# Patient Record
Sex: MALE | Race: WHITE | NOT HISPANIC OR LATINO | Employment: OTHER | URBAN - METROPOLITAN AREA
[De-identification: names, ages, dates, MRNs, and addresses within clinical notes are randomized per-mention and may not be internally consistent; named-entity substitution may affect disease eponyms.]

---

## 2017-02-08 ENCOUNTER — ANTICOAGULATION THERAPY VISIT (OUTPATIENT)
Dept: FAMILY MEDICINE | Facility: CLINIC | Age: 72
End: 2017-02-08

## 2017-02-08 DIAGNOSIS — B19.20 HEPATITIS C: ICD-10-CM

## 2017-02-08 DIAGNOSIS — E11.9 TYPE 2 DIABETES MELLITUS WITHOUT COMPLICATION (H): ICD-10-CM

## 2017-02-08 DIAGNOSIS — I50.9 CHF (NYHA CLASS II, ACC/AHA STAGE C) (H): ICD-10-CM

## 2017-02-08 DIAGNOSIS — I48.20 CHRONIC ATRIAL FIBRILLATION (H): ICD-10-CM

## 2017-02-08 DIAGNOSIS — I48.20 CHRONIC ATRIAL FIBRILLATION (H): Primary | ICD-10-CM

## 2017-02-08 LAB
ALBUMIN SERPL-MCNC: 3.8 G/DL (ref 3.4–5)
ALP SERPL-CCNC: 156 U/L (ref 40–150)
ALT SERPL W P-5'-P-CCNC: 36 U/L (ref 0–70)
ANION GAP SERPL CALCULATED.3IONS-SCNC: 5 MMOL/L (ref 3–14)
AST SERPL W P-5'-P-CCNC: 30 U/L (ref 0–45)
BILIRUB SERPL-MCNC: 1.1 MG/DL (ref 0.2–1.3)
BUN SERPL-MCNC: 12 MG/DL (ref 7–30)
CALCIUM SERPL-MCNC: 9.4 MG/DL (ref 8.5–10.1)
CHLORIDE SERPL-SCNC: 99 MMOL/L (ref 94–109)
CO2 SERPL-SCNC: 33 MMOL/L (ref 20–32)
CREAT SERPL-MCNC: 0.96 MG/DL (ref 0.66–1.25)
DIGOXIN SERPL-MCNC: 0.5 UG/L (ref 0.5–2)
GFR SERPL CREATININE-BSD FRML MDRD: 77 ML/MIN/1.7M2
GLUCOSE SERPL-MCNC: 289 MG/DL (ref 70–99)
HBA1C MFR BLD: 10.2 % (ref 4.3–6)
INR PPP: 3.6 (ref 0.86–1.14)
POTASSIUM SERPL-SCNC: 5.1 MMOL/L (ref 3.4–5.3)
PROT SERPL-MCNC: 7.5 G/DL (ref 6.8–8.8)
SODIUM SERPL-SCNC: 137 MMOL/L (ref 133–144)

## 2017-02-08 PROCEDURE — 87522 HEPATITIS C REVRS TRNSCRPJ: CPT | Performed by: INTERNAL MEDICINE

## 2017-02-08 PROCEDURE — 85610 PROTHROMBIN TIME: CPT | Performed by: INTERNAL MEDICINE

## 2017-02-08 PROCEDURE — 80053 COMPREHEN METABOLIC PANEL: CPT | Performed by: INTERNAL MEDICINE

## 2017-02-08 PROCEDURE — 80162 ASSAY OF DIGOXIN TOTAL: CPT | Performed by: INTERNAL MEDICINE

## 2017-02-08 PROCEDURE — 36415 COLL VENOUS BLD VENIPUNCTURE: CPT | Performed by: INTERNAL MEDICINE

## 2017-02-08 PROCEDURE — 83036 HEMOGLOBIN GLYCOSYLATED A1C: CPT | Performed by: INTERNAL MEDICINE

## 2017-02-08 NOTE — PROGRESS NOTES
Quick Note:    Dear Keith,    Your recent test results are attached.     Digoxin level is stable.    If you have any questions please feel free to contact [phone] or myself via [employer].    Sincerely,  Anh Irvin CNP    ______

## 2017-02-08 NOTE — PROGRESS NOTES
Quick Note:    Dear Keith,    Your recent test results are attached.     Pre-visit lab to be discussed in clinic with Dr. Gregorio.    If you have any questions please feel free to contact [phone] or myself via [employer].    Sincerely,  Anh Irvin, CELIA    ______

## 2017-02-09 LAB
HCV RNA SERPL NAA+PROBE-ACNC: NORMAL [IU]/ML
HCV RNA SERPL NAA+PROBE-LOG IU: NORMAL LOG IU/ML

## 2017-02-13 NOTE — PROGRESS NOTES
"  SUBJECTIVE:                                                    Keith Desir is a 71 year old male who presents to clinic today for the following health issues:    Diabetes Follow-up      Patient is checking blood sugars: not at all    Diabetic concerns: None     Symptoms of hypoglycemia (low blood sugar): none     Paresthesias (numbness or burning in feet) or sores: No     Date of last diabetic eye exam: 2016     Hyperlipidemia Follow-Up      Rate your low fat/cholesterol diet?: good    Taking statin?  Yes, no muscle aches from statin    Other lipid medications/supplements?:  none     Hypertension Follow-up      Outpatient blood pressures are not being checked.    Low Salt Diet: no added salt       Amount of exercise or physical activity: None    Problems taking medications regularly: No    Medication side effects: none    Diet: diabetic    He admits to regularly eating candy and drinking pop despite seeing the diabetes educator. He denies PND, orthopnea, or worsening lower extremity edema. He will see his cardiologist tomorrow, and would like to switch to a newer anticoagulant for atrial fibrillation, if affordable. He denies worsening shortness of breath and continues to smoke.     Hypercholesterolemia well controlled with current treatment plan without side effects.     I have reviewed the patient's medical history in detail and updated the computerized patient record.     ROS:  C: NEGATIVE for fever, chills, change in weight  INTEGUMENTARY/SKIN: skin lesion on right shoulder   RESP: COPD   CV: see above   M: NEGATIVE for significant arthralgias or myalgia  ENDOCRINE: see HPI   H: NEGATIVE for bleeding problems  P: NEGATIVE for changes in mood or affect    OBJECTIVE:                                                    /88 (BP Location: Left arm, Patient Position: Chair, Cuff Size: Adult Large)  Pulse 115  Temp 97.5  F (36.4  C) (Oral)  Resp 13  Ht 5' 9\" (1.753 m)  Wt 226 lb (102.5 kg)  SpO2 94%  BMI " 33.37 kg/m2  Body mass index is 33.37 kg/(m^2).  GENERAL: alert, no distress and obese  NECK: no adenopathy, no asymmetry, masses, or scars and thyroid normal to palpation  RESP: decreased breath sounds throughout  CV: irregularly irregular rhythm, normal S1 S2, no S3 or S4, no murmur, click or rub and 1+ bilateral lower extremity pitting edema to calves     MS: extremities normal- no gross deformities noted  SKIN: no suspicious lesions or rashes and keratoses - seborrheic  NEURO: mentation intact  Diabetic foot exam: no trophic changes or ulcerative lesions and abnormal bilateral monofilament examination     Diagnostic Test Results:  Results for orders placed or performed in visit on 02/15/17   Hemoglobin A1c   Result Value Ref Range    Hemoglobin A1C 10.3 (H) 4.3 - 6.0 %        ASSESSMENT/PLAN:                                                    (E11.9) Type 2 diabetes mellitus without complication, without long-term current use of insulin (H)  (primary encounter diagnosis)  Comment: uncontrolled with lifestyle strategies   Plan: FOOT EXAM  NO CHARGE [30915.114], metFORMIN         (GLUCOPHAGE) 500 MG tablet        Discussed risks and benefits of this medication. Counseled to make better food choices, exercise as tolerated, and lose weight. Avoid candy and pop. Goal blood glucose <130 AM and <180 PM. May need to increase dose and add glipizide. Follow up in one month or sooner for side effects.     (I50.9) CHF (NYHA class II, ACC/AHA stage C) (H)  Comment: euvolemic, on beta blocker and ACE/ARB   Plan: digoxin (LANOXIN) 125 MCG tablet, lisinopril         (PRINIVIL/ZESTRIL) 10 MG tablet, furosemide         (LASIX) 20 MG tablet        Follow-up with cardiology as planned     (I48.2) Chronic atrial fibrillation (HCC)  Comment: rate controlled and anticoagulated   Plan: digoxin (LANOXIN) 125 MCG tablet, warfarin         (COUMADIN) 2.5 MG tablet        Increase dose of metoprolol to improve blood pressure control.  Offered change of coumadin to Pradaxa or Xarelto; patient prefers to discuss with cardiology given cost of medication     (J43.9) Pulmonary emphysema, unspecified emphysema type (H)  Comment: Well controlled with medications without side effects.   Plan: tiotropium (SPIRIVA HANDIHALER) 18 MCG capsule,        albuterol (ALBUTEROL) 108 (90 BASE) MCG/ACT         Inhaler        Urged tobacco cessation and offered my support.     (E78.5) Hyperlipidemia with target LDL less than 100  Comment: Well controlled with medications without side effects.   Lab Results   Component Value Date    LDL 53 07/20/2016     Plan: atorvastatin (LIPITOR) 20 MG tablet           (K82.4) Gallbladder polyp  Plan: US Abdomen Limited          (Z72.0) Tobacco abuse  Plan: Urged cessation and offered my support.     (L82.1) Seborrheic keratosis  Plan: The patient is reassured that these symptoms do not appear to represent a serious or threatening condition.     (Z12.11) Screen for colon cancer  Plan: Fecal colorectal cancer screen (FIT)             See Patient Instructions - lung cancer screening refused by patient     Cayla Gregorio MD  [facility]

## 2017-02-15 ENCOUNTER — OFFICE VISIT (OUTPATIENT)
Dept: FAMILY MEDICINE | Facility: CLINIC | Age: 72
End: 2017-02-15
Payer: COMMERCIAL

## 2017-02-15 ENCOUNTER — TELEPHONE (OUTPATIENT)
Dept: FAMILY MEDICINE | Facility: CLINIC | Age: 72
End: 2017-02-15

## 2017-02-15 ENCOUNTER — TELEPHONE (OUTPATIENT)
Dept: NURSING | Facility: CLINIC | Age: 72
End: 2017-02-15

## 2017-02-15 VITALS
DIASTOLIC BLOOD PRESSURE: 88 MMHG | BODY MASS INDEX: 33.47 KG/M2 | RESPIRATION RATE: 13 BRPM | HEIGHT: 69 IN | TEMPERATURE: 97.5 F | SYSTOLIC BLOOD PRESSURE: 130 MMHG | HEART RATE: 115 BPM | WEIGHT: 226 LBS | OXYGEN SATURATION: 94 %

## 2017-02-15 DIAGNOSIS — Z72.0 TOBACCO ABUSE: ICD-10-CM

## 2017-02-15 DIAGNOSIS — I50.9 CHF (NYHA CLASS II, ACC/AHA STAGE C) (H): ICD-10-CM

## 2017-02-15 DIAGNOSIS — L82.1 SEBORRHEIC KERATOSIS: ICD-10-CM

## 2017-02-15 DIAGNOSIS — Z23 NEED FOR VACCINATION: ICD-10-CM

## 2017-02-15 DIAGNOSIS — E11.9 TYPE 2 DIABETES MELLITUS WITHOUT COMPLICATION, WITHOUT LONG-TERM CURRENT USE OF INSULIN (H): Primary | ICD-10-CM

## 2017-02-15 DIAGNOSIS — I48.20 CHRONIC ATRIAL FIBRILLATION (H): ICD-10-CM

## 2017-02-15 DIAGNOSIS — Z12.11 SCREEN FOR COLON CANCER: ICD-10-CM

## 2017-02-15 DIAGNOSIS — E78.5 HYPERLIPIDEMIA WITH TARGET LDL LESS THAN 100: ICD-10-CM

## 2017-02-15 DIAGNOSIS — K82.4 GALLBLADDER POLYP: ICD-10-CM

## 2017-02-15 DIAGNOSIS — J43.9 PULMONARY EMPHYSEMA, UNSPECIFIED EMPHYSEMA TYPE (H): Chronic | ICD-10-CM

## 2017-02-15 DIAGNOSIS — I48.20 CHRONIC ATRIAL FIBRILLATION (H): Primary | ICD-10-CM

## 2017-02-15 LAB — HBA1C MFR BLD: 10.3 % (ref 4.3–6)

## 2017-02-15 PROCEDURE — 36415 COLL VENOUS BLD VENIPUNCTURE: CPT | Performed by: FAMILY MEDICINE

## 2017-02-15 PROCEDURE — 85610 PROTHROMBIN TIME: CPT | Performed by: FAMILY MEDICINE

## 2017-02-15 PROCEDURE — 99214 OFFICE O/P EST MOD 30 MIN: CPT | Mod: 25 | Performed by: FAMILY MEDICINE

## 2017-02-15 PROCEDURE — 83036 HEMOGLOBIN GLYCOSYLATED A1C: CPT | Performed by: FAMILY MEDICINE

## 2017-02-15 PROCEDURE — 90732 PPSV23 VACC 2 YRS+ SUBQ/IM: CPT | Performed by: FAMILY MEDICINE

## 2017-02-15 PROCEDURE — 99207 C FOOT EXAM  NO CHARGE: CPT | Performed by: FAMILY MEDICINE

## 2017-02-15 PROCEDURE — G0009 ADMIN PNEUMOCOCCAL VACCINE: HCPCS | Performed by: FAMILY MEDICINE

## 2017-02-15 RX ORDER — METOPROLOL TARTRATE 100 MG
300 TABLET ORAL 2 TIMES DAILY
Qty: 540 TABLET | Refills: 0 | Status: SHIPPED | OUTPATIENT
Start: 2017-02-15 | End: 2017-04-26

## 2017-02-15 RX ORDER — GLIPIZIDE 10 MG/1
TABLET, FILM COATED, EXTENDED RELEASE ORAL
Qty: 180 TABLET | Refills: 0 | Status: CANCELLED | OUTPATIENT
Start: 2017-02-15

## 2017-02-15 RX ORDER — ALBUTEROL SULFATE 90 UG/1
1-2 AEROSOL, METERED RESPIRATORY (INHALATION) EVERY 4 HOURS PRN
Qty: 1 INHALER | Refills: 11 | Status: SHIPPED | OUTPATIENT
Start: 2017-02-15 | End: 2018-05-23 | Stop reason: ALTCHOICE

## 2017-02-15 RX ORDER — LISINOPRIL 10 MG/1
TABLET ORAL
Qty: 90 TABLET | Refills: 3 | Status: SHIPPED | OUTPATIENT
Start: 2017-02-15 | End: 2017-03-22 | Stop reason: DRUGHIGH

## 2017-02-15 RX ORDER — ATORVASTATIN CALCIUM 20 MG/1
TABLET, FILM COATED ORAL
Qty: 90 TABLET | Refills: 3 | Status: SHIPPED | OUTPATIENT
Start: 2017-02-15 | End: 2018-02-14

## 2017-02-15 RX ORDER — WARFARIN SODIUM 2.5 MG/1
TABLET ORAL
Qty: 220 TABLET | Refills: 1 | Status: SHIPPED | OUTPATIENT
Start: 2017-02-15 | End: 2017-04-26

## 2017-02-15 RX ORDER — METOPROLOL TARTRATE 100 MG
300 TABLET ORAL 2 TIMES DAILY
Qty: 540 TABLET | Refills: 0 | Status: SHIPPED | OUTPATIENT
Start: 2017-02-15 | End: 2017-02-15

## 2017-02-15 RX ORDER — DIGOXIN 125 MCG
TABLET ORAL
Qty: 90 TABLET | Refills: 3 | Status: SHIPPED | OUTPATIENT
Start: 2017-02-15 | End: 2018-02-27

## 2017-02-15 RX ORDER — FUROSEMIDE 20 MG
20 TABLET ORAL 2 TIMES DAILY
Qty: 180 TABLET | Refills: 1 | Status: SHIPPED | OUTPATIENT
Start: 2017-02-15 | End: 2017-04-26

## 2017-02-15 RX ORDER — TIOTROPIUM BROMIDE 18 UG/1
CAPSULE ORAL; RESPIRATORY (INHALATION)
Qty: 90 CAPSULE | Refills: 3 | Status: SHIPPED | OUTPATIENT
Start: 2017-02-15 | End: 2018-05-23 | Stop reason: ALTCHOICE

## 2017-02-15 NOTE — PATIENT INSTRUCTIONS
Eat healthy foods like fruits, vegetables, chicken, fish, nuts, and low fat yogurt. Drink water and skim milk instead of pop and juice. Avoid sweets. Exercise more. I'd like you to see our diabetes educator for help with this.    Call the imaging center at [phone] or  [phone] to schedule your gallbladder ultrasound.        [facility]    If you have any questions regarding to your visit please contact your care team:       Team Red:   Clinic Hours Telephone Number   Dr. Cayla Marie, NP   7am-7pm  Monday - Thursday   7am-5pm  Fridays  [phone]  (Appointment scheduling available 24/7)    Questions about your visit?   Team Line  [phone]   Urgent Care - [place] and [facility] - 11am-9pm Monday-Friday Saturday-Sunday- 9am-5pm   [place] - 5pm-9pm Monday-Friday Saturday-Sunday- 9am-5pm  [phone] - [facility]  [phone] - [place]       What options do I have for visits at the clinic other than the traditional office visit?  To expand how we care for you, many of our providers are utilizing electronic visits (e-visits) and telephone visits, when medically appropriate, for interactions with their patients rather than a visit in the clinic.   We also offer nurse visits for many medical concerns. Just like any other service, we will bill your insurance company for this type of visit based on time spent on the phone with your provider. Not all insurance companies cover these visits. Please check with your medical insurance if this type of visit is covered. You will be responsible for any charges that are not paid by your insurance.      E-visits via [employer]:  generally incur a $35.00 fee.  Telephone visits:  Time spent on the phone: *charged based on time that is spent on the phone in increments of 10 minutes. Estimated cost:   5-10 mins $30.00   11-20 mins. $59.00   21-30 mins. $85.00     Use [employer] (secure email  communication and access to your chart) to send your primary care provider a message or make an appointment. Ask someone on your Team how to sign up for [employer].  For a Price Quote for your services, please call our [facility] Price Line at [phone].      As always, Thank you for trusting us with your health care needs!    Discharged by Gricelda Cuenca MA.

## 2017-02-15 NOTE — TELEPHONE ENCOUNTER
Reason for call: Other   Patient called regarding (reason for call): call back  Additional comments: metoprolol (LOPRESSOR) 2 sets of directions - please call back to clarify.    Phone Number Pt can be reached at: Other phone number:  [phone]  Best Time: anytime  Can we leave a detailed message on this number? YES

## 2017-02-15 NOTE — TELEPHONE ENCOUNTER
Erin contacted  INR nurse to inform that she thought patient had INR drawn today at appointment today with primary provider in [place].  Per review of chart, no INR appears to have been drawn while patient had lab work done at [place].  Erin stated that she would contact the [place] lab to determine if the INR was drawn and what the value was.  Erin stated she would contact the  INR Clinic with an update.  Advised Erin that if no INR was drawn at today's office visit, patient should schedule a point of care INR appointment as soon as possible due to recently supratherapeutic INR results.  Erin verbalized good understanding, stated she would update  INR Clinic as soon as possible.  Joan Monte RN

## 2017-02-15 NOTE — TELEPHONE ENCOUNTER
Signed Prescriptions:                        Disp   Refills    metoprolol (LOPRESSOR) 100 MG tablet       540 ta*0        Sig: Take 3 tablets (300 mg) by mouth 2 times daily  Authorizing Provider: DEVON PAREDES

## 2017-02-15 NOTE — TELEPHONE ENCOUNTER
Will route to the provider to clarify on the Metoprolol direction as there are two different directions.  Please review the sig below and advise.       Medication Detail         Disp Refills Start End WILMAN     metoprolol (LOPRESSOR) 100 MG tablet 540 tablet 0 2/15/2017  No     Sig: Take 3 tablets (300 mg) by mouth 2 times daily TAKE 3 TABLETS EVERY MORNING AND 2 TABLETS EVERY EVENING     Class: E-Prescribe     Route: Oral     Order: [id number]     E-Prescribing Status: Receipt confirmed by pharmacy (2/15/2017  7:42 AM CST)             Jaydon GARCIA, RN, BSN

## 2017-02-15 NOTE — MR AVS SNAPSHOT
After Visit Summary   2/15/2017    Keith Desir    MRN: [id number]           Patient Information     Date Of Birth          1945        Visit Information        Provider Department      2/15/2017 7:00 AM Cayla Gregorio MD [facility] [place]        Today's Diagnoses     Type 2 diabetes mellitus without complication, without long-term current use of insulin (H)    -  1    CHF (NYHA class II, ACC/AHA stage C) (H)        Chronic atrial fibrillation (HCC)        Pulmonary emphysema, unspecified emphysema type (H)        Hyperlipidemia with target LDL less than 100        Gallbladder polyp        Tobacco abuse        Seborrheic keratosis        Screen for colon cancer        Need for vaccination          Care Instructions    Eat healthy foods like fruits, vegetables, chicken, fish, nuts, and low fat yogurt. Drink water and skim milk instead of pop and juice. Avoid sweets. Exercise more. I'd like you to see our diabetes educator for help with this.    Call the imaging center at [phone] or  [phone] to schedule your gallbladder ultrasound.        [facility]    If you have any questions regarding to your visit please contact your care team:       Team Red:   Clinic Hours Telephone Number   Dr. Cayla Marie, NP   7am-7pm  Monday - Thursday   7am-5pm  Fridays  [phone]  (Appointment scheduling available 24/7)    Questions about your visit?   Team Line  [phone]   Urgent Care - [place] and [place] - 11am-9pm Monday-Friday Saturday-Sunday- 9am-5pm   [place] - 5pm-9pm Monday-Friday Saturday-Sunday- 9am-5pm  [phone] - [place]   [phone] - [place]       What options do I have for visits at the clinic other than the traditional office visit?  To expand how we care for you, many of our providers are utilizing electronic visits (e-visits) and telephone visits, when medically appropriate,  for interactions with their patients rather than a visit in the clinic.   We also offer nurse visits for many medical concerns. Just like any other service, we will bill your insurance company for this type of visit based on time spent on the phone with your provider. Not all insurance companies cover these visits. Please check with your medical insurance if this type of visit is covered. You will be responsible for any charges that are not paid by your insurance.      E-visits via [employer]:  generally incur a $35.00 fee.  Telephone visits:  Time spent on the phone: *charged based on time that is spent on the phone in increments of 10 minutes. Estimated cost:   5-10 mins $30.00   11-20 mins. $59.00   21-30 mins. $85.00     Use [employer] (secure email communication and access to your chart) to send your primary care provider a message or make an appointment. Ask someone on your Team how to sign up for [employer].  For a Price Quote for your services, please call our [employer] Line at [phone].      As always, Thank you for trusting us with your health care needs!    Discharged by Gricelda Cuenca MA.          Follow-ups after your visit        Follow-up notes from your care team     Return in about 1 month (around 3/15/2017), or blood sugar , for BP Recheck.      Your next 10 appointments already scheduled     Feb 16, 2017 11:40 AM CST   Return Visit with Nayeli Hurt MD   [facility] ([facility]    [address] [place] [id number] [phone]              Future tests that were ordered for you today     Open Future Orders        Priority Expected Expires Ordered    Fecal colorectal cancer screen (FIT) Routine 2/15/2017 2/15/2018 2/15/2017    US Abdomen Limited Routine  4/1/2017 2/15/2017            Who to contact     If you have questions or need follow up information about today's clinic visit or your schedule please contact [facility] directly at  "[phone].  Normal or non-critical lab and imaging results will be communicated to you by MyChart, letter or phone within 4 business days after the clinic has received the results. If you do not hear from us within 7 days, please contact the clinic through [employer] or phone. If you have a critical or abnormal lab result, we will notify you by phone as soon as possible.  Submit refill requests through "[employer] or call your pharmacy and they will forward the refill request to us. Please allow 3 business days for your refill to be completed.          Additional Information About Your Visit        [employer] Information     "[employer] gives you secure access to your electronic health record. If you see a primary care provider, you can also send messages to your care team and make appointments. If you have questions, please call your primary care clinic.  If you do not have a primary care provider, please call [phone] and they will assist you.        Care EveryWhere ID     This is your Care EveryWhere ID. This could be used by other organizations to access your [facility] medical records  [id number]        Your Vitals Were     Pulse Temperature Respirations Height Pulse Oximetry BMI (Body Mass Index)    115 97.5  F (36.4  C) (Oral) 13 5' 9\" (1.753 m) 94% 33.37 kg/m2       Blood Pressure from Last 3 Encounters:   02/15/17 152/90   07/28/16 146/89   07/20/16 120/70    Weight from Last 3 Encounters:   02/15/17 226 lb (102.5 kg)   07/28/16 232 lb 9.6 oz (105.5 kg)   07/20/16 234 lb (106.1 kg)              We Performed the Following     FOOT EXAM  NO CHARGE [19828.114]     Hemoglobin A1c     PNEUMOVOCCAL VACCINE 23 VALENT (PNEUMOVAX 23)          Today's Medication Changes          These changes are accurate as of: 2/15/17  7:44 AM.  If you have any questions, ask your nurse or doctor.               Start taking these medicines.        Dose/Directions    metFORMIN 500 MG tablet   Commonly known as:  GLUCOPHAGE   Used for:  " Type 2 diabetes mellitus without complication, without long-term current use of insulin (H)   Started by:  Cayla Gregorio MD        Take 1 tablet by mouth every evening for 1 week then increase to 1 tablet twice daily   Quantity:  180 tablet   Refills:  0         These medicines have changed or have updated prescriptions.        Dose/Directions    metoprolol 100 MG tablet   Commonly known as:  LOPRESSOR   This may have changed:    - how much to take  - how to take this  - when to take this   Used for:  CHF (NYHA class II, ACC/AHA stage C) (H), Chronic atrial fibrillation (H)   Changed by:  Cayla Gregorio MD        Dose:  300 mg   Take 3 tablets (300 mg) by mouth 2 times daily TAKE 3 TABLETS EVERY MORNING AND 2 TABLETS EVERY EVENING   Quantity:  540 tablet   Refills:  0            Where to get your medicines      These medications were sent to [id number] - [place], [place] - [address]  [address] [place] [address]    Hours:  test fax sent successfully 7/31/03 kr Phone:  [phone]     albuterol 108 (90 BASE) MCG/ACT Inhaler    atorvastatin 20 MG tablet    digoxin 125 MCG tablet    furosemide 20 MG tablet    lisinopril 10 MG tablet    metFORMIN 500 MG tablet    metoprolol 100 MG tablet    tiotropium 18 MCG capsule    warfarin 2.5 MG tablet                Primary Care Provider Office Phone # Fax #    Cayla Gregorio [phone]       [address] [address]        Thank you!     Thank you for choosing [facility]  for your care. Our goal is always to provide you with excellent care. Hearing back from our patients is one way we can continue to improve our services. Please take a few minutes to complete the written survey that you may receive in the mail after your visit with us. Thank you!             Your Updated Medication List - Protect others around you: Learn how to safely use, store and throw away  your medicines at www.disposemymeds.org.          This list is accurate as of: 2/15/17  7:44 AM.  Always use your most recent med list.                   Brand Name Dispense Instructions for use    albuterol 108 (90 BASE) MCG/ACT Inhaler    albuterol    1 Inhaler    Inhale 1-2 puffs into the lungs every 4 hours as needed       ASPIRIN NOT PRESCRIBED    INTENTIONAL     Antiplatelet medication not prescribed intentionally due to Current anticoagulant therapy (warfarin/enoxaparin)       atorvastatin 20 MG tablet    LIPITOR    90 tablet    TAKE 1 TABLET (20 MG) BY MOUTH DAILY       digoxin 125 MCG tablet    LANOXIN    90 tablet    TAKE 1 TABLET (125 MCG) BY MOUTH DAILY       furosemide 20 MG tablet    LASIX    180 tablet    Take 1 tablet (20 mg) by mouth 2 times daily       lisinopril 10 MG tablet    PRINIVIL/ZESTRIL    90 tablet    TAKE 1 TABLET (10 MG) BY MOUTH DAILY       metFORMIN 500 MG tablet    GLUCOPHAGE    180 tablet    Take 1 tablet by mouth every evening for 1 week then increase to 1 tablet twice daily       metoprolol 100 MG tablet    LOPRESSOR    540 tablet    Take 3 tablets (300 mg) by mouth 2 times daily TAKE 3 TABLETS EVERY MORNING AND 2 TABLETS EVERY EVENING       tiotropium 18 MCG capsule    SPIRIVA HANDIHALER    90 capsule    INHALE 1 CAPSULE (18 MCG) INTO THE LUNGS DAILY       warfarin 2.5 MG tablet    COUMADIN    220 tablet    Take 2 tablets (5mg) Tuesday and 3 tablets (7.5 mg) other 6 days of the week or as directed by [facility] nurse

## 2017-02-16 ENCOUNTER — TRANSFERRED RECORDS (OUTPATIENT)
Dept: HEALTH INFORMATION MANAGEMENT | Facility: CLINIC | Age: 72
End: 2017-02-16

## 2017-02-16 ENCOUNTER — OFFICE VISIT (OUTPATIENT)
Dept: GASTROENTEROLOGY | Facility: CLINIC | Age: 72
End: 2017-02-16
Payer: COMMERCIAL

## 2017-02-16 ENCOUNTER — ANTICOAGULATION THERAPY VISIT (OUTPATIENT)
Dept: NURSING | Facility: CLINIC | Age: 72
End: 2017-02-16
Payer: COMMERCIAL

## 2017-02-16 VITALS
DIASTOLIC BLOOD PRESSURE: 106 MMHG | HEIGHT: 69 IN | SYSTOLIC BLOOD PRESSURE: 172 MMHG | HEART RATE: 90 BPM | RESPIRATION RATE: 20 BRPM | WEIGHT: 228.6 LBS | BODY MASS INDEX: 33.86 KG/M2

## 2017-02-16 DIAGNOSIS — B19.20: Primary | ICD-10-CM

## 2017-02-16 DIAGNOSIS — K76.0 NAFLD (NONALCOHOLIC FATTY LIVER DISEASE): ICD-10-CM

## 2017-02-16 DIAGNOSIS — Z79.01 LONG-TERM (CURRENT) USE OF ANTICOAGULANTS: ICD-10-CM

## 2017-02-16 DIAGNOSIS — I48.20 CHRONIC ATRIAL FIBRILLATION (H): ICD-10-CM

## 2017-02-16 LAB — INR PPP: 2.6 (ref 0.86–1.14)

## 2017-02-16 PROCEDURE — 99214 OFFICE O/P EST MOD 30 MIN: CPT | Performed by: INTERNAL MEDICINE

## 2017-02-16 PROCEDURE — 99207 ZZC NO CHARGE NURSE ONLY: CPT | Performed by: FAMILY MEDICINE

## 2017-02-16 ASSESSMENT — PAIN SCALES - GENERAL: PAINLEVEL: NO PAIN (0)

## 2017-02-16 NOTE — NURSING NOTE
"Keith Desir's goals for this visit include:   Chief Complaint   Patient presents with     RECHECK     Hep C     He requests these members of his care team be copied on today's visit information: YES - PCP     Initial BP (!) 172/106  Pulse 90  Resp 20  Ht 1.753 m (5' 9\")  Wt 103.7 kg (228 lb 9.6 oz)  BMI 33.76 kg/m2 Estimated body mass index is 33.76 kg/(m^2) as calculated from the following:    Height as of this encounter: 1.753 m (5' 9\").    Weight as of this encounter: 103.7 kg (228 lb 9.6 oz).  BP completed using cuff size: large      "

## 2017-02-16 NOTE — TELEPHONE ENCOUNTER
This writer attempted to contact Keith's daughter Erin on 02/16/17.    Was call answered?  No.  Left message on voicemail with information to call me back.    If patient calls back, please contact [place] [facility] nurse.    Joan Monte

## 2017-02-16 NOTE — MR AVS SNAPSHOT
After Visit Summary   2/16/2017    Keith Desir    MRN: [id number]           Patient Information     Date Of Birth          1945        Visit Information        Provider Department      2/16/2017 11:40 AM Nayeli Hurt MD [facility]        Today's Diagnoses     Viral hepatitis C without coma    -  1    NAFLD (nonalcoholic fatty liver disease)           Follow-ups after your visit        Future tests that were ordered for you today     Open Future Orders        Priority Expected Expires Ordered    Fecal colorectal cancer screen (FIT) Routine 2/15/2017 2/15/2018 2/15/2017    US Abdomen Limited Routine  4/1/2017 2/15/2017            Who to contact     If you have questions or need follow up information about today's clinic visit or your schedule please contact [facility] directly at [phone].  Normal or non-critical lab and imaging results will be communicated to you by [employer], letter or phone within 4 business days after the clinic has received the results. If you do not hear from us within 7 days, please contact the clinic through [employer] or phone. If you have a critical or abnormal lab result, we will notify you by phone as soon as possible.  Submit refill requests through [employer] or call your pharmacy and they will forward the refill request to us. Please allow 3 business days for your refill to be completed.          Additional Information About Your Visit        [employer] Information     [employer] gives you secure access to your electronic health record. If you see a primary care provider, you can also send messages to your care team and make appointments. If you have questions, please call your primary care clinic.  If you do not have a primary care provider, please call [phone] and they will assist you.      [employer] is an electronic gateway that provides easy, online access to your medical records. With [employer], you can request a clinic  "appointment, read your test results, renew a prescription or communicate with your care team.     To access your existing account, please contact your [facility] or call [phone] for assistance.        Care EveryWhere ID     This is your Care EveryWhere ID. This could be used by other organizations to access your [place] medical records  [id number]        Your Vitals Were     Pulse Respirations Height BMI (Body Mass Index)          90 20 1.753 m (5' 9\") 33.76 kg/m2         Blood Pressure from Last 3 Encounters:   02/16/17 (!) 172/106   02/15/17 130/88   07/28/16 146/89    Weight from Last 3 Encounters:   02/16/17 103.7 kg (228 lb 9.6 oz)   02/15/17 102.5 kg (226 lb)   07/28/16 105.5 kg (232 lb 9.6 oz)              Today, you had the following     No orders found for display       Primary Care Provider Office Phone # Fax #    Cayla Gregorio [phone]       [facility] [address] [address]        Thank you!     Thank you for choosing [facility]  for your care. Our goal is always to provide you with excellent care. Hearing back from our patients is one way we can continue to improve our services. Please take a few minutes to complete the written survey that you may receive in the mail after your visit with us. Thank you!             Your Updated Medication List - Protect others around you: Learn how to safely use, store and throw away your medicines at www.disposemymeds.org.          This list is accurate as of: 2/16/17 12:52 PM.  Always use your most recent med list.                   Brand Name Dispense Instructions for use    albuterol 108 (90 BASE) MCG/ACT Inhaler    albuterol    1 Inhaler    Inhale 1-2 puffs into the lungs every 4 hours as needed       ASPIRIN NOT PRESCRIBED    INTENTIONAL     Antiplatelet medication not prescribed intentionally due to Current anticoagulant therapy (warfarin/enoxaparin)    "    atorvastatin 20 MG tablet    LIPITOR    90 tablet    TAKE 1 TABLET (20 MG) BY MOUTH DAILY       digoxin 125 MCG tablet    LANOXIN    90 tablet    TAKE 1 TABLET (125 MCG) BY MOUTH DAILY       furosemide 20 MG tablet    LASIX    180 tablet    Take 1 tablet (20 mg) by mouth 2 times daily       lisinopril 10 MG tablet    PRINIVIL/ZESTRIL    90 tablet    TAKE 1 TABLET (10 MG) BY MOUTH DAILY       metFORMIN 500 MG tablet    GLUCOPHAGE    180 tablet    Take 1 tablet by mouth every evening for 1 week then increase to 1 tablet twice daily       metoprolol 100 MG tablet    LOPRESSOR    540 tablet    Take 3 tablets (300 mg) by mouth 2 times daily       tiotropium 18 MCG capsule    SPIRIVA HANDIHALER    90 capsule    INHALE 1 CAPSULE (18 MCG) INTO THE LUNGS DAILY       warfarin 2.5 MG tablet    COUMADIN    220 tablet    Take 2 tablets (5mg) Tuesday and 3 tablets (7.5 mg) other 6 days of the week or as directed by [facility] nurse

## 2017-02-16 NOTE — PROGRESS NOTES
Hepatology Clinic note  Keith Desir   Date of Birth 1945  Date of Service 2/16/2017         Impression/plan:   Keith Desir is a 69 yo man with history of HTN, HLD, obesity, DM, COPD, A.fib on anticoagulation, HCV infection s/p treatment and achieved SVR, and likely NAFLD/BELL  He presents for follow up.     He's doing well and achieved SVR of HCV infection. He did not have signs of advanced liver disease. However, he continues to have elevated Alk Phos, and this is likely related to  NAFLD/BELL and the previously noted imaging along with his metabolic risk factors.   Again, encouraged him to exercise and improved dietary habits to results in weight loss.   It would be unfortunate to cure HCV but develop progressive liver disease related to BELL.   I suggest we visit again in 1 year, if still with abnormal liver enzymes despite improvement in his metabolic parameters, then may consider further workup such as MR elastography and/or liver biopsy.     RTC in 1 year or sooner as needed    Nayeli Hurt MD  [facility]    -----------------------------------------------------       HPI:   Keith Desir is a 69 yo man with history of HTN, HLD, obesity, DM, COPD, A.fib on anticoagulation, HCV infection s/p treatment and achieved SVR, and likely NAFLD/BELL  He presents for follow up.     Please refer to prior clinic visit for details of initial presentation.   Since last visit, he completed treatment with Sof/Dac x 12 weeks and achieved SVR. He tolerated the treatment well. However, he admits to poor control of his DM, and reduced exercise, as evidence of the increase HgA1C.  Otherwise, notes to be stable and without acute problems such as chest pain or shortness of breath, no abdominal pain, nausea, vomiting fevers, chills, bleeding, confusion, falls, rash, pruritis, leg swelling or abdominal distention. Has stable weight, appetite and bowel habits.         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     Hepatitis C      HTN (hypertension)      Hyperlipidemia      Morbid obesity (H)      Polycythemia secondary to smoking             Past Surgical History:     Past Surgical History   Procedure Laterality Date     Tonsillectomy & adenoidectomy       Angiogram  8/2006     normal            Medications:     Current Outpatient Prescriptions   Medication     atorvastatin (LIPITOR) 20 MG tablet     tiotropium (SPIRIVA HANDIHALER) 18 MCG capsule     digoxin (LANOXIN) 125 MCG tablet     lisinopril (PRINIVIL/ZESTRIL) 10 MG tablet     furosemide (LASIX) 20 MG tablet     albuterol (ALBUTEROL) 108 (90 BASE) MCG/ACT Inhaler     warfarin (COUMADIN) 2.5 MG tablet     metFORMIN (GLUCOPHAGE) 500 MG tablet     metoprolol (LOPRESSOR) 100 MG tablet     ASPIRIN NOT PRESCRIBED, INTENTIONAL,     No current facility-administered medications for this visit.             Allergies:     Allergies   Allergen Reactions     Nkda [No Known Drug Allergies]             Social History:     Social History     Social History     Marital status: Remarried     Spouse name: Melissa     Number of children: 4     Years of education: 7     Occupational History     foundry work Disabled      Retired     Social History Main Topics     Smoking status: Current Every Day Smoker     Packs/day: 1.00     Years: 45.00     Types: Cigarettes     Smokeless tobacco: Never Used     Alcohol use 0.0 - 2.5 oz/week     0 - 5 Standard drinks or equivalent per week     Drug use: No     Sexual activity: Not Currently     Partners: Female     Other Topics Concern     Not on file     Social History Narrative            Family History:     Family History  "  Problem Relation Age of Onset     DIABETES Mother      CEREBROVASCULAR DISEASE Father      HEART DISEASE Brother      pacer      CANCER No family hx of               Review of Systems:   10 points ROS was obtained and highlighted in the HPI, otherwise negative.          Physical Exam:   VS:  BP (!) 172/106  Pulse 90  Resp 20  Ht 1.753 m (5' 9\")  Wt 103.7 kg (228 lb 9.6 oz)  BMI 33.76 kg/m2    Gen: A&Ox3, NAD, chronically ill appearing   HEENT: non-icteric, oropharynx clear  CV: RRR  Lung: CTAB  Abd: soft, NT, ND  Ext: trace edema, intact pulses.   Skin: no stigmata of chronic liver disease.   Neuro: no focal deficits, grossly intact, no asterixis   Psych: appropriate mood and affect         Data:   Reviewed in person and significant for:  Lab Results   Component Value Date    WBC 8.1 07/20/2016     Lab Results   Component Value Date    RBC 4.90 07/20/2016     Lab Results   Component Value Date    HGB 16.3 07/20/2016     Lab Results   Component Value Date    HCT 49.1 07/20/2016     No components found for: MCT  Lab Results   Component Value Date     07/20/2016     Lab Results   Component Value Date    MCH 33.3 07/20/2016     Lab Results   Component Value Date    MCHC 33.2 07/20/2016     Lab Results   Component Value Date    RDW 14.4 07/20/2016     Lab Results   Component Value Date     07/20/2016     Last Basic Metabolic Panel:  Lab Results   Component Value Date     02/08/2017      Lab Results   Component Value Date    POTASSIUM 5.1 02/08/2017     Lab Results   Component Value Date    CHLORIDE 99 02/08/2017     Lab Results   Component Value Date    SHAHRIAR 9.4 02/08/2017     Lab Results   Component Value Date    CO2 33 02/08/2017     Lab Results   Component Value Date    BUN 12 02/08/2017     Lab Results   Component Value Date    CR 0.96 02/08/2017     Lab Results   Component Value Date     02/08/2017     Liver Function Studies -   Recent Labs   Lab Test  02/08/17   0936   PROTTOTAL  7.5 "   ALBUMIN  3.8   BILITOTAL  1.1   ALKPHOS  156*   AST  30   ALT  36

## 2017-02-16 NOTE — TELEPHONE ENCOUNTER
Erin returned call and stated that patient had INR drawn at [place] [facility] on 02/15/2017.  Per review of chart, no INR value is shown in office visit documentation or in labs.  Routing to [facility] to please advise if patient had INR drawn while at appointment on 02/15/2017.  Joan Monte RN

## 2017-02-16 NOTE — MR AVS SNAPSHOT
Keith Reeden   2/16/2017   Anticoagulation Therapy Visit    Description:  71 year old male   Provider:  Cayla Gregorio MD   Department:  [facility]           INR as of 2/16/2017     Today's INR 2.60 (2/15/2017)      Anticoagulation Summary as of 2/16/2017     INR goal 2.0-3.0   Today's INR 2.60 (2/15/2017)   Full instructions 5 mg on Sun, Tue, Thu; 7.5 mg all other days   Next INR check 3/29/2017    Indications   Long-term (current) use of anticoagulants [Z79.01] [Z79.01]  Chronic atrial fibrillation (HCC) [I48.2]         Your next Anticoagulation Clinic appointment(s)     Mar 29, 2017 10:00 AM CDT   [facility]    [address] [place] [id number] [phone]              Contact Numbers     [facility]  Please call  [phone] to cancel and/or reschedule your appointment, or with any problems or questions regarding your therapy.        February 2017 Details    Sun Mon Tue Wed Thu Fri Sat        1               2               3               4                 5               6               7               8               9               10               11                 12               13               14               15               16      5 mg   See details      17      7.5 mg         18      7.5 mg           19      5 mg         20      7.5 mg         21      5 mg         22      7.5 mg         23      5 mg         24      7.5 mg         25      7.5 mg           26      5 mg         27      7.5 mg         28      5 mg              Date Details   02/16 This INR check               How to take your warfarin dose     To take:  5 mg Take 2 of the 2.5 mg tablets.    To take:  7.5 mg Take 3 of the 2.5 mg tablets.           March 2017 Details    Sun Mon Tue Wed Thu Fri Sat        1      7.5 mg         2      5 mg         3      7.5 mg         4      7.5 mg           5      5 mg          6      7.5 mg         7      5 mg         8      7.5 mg         9      5 mg         10      7.5 mg         11      7.5 mg           12      5 mg         13      7.5 mg         14      5 mg         15      7.5 mg         16      5 mg         17      7.5 mg         18      7.5 mg           19      5 mg         20      7.5 mg         21      5 mg         22      7.5 mg         23      5 mg         24      7.5 mg         25      7.5 mg           26      5 mg         27      7.5 mg         28      5 mg         29            30               31                 Date Details   No additional details    Date of next INR:  3/29/2017         How to take your warfarin dose     To take:  5 mg Take 2 of the 2.5 mg tablets.    To take:  7.5 mg Take 3 of the 2.5 mg tablets.

## 2017-02-16 NOTE — TELEPHONE ENCOUNTER
Spoke with Preethi at [facility] - INR order was placed and result from 02/15/2017 was entered into EPIC.    This writer attempted to contact Keith's daughter Preethi on 02/16/17.    Was call answered?  No.  Left message on voicemail with information to call me back.    If patient calls back, please contact [facility] INR nurse.    Joan Monte

## 2017-02-17 NOTE — PROGRESS NOTES
ANTICOAGULATION FOLLOW-UP CLINIC VISIT    Patient Name:  Keith Desir  Date:  2/17/2017  Contact Type:  Telephone    SUBJECTIVE:        OBJECTIVE    INR   Date Value Ref Range Status   02/15/2017 2.60 (H) 0.86 - 1.14 Final     Comment:     This test is intended for monitoring Coumadin therapy.  Results are not   accurate   in patients with prolonged INR due to factor deficiency.         ASSESSMENT / PLAN  INR assessment THER    Recheck INR In: 2 WEEKS    INR Location Clinic      Anticoagulation Summary as of 2/16/2017     INR goal 2.0-3.0   Today's INR 2.60 (2/15/2017)   Maintenance plan 5 mg (2.5 mg x 2) on Sun, Tue, Thu; 7.5 mg (2.5 mg x 3) all other days   Full instructions 5 mg on Sun, Tue, Thu; 7.5 mg all other days   Weekly total 45 mg   Plan last modified Joan Monte RN (2/16/2017)   Next INR check 3/29/2017   Target end date     Indications   Long-term (current) use of anticoagulants [Z79.01] [Z79.01]  Chronic atrial fibrillation (HCC) [I48.2]         Anticoagulation Episode Summary     INR check location     Preferred lab     Send INR reminders to  [facility]    Comments       Anticoagulation Care Providers     Provider Role Specialty Phone number    Cayla Gregorio MD [facility] [phone]            See the Encounter Report to view Anticoagulation Flowsheet and Dosing Calendar (Go to Encounters tab in chart review, and find the Anticoagulation Therapy Visit)    Joan Monte RN

## 2017-02-17 NOTE — TELEPHONE ENCOUNTER
Erin returned call to clinic.  Follow-up point of care appointment was scheduled.  Please see anticoagulation encounter from 02/16/2017 for additional information.  Joan Monte RN

## 2017-02-23 ENCOUNTER — MYC MEDICAL ADVICE (OUTPATIENT)
Dept: FAMILY MEDICINE | Facility: CLINIC | Age: 72
End: 2017-02-23

## 2017-02-23 DIAGNOSIS — E11.9 TYPE 2 DIABETES MELLITUS WITHOUT COMPLICATION, WITHOUT LONG-TERM CURRENT USE OF INSULIN (H): Primary | ICD-10-CM

## 2017-02-23 NOTE — TELEPHONE ENCOUNTER
Testing Strips  Last Written Prescription Date:  n/a  Last Fill Quantity: n/a,   # refills: n/a  Last Office Visit with G, [facility] or [facility] prescribing provider: 2/15/2016  Future Office visit:       Routing refill request to provider for review/approval because:  Drug not active on patient's medication list

## 2017-03-22 ENCOUNTER — OFFICE VISIT (OUTPATIENT)
Dept: FAMILY MEDICINE | Facility: CLINIC | Age: 72
End: 2017-03-22
Payer: COMMERCIAL

## 2017-03-22 VITALS
SYSTOLIC BLOOD PRESSURE: 128 MMHG | HEIGHT: 69 IN | DIASTOLIC BLOOD PRESSURE: 88 MMHG | BODY MASS INDEX: 33.47 KG/M2 | TEMPERATURE: 97.2 F | OXYGEN SATURATION: 98 % | HEART RATE: 98 BPM | WEIGHT: 226 LBS

## 2017-03-22 DIAGNOSIS — I10 HYPERTENSION GOAL BP (BLOOD PRESSURE) < 140/90: ICD-10-CM

## 2017-03-22 DIAGNOSIS — E11.9 TYPE 2 DIABETES MELLITUS WITHOUT COMPLICATION, WITHOUT LONG-TERM CURRENT USE OF INSULIN (H): Primary | ICD-10-CM

## 2017-03-22 DIAGNOSIS — Z23 NEED FOR VACCINATION: ICD-10-CM

## 2017-03-22 LAB — HBA1C MFR BLD: 8.9 % (ref 4.3–6)

## 2017-03-22 PROCEDURE — G0010 ADMIN HEPATITIS B VACCINE: HCPCS | Performed by: FAMILY MEDICINE

## 2017-03-22 PROCEDURE — 99214 OFFICE O/P EST MOD 30 MIN: CPT | Mod: 25 | Performed by: FAMILY MEDICINE

## 2017-03-22 PROCEDURE — 83036 HEMOGLOBIN GLYCOSYLATED A1C: CPT | Performed by: FAMILY MEDICINE

## 2017-03-22 PROCEDURE — 36415 COLL VENOUS BLD VENIPUNCTURE: CPT | Performed by: FAMILY MEDICINE

## 2017-03-22 PROCEDURE — 90746 HEPB VACCINE 3 DOSE ADULT IM: CPT | Performed by: FAMILY MEDICINE

## 2017-03-22 RX ORDER — LISINOPRIL 20 MG/1
20 TABLET ORAL DAILY
Qty: 90 TABLET | Refills: 0 | Status: SHIPPED | OUTPATIENT
Start: 2017-03-22 | End: 2017-04-26 | Stop reason: DRUGHIGH

## 2017-03-22 RX ORDER — METFORMIN HCL 500 MG
500 TABLET, EXTENDED RELEASE 24 HR ORAL 2 TIMES DAILY WITH MEALS
Qty: 180 TABLET | Refills: 0 | Status: CANCELLED | OUTPATIENT
Start: 2017-03-22

## 2017-03-22 NOTE — NURSING NOTE
"Chief Complaint   Patient presents with     Diabetes       Initial BP (!) 148/98 (BP Location: Left arm, Patient Position: Chair, Cuff Size: Adult Regular)  Pulse 98  Temp 97.2  F (36.2  C) (Oral)  Ht 5' 9\" (1.753 m)  Wt 226 lb (102.5 kg)  SpO2 98%  BMI 33.37 kg/m2 Estimated body mass index is 33.37 kg/(m^2) as calculated from the following:    Height as of this encounter: 5' 9\" (1.753 m).    Weight as of this encounter: 226 lb (102.5 kg).  Medication Reconciliation: complete    "

## 2017-03-22 NOTE — PROGRESS NOTES
"  SUBJECTIVE:                                                    Keith Desir is a 71 year old male who presents to clinic today for the following health issues:    Diabetes Follow-up      Patient is checking blood sugars: 2 times per day: 125-305    Diabetic concerns: other - soreness in fingers     Symptoms of hypoglycemia (low blood sugar): none     Paresthesias (numbness or burning in feet) or sores: No     Date of last diabetic eye exam: 1 year ago       Amount of exercise or physical activity: gets some    Problems taking medications regularly: No    Medication side effects: none    Diet: regular (no restrictions)    I have reviewed the patient's medical history in detail and updated the computerized patient record.     ROS:  C: NEGATIVE for fever, chills, change in weight  I: NEGATIVE for worrisome rashes, moles or lesions  E: NEGATIVE for vision changes or irritation  CV: NEGATIVE for chest pain, palpitations or peripheral edema  ENDOCRINE: NEGATIVE for hypoglycemia     OBJECTIVE:                                                    BP (!) 148/98 (BP Location: Left arm, Patient Position: Chair, Cuff Size: Adult Regular)  Pulse 98  Temp 97.2  F (36.2  C) (Oral)  Ht 5' 9\" (1.753 m)  Wt 226 lb (102.5 kg)  SpO2 98%  BMI 33.37 kg/m2  Body mass index is 33.37 kg/(m^2).  GENERAL: alert, no distress and obese  MS: extremities normal- no gross deformities noted  NEURO: mentation intact  PSYCH: affect normal/bright    Diagnostic Test Results:  Results for orders placed or performed in visit on 03/22/17   Hemoglobin A1c   Result Value Ref Range    Hemoglobin A1C 8.9 (H) 4.3 - 6.0 %        ASSESSMENT/PLAN:                                                    (E11.9) Type 2 diabetes mellitus without complication, without long-term current use of insulin (H)  (primary encounter diagnosis)  Comment: uncontrolled   Plan: Hemoglobin A1c, metFORMIN (GLUCOPHAGE) 1000 MG         tablet, HEPATITIS B VACCINE,ADULT,IM        " Increase metformin to max dose.     (I10) Hypertension goal BP (blood pressure) < 140/90  Plan: lisinopril (PRINIVIL/ZESTRIL) 20 MG tablet        Increase dose and follow-up in 1 month with labs      See Patient Instructions    Cayla Gregorio MD  [facility]

## 2017-03-22 NOTE — MR AVS SNAPSHOT
After Visit Summary   3/22/2017    Keith Desir    MRN: [id number]           Patient Information     Date Of Birth          1945        Visit Information        Provider Department      3/22/2017 8:40 AM Cayla Gregorio MD [facility]        Today's Diagnoses     Type 2 diabetes mellitus without complication, without long-term current use of insulin (H)    -  1    Hypertension goal BP (blood pressure) < 140/90        Need for vaccination          Care Instructions    Increase the lisinopril dose to 20 mg daily.    Increase the metformin dose to 1000 mg twice daily.     Did you know you can lower your blood pressure with your daily habits?    *Losing 20 pounds of weight lowers blood pressure 5 to 20 points.  *Eating a low-fat diet rich in fruits, vegetables and low-fat dairy lowers blood pressure 8 to 14 points.  *Eating a low-salt diet lowers blood pressure 2 to 8 points.  *Exercising regularly lowers blood pressure 4 to 9 points.  *Reducing alcohol use lowers blood pressure 2 to 4 points.    [facility]    If you have any questions regarding to your visit please contact your care team:       Team Red:   Clinic Hours Telephone Number   Dr. Cayla Marie, NP   7am-7pm  Monday - Thursday   7am-5pm  Fridays  [phone]  (Appointment scheduling available 24/7)    Questions about your visit?   Team Line  [phone]   Urgent Care - [place] and [place] - 11am-9pm Monday-Friday Saturday-Sunday- 9am-5pm   [place] - 5pm-9pm Monday-Friday Saturday-Sunday- 9am-5pm  [phone] - [place]   [phone] - [place]       What options do I have for visits at the clinic other than the traditional office visit?  To expand how we care for you, many of our providers are utilizing electronic visits (e-visits) and telephone visits, when medically appropriate, for interactions with their patients rather than a  visit in the clinic.   We also offer nurse visits for many medical concerns. Just like any other service, we will bill your insurance company for this type of visit based on time spent on the phone with your provider. Not all insurance companies cover these visits. Please check with your medical insurance if this type of visit is covered. You will be responsible for any charges that are not paid by your insurance.      E-visits via [employer]:  generally incur a $35.00 fee.  Telephone visits:  Time spent on the phone: *charged based on time that is spent on the phone in increments of 10 minutes. Estimated cost:   5-10 mins $30.00   11-20 mins. $59.00   21-30 mins. $85.00     Use [employer] (secure email communication and access to your chart) to send your primary care provider a message or make an appointment. Ask someone on your Team how to sign up for i.Sec.  For a Price Quote for your services, please call our [place]elle Line at [phone].      As always, Thank you for trusting us with your health care needs!    Discharged by Gricelda Cuenca MA.          Follow-ups after your visit        Follow-up notes from your care team     Return in about 1 month (around 4/22/2017) for BP Recheck, Lab Work.      Your next 10 appointments already scheduled     Mar 29, 2017 10:00 AM CDT   [facility]    [address] [place] [id number] [phone]              Who to contact     If you have questions or need follow up information about today's clinic visit or your schedule please contact [facility] directly at [phone].  Normal or non-critical lab and imaging results will be communicated to you by MyChart, letter or phone within 4 business days after the clinic has received the results. If you do not hear from us within 7 days, please contact the clinic through MyChart or phone. If you have a  "critical or abnormal lab result, we will notify you by phone as soon as possible.  Submit refill requests through [employer] or call your pharmacy and they will forward the refill request to us. Please allow 3 business days for your refill to be completed.          Additional Information About Your Visit        [employer] Information     [employer] gives you secure access to your electronic health record. If you see a primary care provider, you can also send messages to your care team and make appointments. If you have questions, please call your primary care clinic.  If you do not have a primary care provider, please call [phone] and they will assist you.        Care EveryWhere ID     This is your Care EveryWhere ID. This could be used by other organizations to access your [place] medical records  [id number]        Your Vitals Were     Pulse Temperature Height Pulse Oximetry BMI (Body Mass Index)       98 97.2  F (36.2  C) (Oral) 5' 9\" (1.753 m) 98% 33.37 kg/m2        Blood Pressure from Last 3 Encounters:   03/22/17 (!) 148/98   02/16/17 (!) 172/106   02/15/17 130/88    Weight from Last 3 Encounters:   03/22/17 226 lb (102.5 kg)   02/16/17 228 lb 9.6 oz (103.7 kg)   02/15/17 226 lb (102.5 kg)              We Performed the Following     Hemoglobin A1c     HEPATITIS B VACCINE,ADULT,IM          Today's Medication Changes          These changes are accurate as of: 3/22/17  9:15 AM.  If you have any questions, ask your nurse or doctor.               These medicines have changed or have updated prescriptions.        Dose/Directions    lisinopril 20 MG tablet   Commonly known as:  PRINIVIL/ZESTRIL   This may have changed:    - medication strength  - how much to take  - how to take this  - when to take this  - additional instructions   Used for:  Hypertension goal BP (blood pressure) < 140/90   Changed by:  Cayla Gregorio MD        Dose:  20 mg   Take 1 tablet (20 mg) by mouth daily   Quantity:  90 tablet   Refills:  0 "       metFORMIN 1000 MG tablet   Commonly known as:  GLUCOPHAGE   This may have changed:    - medication strength  - how much to take  - how to take this  - when to take this  - additional instructions   Used for:  Type 2 diabetes mellitus without complication, without long-term current use of insulin (H)   Changed by:  Cayla Gregorio MD        Dose:  1000 mg   Take 1 tablet (1,000 mg) by mouth 2 times daily (with meals)   Quantity:  180 tablet   Refills:  0            Where to get your medicines      These medications were sent to [id number] - [place] - [address]  [address] [place] [address]    Hours:  test fax sent successfully 7/31/03  Phone:  [phone]     lisinopril 20 MG tablet    metFORMIN 1000 MG tablet                Primary Care Provider Office Phone # Fax #    Cayla Gregorio [phone]       [address] [address]        Thank you!     Thank you for choosing [facility]  for your care. Our goal is always to provide you with excellent care. Hearing back from our patients is one way we can continue to improve our services. Please take a few minutes to complete the written survey that you may receive in the mail after your visit with us. Thank you!             Your Updated Medication List - Protect others around you: Learn how to safely use, store and throw away your medicines at www.disposemymeds.org.          This list is accurate as of: 3/22/17  9:15 AM.  Always use your most recent med list.                   Brand Name Dispense Instructions for use    albuterol 108 (90 BASE) MCG/ACT Inhaler    albuterol    1 Inhaler    Inhale 1-2 puffs into the lungs every 4 hours as needed       ASPIRIN NOT PRESCRIBED    INTENTIONAL     Antiplatelet medication not prescribed intentionally due to Current anticoagulant therapy (warfarin/enoxaparin)       atorvastatin 20 MG tablet    LIPITOR     90 tablet    TAKE 1 TABLET (20 MG) BY MOUTH DAILY       digoxin 125 MCG tablet    LANOXIN    90 tablet    TAKE 1 TABLET (125 MCG) BY MOUTH DAILY       furosemide 20 MG tablet    LASIX    180 tablet    Take 1 tablet (20 mg) by mouth 2 times daily       lisinopril 20 MG tablet    PRINIVIL/ZESTRIL    90 tablet    Take 1 tablet (20 mg) by mouth daily       metFORMIN 1000 MG tablet    GLUCOPHAGE    180 tablet    Take 1 tablet (1,000 mg) by mouth 2 times daily (with meals)       metoprolol 100 MG tablet    LOPRESSOR    540 tablet    Take 3 tablets (300 mg) by mouth 2 times daily       order for DME     300 each    Equipment being ordered: Blood glucose monitor per insurance formulary; blood glucose test strips per formulary for use 3 time daily; lancets per formulary for use 3 time daily       tiotropium 18 MCG capsule    SPIRIVA HANDIHALER    90 capsule    INHALE 1 CAPSULE (18 MCG) INTO THE LUNGS DAILY       warfarin 2.5 MG tablet    COUMADIN    220 tablet    Take 2 tablets (5mg) Tuesday and 3 tablets (7.5 mg) other 6 days of the week or as directed by [facility] nurse

## 2017-03-29 ENCOUNTER — TELEPHONE (OUTPATIENT)
Dept: NURSING | Facility: CLINIC | Age: 72
End: 2017-03-29

## 2017-03-29 ENCOUNTER — ANTICOAGULATION THERAPY VISIT (OUTPATIENT)
Dept: NURSING | Facility: CLINIC | Age: 72
End: 2017-03-29
Payer: COMMERCIAL

## 2017-03-29 DIAGNOSIS — I48.20 CHRONIC ATRIAL FIBRILLATION (H): ICD-10-CM

## 2017-03-29 DIAGNOSIS — Z79.01 LONG-TERM (CURRENT) USE OF ANTICOAGULANTS: ICD-10-CM

## 2017-03-29 LAB — INR POINT OF CARE: 1.5 (ref 0.86–1.14)

## 2017-03-29 PROCEDURE — 36416 COLLJ CAPILLARY BLOOD SPEC: CPT

## 2017-03-29 PROCEDURE — 85610 PROTHROMBIN TIME: CPT | Mod: QW

## 2017-03-29 PROCEDURE — 99207 ZZC NO CHARGE NURSE ONLY: CPT

## 2017-03-29 NOTE — MR AVS SNAPSHOT
Juannoemi FABRICE Desir   3/29/2017 10:00 AM   Anticoagulation Therapy Visit    Description:  71 year old male   Provider:  MAJOR HERNANDEZ   Department:  Major Nurse           INR as of 3/29/2017     Today's INR 1.5!      Anticoagulation Summary as of 3/29/2017     INR goal 2.0-3.0   Today's INR 1.5!   Full instructions 3/29: 10 mg; Otherwise 5 mg on Sun, Tue, Thu; 7.5 mg all other days   Next INR check 4/5/2017    Indications   Long-term (current) use of anticoagulants [Z79.01] [Z79.01]  Chronic atrial fibrillation (HCC) [I48.2]         Contact Numbers     [facility]  Please call  [phone] to cancel and/or reschedule your appointment, or with any problems or questions regarding your therapy.        March 2017 Details    Sun Mon Tue Wed Thu Fri Sat        1               2               3               4                 5               6               7               8               9               10               11                 12               13               14               15               16               17               18                 19               20               21               22               23               24               25                 26               27               28               29      10 mg   See details      30      5 mg         31      7.5 mg           Date Details   03/29 This INR check               How to take your warfarin dose     To take:  5 mg Take 2 of the 2.5 mg tablets.    To take:  7.5 mg Take 3 of the 2.5 mg tablets.    To take:  10 mg Take 4 of the 2.5 mg tablets.           April 2017 Details    Sun Mon Tue Wed Thu Fri Sat           1      7.5 mg           2      5 mg         3      7.5 mg         4      5 mg         5            6               7               8                 9               10               11               12               13               14               15                 16               17               18               19                20               21               22                 23               24               25               26               27               28               29                 30                      Date Details   No additional details    Date of next INR:  4/5/2017         How to take your warfarin dose     To take:  5 mg Take 2 of the 2.5 mg tablets.    To take:  7.5 mg Take 3 of the 2.5 mg tablets.

## 2017-03-29 NOTE — TELEPHONE ENCOUNTER
Patient's INR today was 1.5, please see flow sheet regarding fluctuating INR values despite and non-compliance with recheck dates.  Recommended patient recheck INR in 1-2 weeks due to significantly fluctuating INR values.  Patient declined, stated the soonest that he would return to check INR is 6 weeks despite INR value of 1.5.    Routing to provider to please review and advise if six week check is appropriate.  Patient refused to schedule follow-up appointment at time of INR appointment if it was anything less than 6 weeks, which needs provider approval.  Joan Monte RN

## 2017-03-29 NOTE — PROGRESS NOTES
ANTICOAGULATION FOLLOW-UP CLINIC VISIT    Patient Name:  Keith Desir  Date:  3/29/2017  Contact Type:  Face to Face    SUBJECTIVE:     Patient Findings     Positives Change in diet/appetite    Comments Patient reported eating increased green vegetables in the past seven days (including spinach).  Per patient report, plans to resume normal (decreased) intake of vitamin K foods on a consistent basis.    Patient was advised to recheck INR in 1 week, patient declined and stated the soonest that he would return was 6 weeks  INR values have fluctuated significantly over the past few months.  Advised on the importance of complying with recheck dates, sign/symptoms of bruising/bleeding as well as stroke/ clot.  Advised that at a 1.5, this nurse could not independently recommend an INR recheck date of 6 weeks.  Patient was frustrated with this despite having the above information explained at length.  Advised that this nurse would send a message to primary provider to determine follow-up recommendation, patient stated this was okay, got up and left without obtained dosing directions from this nurse.  Patient's daughter Erin was also at the appointment with patient.  Erin stated to call her later with both the dosing recommendations and recheck date once response was received from provider.           OBJECTIVE    INR Protime   Date Value Ref Range Status   03/29/2017 1.5 (A) 0.86 - 1.14 Final       ASSESSMENT / PLAN  INR assessment SUB    Recheck INR In: 1 WEEK    INR Location Clinic      Anticoagulation Summary as of 3/29/2017     INR goal 2.0-3.0   Today's INR 1.5!   Maintenance plan 5 mg (2.5 mg x 2) on Sun, Tue, Thu; 7.5 mg (2.5 mg x 3) all other days   Full instructions 3/29: 10 mg; Otherwise 5 mg on Sun, Tue, Thu; 7.5 mg all other days   Weekly total 45 mg   Plan last modified Joan Monte RN (2/16/2017)   Next INR check 4/5/2017   Target end date     Indications   Long-term (current) use of  anticoagulants [Z79.01] [Z79.01]  Chronic atrial fibrillation (HCC) [I48.2]         Anticoagulation Episode Summary     INR check location     Preferred lab     Send INR reminders to  ANTICOAG CLINIC    Comments       Anticoagulation Care Providers     Provider Role Specialty Phone number    Cayla Gregorio MD [facility] [phone]            See the Encounter Report to view Anticoagulation Flowsheet and Dosing Calendar (Go to Encounters tab in chart review, and find the Anticoagulation Therapy Visit)    Joan Monte RN

## 2017-03-29 NOTE — TELEPHONE ENCOUNTER
Patient returned call, was informed of below information and recommendations from provider.  Patient reported that he would contact his daughter to figure out when she could provider him with a ride to the clinic.  Joan Monte RN

## 2017-03-29 NOTE — TELEPHONE ENCOUNTER
No, stroke is higher when INR is less than 2. I recommend follow-up INR in 1 week  Cayla Gregorio MD

## 2017-03-29 NOTE — TELEPHONE ENCOUNTER
Left message on Erin's (daughter) voicemail per patient request - left [place] INR clinic phone number to call back.  Would recommend Warfarin 10 mg 03/29/2017; then resume 5 mg Tuesday Thursday Sunday and 7.5 mg other 4 days of week - will inform that primary provider is recommending INR recheck in 1 week.  Joan Monte RN

## 2017-03-30 ENCOUNTER — TELEPHONE (OUTPATIENT)
Dept: NURSING | Facility: CLINIC | Age: 72
End: 2017-03-30

## 2017-03-30 DIAGNOSIS — I10 HYPERTENSION GOAL BP (BLOOD PRESSURE) < 140/90: Primary | ICD-10-CM

## 2017-03-30 DIAGNOSIS — E11.9 TYPE 2 DIABETES MELLITUS WITHOUT COMPLICATION, WITHOUT LONG-TERM CURRENT USE OF INSULIN (H): ICD-10-CM

## 2017-03-30 DIAGNOSIS — Z72.0 TOBACCO ABUSE: ICD-10-CM

## 2017-04-03 ENCOUNTER — TELEPHONE (OUTPATIENT)
Dept: NURSING | Facility: CLINIC | Age: 72
End: 2017-04-03

## 2017-04-04 ENCOUNTER — ANTICOAGULATION THERAPY VISIT (OUTPATIENT)
Dept: NURSING | Facility: CLINIC | Age: 72
End: 2017-04-04
Payer: COMMERCIAL

## 2017-04-04 DIAGNOSIS — Z79.01 LONG-TERM (CURRENT) USE OF ANTICOAGULANTS: ICD-10-CM

## 2017-04-04 DIAGNOSIS — I48.20 CHRONIC ATRIAL FIBRILLATION (H): ICD-10-CM

## 2017-04-04 LAB — INR POINT OF CARE: 1.5 (ref 0.86–1.14)

## 2017-04-04 PROCEDURE — 85610 PROTHROMBIN TIME: CPT | Mod: QW

## 2017-04-04 PROCEDURE — 36416 COLLJ CAPILLARY BLOOD SPEC: CPT

## 2017-04-12 ENCOUNTER — ANTICOAGULATION THERAPY VISIT (OUTPATIENT)
Dept: NURSING | Facility: CLINIC | Age: 72
End: 2017-04-12
Payer: COMMERCIAL

## 2017-04-12 DIAGNOSIS — I48.20 CHRONIC ATRIAL FIBRILLATION (H): ICD-10-CM

## 2017-04-12 DIAGNOSIS — Z79.01 LONG-TERM (CURRENT) USE OF ANTICOAGULANTS: ICD-10-CM

## 2017-04-12 LAB — INR POINT OF CARE: 1.8 (ref 0.86–1.14)

## 2017-04-12 PROCEDURE — 36416 COLLJ CAPILLARY BLOOD SPEC: CPT

## 2017-04-12 PROCEDURE — 99207 ZZC NO CHARGE NURSE ONLY: CPT

## 2017-04-12 PROCEDURE — 85610 PROTHROMBIN TIME: CPT | Mod: QW

## 2017-04-12 NOTE — PROGRESS NOTES
ANTICOAGULATION FOLLOW-UP CLINIC VISIT    Patient Name:  Keith Desir  Date:  4/12/2017  Contact Type:  Face to Face    SUBJECTIVE:     Patient Findings     Positives Unexplained INR or factor level change    Comments Recommended patient recheck INR in 1 week.  Patient declined, stated the soonest he could come back was 2 weeks.    Patient was advised on signs/symptoms of clot/strok as well as when to be seen in ED/UC.  Patient verbalized good understanding.           OBJECTIVE    INR Protime   Date Value Ref Range Status   04/12/2017 1.8 (A) 0.86 - 1.14 Final       ASSESSMENT / PLAN  INR assessment SUB    Recheck INR In: 1 WEEK    INR Location Clinic      Anticoagulation Summary as of 4/12/2017     INR goal 2.0-3.0   Today's INR 1.8!   Maintenance plan 7.5 mg (2.5 mg x 3) every day   Full instructions 7.5 mg every day   Weekly total 52.5 mg   Plan last modified Joan Monte RN (4/12/2017)   Next INR check 4/26/2017   Target end date     Indications   Long-term (current) use of anticoagulants [Z79.01] [Z79.01]  Chronic atrial fibrillation (HCC) [I48.2]         Anticoagulation Episode Summary     INR check location     Preferred lab     Send INR reminders to [facility]    Comments       Anticoagulation Care Providers     Provider Role Specialty Phone number    Cayla Gregorio MD [facility] [phone]            See the Encounter Report to view Anticoagulation Flowsheet and Dosing Calendar (Go to Encounters tab in chart review, and find the Anticoagulation Therapy Visit)    Joan Monte RN

## 2017-04-12 NOTE — MR AVS SNAPSHOT
Keith Desir   4/12/2017 10:20 AM   Anticoagulation Therapy Visit    Description:  71 year old male   Provider:  MAJOR HERNANDEZ   Department:  [facility] Nurse           INR as of 4/12/2017     Today's INR 1.8!      Anticoagulation Summary as of 4/12/2017     INR goal 2.0-3.0   Today's INR 1.8!   Full instructions 7.5 mg every day   Next INR check 4/26/2017    Indications   Long-term (current) use of anticoagulants [Z79.01] [Z79.01]  Chronic atrial fibrillation (HCC) [I48.2]         Your next Anticoagulation Clinic appointment(s)     Apr 12, 2017 10:20 AM CDT   Anticoagulation Visit with MAJOR HERNANDEZ   [facility]    [address] [facility] [id number] [phone]            Apr 26, 2017 10:20 AM CDT   Anticoagulation Visit with MAJOR HERNANDEZ   [facility] ([facility]    [address] [facility] [id number] [phone]              Contact Numbers     [facility]  Please call  [phone] to cancel and/or reschedule your appointment, or with any problems or questions regarding your therapy.        April 2017 Details    Sun Mon Tue Wed Thu Fri Sat           1                 2               3               4               5               6               7               8                 9               10               11               12      7.5 mg   See details      13      7.5 mg         14      7.5 mg         15      7.5 mg           16      7.5 mg         17      7.5 mg         18      7.5 mg         19      7.5 mg         20      7.5 mg         21      7.5 mg         22      7.5 mg           23      7.5 mg         24      7.5 mg         25      7.5 mg         26            27               28               29                 30                      Date Details   04/12 This INR check       Date of next INR:  4/26/2017         How to take your warfarin dose     To take:  7.5 mg  Take 3 of the 2.5 mg tablets.

## 2017-04-17 NOTE — PROGRESS NOTES
"  SUBJECTIVE:                                                    Keith Desir is a 71 year old obese male smoker with BELL who presents to clinic today for the following health issues:    Diabetes Follow-up      Patient is checking blood sugars: twice daily.  Results range from 117 to 216    Diabetic concerns: other - blood sugars have been fluctuating     Symptoms of hypoglycemia (low blood sugar): none     Paresthesias (numbness or burning in feet) or sores: No     Date of last diabetic eye exam: last year       Amount of exercise or physical activity: just house house work    Problems taking medications regularly: No    Medication side effects: none    Diet: cutting back on sugar    He sees his cardiologist yearly for atrial fibrillation with HF.    I have reviewed the patient's medical history in detail and updated the computerized patient record.     ROS:  C: NEGATIVE for fever, chills, change in weight  I: NEGATIVE for worrisome rashes, moles or lesions  E: NEGATIVE for vision changes or irritation  RESP: COPD   CV: NEGATIVE for chest pain; see above   M: NEGATIVE for significant arthralgias or myalgia  N: NEGATIVE for weakness, dizziness or paresthesias  ENDOCRINE: see HPI   HEME/ALLERGY/IMMUNE: on coumadin     OBJECTIVE:                                                    /88  Pulse 90  Temp 97  F (36.1  C) (Oral)  Ht 5' 9\" (1.753 m)  Wt 225 lb (102.1 kg)  SpO2 96%  BMI 33.23 kg/m2  Body mass index is 33.23 kg/(m^2).  GENERAL: alert, no distress and obese  RESP: decreased breath sounds throughout  CV: irregularly irregular rhythm, normal S1 S2, no S3 or S4 and no murmur, click or rub  MS: no gross musculoskeletal defects noted, no edema  NEURO: mentation intact  PSYCH: affect flat    Diagnostic Test Results:  Results for orders placed or performed in visit on 04/26/17   Hemoglobin A1c   Result Value Ref Range    Hemoglobin A1C 7.1 (H) 4.3 - 6.0 %   CBC with platelets and differential   Result " Value Ref Range    WBC 9.2 4.0 - 11.0 10e9/L    RBC Count 5.16 4.4 - 5.9 10e12/L    Hemoglobin 17.3 13.3 - 17.7 g/dL    Hematocrit 49.7 40.0 - 53.0 %    MCV 96 78 - 100 fl    MCH 33.5 (H) 26.5 - 33.0 pg    MCHC 34.8 31.5 - 36.5 g/dL    RDW 13.9 10.0 - 15.0 %    Platelet Count 187 150 - 450 10e9/L    Diff Method Automated Method     % Neutrophils 59.2 %    % Lymphocytes 29.7 %    % Monocytes 8.7 %    % Eosinophils 2.1 %    % Basophils 0.3 %    Absolute Neutrophil 5.5 1.6 - 8.3 10e9/L    Absolute Lymphocytes 2.7 0.8 - 5.3 10e9/L    Absolute Monocytes 0.8 0.0 - 1.3 10e9/L    Absolute Eosinophils 0.2 0.0 - 0.7 10e9/L    Absolute Basophils 0.0 0.0 - 0.2 10e9/L        ASSESSMENT/PLAN:                                                    (E11.9) Type 2 diabetes mellitus without complication, without long-term current use of insulin (H)  (primary encounter diagnosis)  Comment: much improved control  Plan: Hemoglobin A1c, metFORMIN (GLUCOPHAGE) 1000 MG         tablet, Basic metabolic panel, lisinopril         (PRINIVIL,ZESTRIL) 30 MG tablet        Follow-up 6 months     (I48.2) Chronic atrial fibrillation (HCC)  Comment: rate controlled and anticoagulated   Plan: metoprolol (LOPRESSOR) 100 MG tablet, Basic         metabolic panel        Continue chronic anticoagulation under surveillance of protime clinic with goal INR 2 - 3 indefinitely      (I50.9) CHF (NYHA class II, ACC/AHA stage C) (H)  Comment: euvolemic, on beta blocker and ACE/ARB   Plan: furosemide (LASIX) 20 MG tablet, metoprolol         (LOPRESSOR) 100 MG tablet, Basic metabolic         panel, lisinopril (PRINIVIL,ZESTRIL) 30 MG         tablet        Follow-up with cardiology yearly as planned     (I10) Hypertension goal BP (blood pressure) < 140/90  Comment: uncontrolled   Plan: Basic metabolic panel, lisinopril         (PRINIVIL,ZESTRIL) 30 MG tablet        Increase lisinopril dose and follow-up for BMP and ancillary blood pressure check in 1 month        See  Patient Instructions    Cayla Gregorio MD  [facility]

## 2017-04-21 ENCOUNTER — TELEPHONE (OUTPATIENT)
Dept: FAMILY MEDICINE | Facility: CLINIC | Age: 72
End: 2017-04-21

## 2017-04-21 NOTE — TELEPHONE ENCOUNTER
Panel Management Review      Patient has the following on his problem list:     Diabetes    ASA: Not Required     Last A1C  Lab Results   Component Value Date    A1C 8.9 03/22/2017    A1C 10.3 02/15/2017    A1C 10.2 02/08/2017    A1C 6.8 07/20/2016    A1C 7.6 12/02/2015     A1C tested: FAILED    Last LDL:    Lab Results   Component Value Date    CHOL 116 07/20/2016     Lab Results   Component Value Date    HDL 31 07/20/2016     Lab Results   Component Value Date    LDL 53 07/20/2016     Lab Results   Component Value Date    TRIG 159 07/20/2016     Lab Results   Component Value Date    CHOLHDLRATIO 5.0 04/22/2015     Lab Results   Component Value Date    NHDL 85 07/20/2016       Is the patient on a Statin? YES             Is the patient on Aspirin? NO    Medications     HMG CoA Reductase Inhibitors    atorvastatin (LIPITOR) 20 MG tablet          Last three blood pressure readings:  BP Readings from Last 3 Encounters:   03/22/17 128/88   02/16/17 (!) 172/106   02/15/17 130/88       Date of last diabetes office visit: 3/22/17     Tobacco History:     History   Smoking Status     Current Every Day Smoker     Packs/day: 1.00     Years: 45.00     Types: Cigarettes   Smokeless Tobacco     Never Used         Hypertension   Last three blood pressure readings:  BP Readings from Last 3 Encounters:   03/22/17 128/88   02/16/17 (!) 172/106   02/15/17 130/88     Blood pressure: Passed    HTN Guidelines:  Age 18-59 BP range:  Less than 140/90  Age 60-85 with Diabetes:  Less than 140/90  Age 60-85 without Diabetes:  less than 150/90     COPD  Diagnosis date:     Is this diagnosis new within the last year?   NO   Was spirometry completed?  NO      Composite cancer screening  Chart review shows that this patient is due/due soon for the following Fecal Colorectal (FIT)  Summary:    Patient is due/failing the following:   FIT    Action needed:   Routed to provider for review.    Type of outreach:    None, routed to provider for  review.    Questions for provider review:    None                                                                                                                                    Srinivas Gomez. MA       Chart routed to Provider .

## 2017-04-26 ENCOUNTER — OFFICE VISIT (OUTPATIENT)
Dept: FAMILY MEDICINE | Facility: CLINIC | Age: 72
End: 2017-04-26
Payer: COMMERCIAL

## 2017-04-26 ENCOUNTER — ANTICOAGULATION THERAPY VISIT (OUTPATIENT)
Dept: NURSING | Facility: CLINIC | Age: 72
End: 2017-04-26
Payer: COMMERCIAL

## 2017-04-26 VITALS
SYSTOLIC BLOOD PRESSURE: 154 MMHG | HEART RATE: 90 BPM | TEMPERATURE: 97 F | WEIGHT: 225 LBS | BODY MASS INDEX: 33.33 KG/M2 | OXYGEN SATURATION: 96 % | DIASTOLIC BLOOD PRESSURE: 88 MMHG | HEIGHT: 69 IN

## 2017-04-26 DIAGNOSIS — E11.9 TYPE 2 DIABETES MELLITUS WITHOUT COMPLICATION, WITHOUT LONG-TERM CURRENT USE OF INSULIN (H): Primary | ICD-10-CM

## 2017-04-26 DIAGNOSIS — Z79.01 LONG-TERM (CURRENT) USE OF ANTICOAGULANTS: ICD-10-CM

## 2017-04-26 DIAGNOSIS — I10 HYPERTENSION GOAL BP (BLOOD PRESSURE) < 140/90: ICD-10-CM

## 2017-04-26 DIAGNOSIS — R74.8 ELEVATED LIVER ENZYMES: ICD-10-CM

## 2017-04-26 DIAGNOSIS — I50.9 CHF (NYHA CLASS II, ACC/AHA STAGE C) (H): ICD-10-CM

## 2017-04-26 DIAGNOSIS — I48.20 CHRONIC ATRIAL FIBRILLATION (H): ICD-10-CM

## 2017-04-26 LAB
ALBUMIN SERPL-MCNC: 3.8 G/DL (ref 3.4–5)
ALP SERPL-CCNC: 127 U/L (ref 40–150)
ALT SERPL W P-5'-P-CCNC: 26 U/L (ref 0–70)
ANION GAP SERPL CALCULATED.3IONS-SCNC: 7 MMOL/L (ref 3–14)
AST SERPL W P-5'-P-CCNC: 20 U/L (ref 0–45)
BASOPHILS # BLD AUTO: 0 10E9/L (ref 0–0.2)
BASOPHILS NFR BLD AUTO: 0.3 %
BILIRUB DIRECT SERPL-MCNC: 0.2 MG/DL (ref 0–0.2)
BILIRUB SERPL-MCNC: 0.8 MG/DL (ref 0.2–1.3)
BUN SERPL-MCNC: 16 MG/DL (ref 7–30)
CALCIUM SERPL-MCNC: 9.7 MG/DL (ref 8.5–10.1)
CHLORIDE SERPL-SCNC: 103 MMOL/L (ref 94–109)
CO2 SERPL-SCNC: 31 MMOL/L (ref 20–32)
CREAT SERPL-MCNC: 0.87 MG/DL (ref 0.66–1.25)
DIFFERENTIAL METHOD BLD: ABNORMAL
EOSINOPHIL # BLD AUTO: 0.2 10E9/L (ref 0–0.7)
EOSINOPHIL NFR BLD AUTO: 2.1 %
ERYTHROCYTE [DISTWIDTH] IN BLOOD BY AUTOMATED COUNT: 13.9 % (ref 10–15)
GFR SERPL CREATININE-BSD FRML MDRD: 87 ML/MIN/1.7M2
GLUCOSE SERPL-MCNC: 137 MG/DL (ref 70–99)
HBA1C MFR BLD: 7.1 % (ref 4.3–6)
HCT VFR BLD AUTO: 49.7 % (ref 40–53)
HGB BLD-MCNC: 17.3 G/DL (ref 13.3–17.7)
INR POINT OF CARE: 2.6 (ref 0.86–1.14)
LYMPHOCYTES # BLD AUTO: 2.7 10E9/L (ref 0.8–5.3)
LYMPHOCYTES NFR BLD AUTO: 29.7 %
MCH RBC QN AUTO: 33.5 PG (ref 26.5–33)
MCHC RBC AUTO-ENTMCNC: 34.8 G/DL (ref 31.5–36.5)
MCV RBC AUTO: 96 FL (ref 78–100)
MONOCYTES # BLD AUTO: 0.8 10E9/L (ref 0–1.3)
MONOCYTES NFR BLD AUTO: 8.7 %
NEUTROPHILS # BLD AUTO: 5.5 10E9/L (ref 1.6–8.3)
NEUTROPHILS NFR BLD AUTO: 59.2 %
PLATELET # BLD AUTO: 187 10E9/L (ref 150–450)
POTASSIUM SERPL-SCNC: 4.9 MMOL/L (ref 3.4–5.3)
PROT SERPL-MCNC: 7.4 G/DL (ref 6.8–8.8)
RBC # BLD AUTO: 5.16 10E12/L (ref 4.4–5.9)
SODIUM SERPL-SCNC: 141 MMOL/L (ref 133–144)
WBC # BLD AUTO: 9.2 10E9/L (ref 4–11)

## 2017-04-26 PROCEDURE — 85025 COMPLETE CBC W/AUTO DIFF WBC: CPT | Performed by: INTERNAL MEDICINE

## 2017-04-26 PROCEDURE — 99214 OFFICE O/P EST MOD 30 MIN: CPT | Performed by: FAMILY MEDICINE

## 2017-04-26 PROCEDURE — 36416 COLLJ CAPILLARY BLOOD SPEC: CPT

## 2017-04-26 PROCEDURE — 83036 HEMOGLOBIN GLYCOSYLATED A1C: CPT | Performed by: FAMILY MEDICINE

## 2017-04-26 PROCEDURE — 99207 ZZC NO CHARGE NURSE ONLY: CPT

## 2017-04-26 PROCEDURE — 85610 PROTHROMBIN TIME: CPT | Mod: QW

## 2017-04-26 PROCEDURE — 80048 BASIC METABOLIC PNL TOTAL CA: CPT | Performed by: INTERNAL MEDICINE

## 2017-04-26 PROCEDURE — 80076 HEPATIC FUNCTION PANEL: CPT | Performed by: INTERNAL MEDICINE

## 2017-04-26 RX ORDER — LISINOPRIL 30 MG/1
30 TABLET ORAL DAILY
Qty: 90 TABLET | Refills: 3 | Status: SHIPPED | OUTPATIENT
Start: 2017-04-26 | End: 2018-05-23

## 2017-04-26 RX ORDER — METOPROLOL TARTRATE 100 MG
300 TABLET ORAL 2 TIMES DAILY
Qty: 540 TABLET | Refills: 0 | Status: SHIPPED | OUTPATIENT
Start: 2017-04-26 | End: 2017-04-27

## 2017-04-26 RX ORDER — WARFARIN SODIUM 2.5 MG/1
TABLET ORAL
Qty: 220 TABLET | Refills: 1 | COMMUNITY
Start: 2017-04-26 | End: 2017-11-01

## 2017-04-26 RX ORDER — FUROSEMIDE 20 MG
20 TABLET ORAL 2 TIMES DAILY
Qty: 180 TABLET | Refills: 1 | Status: SHIPPED | OUTPATIENT
Start: 2017-04-26 | End: 2017-10-19

## 2017-04-26 RX ORDER — LISINOPRIL 20 MG/1
20 TABLET ORAL DAILY
Qty: 90 TABLET | Refills: 3 | Status: CANCELLED | OUTPATIENT
Start: 2017-04-26

## 2017-04-26 RX ORDER — METOPROLOL SUCCINATE 200 MG/1
400 TABLET, EXTENDED RELEASE ORAL DAILY
Qty: 180 TABLET | Refills: 0 | Status: CANCELLED | OUTPATIENT
Start: 2017-04-26

## 2017-04-26 RX ORDER — WARFARIN SODIUM 2.5 MG/1
TABLET ORAL
Qty: 220 TABLET | Refills: 1 | Status: SHIPPED | OUTPATIENT
Start: 2017-04-26 | End: 2017-04-26

## 2017-04-26 NOTE — NURSING NOTE
"Chief Complaint   Patient presents with     Diabetes       Initial BP (!) 148/94 (BP Location: Right arm, Patient Position: Chair, Cuff Size: Adult Large)  Pulse 115  Temp 97  F (36.1  C) (Oral)  Ht 5' 9\" (1.753 m)  Wt 225 lb (102.1 kg)  SpO2 96%  BMI 33.23 kg/m2 Estimated body mass index is 33.23 kg/(m^2) as calculated from the following:    Height as of this encounter: 5' 9\" (1.753 m).    Weight as of this encounter: 225 lb (102.1 kg).  Medication Reconciliation: complete    "

## 2017-04-26 NOTE — PROGRESS NOTES
ANTICOAGULATION FOLLOW-UP CLINIC VISIT    Patient Name:  Keith Desir  Date:  4/26/2017  Contact Type:  Face to Face    SUBJECTIVE:     Patient Findings     Positives No Problem Findings           OBJECTIVE    INR Protime   Date Value Ref Range Status   04/26/2017 2.6 (A) 0.86 - 1.14 Final       ASSESSMENT / PLAN  INR assessment THER    Recheck INR In: 4 WEEKS    INR Location Clinic      Anticoagulation Summary as of 4/26/2017     INR goal 2.0-3.0   Today's INR 2.6   Maintenance plan 7.5 mg (2.5 mg x 3) every day   Full instructions 7.5 mg every day   Weekly total 52.5 mg   No change documented Joan Monte RN   Plan last modified Joan Monte RN (4/12/2017)   Next INR check 6/7/2017   Target end date     Indications   Long-term (current) use of anticoagulants [Z79.01] [Z79.01]  Chronic atrial fibrillation (HCC) [I48.2]         Anticoagulation Episode Summary     INR check location     Preferred lab     Send INR reminders to [facility]    Comments       Anticoagulation Care Providers     Provider Role Specialty Phone number    Cayla Gregorio MD [facility] [phone]            See the Encounter Report to view Anticoagulation Flowsheet and Dosing Calendar (Go to Encounters tab in chart review, and find the Anticoagulation Therapy Visit)    Joan Monte RN

## 2017-04-26 NOTE — PATIENT INSTRUCTIONS
[facility]    If you have any questions regarding to your visit please contact your care team:       Team Red:   Clinic Hours Telephone Number   Dr. Cayla Marie, NP   7am-7pm  Monday - Thursday   7am-5pm  Fridays  [phone]  (Appointment scheduling available 24/7)    Questions about your visit?   Team Line  [phone]   Urgent Care - [place] - 11am-9pm Monday-Friday Saturday-Sunday- 9am-5pm   [place] - 5pm-9pm Monday-Friday Saturday-Sunday- 9am-5pm  [phone] - [place]   [phone] - [place]       What options do I have for visits at the clinic other than the traditional office visit?  To expand how we care for you, many of our providers are utilizing electronic visits (e-visits) and telephone visits, when medically appropriate, for interactions with their patients rather than a visit in the clinic.   We also offer nurse visits for many medical concerns. Just like any other service, we will bill your insurance company for this type of visit based on time spent on the phone with your provider. Not all insurance companies cover these visits. Please check with your medical insurance if this type of visit is covered. You will be responsible for any charges that are not paid by your insurance.      E-visits via [employer]:  generally incur a $35.00 fee.  Telephone visits:  Time spent on the phone: *charged based on time that is spent on the phone in increments of 10 minutes. Estimated cost:   5-10 mins $30.00   11-20 mins. $59.00   21-30 mins. $85.00     Use [employer] (secure email communication and access to your chart) to send your primary care provider a message or make an appointment. Ask someone on your Team how to sign up for [employer].  For a Price Quote for your services, please call our Consumer Price Line at [phone].      As always, Thank you for trusting us with your health care needs!  Brice MEDINA  [place]

## 2017-04-26 NOTE — MR AVS SNAPSHOT
Keith Desir   4/26/2017 10:20 AM   Anticoagulation Therapy Visit    Description:  71 year old male   Provider:  MAJOR HERNANDEZ   Department:  [facility]           INR as of 4/26/2017     Today's INR 2.6      Anticoagulation Summary as of 4/26/2017     INR goal 2.0-3.0   Today's INR 2.6   Full instructions 7.5 mg every day   Next INR check 6/7/2017    Indications   Long-term (current) use of anticoagulants [Z79.01] [Z79.01]  Chronic atrial fibrillation (HCC) [I48.2]         Your next Anticoagulation Clinic appointment(s)     Apr 26, 2017 10:20 AM CDT   Antico[facility] MAJ[facility]    [address] [id number] [phone]            Jun 07, 2017 10:00 AM CDT   Anticoagulation Visit with MAJOR HERNANDEZ   [facility]    [address] [id number] [phone]              Contact Numbers     [facility]  Please call  [phone] to cancel and/or reschedule your appointment, or with any problems or questions regarding your therapy.        April 2017 Details    Sun Mon Tue Wed Thu Fri Sat           1                 2               3               4               5               6               7               8                 9               10               11               12               13               14               15                 16               17               18               19               20               21               22                 23               24               25               26      7.5 mg   See details      27      7.5 mg         28      7.5 mg         29      7.5 mg           30      7.5 mg                Date Details   04/26 This INR check               How to take your warfarin dose     To take:  7.5 mg Take 3 of the 2.5 mg tablets.           May 2017 Details    Sun Mon Tue Wed Thu Fri  Sat      1      7.5 mg         2      7.5 mg         3      7.5 mg         4      7.5 mg         5      7.5 mg         6      7.5 mg           7      7.5 mg         8      7.5 mg         9      7.5 mg         10      7.5 mg         11      7.5 mg         12      7.5 mg         13      7.5 mg           14      7.5 mg         15      7.5 mg         16      7.5 mg         17      7.5 mg         18      7.5 mg         19      7.5 mg         20      7.5 mg           21      7.5 mg         22      7.5 mg         23      7.5 mg         24      7.5 mg         25      7.5 mg         26      7.5 mg         27      7.5 mg           28      7.5 mg         29      7.5 mg         30      7.5 mg         31      7.5 mg             Date Details   No additional details            How to take your warfarin dose     To take:  7.5 mg Take 3 of the 2.5 mg tablets.           June 2017 Details    Sun Mon Tue Wed Thu Fri Sat         1      7.5 mg         2      7.5 mg         3      7.5 mg           4      7.5 mg         5      7.5 mg         6      7.5 mg         7            8               9               10                 11               12               13               14               15               16               17                 18               19               20               21               22               23               24                 25               26               27               28               29               30                 Date Details   No additional details    Date of next INR:  6/7/2017         How to take your warfarin dose     To take:  7.5 mg Take 3 of the 2.5 mg tablets.

## 2017-04-26 NOTE — MR AVS SNAPSHOT
After Visit Summary   4/26/2017    Keith Desir    MRN: [id number]           Patient Information     Date Of Birth          1945        Visit Information        Provider Department      4/26/2017 7:20 AM Cayla Gregorio MD [facility]        Today's Diagnoses     Type 2 diabetes mellitus without complication, without long-term current use of insulin (H)    -  1    Chronic atrial fibrillation (HCC)        CHF (NYHA class II, ACC/AHA stage C) (H)        Hypertension goal BP (blood pressure) < 140/90        Elevated liver enzymes          Care Instructions    [facility]    If you have any questions regarding to your visit please contact your care team:       Team Red:   Clinic Hours Telephone Number   Dr. Cayla Marie, NP   7am-7pm  Monday - Thursday   7am-5pm  Fridays  [phone]  (Appointment scheduling available 24/7)    Questions about your visit?   Team Line  [phone]   Urgent Care - [place] and [place] - 11am-9pm Monday-Friday Saturday-Sunday- 9am-5pm   [place] - 5pm-9pm Monday-Friday Saturday-Sunday- 9am-5pm  [phone] - [place]   [phone] - [place]       What options do I have for visits at the clinic other than the traditional office visit?  To expand how we care for you, many of our providers are utilizing electronic visits (e-visits) and telephone visits, when medically appropriate, for interactions with their patients rather than a visit in the clinic.   We also offer nurse visits for many medical concerns. Just like any other service, we will bill your insurance company for this type of visit based on time spent on the phone with your provider. Not all insurance companies cover these visits. Please check with your medical insurance if this type of visit is covered. You will be responsible for any charges that are not paid by your insurance.      E-visits via [employer]:   generally incur a $35.00 fee.  Telephone visits:  Time spent on the phone: *charged based on time that is spent on the phone in increments of 10 minutes. Estimated cost:   5-10 mins $30.00   11-20 mins. $59.00   21-30 mins. $85.00     Use [employer] (secure email communication and access to your chart) to send your primary care provider a message or make an appointment. Ask someone on your Team how to sign up for [employer].  For a Price Quote for your services, please call our [employer] Line at [phone].      As always, Thank you for trusting us with your health care needs!  Brice Soria          Follow-ups after your visit        Follow-up notes from your care team     Return in about 1 month (around 5/26/2017) for free nurse only blood pressure check.      Your next 10 appointments already scheduled     Apr 26, 2017 10:20 AM CDT   Anticoagulation Visit with NICOLE HERNANDEZ   [facility]    [address] [place] [id number] [phone]              Future tests that were ordered for you today     Open Future Orders        Priority Expected Expires Ordered    Basic metabolic panel Routine 5/26/2017 4/26/2018 4/26/2017            Who to contact     If you have questions or need follow up information about today's clinic visit or your schedule please contact [facility] directly at [phone].  Normal or non-critical lab and imaging results will be communicated to you by MyChart, letter or phone within 4 business days after the clinic has received the results. If you do not hear from us within 7 days, please contact the clinic through MyChart or phone. If you have a critical or abnormal lab result, we will notify you by phone as soon as possible.  Submit refill requests through [employer] or call your pharmacy and they will forward the refill request to us. Please allow 3 business days for your refill to be completed.           "Additional Information About Your Visit        MyChart Information     [employer] gives you secure access to your electronic health record. If you see a primary care provider, you can also send messages to your care team and make appointments. If you have questions, please call your primary care clinic.  If you do not have a primary care provider, please call [phone] and they will assist you.        Care EveryWhere ID     This is your Care EveryWhere ID. This could be used by other organizations to access your [facility] medical records  [id number]        Your Vitals Were     Pulse Temperature Height Pulse Oximetry BMI (Body Mass Index)       90 97  F (36.1  C) (Oral) 5' 9\" (1.753 m) 96% 33.23 kg/m2        Blood Pressure from Last 3 Encounters:   04/26/17 154/88   03/22/17 128/88   02/16/17 (!) 172/106    Weight from Last 3 Encounters:   04/26/17 225 lb (102.1 kg)   03/22/17 226 lb (102.5 kg)   02/16/17 228 lb 9.6 oz (103.7 kg)              We Performed the Following     Basic metabolic panel  (Ca, Cl, CO2, Creat, Gluc, K, Na, BUN)     CBC with platelets and differential     Hemoglobin A1c     Hepatic panel (Albumin, ALT, AST, Bili, Alk Phos, TP)          Today's Medication Changes          These changes are accurate as of: 4/26/17  8:13 AM.  If you have any questions, ask your nurse or doctor.               These medicines have changed or have updated prescriptions.        Dose/Directions    lisinopril 30 MG tablet   Commonly known as:  PRINIVIL,ZESTRIL   This may have changed:    - medication strength  - how much to take   Used for:  Type 2 diabetes mellitus without complication, without long-term current use of insulin (H), CHF (NYHA class II, ACC/AHA stage C) (H), Hypertension goal BP (blood pressure) < 140/90   Changed by:  Cayla Gregorio MD        Dose:  30 mg   Take 1 tablet (30 mg) by mouth daily   Quantity:  90 tablet   Refills:  3            Where to get your medicines      These medications were sent " to [id number] - [place], [place] - [address]  [address] [place] [address]    Hours:  test fax sent successfully 7/31/03 kr Phone:  [phone]     furosemide 20 MG tablet    lisinopril 30 MG tablet    metFORMIN 1000 MG tablet    metoprolol 100 MG tablet    warfarin 2.5 MG tablet                Primary Care Provider Office Phone # Fax #    Cayla Gregorio [phone]       [address] [address]        Thank you!     Thank you for choosing [facility]  for your care. Our goal is always to provide you with excellent care. Hearing back from our patients is one way we can continue to improve our services. Please take a few minutes to complete the written survey that you may receive in the mail after your visit with us. Thank you!             Your Updated Medication List - Protect others around you: Learn how to safely use, store and throw away your medicines at www.disposemymeds.org.          This list is accurate as of: 4/26/17  8:13 AM.  Always use your most recent med list.                   Brand Name Dispense Instructions for use    albuterol 108 (90 BASE) MCG/ACT Inhaler    albuterol    1 Inhaler    Inhale 1-2 puffs into the lungs every 4 hours as needed       ASPIRIN NOT PRESCRIBED    INTENTIONAL     Antiplatelet medication not prescribed intentionally due to Current anticoagulant therapy (warfarin/enoxaparin)       atorvastatin 20 MG tablet    LIPITOR    90 tablet    TAKE 1 TABLET (20 MG) BY MOUTH DAILY       digoxin 125 MCG tablet    LANOXIN    90 tablet    TAKE 1 TABLET (125 MCG) BY MOUTH DAILY       furosemide 20 MG tablet    LASIX    180 tablet    Take 1 tablet (20 mg) by mouth 2 times daily       lisinopril 30 MG tablet    PRINIVIL,ZESTRIL    90 tablet    Take 1 tablet (30 mg) by mouth daily       metFORMIN 1000 MG tablet    GLUCOPHAGE    180 tablet    Take 1 tablet (1,000 mg) by mouth 2 times  daily (with meals)       metoprolol 100 MG tablet    LOPRESSOR    540 tablet    Take 3 tablets (300 mg) by mouth 2 times daily       order for DME     300 each    Equipment being ordered: Blood glucose monitor per insurance formulary; blood glucose test strips per formulary for use 3 time daily; lancets per formulary for use 3 time daily       tiotropium 18 MCG capsule    SPIRIVA HANDIHALER    90 capsule    INHALE 1 CAPSULE (18 MCG) INTO THE LUNGS DAILY       warfarin 2.5 MG tablet    COUMADIN    220 tablet    Take 2 tablets (5mg) Tuesday and 3 tablets (7.5 mg) other 6 days of the week or as directed by [facility] nurse

## 2017-04-26 NOTE — LETTER
May 15, 2017       TO: Keith Desir  [address]   [place] [address]         Dear Keith,    Your liver enzymes have improved significantly and are in the normal range. This appears to correlate with improvement in diabetes control. That's great news and I encourage you to continue your current efforts.   The plan is still the same, clinic visit again in early 2018    Resulted Orders   CBC with platelets and differential   Result Value Ref Range    WBC 9.2 4.0 - 11.0 10e9/L    RBC Count 5.16 4.4 - 5.9 10e12/L    Hemoglobin 17.3 13.3 - 17.7 g/dL    Hematocrit 49.7 40.0 - 53.0 %    MCV 96 78 - 100 fl    MCH 33.5 (H) 26.5 - 33.0 pg    MCHC 34.8 31.5 - 36.5 g/dL    RDW 13.9 10.0 - 15.0 %    Platelet Count 187 150 - 450 10e9/L    Diff Method Automated Method     % Neutrophils 59.2 %    % Lymphocytes 29.7 %    % Monocytes 8.7 %    % Eosinophils 2.1 %    % Basophils 0.3 %    Absolute Neutrophil 5.5 1.6 - 8.3 10e9/L    Absolute Lymphocytes 2.7 0.8 - 5.3 10e9/L    Absolute Monocytes 0.8 0.0 - 1.3 10e9/L    Absolute Eosinophils 0.2 0.0 - 0.7 10e9/L    Absolute Basophils 0.0 0.0 - 0.2 10e9/L   Basic metabolic panel  (Ca, Cl, CO2, Creat, Gluc, K, Na, BUN)   Result Value Ref Range    Sodium 141 133 - 144 mmol/L    Potassium 4.9 3.4 - 5.3 mmol/L    Chloride 103 94 - 109 mmol/L    Carbon Dioxide 31 20 - 32 mmol/L    Anion Gap 7 3 - 14 mmol/L    Glucose 137 (H) 70 - 99 mg/dL      Comment:      Fasting specimen    Urea Nitrogen 16 7 - 30 mg/dL    Creatinine 0.87 0.66 - 1.25 mg/dL    GFR Estimate 87 >60 mL/min/1.7m2      Comment:      Non  GFR Calc    GFR Estimate If Black >90   GFR Calc   >60 mL/min/1.7m2    Calcium 9.7 8.5 - 10.1 mg/dL   Hepatic panel (Albumin, ALT, AST, Bili, Alk Phos, TP)   Result Value Ref Range    Bilirubin Direct 0.2 0.0 - 0.2 mg/dL    Bilirubin Total 0.8 0.2 - 1.3 mg/dL    Albumin 3.8 3.4 - 5.0 g/dL    Protein Total 7.4 6.8 - 8.8 g/dL    Alkaline Phosphatase  127 40 - 150 U/L    ALT 26 0 - 70 U/L    AST 20 0 - 45 U/L           Sincerely,    Nayeli [facility]  [phone]

## 2017-04-27 ENCOUNTER — TELEPHONE (OUTPATIENT)
Dept: FAMILY MEDICINE | Facility: CLINIC | Age: 72
End: 2017-04-27

## 2017-04-27 DIAGNOSIS — I48.20 CHRONIC ATRIAL FIBRILLATION (H): ICD-10-CM

## 2017-04-27 DIAGNOSIS — I50.9 CHF (NYHA CLASS II, ACC/AHA STAGE C) (H): ICD-10-CM

## 2017-04-27 RX ORDER — METOPROLOL TARTRATE 100 MG
200 TABLET ORAL 2 TIMES DAILY
Qty: 360 TABLET | Refills: 0 | Status: SHIPPED | OUTPATIENT
Start: 2017-04-27 | End: 2017-10-28

## 2017-04-27 NOTE — TELEPHONE ENCOUNTER
Our MTM pharmacist recommends reducing dose of metoprolol to 200 mg po BID. I've notified pharmacy. Follow-up blood pressure check on day of next INR check as planned.  Cayla Gregorio MD

## 2017-04-27 NOTE — TELEPHONE ENCOUNTER
RN spoke with patient and patient requested from writer to call his wife and discuss the message below.  RN called patient's wife Melissa and notified of the message below as it's written.  Melissa agrees and verbalized understanding.    Jaydon GARCIA RN, BSN

## 2017-05-21 DIAGNOSIS — I48.20 CHRONIC ATRIAL FIBRILLATION (H): ICD-10-CM

## 2017-05-21 DIAGNOSIS — I50.9 CHF (NYHA CLASS II, ACC/AHA STAGE C) (H): ICD-10-CM

## 2017-05-22 NOTE — TELEPHONE ENCOUNTER
Metoprolol       Last Written Prescription Date: 04/27/2017  Last Fill Quantity: 360, # refills: 0    Last Office Visit with G, [facility] or [facility] prescribing provider:  04/26/2017   Future Office Visit:        BP Readings from Last 3 Encounters:   04/26/17 154/88   03/22/17 128/88   02/16/17 (!) 172/106

## 2017-05-23 RX ORDER — METOPROLOL TARTRATE 100 MG
TABLET ORAL
Qty: 540 TABLET | Refills: 0
Start: 2017-05-23

## 2017-05-23 NOTE — TELEPHONE ENCOUNTER
metoprolol (LOPRESSOR) 100 MG tablet      Last Written Prescription Date: 4/27/17  Last Fill Quantity: 360, # refills: 0  Last Office Visit with G, P or [facility] prescribing provider: 4/26/17     Patient called in an old script, the old script said take 3 tablets 2 times daily. The new script is take 2 tablets 2 times daily. Patient will call when script is due.    Potassium   Date Value Ref Range Status   04/26/2017 4.9 3.4 - 5.3 mmol/L Final     Creatinine   Date Value Ref Range Status   04/26/2017 0.87 0.66 - 1.25 mg/dL Final     BP Readings from Last 3 Encounters:   04/26/17 154/88   03/22/17 128/88   02/16/17 (!) 172/106

## 2017-06-07 ENCOUNTER — TELEPHONE (OUTPATIENT)
Dept: FAMILY MEDICINE | Facility: CLINIC | Age: 72
End: 2017-06-07

## 2017-06-07 ENCOUNTER — ANTICOAGULATION THERAPY VISIT (OUTPATIENT)
Dept: NURSING | Facility: CLINIC | Age: 72
End: 2017-06-07
Payer: COMMERCIAL

## 2017-06-07 ENCOUNTER — ALLIED HEALTH/NURSE VISIT (OUTPATIENT)
Dept: NURSING | Facility: CLINIC | Age: 72
End: 2017-06-07
Payer: COMMERCIAL

## 2017-06-07 VITALS — SYSTOLIC BLOOD PRESSURE: 159 MMHG | DIASTOLIC BLOOD PRESSURE: 96 MMHG | HEART RATE: 94 BPM

## 2017-06-07 DIAGNOSIS — Z79.01 LONG-TERM (CURRENT) USE OF ANTICOAGULANTS: ICD-10-CM

## 2017-06-07 DIAGNOSIS — I48.20 CHRONIC ATRIAL FIBRILLATION (H): ICD-10-CM

## 2017-06-07 DIAGNOSIS — Z01.30 BP CHECK: Primary | ICD-10-CM

## 2017-06-07 LAB — INR POINT OF CARE: 3.3 (ref 0.86–1.14)

## 2017-06-07 PROCEDURE — 99207 ZZC NO CHARGE NURSE ONLY: CPT

## 2017-06-07 PROCEDURE — 36416 COLLJ CAPILLARY BLOOD SPEC: CPT

## 2017-06-07 PROCEDURE — 85610 PROTHROMBIN TIME: CPT | Mod: QW

## 2017-06-07 NOTE — PROGRESS NOTES
Ancillary BP check    HTN Guidelines:  Age 18-59 BP range:  Less than 140/90  Age 60-85 with Diabetes:  Less than 140/90  Age 60-85 without Diabetes:  less than 150/90        Keith Desir is a 71 year old male who comes in today for a Blood Pressure check.    Patient is taking medication as prescribed  Patient is tolerating medications well.  Patient is monitoring Blood Pressure at home.      BP goal: < 150/90 mmHg (patient 60+ years of age without diabetes)    BP reading today: FAILED     BP Readings from Last 1 Encounters:   06/07/17 (!) 159/96     A x-large cuff was used.  Pulse: 96    Vitals reviewed     Each reading recorded in vital signs section of chart: yes    Symptoms: none    Need for urgent appointment, based on criteria in ancillary BP workflow (elevated blood pressure and any of the following: dizziness, blurry vision, lightheadedness, severe shortness of breath, chest pain or visual disturbance): No       Plan: At goal follow up as directed by provider.      Completed by: Marium Crabtree  [facility]

## 2017-06-07 NOTE — MR AVS SNAPSHOT
Keith Desir   6/7/2017 10:00 AM   Anticoagulation Therapy Visit    Description:  71 year old male   Provider:  MAJOR HERNANDEZ   Department:  [facility]           INR as of 6/7/2017     Today's INR 3.3!      Anticoagulation Summary as of 6/7/2017     INR goal 2.0-3.0   Today's INR 3.3!   Full instructions 7.5 mg every day   Next INR check 7/12/2017    Indications   Long-term (current) use of anticoagulants [Z79.01] [Z79.01]  Chronic atrial fibrillation (HCC) [I48.2]         Your next Anticoagulation Clinic appointment(s)     Jun 07, 2017 10:00 AM CDT   An[facility] MAJOR [facility]   [facility]    [address] [id number] [phone]            Jul 12, 2017 10:00 AM CDT   Anticoagulation Visit with MAJOR HERNANDEZ   [facility] ([facility]    [address] [id number] [phone]              Contact Numbers     [facility]  Please call  [phone] to cancel and/or reschedule your appointment, or with any problems or questions regarding your therapy.        June 2017 Details    Sun Mon Tue Wed Thu Fri Sat         1               2               3                 4               5               6               7      7.5 mg   See details      8      7.5 mg         9      7.5 mg         10      7.5 mg           11      7.5 mg         12      7.5 mg         13      7.5 mg         14      7.5 mg         15      7.5 mg         16      7.5 mg         17      7.5 mg           18      7.5 mg         19      7.5 mg         20      7.5 mg         21      7.5 mg         22      7.5 mg         23      7.5 mg         24      7.5 mg           25      7.5 mg         26      7.5 mg         27      7.5 mg         28      7.5 mg         29      7.5 mg         30      7.5 mg           Date Details   06/07 This INR check               How to take your warfarin dose      To take:  7.5 mg Take 3 of the 2.5 mg tablets.           July 2017 Details    Sun Mon Tue Wed Thu Fri Sat           1      7.5 mg           2      7.5 mg         3      7.5 mg         4      7.5 mg         5      7.5 mg         6      7.5 mg         7      7.5 mg         8      7.5 mg           9      7.5 mg         10      7.5 mg         11      7.5 mg         12            13               14               15                 16               17               18               19               20               21               22                 23               24               25               26               27               28               29                 30               31                     Date Details   No additional details    Date of next INR:  7/12/2017         How to take your warfarin dose     To take:  7.5 mg Take 3 of the 2.5 mg tablets.

## 2017-06-07 NOTE — MR AVS SNAPSHOT
After Visit Summary   6/7/2017    Keith Desir    MRN: [id number]           Patient Information     Date Of Birth          1945        Visit Information        Provider Department      6/7/2017 10:00 AM NICOLE ANCILLARY [facility]        Today's Diagnoses     BP check    -  1       Follow-ups after your visit        Follow-up notes from your care team     Return for BP Recheck.      Your next 10 appointments already scheduled     Jul 12, 2017 10:00 AM CDT   Anticoagulation Visit with NICOLE HER[facility]NDEZ   [facility]    [address] [place] [id number] [phone]              Who to contact     If you have questions or need follow up information about today's clinic visit or your schedule please contact [facility] directly at [phone].  Normal or non-critical lab and imaging results will be communicated to you by MyChart, letter or phone within 4 business days after the clinic has received the results. If you do not hear from us within 7 days, please contact the clinic through MyChart or phone. If you have a critical or abnormal lab result, we will notify you by phone as soon as possible.  Submit refill requests through [employer] or call your pharmacy and they will forward the refill request to us. Please allow 3 business days for your refill to be completed.          Additional Information About Your Visit        MyChart Information     [employer] gives you secure access to your electronic health record. If you see a primary care provider, you can also send messages to your care team and make appointments. If you have questions, please call your primary care clinic.  If you do not have a primary care provider, please call [phone] and they will assist you.        Care EveryWhere ID     This is your Care EveryWhere ID. This could be used by other organizations to access your [facility]  medical records  [id number]        Your Vitals Were     Pulse                   94            Blood Pressure from Last 3 Encounters:   06/07/17 (!) 159/96   04/26/17 154/88   03/22/17 128/88    Weight from Last 3 Encounters:   04/26/17 225 lb (102.1 kg)   03/22/17 226 lb (102.5 kg)   02/16/17 228 lb 9.6 oz (103.7 kg)              Today, you had the following     No orders found for display       Primary Care Provider Office Phone # Fax #    Cayla Gregorio [phone]       [address] [address]        Thank you!     Thank you for choosing [facility]  for your care. Our goal is always to provide you with excellent care. Hearing back from our patients is one way we can continue to improve our services. Please take a few minutes to complete the written survey that you may receive in the mail after your visit with us. Thank you!             Your Updated Medication List - Protect others around you: Learn how to safely use, store and throw away your medicines at www.disposemymeds.org.          This list is accurate as of: 6/7/17 10:56 AM.  Always use your most recent med list.                   Brand Name Dispense Instructions for use    albuterol 108 (90 BASE) MCG/ACT Inhaler    albuterol    1 Inhaler    Inhale 1-2 puffs into the lungs every 4 hours as needed       ASPIRIN NOT PRESCRIBED    INTENTIONAL     Antiplatelet medication not prescribed intentionally due to Current anticoagulant therapy (warfarin/enoxaparin)       atorvastatin 20 MG tablet    LIPITOR    90 tablet    TAKE 1 TABLET (20 MG) BY MOUTH DAILY       digoxin 125 MCG tablet    LANOXIN    90 tablet    TAKE 1 TABLET (125 MCG) BY MOUTH DAILY       furosemide 20 MG tablet    LASIX    180 tablet    Take 1 tablet (20 mg) by mouth 2 times daily       lisinopril 30 MG tablet    PRINIVIL,ZESTRIL    90 tablet    Take 1 tablet (30 mg) by mouth daily       metFORMIN 1000 MG tablet     GLUCOPHAGE    180 tablet    Take 1 tablet (1,000 mg) by mouth 2 times daily (with meals)       metoprolol 100 MG tablet    LOPRESSOR    360 tablet    Take 2 tablets (200 mg) by mouth 2 times daily       order for DME     300 each    Equipment being ordered: Blood glucose monitor per insurance formulary; blood glucose test strips per formulary for use 3 time daily; lancets per formulary for use 3 time daily       tiotropium 18 MCG capsule    SPIRIVA HANDIHALER    90 capsule    INHALE 1 CAPSULE (18 MCG) INTO THE LUNGS DAILY       warfarin 2.5 MG tablet    COUMADIN    220 tablet    Take 3 tablets (7.5mg) daily or as directed by ACC nurse

## 2017-06-07 NOTE — PROGRESS NOTES
ANTICOAGULATION FOLLOW-UP CLINIC VISIT    Patient Name:  Keith Desir  Date:  6/7/2017  Contact Type:  Face to Face    SUBJECTIVE:     Patient Findings     Positives No Problem Findings           OBJECTIVE    INR Protime   Date Value Ref Range Status   06/07/2017 3.3 (A) 0.86 - 1.14 Final       ASSESSMENT / PLAN  INR assessment SUPRA    Recheck INR In: 3 WEEKS    INR Location Clinic      Anticoagulation Summary as of 6/7/2017     INR goal 2.0-3.0   Today's INR 3.3!   Maintenance plan 7.5 mg (2.5 mg x 3) every day   Full instructions 7.5 mg every day   Weekly total 52.5 mg   No change documented Joan Monte RN   Plan last modified Joan Monte RN (4/12/2017)   Next INR check 7/12/2017   Target end date     Indications   Long-term (current) use of anticoagulants [Z79.01] [Z79.01]  Chronic atrial fibrillation (HCC) [I48.2]         Anticoagulation Episode Summary     INR check location     Preferred lab     Send INR reminders to [facility]    Comments       Anticoagulation Care Providers     Provider Role Specialty Phone number    Cayla Gregorio MD [facility] [phone]            See the Encounter Report to view Anticoagulation Flowsheet and Dosing Calendar (Go to Encounters tab in chart review, and find the Anticoagulation Therapy Visit)    Joan Monte RN

## 2017-06-08 ENCOUNTER — MYC MEDICAL ADVICE (OUTPATIENT)
Dept: OTHER | Age: 72
End: 2017-06-08

## 2017-06-08 DIAGNOSIS — Z12.11 SCREENING FOR COLON CANCER: Primary | ICD-10-CM

## 2017-06-08 NOTE — TELEPHONE ENCOUNTER
Called and Got Ed Scheduled.   Next 5 appointments (look out 90 days)     Jun 14, 2017  9:00 AM CDT   Office Visit with Cayla Gregorio MD   [facility] ([facility])    [address]  [place] [address]   [phone]              Yesica Brown,

## 2017-06-18 DIAGNOSIS — I10 HYPERTENSION GOAL BP (BLOOD PRESSURE) < 140/90: ICD-10-CM

## 2017-06-19 RX ORDER — LISINOPRIL 20 MG/1
TABLET ORAL
Qty: 90 TABLET | Refills: 0
Start: 2017-06-19

## 2017-06-19 NOTE — TELEPHONE ENCOUNTER
MA - call pharmacy and verify refills. A year supply was just sent in on 4/26/17.    Ban Matamoros RN - BC

## 2017-06-19 NOTE — TELEPHONE ENCOUNTER
Lisinopril      Last Written Prescription Date: 4/26/17  Last Fill Quantity: 90, # refills: 3  Last Office Visit with G, [facility] or [facility] prescribing provider: 4/26/17       Potassium   Date Value Ref Range Status   04/26/2017 4.9 3.4 - 5.3 mmol/L Final     Creatinine   Date Value Ref Range Status   04/26/2017 0.87 0.66 - 1.25 mg/dL Final     BP Readings from Last 3 Encounters:   06/07/17 (!) 159/96   04/26/17 154/88   03/22/17 128/88

## 2017-06-21 ENCOUNTER — ALLIED HEALTH/NURSE VISIT (OUTPATIENT)
Dept: FAMILY MEDICINE | Facility: CLINIC | Age: 72
End: 2017-06-21
Payer: COMMERCIAL

## 2017-06-21 VITALS — SYSTOLIC BLOOD PRESSURE: 142 MMHG | DIASTOLIC BLOOD PRESSURE: 92 MMHG | HEART RATE: 75 BPM

## 2017-06-21 DIAGNOSIS — Z01.30 BP CHECK: Primary | ICD-10-CM

## 2017-06-21 PROCEDURE — 99207 ZZC NO CHARGE NURSE ONLY: CPT | Performed by: FAMILY MEDICINE

## 2017-06-21 NOTE — PROGRESS NOTES
Keith Desir is enrolled/participating in the retail pharmacy Blood Pressure Goals Achievement Program (BPGAP).  Keith Desir was evaluated at [facility] on June 21, 2017 at which time his blood pressure was:    BP Readings from Last 3 Encounters:   06/21/17 (!) 142/92   06/07/17 (!) 159/96   04/26/17 154/88     Reviewed lifestyle modifications for blood pressure control and reduction: including making healthy food choices, managing weight, getting regular exercise, smoking cessation, reducing alcohol consumption, monitoring blood pressure regularly.     Keith Desir is not experiencing symptoms.    Follow-Up: BP is not at goal of < 140/90mmHg (patient 18+ years of age with or without diabetes), Recommended follow-up with PCP.  Routing to PCP for further review.    Recommendation to Provider: PAYTON Gregorio    Keith Desir was evaluated for enrollment into the PGEN study today.    Patient eligible for enrollment:  No  Patient interested in enrollment:  No    Completed by: Long Callahan RPh.  [facility]  [phone]

## 2017-06-21 NOTE — Clinical Note
Routing message to PCP for review -BP checked at pharmacy and noted to be above goal - 142/92 with a pulse of 75. Recommended patient follow-up with PCP.  Long Callahan RP. [facility] [phone]

## 2017-06-21 NOTE — MR AVS SNAPSHOT
After Visit Summary   6/21/2017    Keith Desir    MRN: [id number]           Patient Information     Date Of Birth          1945        Visit Information        Provider Department      6/21/2017 4:57 PM Cayla Gregorio MD [facility]        Today's Diagnoses     BP check    -  1       Follow-ups after your visit        Your next 10 appointments already scheduled     Jul 12, 2017 10:00 AM CDT   Anticoagulation Visit with NICOLE HERNANDEZ   [facility] ([facility])    [address] [place] [address] [phone]              Who to contact     If you have questions or need follow up information about today's clinic visit or your schedule please contact [facility] directly at [phone].  Normal or non-critical lab and imaging results will be communicated to you by MyChart, letter or phone within 4 business days after the clinic has received the results. If you do not hear from us within 7 days, please contact the clinic through MyChart or phone. If you have a critical or abnormal lab result, we will notify you by phone as soon as possible.  Submit refill requests through [employer] or call your pharmacy and they will forward the refill request to us. Please allow 3 business days for your refill to be completed.          Additional Information About Your Visit        MyChart Information     [employer] gives you secure access to your electronic health record. If you see a primary care provider, you can also send messages to your care team and make appointments. If you have questions, please call your primary care clinic.  If you do not have a primary care provider, please call [phone] and they will assist you.        Care EveryWhere ID     This is your Care EveryWhere ID. This could be used by other organizations to access your [facility] medical records  [id number]        Your Vitals Were     Pulse                    75            Blood Pressure from Last 3 Encounters:   06/21/17 (!) 142/92   06/07/17 (!) 159/96   04/26/17 154/88    Weight from Last 3 Encounters:   04/26/17 225 lb (102.1 kg)   03/22/17 226 lb (102.5 kg)   02/16/17 228 lb 9.6 oz (103.7 kg)              Today, you had the following     No orders found for display       Primary Care Provider Office Phone # Fax #    Cayla Gregorio [phone]       [facility] [address] [place] [address]        Equal Access to Services     [facility] Hospital[facility] : Hadii aad ku hadasho Soomaali, waaxda luqadaha, qaybta kaalmada adeegyada, marii paez . So [facility] [phone].    ATENCIÓN: Si habla español, tiene a redding disposición servicios gratuitos de asistencia lingüística. Llame al [phone].    We comply with applicable federal civil rights laws and [place] laws. We do not discriminate on the basis of race, color, national origin, age, disability sex, sexual orientation or gender identity.            Thank you!     Thank you for choosing [facility]  [facility] your [facility]. Our goal is always to provide you with excellent care. Hearing back from our patients is one way we can continue to improve our services. Please take a few minutes to complete the written survey that you may receive in the mail after your visit with us. Thank you!             Your Updated Medication List - Protect others around you: Learn how to safely use, store and throw away your medicines at www.disposemymeds.org.          This list is accurate as of: 6/21/17  5:01 PM.  Always use your most recent med list.                   Brand Name Dispense Instructions for use Diagnosis    albuterol 108 (90 BASE) MCG/ACT Inhaler    albuterol    1 Inhaler    Inhale 1-2 puffs into the lungs every 4 hours as needed    Pulmonary emphysema, unspecified emphysema type (H)       ASPIRIN NOT PRESCRIBED    INTENTIONAL     Antiplatelet medication not  prescribed intentionally due to Current anticoagulant therapy (warfarin/enoxaparin)        atorvastatin 20 MG tablet    LIPITOR    90 tablet    TAKE 1 TABLET (20 MG) BY MOUTH DAILY    Hyperlipidemia with target LDL less than 100       digoxin 125 MCG tablet    LANOXIN    90 tablet    TAKE 1 TABLET (125 MCG) BY MOUTH DAILY    CHF (NYHA class II, ACC/AHA stage C) (H), Chronic atrial fibrillation (H)       furosemide 20 MG tablet    LASIX    180 tablet    Take 1 tablet (20 mg) by mouth 2 times daily    CHF (NYHA class II, ACC/AHA stage C) (H)       lisinopril 30 MG tablet    PRINIVIL,ZESTRIL    90 tablet    Take 1 tablet (30 mg) by mouth daily    Type 2 diabetes mellitus without complication, without long-term current use of insulin (H), CHF (NYHA class II, ACC/AHA stage C) (H), Hypertension goal BP (blood pressure) < 140/90       metFORMIN 1000 MG tablet    GLUCOPHAGE    180 tablet    Take 1 tablet (1,000 mg) by mouth 2 times daily (with meals)    Type 2 diabetes mellitus without complication, without long-term current use of insulin (H)       metoprolol 100 MG tablet    LOPRESSOR    360 tablet    Take 2 tablets (200 mg) by mouth 2 times daily    CHF (NYHA class II, ACC/AHA stage C) (H), Chronic atrial fibrillation (H)       order for DME     300 each    Equipment being ordered: Blood glucose monitor per insurance formulary; blood glucose test strips per formulary for use 3 time daily; lancets per formulary for use 3 time daily    Type 2 diabetes mellitus without complication, without long-term current use of insulin (H)       tiotropium 18 MCG capsule    SPIRIVA HANDIHALER    90 capsule    INHALE 1 CAPSULE (18 MCG) INTO THE LUNGS DAILY    Pulmonary emphysema, unspecified emphysema type (H)       warfarin 2.5 MG tablet    COUMADIN    220 tablet    Take 3 tablets (7.5mg) daily or as directed by [facility] nurse    Chronic atrial fibrillation (H)

## 2017-07-03 ENCOUNTER — ALLIED HEALTH/NURSE VISIT (OUTPATIENT)
Dept: FAMILY MEDICINE | Facility: CLINIC | Age: 72
End: 2017-07-03
Payer: COMMERCIAL

## 2017-07-03 VITALS — HEART RATE: 76 BPM | DIASTOLIC BLOOD PRESSURE: 88 MMHG | SYSTOLIC BLOOD PRESSURE: 136 MMHG

## 2017-07-03 DIAGNOSIS — Z01.30 BP CHECK: Primary | ICD-10-CM

## 2017-07-03 PROCEDURE — 99207 ZZC NO CHARGE NURSE ONLY: CPT | Performed by: FAMILY MEDICINE

## 2017-07-03 NOTE — MR AVS SNAPSHOT
After Visit Summary   7/3/2017    Keith Desir    MRN: [id number]           Patient Information     Date Of Birth          1945        Visit Information        Provider Department      7/3/2017 10:54 AM Cayla Gregorio MD [facility]        Today's Diagnoses     BP check    -  1       Follow-ups after your visit        Your next 10 appointments already scheduled     Jul 12, 2017 10:00 AM CDT   Anticoagulation Visit with NICOLE HERNANDEZ   [facility]    [address] [place] [address] [phone]              Who to contact     If you have questions or need follow up information about today's clinic visit or your schedule please contact [facility] directly at [phone].  Normal or non-critical lab and imaging results will be communicated to you by MyChart, letter or phone within 4 business days after the clinic has received the results. If you do not hear from us within 7 days, please contact the clinic through MyChart or phone. If you have a critical or abnormal lab result, we will notify you by phone as soon as possible.  Submit refill requests through My[employer] or call your pharmacy and they will forward the refill request to us. Please allow 3 business days for your refill to be completed.          Additional Information About Your Visit        MyChart Information     [employer] gives you secure access to your electronic health record. If you see a primary care provider, you can also send messages to your care team and make appointments. If you have questions, please call your primary care clinic.  If you do not have a primary care provider, please call [phone] and they will assist you.        Care EveryWhere ID     This is your Care EveryWhere ID. This could be used by other organizations to access your [facility] medical records  [id number]        Your Vitals Were     Pulse                    76            Blood Pressure from Last 3 Encounters:   07/03/17 136/88   06/21/17 (!) 142/92   06/07/17 (!) 159/96    Weight from Last 3 Encounters:   04/26/17 225 lb (102.1 kg)   03/22/17 226 lb (102.5 kg)   02/16/17 228 lb 9.6 oz (103.7 kg)              Today, you had the following     No orders found for display       Primary Care Provider Office Phone # Fax #    Cayla Gregorio [phone]       [facility] [address] [place] [address]        Equal Access to Services     [facility] : Hadii aad ku hadasho Soomaali, waaxda luqadaha, qaybta kaalmada adeegyada, waxjose angel freedn roz paez . So [facility] [phone].    ATENCIÓN: Si habla español, tiene a redding disposición servicios gratuitos de asistencia lingüística. Llame al [phone].    We comply with applicable federal civil rights laws and [place] laws. We do not discriminate on the basis of race, color, national origin, age, disability sex, sexual orientation or gender identity.            Thank you!     Thank you for choosing [facility]  [facility]. Our goal is always to provide you with excellent care. Hearing back from our patients is one way we can continue to improve our services. Please take a few minutes to complete the written survey that you may receive in the mail after your visit with us. Thank you!             Your Updated Medication List - Protect others around you: Learn how to safely use, store and throw away your medicines at www.disposemymeds.org.          This list is accurate as of: 7/3/17 10:58 AM.  Always use your most recent med list.                   Brand Name Dispense Instructions for use Diagnosis    albuterol 108 (90 BASE) MCG/ACT Inhaler    albuterol    1 Inhaler    Inhale 1-2 puffs into the lungs every 4 hours as needed    Pulmonary emphysema, unspecified emphysema type (H)       ASPIRIN NOT PRESCRIBED    INTENTIONAL     Antiplatelet medication not  prescribed intentionally due to Current anticoagulant therapy (warfarin/enoxaparin)        atorvastatin 20 MG tablet    LIPITOR    90 tablet    TAKE 1 TABLET (20 MG) BY MOUTH DAILY    Hyperlipidemia with target LDL less than 100       digoxin 125 MCG tablet    LANOXIN    90 tablet    TAKE 1 TABLET (125 MCG) BY MOUTH DAILY    CHF (NYHA class II, ACC/AHA stage C) (H), Chronic atrial fibrillation (H)       furosemide 20 MG tablet    LASIX    180 tablet    Take 1 tablet (20 mg) by mouth 2 times daily    CHF (NYHA class II, ACC/AHA stage C) (H)       lisinopril 30 MG tablet    PRINIVIL,ZESTRIL    90 tablet    Take 1 tablet (30 mg) by mouth daily    Type 2 diabetes mellitus without complication, without long-term current use of insulin (H), CHF (NYHA class II, ACC/AHA stage C) (H), Hypertension goal BP (blood pressure) < 140/90       metFORMIN 1000 MG tablet    GLUCOPHAGE    180 tablet    Take 1 tablet (1,000 mg) by mouth 2 times daily (with meals)    Type 2 diabetes mellitus without complication, without long-term current use of insulin (H)       metoprolol 100 MG tablet    LOPRESSOR    360 tablet    Take 2 tablets (200 mg) by mouth 2 times daily    CHF (NYHA class II, ACC/AHA stage C) (H), Chronic atrial fibrillation (H)       order for DME     300 each    Equipment being ordered: Blood glucose monitor per insurance formulary; blood glucose test strips per formulary for use 3 time daily; lancets per formulary for use 3 time daily    Type 2 diabetes mellitus without complication, without long-term current use of insulin (H)       tiotropium 18 MCG capsule    SPIRIVA HANDIHALER    90 capsule    INHALE 1 CAPSULE (18 MCG) INTO THE LUNGS DAILY    Pulmonary emphysema, unspecified emphysema type (H)       warfarin 2.5 MG tablet    COUMADIN    220 tablet    Take 3 tablets (7.5mg) daily or as directed by ACC nurse    Chronic atrial fibrillation (H)

## 2017-07-03 NOTE — PROGRESS NOTES
Keith Desir is enrolled/participating in the retail pharmacy Blood Pressure Goals Achievement Program (BPGAP).  Keith Desir was evaluated at [facility] on July 3, 2017 at which time his blood pressure was:    BP Readings from Last 3 Encounters:   07/03/17 136/88   06/21/17 (!) 142/92   06/07/17 (!) 159/96     Reviewed lifestyle modifications for blood pressure control and reduction: including making healthy food choices, managing weight, getting regular exercise, smoking cessation, reducing alcohol consumption, monitoring blood pressure regularly.     Keith Desir is not experiencing symptoms.    Follow-Up: BP is at goal of < 140/90mmHg (patient 18+ years of age with or without diabetes).  Recommended follow-up at pharmacy in 6 months.     Recommendation to Provider: Dr. Gregorio     Keith Desir was evaluated for enrollment into the PGEN study today.    Patient eligible for enrollment:  No  Patient interested in enrollment:  No    Completed by: Long Callahan [facility].  [facility]  [phone]

## 2017-07-12 ENCOUNTER — ANTICOAGULATION THERAPY VISIT (OUTPATIENT)
Dept: NURSING | Facility: CLINIC | Age: 72
End: 2017-07-12
Payer: COMMERCIAL

## 2017-07-12 LAB — INR POINT OF CARE: 2.9 (ref 0.86–1.14)

## 2017-07-12 PROCEDURE — 85610 PROTHROMBIN TIME: CPT | Mod: QW

## 2017-07-12 PROCEDURE — 36416 COLLJ CAPILLARY BLOOD SPEC: CPT

## 2017-07-12 PROCEDURE — 99207 ZZC NO CHARGE NURSE ONLY: CPT

## 2017-07-12 NOTE — PROGRESS NOTES
ANTICOAGULATION FOLLOW-UP CLINIC VISIT    Patient Name:  Keith Desir  Date:  7/12/2017  Contact Type:  Face to Face    SUBJECTIVE:     Patient Findings     Positives No Problem Findings           OBJECTIVE    INR Protime   Date Value Ref Range Status   07/12/2017 2.9 (A) 0.86 - 1.14 Final       ASSESSMENT / PLAN  INR assessment THER    Recheck INR In: 6 WEEKS    INR Location Clinic      Anticoagulation Summary as of 7/12/2017     INR goal 2.0-3.0   Today's INR 2.9   Maintenance plan 7.5 mg (2.5 mg x 3) every day   Full instructions 7.5 mg every day   Weekly total 52.5 mg   No change documented Joan Monte RN   Plan last modified Joan Monte RN (4/12/2017)   Next INR check 8/30/2017   Target end date     Indications   Long-term (current) use of anticoagulants [Z79.01] [Z79.01]  Chronic atrial fibrillation (HCC) [I48.2]         Anticoagulation Episode Summary     INR check location     Preferred lab     Send INR reminders to [facility]    Comments       Anticoagulation Care Providers     Provider Role Specialty Phone number    Cayla Gregorio MD [facility] [phone]            See the Encounter Report to view Anticoagulation Flowsheet and Dosing Calendar (Go to Encounters tab in chart review, and find the Anticoagulation Therapy Visit)    Joan Monte RN

## 2017-07-12 NOTE — MR AVS SNAPSHOT
Keith Desir   7/12/2017 10:00 AM   Anticoagulation Therapy Visit    Description:  71 year old male   Provider:  MAJOR HERNANDEZ   Department:  [facility]           INR as of 7/12/2017     Today's INR 2.9      Anticoagulation Summary as of 7/12/2017     INR goal 2.0-3.0   Today's INR 2.9   Full instructions 7.5 mg every day   Next INR check 8/30/2017    Indications   Long-term (current) use of anticoagulants [Z79.01] [Z79.01]  Chronic atrial fibrillation (HCC) [I48.2]         Your next Anticoagulation Clinic appointment(s)     Jul 12, 2017 10:00 AM CDT   Anticoagulation Visit with MAJOR HERNANDEZ   [facility]    [address] [id number] [phone]            Aug 30, 2017 10:00 AM CDT   Anticoagulation Visit with MAJOR HERNANDEZ   [facility]    [address] [id number] [phone]              Contact Numbers     [facility]  Please call  [phone] to cancel and/or reschedule your appointment, or with any problems or questions regarding your therapy.        July 2017 Details    Sun Mon Tue Wed Thu Fri Sat           1                 2               3               4               5               6               7               8                 9               10               11               12      7.5 mg   See details      13      7.5 mg         14      7.5 mg         15      7.5 mg           16      7.5 mg         17      7.5 mg         18      7.5 mg         19      7.5 mg         20      7.5 mg         21      7.5 mg         22      7.5 mg           23      7.5 mg         24      7.5 mg         25      7.5 mg         26      7.5 mg         27      7.5 mg         28      7.5 mg         29      7.5 mg           30      7.5 mg         31      7.5 mg               Date Details   07/12 This INR check               How to take your warfarin dose      To take:  7.5 mg Take 3 of the 2.5 mg tablets.           August 2017 Details    Sun Mon Tue Wed Thu Fri Sat       1      7.5 mg         2      7.5 mg         3      7.5 mg         4      7.5 mg         5      7.5 mg           6      7.5 mg         7      7.5 mg         8      7.5 mg         9      7.5 mg         10      7.5 mg         11      7.5 mg         12      7.5 mg           13      7.5 mg         14      7.5 mg         15      7.5 mg         16      7.5 mg         17      7.5 mg         18      7.5 mg         19      7.5 mg           20      7.5 mg         21      7.5 mg         22      7.5 mg         23      7.5 mg         24      7.5 mg         25      7.5 mg         26      7.5 mg           27      7.5 mg         28      7.5 mg         29      7.5 mg         30            31                  Date Details   No additional details    Date of next INR:  8/30/2017         How to take your warfarin dose     To take:  7.5 mg Take 3 of the 2.5 mg tablets.

## 2017-08-30 DIAGNOSIS — Z79.01 LONG-TERM (CURRENT) USE OF ANTICOAGULANTS: Primary | ICD-10-CM

## 2017-08-30 DIAGNOSIS — Z79.01 LONG-TERM (CURRENT) USE OF ANTICOAGULANTS: ICD-10-CM

## 2017-08-30 LAB — INR PPP: 2.8 (ref 0.86–1.14)

## 2017-08-30 PROCEDURE — 36416 COLLJ CAPILLARY BLOOD SPEC: CPT | Performed by: FAMILY MEDICINE

## 2017-08-30 PROCEDURE — 85610 PROTHROMBIN TIME: CPT | Performed by: FAMILY MEDICINE

## 2017-08-30 NOTE — PROGRESS NOTES
Please call patient:  Continue current dose and follow-up 1 month with INR clinic  Cayla Gregorio MD

## 2017-08-31 ENCOUNTER — TELEPHONE (OUTPATIENT)
Dept: FAMILY MEDICINE | Facility: CLINIC | Age: 72
End: 2017-08-31

## 2017-08-31 NOTE — TELEPHONE ENCOUNTER
Panel Management Review      Patient has the following on his problem list:     Diabetes    ASA: Not Required     Last A1C  Lab Results   Component Value Date    A1C 7.1 04/26/2017    A1C 8.9 03/22/2017    A1C 10.3 02/15/2017    A1C 10.2 02/08/2017    A1C 6.8 07/20/2016     A1C tested: FAILED    Last LDL:    Lab Results   Component Value Date    CHOL 116 07/20/2016     Lab Results   Component Value Date    HDL 31 07/20/2016     Lab Results   Component Value Date    LDL 53 07/20/2016     Lab Results   Component Value Date    TRIG 159 07/20/2016     Lab Results   Component Value Date    CHOLHDLRATIO 5.0 04/22/2015     Lab Results   Component Value Date    NHDL 85 07/20/2016       Is the patient on a Statin? YES             Is the patient on Aspirin? NO    Medications     HMG CoA Reductase Inhibitors    atorvastatin (LIPITOR) 20 MG tablet          Last three blood pressure readings:  BP Readings from Last 3 Encounters:   07/03/17 136/88   06/21/17 (!) 142/92   06/07/17 (!) 159/96       Date of last diabetes office visit: 4-26-17     Tobacco History:     History   Smoking Status     Current Every Day Smoker     Packs/day: 1.00     Years: 45.00     Types: Cigarettes   Smokeless Tobacco     Never Used             Composite cancer screening  Chart review shows that this patient is due/due soon for the following Fecal Colorectal (FIT)  Summary:    Patient is due/failing the following:   A1C and FIT    Action needed:   Patient needs office visit for Diabetes.    Type of outreach:    Sent letter.    Questions for provider review:    None                                                                                                                                    Dot SEGURA MA

## 2017-08-31 NOTE — LETTER
[address] [address]  Phone: [phone]      August 31, 2017                  Keith Desir,  [address] [facility] [address]              Monitoring and managing your preventative and chronic health conditions are very important to us. Our records indicate that you have not scheduled for Diabetes check up  which was recommended by Dr. Gregorio      If you have received your health care elsewhere, please call the clinic so the information can be documented in your chart.    Please call [phone] or message us through your [employer] account to schedule an appointment or provide information for your chart.     Feel free to contact us if you have any questions or concerns!    I look forward to seeing you and working with you on your health care needs.     Sincerely,         Cayla Gregorio / Dot SEGURA MA              *If you have already scheduled an appointment, please disregard this reminder

## 2017-09-12 ENCOUNTER — TELEPHONE (OUTPATIENT)
Dept: NURSING | Facility: CLINIC | Age: 72
End: 2017-09-12

## 2017-09-12 ENCOUNTER — ANTICOAGULATION THERAPY VISIT (OUTPATIENT)
Dept: NURSING | Facility: CLINIC | Age: 72
End: 2017-09-12

## 2017-09-12 DIAGNOSIS — Z79.01 LONG-TERM (CURRENT) USE OF ANTICOAGULANTS: ICD-10-CM

## 2017-09-12 DIAGNOSIS — I48.20 CHRONIC ATRIAL FIBRILLATION (H): Primary | ICD-10-CM

## 2017-09-12 DIAGNOSIS — I48.20 CHRONIC ATRIAL FIBRILLATION (H): ICD-10-CM

## 2017-09-12 NOTE — MR AVS SNAPSHOT
Keith Reeden   9/12/2017   Anticoagulation Therapy Visit    Description:  71 year old male   Provider:  Cayla Gregorio MD   Department:  [facility]           INR as of 9/12/2017     Today's INR 2.80 (8/30/2017)      Anticoagulation Summary as of 9/12/2017     INR goal 2.0-3.0   Today's INR 2.80 (8/30/2017)   Full instructions 7.5 mg every day   Next INR check 10/11/2017    Indications   Long-term (current) use of anticoagulants [Z79.01] [Z79.01]  Chronic atrial fibrillation (HCC) [I48.2]         Your next Anticoagulation Clinic appointment(s)     Oct 11, 2017 10:00 AM CDT   Anticoagulation Visit with NICOLE HERNANDEZ   [facility]    [address] [place] [id number] [phone]              Contact Numbers     [facility]  Please call  [phone] to cancel and/or reschedule your appointment, or with any problems or questions regarding your therapy.        September 2017 Details    Sun Mon Tue Wed Thu Fri Sat          1               2                 3               4               5               6               7               8               9                 10               11               12      7.5 mg   See details      13      7.5 mg         14      7.5 mg         15      7.5 mg         16      7.5 mg           17      7.5 mg         18      7.5 mg         19      7.5 mg         20      7.5 mg         21      7.5 mg         22      7.5 mg         23      7.5 mg           24      7.5 mg         25      7.5 mg         26      7.5 mg         27      7.5 mg         28      7.5 mg         29      7.5 mg         30      7.5 mg          Date Details   09/12 This INR check               How to take your warfarin dose     To take:  7.5 mg Take 3 of the 2.5 mg tablets.           October 2017 Details    Sun Mon Tue Wed Thu Fri Sat     1      7.5 mg         2      7.5 mg         3      7.5 mg         4      7.5 mg         5       7.5 mg         6      7.5 mg         7      7.5 mg           8      7.5 mg         9      7.5 mg         10      7.5 mg         11            12               13               14                 15               16               17               18               19               20               21                 22               23               24               25               26               27               28                 29               30               31                    Date Details   No additional details    Date of next INR:  10/11/2017         How to take your warfarin dose     To take:  7.5 mg Take 3 of the 2.5 mg tablets.

## 2017-09-12 NOTE — PROGRESS NOTES
ANTICOAGULATION FOLLOW-UP CLINIC VISIT    Patient Name:  Keith Desir  Date:  9/12/2017  Contact Type:  Based on below result note documentation.    SUBJECTIVE:        OBJECTIVE    INR   Date Value Ref Range Status   08/30/2017 2.80 (H) 0.86 - 1.14 Final     Comment:     This test is intended for monitoring Coumadin therapy.  Results are not   accurate in patients with prolonged INR due to factor deficiency.         ASSESSMENT / PLAN  INR assessment THER    Recheck INR In: 6 WEEKS    INR Location Clinic      Anticoagulation Summary as of 9/12/2017     INR goal 2.0-3.0   Today's INR 2.80 (8/30/2017)   Maintenance plan 7.5 mg (2.5 mg x 3) every day   Full instructions 7.5 mg every day   Weekly total 52.5 mg   No change documented Joan Monte RN   Plan last modified Joan Monte RN (4/12/2017)   Next INR check 10/11/2017   Target end date     Indications   Long-term (current) use of anticoagulants [Z79.01] [Z79.01]  Chronic atrial fibrillation (HCC) [I48.2]         Anticoagulation Episode Summary     INR check location     Preferred lab     Send INR reminders to  [facility]    Comments       Anticoagulation Care Providers     Provider Role Specialty Phone number    Cayla Gregorio MD [facility] [phone]            See the Encounter Report to view Anticoagulation Flowsheet and Dosing Calendar (Go to Encounters tab in chart review, and find the Anticoagulation Therapy Visit)    Notes Recorded by Hermelinda Burton RN on 8/30/2017 at 12:14 PM  RN spoke with the patient. He will continue current dose and follow up in 1 month. Patient verbalized understanding. No further questions or concerns. Closing encounter.      Hermelinda Burton RN    ------    Notes Recorded by Cayla Gregorio MD on 8/30/2017 at 11:59 AM  Please call patient:  Continue current dose and follow-up 1 month with INR clinic  Cayla Gregorio MD    ------    Notes Recorded by Dot Ziegler RN on 8/30/2017  at 11:57 AM  Anticoagulation RN is not available to process this result. Provider to review and advise on dosing.     Joan Monte RN

## 2017-09-26 DIAGNOSIS — I48.20 CHRONIC ATRIAL FIBRILLATION (H): ICD-10-CM

## 2017-09-27 RX ORDER — WARFARIN SODIUM 2.5 MG/1
7.5 TABLET ORAL DAILY
Qty: 270 TABLET | Refills: 1 | Status: SHIPPED | OUTPATIENT
Start: 2017-09-27 | End: 2018-03-25

## 2017-09-27 NOTE — TELEPHONE ENCOUNTER
warfarin (COUMADIN) 2.5 MG tablet    Last Written Prescription Date: 4/26/17  Last Fill Qty: 220, # refills: 1  Last Office Visit with G, P or [facility] prescribing provider: 4/26/17       Date and Result of Last PT/INR:   Lab Results   Component Value Date    INR 2.80 08/30/2017    INR 2.9 07/12/2017    INR 3.3 06/07/2017    INR 2.60 02/15/2017

## 2017-09-27 NOTE — TELEPHONE ENCOUNTER
Prescription approved per [facility] [place].  Chelle Hanson RN

## 2017-10-11 ENCOUNTER — ANTICOAGULATION THERAPY VISIT (OUTPATIENT)
Dept: NURSING | Facility: CLINIC | Age: 72
End: 2017-10-11
Payer: COMMERCIAL

## 2017-10-11 DIAGNOSIS — Z79.01 LONG-TERM (CURRENT) USE OF ANTICOAGULANTS: ICD-10-CM

## 2017-10-11 DIAGNOSIS — I48.20 CHRONIC ATRIAL FIBRILLATION (H): ICD-10-CM

## 2017-10-11 LAB — INR POINT OF CARE: 2.8 (ref 0.86–1.14)

## 2017-10-11 PROCEDURE — 99207 ZZC NO CHARGE NURSE ONLY: CPT

## 2017-10-11 PROCEDURE — 85610 PROTHROMBIN TIME: CPT | Mod: QW

## 2017-10-11 PROCEDURE — 36416 COLLJ CAPILLARY BLOOD SPEC: CPT

## 2017-10-11 NOTE — MR AVS SNAPSHOT
Keith Desir   10/11/2017 10:00 AM   Anticoagulation Therapy Visit    Description:  71 year old male   Provider:  MAJOR HERNANDEZ   Department:  [facility]           INR as of 10/11/2017     Today's INR 2.8      Anticoagulation Summary as of 10/11/2017     INR goal 2.0-3.0   Today's INR 2.8   Full instructions 7.5 mg every day   Next INR check 11/22/2017    Indications   Long-term (current) use of anticoagulants [Z79.01] [Z79.01]  Chronic atrial fibrillation (HCC) [I48.2]         Your next Anticoagulation Clinic appointment(s)     Nov 22, 2017 10:00 AM CST   Anticoagulation Visit with MAJOR HERNANDEZ   [facility]    [address] [place] [address] [phone]              Contact Numbers     [facility]  Please call  [phone] to cancel and/or reschedule your appointment, or with any problems or questions regarding your therapy.        October 2017 Details    Sun Mon Tue Wed Thu Fri Sat     1               2               3               4               5               6               7                 8               9               10               11      7.5 mg   See details      12      7.5 mg         13      7.5 mg         14      7.5 mg           15      7.5 mg         16      7.5 mg         17      7.5 mg         18      7.5 mg         19      7.5 mg         20      7.5 mg         21      7.5 mg           22      7.5 mg         23      7.5 mg         24      7.5 mg         25      7.5 mg         26      7.5 mg         27      7.5 mg         28      7.5 mg           29      7.5 mg         30      7.5 mg         31      7.5 mg              Date Details   10/11 This INR check               How to take your warfarin dose     To take:  7.5 mg Take 3 of the 2.5 mg tablets.           November 2017 Details    Sun Mon Tue Wed Thu Fri Sat        1      7.5 mg         2      7.5 mg         3      7.5 mg         4      7.5 mg            5      7.5 mg         6      7.5 mg         7      7.5 mg         8      7.5 mg         9      7.5 mg         10      7.5 mg         11      7.5 mg           12      7.5 mg         13      7.5 mg         14      7.5 mg         15      7.5 mg         16      7.5 mg         17      7.5 mg         18      7.5 mg           19      7.5 mg         20      7.5 mg         21      7.5 mg         22            23               24               25                 26               27               28               29               30                  Date Details   No additional details    Date of next INR:  11/22/2017         How to take your warfarin dose     To take:  7.5 mg Take 3 of the 2.5 mg tablets.

## 2017-10-11 NOTE — PROGRESS NOTES
ANTICOAGULATION FOLLOW-UP CLINIC VISIT    Patient Name:  Keith Desir  Date:  10/11/2017  Contact Type:  Face to Face    SUBJECTIVE:     Patient Findings     Positives No Problem Findings           OBJECTIVE    INR Protime   Date Value Ref Range Status   10/11/2017 2.8 (A) 0.86 - 1.14 Final       ASSESSMENT / PLAN  INR assessment THER    Recheck INR In: 6 WEEKS    INR Location Clinic      Anticoagulation Summary as of 10/11/2017     INR goal 2.0-3.0   Today's INR 2.8   Maintenance plan 7.5 mg (2.5 mg x 3) every day   Full instructions 7.5 mg every day   Weekly total 52.5 mg   No change documented Nas Ren RN   Plan last modified Joan Monte RN (4/12/2017)   Next INR check 11/22/2017   Target end date     Indications   Long-term (current) use of anticoagulants [Z79.01] [Z79.01]  Chronic atrial fibrillation (HCC) [I48.2]         Anticoagulation Episode Summary     INR check location     Preferred lab     Send INR reminders to [facility] CLINIC    Comments       Anticoagulation Care Providers     Provider Role Specialty Phone number    Cayla Gregorio MD [facility] [phone]            See the Encounter Report to view Anticoagulation Flowsheet and Dosing Calendar (Go to Encounters tab in chart review, and find the Anticoagulation Therapy Visit)        Nas Ren, RN

## 2017-10-19 DIAGNOSIS — I50.9 CHF (NYHA CLASS II, ACC/AHA STAGE C) (H): ICD-10-CM

## 2017-10-19 RX ORDER — FUROSEMIDE 20 MG
TABLET ORAL
Qty: 180 TABLET | Refills: 1 | Status: SHIPPED | OUTPATIENT
Start: 2017-10-19 | End: 2018-05-23

## 2017-10-19 NOTE — TELEPHONE ENCOUNTER
Prescription approved per [facility] Refill Protocol.  Ban Matamoros, RN - BC

## 2017-10-24 NOTE — PROGRESS NOTES
"  SUBJECTIVE:   Keith Desir is a 71 year old male smoker with COPD and atrial fibrillation who presents to clinic today for the following health issues:    Diabetes Follow-up      Patient is checking blood sugars: rarely.  Results range from 130 to 180    Diabetic concerns: None     Symptoms of hypoglycemia (low blood sugar): none     Paresthesias (numbness or burning in feet) or sores: No     Date of last diabetic eye exam: 1 year ago    Hyperlipidemia Follow-Up      Rate your low fat/cholesterol diet?: good    Taking statin?  Yes, no muscle aches from statin    Other lipid medications/supplements?:  none    Hypertension Follow-up      Outpatient blood pressures are not being checked.    Low Salt Diet: no added salt      Amount of exercise or physical activity: some walking and up/down stairs    Problems taking medications regularly: No    Medication side effects: none    Diet: diabetic    I have reviewed the patient's medical history in detail and updated the computerized patient record.     ROS:  CONSTITUTIONAL: POSITIVE for weight loss  I: NEGATIVE for worrisome rashes, moles or lesions  E: NEGATIVE for vision changes or irritation  R: NEGATIVE for significant cough or SOB  CV: NEGATIVE for chest pain, palpitations or peripheral edema  M: NEGATIVE for significant arthralgias or myalgia  NEURO: lightheadedness when standing up on occasion   ENDOCRINE: Hx diabetes  H: NEGATIVE for bleeding problems  P: NEGATIVE for changes in mood or affect    OBJECTIVE:     /78 (BP Location: Left arm, Patient Position: Chair, Cuff Size: Adult Large)  Pulse 95  Temp 96.8  F (36  C) (Oral)  Ht 5' 9\" (1.753 m)  Wt 214 lb (97.1 kg)  SpO2 98%  BMI 31.6 kg/m2  Body mass index is 31.6 kg/(m^2).  GENERAL: alert, no distress and obese  NECK: no adenopathy, no asymmetry, masses, or scars and thyroid normal to palpation  RESP: decreased breath sounds throughout  CV: irregularly irregular rhythm, normal S1 S2, no S3 or S4, no " murmur, click or rub and no peripheral edema  MS: no gross musculoskeletal defects noted, no edema  PSYCH: mentation appears normal, affect normal/bright    Diagnostic Test Results:  Results for orders placed or performed in visit on 11/01/17   HEMOGLOBIN A1C   Result Value Ref Range    Hemoglobin A1C 6.2 (H) 4.3 - 6.0 %       ASSESSMENT/PLAN:   (E11.9) Type 2 diabetes mellitus without complication, without long-term current use of insulin (H)  (primary encounter diagnosis)  Comment: Well controlled with medications without side effects.   Plan: HEMOGLOBIN A1C, Lipid panel reflex to direct         LDL Fasting, Albumin Random Urine Quantitative         with Creat Ratio, TSH WITH FREE T4 REFLEX,         Comprehensive metabolic panel        Follow-up 6 months     (J43.9) Pulmonary emphysema, unspecified emphysema type (H)  Comment: Well controlled with medications without side effects.     (I50.9) CHF (NYHA class II, ACC/AHA stage C) (H)  Comment: euvolemic, on beta blocker and ACE/ARB   Plan: Lipid panel reflex to direct LDL Fasting,         Comprehensive metabolic panel, CBC with         platelets differential          (I48.2) Chronic atrial fibrillation (HCC)  Comment: rate controlled and anticoagulated   Plan: TSH WITH FREE T4 REFLEX, Comprehensive         metabolic panel        Continue chronic anticoagulation under surveillance of protime clinic with goal INR 2 - 3 indefinitely      (Z72.0) Tobacco abuse  Plan: Urged cessation and offered my support. He feels that cutting down from 2 ppd to 1 ppd is all he wants to do     (Z12.11) Screen for colon cancer  Plan: Fecal colorectal cancer screen (FIT)             See Patient Instructions    Cayla Gregorio MD  [facility]

## 2017-10-28 DIAGNOSIS — I48.20 CHRONIC ATRIAL FIBRILLATION (H): ICD-10-CM

## 2017-10-28 DIAGNOSIS — I50.9 CHF (NYHA CLASS II, ACC/AHA STAGE C) (H): ICD-10-CM

## 2017-10-31 RX ORDER — METOPROLOL TARTRATE 100 MG
TABLET ORAL
Qty: 360 TABLET | Refills: 1 | Status: SHIPPED | OUTPATIENT
Start: 2017-10-31 | End: 2018-04-23

## 2017-10-31 NOTE — TELEPHONE ENCOUNTER
Signed Prescriptions:                        Disp   Refills    metoprolol (LOPRESSOR) 100 MG tablet       360 ta*1        Sig: TAKE TWO TABLETS BY MOUTH TWICE DAILY   Authorizing Provider: DEVON PAREDES  Ordering User: MARIAM DUCKWORTH RN refilled medication per [facility] [place].     Mariam Duckworth RN

## 2017-11-01 ENCOUNTER — OFFICE VISIT (OUTPATIENT)
Dept: FAMILY MEDICINE | Facility: CLINIC | Age: 72
End: 2017-11-01
Payer: COMMERCIAL

## 2017-11-01 VITALS
WEIGHT: 214 LBS | TEMPERATURE: 96.8 F | DIASTOLIC BLOOD PRESSURE: 78 MMHG | HEART RATE: 95 BPM | OXYGEN SATURATION: 98 % | BODY MASS INDEX: 31.7 KG/M2 | HEIGHT: 69 IN | SYSTOLIC BLOOD PRESSURE: 114 MMHG

## 2017-11-01 DIAGNOSIS — E11.9 TYPE 2 DIABETES MELLITUS WITHOUT COMPLICATION, WITHOUT LONG-TERM CURRENT USE OF INSULIN (H): Primary | ICD-10-CM

## 2017-11-01 DIAGNOSIS — I50.9 CHF (NYHA CLASS II, ACC/AHA STAGE C) (H): ICD-10-CM

## 2017-11-01 DIAGNOSIS — Z72.0 TOBACCO ABUSE: ICD-10-CM

## 2017-11-01 DIAGNOSIS — J43.9 PULMONARY EMPHYSEMA, UNSPECIFIED EMPHYSEMA TYPE (H): Chronic | ICD-10-CM

## 2017-11-01 DIAGNOSIS — I48.20 CHRONIC ATRIAL FIBRILLATION (H): ICD-10-CM

## 2017-11-01 DIAGNOSIS — Z23 NEED FOR PROPHYLACTIC VACCINATION AND INOCULATION AGAINST INFLUENZA: ICD-10-CM

## 2017-11-01 DIAGNOSIS — Z12.11 SCREEN FOR COLON CANCER: ICD-10-CM

## 2017-11-01 PROBLEM — R80.9 URINE TEST POSITIVE FOR MICROALBUMINURIA: Status: ACTIVE | Noted: 2017-11-01

## 2017-11-01 LAB
ALBUMIN SERPL-MCNC: 3.6 G/DL (ref 3.4–5)
ALP SERPL-CCNC: 157 U/L (ref 40–150)
ALT SERPL W P-5'-P-CCNC: 20 U/L (ref 0–70)
ANION GAP SERPL CALCULATED.3IONS-SCNC: 6 MMOL/L (ref 3–14)
AST SERPL W P-5'-P-CCNC: 19 U/L (ref 0–45)
BASOPHILS # BLD AUTO: 0 10E9/L (ref 0–0.2)
BASOPHILS NFR BLD AUTO: 0.2 %
BILIRUB SERPL-MCNC: 0.8 MG/DL (ref 0.2–1.3)
BUN SERPL-MCNC: 13 MG/DL (ref 7–30)
CALCIUM SERPL-MCNC: 9.1 MG/DL (ref 8.5–10.1)
CHLORIDE SERPL-SCNC: 101 MMOL/L (ref 94–109)
CHOLEST SERPL-MCNC: 120 MG/DL
CO2 SERPL-SCNC: 31 MMOL/L (ref 20–32)
CREAT SERPL-MCNC: 0.86 MG/DL (ref 0.66–1.25)
CREAT UR-MCNC: 33 MG/DL
DIFFERENTIAL METHOD BLD: ABNORMAL
EOSINOPHIL # BLD AUTO: 0.1 10E9/L (ref 0–0.7)
EOSINOPHIL NFR BLD AUTO: 1.6 %
ERYTHROCYTE [DISTWIDTH] IN BLOOD BY AUTOMATED COUNT: 14.5 % (ref 10–15)
GFR SERPL CREATININE-BSD FRML MDRD: 88 ML/MIN/1.7M2
GLUCOSE SERPL-MCNC: 149 MG/DL (ref 70–99)
HBA1C MFR BLD: 6.2 % (ref 4.3–6)
HCT VFR BLD AUTO: 50.1 % (ref 40–53)
HDLC SERPL-MCNC: 33 MG/DL
HGB BLD-MCNC: 17.8 G/DL (ref 13.3–17.7)
LDLC SERPL CALC-MCNC: 50 MG/DL
LYMPHOCYTES # BLD AUTO: 2.1 10E9/L (ref 0.8–5.3)
LYMPHOCYTES NFR BLD AUTO: 23.6 %
MCH RBC QN AUTO: 34.1 PG (ref 26.5–33)
MCHC RBC AUTO-ENTMCNC: 35.5 G/DL (ref 31.5–36.5)
MCV RBC AUTO: 96 FL (ref 78–100)
MICROALBUMIN UR-MCNC: 12 MG/L
MICROALBUMIN/CREAT UR: 38.34 MG/G CR (ref 0–17)
MONOCYTES # BLD AUTO: 0.8 10E9/L (ref 0–1.3)
MONOCYTES NFR BLD AUTO: 8.8 %
NEUTROPHILS # BLD AUTO: 5.7 10E9/L (ref 1.6–8.3)
NEUTROPHILS NFR BLD AUTO: 65.8 %
NONHDLC SERPL-MCNC: 87 MG/DL
PLATELET # BLD AUTO: 211 10E9/L (ref 150–450)
POTASSIUM SERPL-SCNC: 4.5 MMOL/L (ref 3.4–5.3)
PROT SERPL-MCNC: 7.4 G/DL (ref 6.8–8.8)
RBC # BLD AUTO: 5.22 10E12/L (ref 4.4–5.9)
SODIUM SERPL-SCNC: 138 MMOL/L (ref 133–144)
TRIGL SERPL-MCNC: 185 MG/DL
TSH SERPL DL<=0.005 MIU/L-ACNC: 1.77 MU/L (ref 0.4–4)
WBC # BLD AUTO: 8.7 10E9/L (ref 4–11)

## 2017-11-01 PROCEDURE — 82043 UR ALBUMIN QUANTITATIVE: CPT | Performed by: FAMILY MEDICINE

## 2017-11-01 PROCEDURE — 80050 GENERAL HEALTH PANEL: CPT | Performed by: FAMILY MEDICINE

## 2017-11-01 PROCEDURE — 99214 OFFICE O/P EST MOD 30 MIN: CPT | Mod: 25 | Performed by: FAMILY MEDICINE

## 2017-11-01 PROCEDURE — 90662 IIV NO PRSV INCREASED AG IM: CPT | Performed by: FAMILY MEDICINE

## 2017-11-01 PROCEDURE — 36415 COLL VENOUS BLD VENIPUNCTURE: CPT | Performed by: FAMILY MEDICINE

## 2017-11-01 PROCEDURE — 83036 HEMOGLOBIN GLYCOSYLATED A1C: CPT | Performed by: FAMILY MEDICINE

## 2017-11-01 PROCEDURE — G0328 FECAL BLOOD SCRN IMMUNOASSAY: HCPCS | Performed by: FAMILY MEDICINE

## 2017-11-01 PROCEDURE — G0008 ADMIN INFLUENZA VIRUS VAC: HCPCS | Performed by: FAMILY MEDICINE

## 2017-11-01 PROCEDURE — 80061 LIPID PANEL: CPT | Performed by: FAMILY MEDICINE

## 2017-11-01 NOTE — MR AVS SNAPSHOT
After Visit Summary   11/1/2017    Keith Desir    MRN: [id number]           Patient Information     Date Of Birth          1945        Visit Information        Provider Department      11/1/2017 7:00 AM Cayla Gregorio MD [facility]        Today's Diagnoses     Type 2 diabetes mellitus without complication, without long-term current use of insulin (H)    -  1    Pulmonary emphysema, unspecified emphysema type (H)        CHF (NYHA class II, ACC/AHA stage C) (H)        Chronic atrial fibrillation (HCC)        Tobacco abuse        Screen for colon cancer          Care Instructions    [facility]    If you have any questions regarding to your visit please contact your care team:       Team Red:   Clinic Hours Telephone Number   Dr. Cayla Hanson  (pediatrics)  Jenna Marie NP 7am-7pm  Monday - Thursday   7am-5pm  Fridays  [phone] (fax)    Jaydon TIDWELL  [phone]   Urgent Care - [place] and [place] Monday-Friday  [place] - 11am-8pm  Saturday-Sunday  Both sites - 9am-5pm  [phone] - [facility]  [phone] - [place]       What options do I have for visits at the clinic other than the traditional office visit?  To expand how we care for you, many of our providers are utilizing electronic visits (e-visits) and telephone visits, when medically appropriate, for interactions with their patients rather than a visit in the clinic.   We also offer nurse visits for many medical concerns. Just like any other service, we will bill your insurance company for this type of visit based on time spent on the phone with your provider. Not all insurance companies cover these visits. Please check with your medical insurance if this type of visit is covered. You will be responsible for any charges that are not paid by your insurance.      E-visits via [employer]:  generally incur a $35.00 fee.  Telephone  visits:  Time spent on the phone: *charged based on time that is spent on the phone in increments of 10 minutes. Estimated cost:   5-10 mins $30.00   11-20 mins. $59.00   21-30 mins. $85.00     As always, Thank you for trusting us with your health care needs!    Brice Soria            Follow-ups after your visit        Follow-up notes from your care team     Return in about 6 months (around 5/1/2018) for diabetes (fasting labs up to one week prior).      Your next 10 appointments already scheduled     Nov 28, 2017 10:00 AM CST   Anticoagulation Visit with NICOLE HERNANDEZ   [facility] ([facility]    [address] [place] [id number] [phone]              Future tests that were ordered for you today     Open Future Orders        Priority Expected Expires Ordered    Fecal colorectal cancer screen (FIT) Routine 11/22/2017 1/24/2018 11/1/2017            Who to contact     If you have questions or need follow up information about today's clinic visit or your schedule please contact [facility] directly at [phone].  Normal or non-critical lab and imaging results will be communicated to you by MyChart, letter or phone within 4 business days after the clinic has received the results. If you do not hear from us within 7 days, please contact the clinic through MyChart or phone. If you have a critical or abnormal lab result, we will notify you by phone as soon as possible.  Submit refill requests through [employer] or call your pharmacy and they will forward the refill request to us. Please allow 3 business days for your refill to be completed.          Additional Information About Your Visit        MyChart Information     [employer] gives you secure access to your electronic health record. If you see a primary care provider, you can also send messages to your care team and make appointments. If you have questions, please call your primary care  "clinic.  If you do not have a primary care provider, please call [phone] and they will assist you.        Care EveryWhere ID     This is your Care EveryWhere ID. This could be used by other organizations to access your [facility] medical records  [id number]        Your Vitals Were     Pulse Temperature Height Pulse Oximetry BMI (Body Mass Index)       95 96.8  F (36  C) (Oral) 5' 9\" (1.753 m) 98% 31.6 kg/m2        Blood Pressure from Last 3 Encounters:   11/01/17 114/78   07/03/17 136/88   06/21/17 (!) 142/92    Weight from Last 3 Encounters:   11/01/17 214 lb (97.1 kg)   04/26/17 225 lb (102.1 kg)   03/22/17 226 lb (102.5 kg)              We Performed the Following     Albumin Random Urine Quantitative with Creat Ratio     CBC with platelets differential     Comprehensive metabolic panel     HEMOGLOBIN A1C     Lipid panel reflex to direct LDL Fasting     TSH WITH FREE T4 REFLEX        Primary Care Provider Office Phone # Fax #    Cayla Gregorio [phone]       [address] [facility] [address]        Equal Access to Services     [facility]: Hadii aad ku hadasho Soomaali, waaxda luqadaha, qaybta kaalmada adeegyada, marii paez ah. So [facility] [phone].    ATENCIÓN: Si habla español, tiene a redding disposición servicios gratuitos de asistencia lingüística. Llame al [phone].    We comply with applicable federal civil rights laws and [place] laws. We do not discriminate on the basis of race, color, national origin, age, disability, sex, sexual orientation, or gender identity.            Thank you!     Thank you for choosing [facility]  for your care. Our goal is always to provide you with excellent care. Hearing back from our patients is one way we can continue to improve our services. Please take a few minutes to complete the written survey that you may receive in the mail after your visit with us. Thank you!             Your Updated " Medication List - Protect others around you: Learn how to safely use, store and throw away your medicines at www.disposemymeds.org.          This list is accurate as of: 11/1/17  7:38 AM.  Always use your most recent med list.                   Brand Name Dispense Instructions for use Diagnosis    albuterol 108 (90 BASE) MCG/ACT Inhaler    PROAIR HFA    1 Inhaler    Inhale 1-2 puffs into the lungs every 4 hours as needed    Pulmonary emphysema, unspecified emphysema type (H)       ASPIRIN NOT PRESCRIBED    INTENTIONAL     Antiplatelet medication not prescribed intentionally due to Current anticoagulant therapy (warfarin/enoxaparin)        atorvastatin 20 MG tablet    LIPITOR    90 tablet    TAKE 1 TABLET (20 MG) BY MOUTH DAILY    Hyperlipidemia with target LDL less than 100       digoxin 125 MCG tablet    LANOXIN    90 tablet    TAKE 1 TABLET (125 MCG) BY MOUTH DAILY    CHF (NYHA class II, ACC/AHA stage C) (H), Chronic atrial fibrillation (H)       furosemide 20 MG tablet    LASIX    180 tablet    TAKE ONE TABLET BY MOUTH TWICE DAILY    CHF (NYHA class II, ACC/AHA stage C) (H)       lisinopril 30 MG tablet    PRINIVIL,ZESTRIL    90 tablet    Take 1 tablet (30 mg) by mouth daily    Type 2 diabetes mellitus without complication, without long-term current use of insulin (H), CHF (NYHA class II, ACC/AHA stage C) (H), Hypertension goal BP (blood pressure) < 140/90       metFORMIN 1000 MG tablet    GLUCOPHAGE    180 tablet    Take 1 tablet (1,000 mg) by mouth 2 times daily (with meals)    Type 2 diabetes mellitus without complication, without long-term current use of insulin (H)       metoprolol 100 MG tablet    LOPRESSOR    360 tablet    TAKE TWO TABLETS BY MOUTH TWICE DAILY    CHF (NYHA class II, ACC/AHA stage C) (H), Chronic atrial fibrillation (H)       order for DME     300 each    Equipment being ordered: Blood glucose monitor per insurance formulary; blood glucose test strips per formulary for use 3 time daily;  lancets per formulary for use 3 time daily    Type 2 diabetes mellitus without complication, without long-term current use of insulin (H)       tiotropium 18 MCG capsule    SPIRIVA HANDIHALER    90 capsule    INHALE 1 CAPSULE (18 MCG) INTO THE LUNGS DAILY    Pulmonary emphysema, unspecified emphysema type (H)       warfarin 2.5 MG tablet    COUMADIN    270 tablet    Take 3 tablets (7.5 mg) by mouth daily    Chronic atrial fibrillation (H)

## 2017-11-01 NOTE — NURSING NOTE
"Chief Complaint   Patient presents with     Diabetes     Hypertension     Lipids     Flu Shot       Initial /78 (BP Location: Left arm, Patient Position: Chair, Cuff Size: Adult Large)  Pulse 95  Temp 96.8  F (36  C) (Oral)  Ht 5' 9\" (1.753 m)  Wt 214 lb (97.1 kg)  SpO2 98%  BMI 31.6 kg/m2 Estimated body mass index is 31.6 kg/(m^2) as calculated from the following:    Height as of this encounter: 5' 9\" (1.753 m).    Weight as of this encounter: 214 lb (97.1 kg).  Medication Reconciliation: complete    "

## 2017-11-01 NOTE — PROGRESS NOTES

## 2017-11-01 NOTE — PROGRESS NOTES
Ed,    Your diabetes is well controlled. You have a small of protein in your urine, which in some cases represents early effects of high blood pressure or diabetes on the kidneys. We can recheck this at your next visit.     Your thyroid, kidney, liver, and blood count tests are fine. Your cholesterol is controlled.     Cayla Gregorio MD

## 2017-11-01 NOTE — LETTER
My COPD Action Plan     Name: Keith Desir    YOB: 1945   Date: 11/1/2017    My doctor: Cayla Gregorio MD   My clinic: [address]  [place] [id number] [phone]  My Controller Medicine: Tiotropium (Spiriva)   Dose: 1 puff daily      My Rescue Medicine: Albuterol (Proair/Ventolin/Proventil) inhaler   Dose: 2 puffs every 4 hours as needed      My Flare Up Medicine: Prednisone   Dose:   FEV-1 (no units)   Date Value   09/25/2012 68     FEV1/FVC (no units)   Date Value   04/24/2012 88      My COPD Severity: Moderate = FeV1 < 79% -50%      Use of Oxygen: Oxygen Not Prescribed      Make sure you've had your pneumonia   vaccines.          GREEN ZONE       Doing well today      Usual level of activity and exercise    Usual amount of cough and mucus    No shortness of breath    Usual level of health (thinking clearly, sleeping well, feel like eating) Actions:      Take daily medicines    Use oxygen as prescribed    Follow regular exercise and diet plan    Avoid cigarette smoke and other irritants that harm the lungs           YELLOW ZONE          Having a bad day or flare up      Short of breath more than usual    A lot more sputum (mucus) than usual    Sputum looks yellow, green, tan, brown or bloody    More coughing or wheezing    Fever or chills    Less energy; trouble completing activities    Trouble thinking or focusing    Using quick relief inhaler or nebulizer more often    Poor sleep; symptoms wake me up    Do not feel like eating Actions:      Get plenty of rest    Take daily medicines    Use quick relief inhaler every 4 hours    If you use oxygen, call you doctor to see if you should adjust your oxygen    Do breathing exercises or other things to help you relax    Let a loved one, friend or neighbor know you are feeling worse    Call your care team if you have 2 or more symptoms.  Start taking steroids or antibiotics if directed by your care team            RED ZONE       Need medical care now      Severe shortness of breath (feel you can't breathe)    Fever, chills    Not enough breath to do any activity    Trouble coughing up mucus, walking or talking    Blood in mucus    Frequent coughing   Rescue medicines are not working    Not able to sleep because of breathing    Feel confused or drowsy    Chest pain    Actions:      Call your health care team.  If you cannot reach your care team, call 911 or go to the emergency room.        Electronically signed by: Cayla Gregorio, November 1, 2017  Annual Reminders:  Meet with Care Team, Flu Shot every Fall  Pharmacy:    [id number] - [place], [place] - [address] [facility] [place], [place] - [address]

## 2017-11-04 LAB — HEMOCCULT STL QL IA: NEGATIVE

## 2017-11-06 DIAGNOSIS — Z12.11 SCREEN FOR COLON CANCER: ICD-10-CM

## 2017-11-28 ENCOUNTER — ANTICOAGULATION THERAPY VISIT (OUTPATIENT)
Dept: NURSING | Facility: CLINIC | Age: 72
End: 2017-11-28
Payer: COMMERCIAL

## 2017-11-28 DIAGNOSIS — I48.20 CHRONIC ATRIAL FIBRILLATION (H): ICD-10-CM

## 2017-11-28 DIAGNOSIS — Z79.01 LONG-TERM (CURRENT) USE OF ANTICOAGULANTS: ICD-10-CM

## 2017-11-28 LAB — INR POINT OF CARE: 4.1 (ref 0.86–1.14)

## 2017-11-28 PROCEDURE — 99207 ZZC NO CHARGE NURSE ONLY: CPT

## 2017-11-28 PROCEDURE — 85610 PROTHROMBIN TIME: CPT | Mod: QW

## 2017-11-28 PROCEDURE — 36416 COLLJ CAPILLARY BLOOD SPEC: CPT

## 2017-11-28 NOTE — PROGRESS NOTES
ANTICOAGULATION FOLLOW-UP CLINIC VISIT    Patient Name:  Keith Desir  Date:  11/28/2017  Contact Type:  Face to Face    SUBJECTIVE:     Patient Findings     Positives Antibiotic use or infection (Pt reports that he's had a bit of a cold for the past week), No Problem Findings    Comments Pt is also going to increase greens a little bit.           OBJECTIVE    INR Protime   Date Value Ref Range Status   11/28/2017 4.1 (A) 0.86 - 1.14 Final       ASSESSMENT / PLAN  INR assessment SUPRA    Recheck INR In: 3 DAYS    INR Location Clinic      Anticoagulation Summary as of 11/28/2017     INR goal 2.0-3.0   Today's INR 4.1!   Maintenance plan 7.5 mg (2.5 mg x 3) every day   Full instructions 11/28: Hold; Otherwise 7.5 mg every day   Weekly total 52.5 mg   Plan last modified Joan Monte RN (4/12/2017)   Next INR check 12/1/2017   Target end date     Indications   Long-term (current) use of anticoagulants [Z79.01] [Z79.01]  Chronic atrial fibrillation (HCC) [I48.2]         Anticoagulation Episode Summary     INR check location     Preferred lab     Send INR reminders to  ANTICOAG CLINIC    Comments       Anticoagulation Care Providers     Provider Role Specialty Phone number    Cayla Gregorio MD [facility] [phone]            See the Encounter Report to view Anticoagulation Flowsheet and Dosing Calendar (Go to Encounters tab in chart review, and find the Anticoagulation Therapy Visit)    Dosage adjustment made based on physician directed care plan.    Fraaz De La Cruz RN

## 2017-11-28 NOTE — MR AVS SNAPSHOT
Juannoemi FABRICE Desir   11/28/2017 10:00 AM   Anticoagulation Therapy Visit    Description:  71 year old male   Provider:  MAJOR HERNANDEZ   Department:  [facility]           INR as of 11/28/2017     Today's INR 4.1!      Anticoagulation Summary as of 11/28/2017     INR goal 2.0-3.0   Today's INR 4.1!   Full instructions 11/28: Hold; Otherwise 7.5 mg every day   Next INR check 12/1/2017    Indications   Long-term (current) use of anticoagulants [Z79.01] [Z79.01]  Chronic atrial fibrillation (HCC) [I48.2]         Contact Numbers     [facility]  Please call  [phone] to cancel and/or reschedule your appointment, or with any problems or questions regarding your therapy.        November 2017 Details    Sun Mon Tue Wed Thu Fri Sat        1               2               3               4                 5               6               7               8               9               10               11                 12               13               14               15               16               17               18                 19               20               21               22               23               24               25                 26               27               28      Hold   See details      29      7.5 mg         30      7.5 mg            Date Details   11/28 This INR check               How to take your warfarin dose     To take:  7.5 mg Take 3 of the 2.5 mg tablets.    Hold Do not take your warfarin dose. See the Details table to the right for additional instructions.                December 2017 Details    Sun Mon Tue Wed Thu Fri Sat          1            2                 3               4               5               6               7               8               9                 10               11               12               13               14               15               16                 17               18               19               20               21                22               23                 24               25               26               27               28               29               30                 31                      Date Details   No additional details    Date of next INR:  12/1/2017         How to take your warfarin dose     To take:  7.5 mg Take 3 of the 2.5 mg tablets.

## 2017-11-30 ENCOUNTER — DOCUMENTATION ONLY (OUTPATIENT)
Dept: LAB | Facility: CLINIC | Age: 72
End: 2017-11-30

## 2017-11-30 DIAGNOSIS — I48.20 CHRONIC ATRIAL FIBRILLATION (H): Primary | ICD-10-CM

## 2017-11-30 NOTE — PROGRESS NOTES
Patient has lab appointment 12.01.17 at 10:30 am.  Need orders ASAP.  Thank you!    [facility] staff

## 2017-11-30 NOTE — PROGRESS NOTES
Patient needs future INR lab order. Pt is being seen in [facility] for INR, [facility] does not have INR nurse available so patient is being set to lab for draw, currently needs INR lab order.     Routing to Dr. Gregorio and Ban, INR RN.     Ashley Hooper, RN

## 2017-12-01 ENCOUNTER — ANTICOAGULATION THERAPY VISIT (OUTPATIENT)
Dept: FAMILY MEDICINE | Facility: CLINIC | Age: 72
End: 2017-12-01

## 2017-12-01 DIAGNOSIS — I48.20 CHRONIC ATRIAL FIBRILLATION (H): ICD-10-CM

## 2017-12-01 LAB — INR PPP: 1.6 (ref 0.86–1.14)

## 2017-12-01 PROCEDURE — 36416 COLLJ CAPILLARY BLOOD SPEC: CPT | Performed by: FAMILY MEDICINE

## 2017-12-01 PROCEDURE — 85610 PROTHROMBIN TIME: CPT | Performed by: FAMILY MEDICINE

## 2017-12-13 ENCOUNTER — ANTICOAGULATION THERAPY VISIT (OUTPATIENT)
Dept: NURSING | Facility: CLINIC | Age: 72
End: 2017-12-13
Payer: COMMERCIAL

## 2017-12-13 LAB — INR POINT OF CARE: 3.3 (ref 0.86–1.14)

## 2017-12-13 PROCEDURE — 36416 COLLJ CAPILLARY BLOOD SPEC: CPT

## 2017-12-13 PROCEDURE — 99207 ZZC NO CHARGE NURSE ONLY: CPT

## 2017-12-13 PROCEDURE — 85610 PROTHROMBIN TIME: CPT | Mod: QW

## 2017-12-13 NOTE — MR AVS SNAPSHOT
Juannoemi FABRICE Desir   12/13/2017 10:40 AM   Anticoagulation Therapy Visit    Description:  72 year old male   Provider:  MAJOR HERNANDEZ   Department:  [facility]           INR as of 12/13/2017     Today's INR 3.3!      Anticoagulation Summary as of 12/13/2017     INR goal 2.0-3.0   Today's INR 3.3!   Full instructions 12/13: 5 mg; Otherwise 7.5 mg every day   Next INR check 12/22/2017    Indications   Long-term (current) use of anticoagulants [Z79.01] [Z79.01]  Chronic atrial fibrillation (HCC) [I48.2]         Your next Anticoagulation Clinic appointment(s)     Dec 22, 2017 10:20 AM CST   Anticoagulation Visit with MAJOR HERNANDEZ   [facility]    [address] [place] [address] [phone]              Contact Numbers     [facility]  Please call  [phone] to cancel and/or reschedule your appointment, or with any problems or questions regarding your therapy.        December 2017 Details    Sun Mon Tue Wed Thu Fri Sat          1               2                 3               4               5               6               7               8               9                 10               11               12               13      5 mg   See details      14      7.5 mg         15      7.5 mg         16      7.5 mg           17      7.5 mg         18      7.5 mg         19      7.5 mg         20      7.5 mg         21      7.5 mg         22            23                 24               25               26               27               28               29               30                 31                      Date Details   12/13 This INR check       Date of next INR:  12/22/2017         How to take your warfarin dose     To take:  5 mg Take 2 of the 2.5 mg tablets.    To take:  7.5 mg Take 3 of the 2.5 mg tablets.

## 2017-12-13 NOTE — PROGRESS NOTES
ANTICOAGULATION FOLLOW-UP CLINIC VISIT    Patient Name:  Keith Desir  Date:  12/13/2017  Contact Type:  Face to Face    SUBJECTIVE:     Patient Findings     Positives Antibiotic use or infection (has cold, sick for about 2 weeks)           OBJECTIVE    INR Protime   Date Value Ref Range Status   12/13/2017 3.3 (A) 0.86 - 1.14 Final       ASSESSMENT / PLAN  INR assessment SUPRA    Recheck INR In: 10 DAYS    INR Location Clinic      Anticoagulation Summary as of 12/13/2017     INR goal 2.0-3.0   Today's INR 3.3!   Maintenance plan 7.5 mg (2.5 mg x 3) every day   Full instructions 12/13: 5 mg; Otherwise 7.5 mg every day   Weekly total 52.5 mg   Plan last modified Joan Monte RN (4/12/2017)   Next INR check 12/22/2017   Target end date     Indications   Long-term (current) use of anticoagulants [Z79.01] [Z79.01]  Chronic atrial fibrillation (HCC) [I48.2]         Anticoagulation Episode Summary     INR check location     Preferred lab     Send INR reminders to [facility] CLINIC    Comments       Anticoagulation Care Providers     Provider Role Specialty Phone number    Cayla Gregorio MD [facility] [phone]            See the Encounter Report to view Anticoagulation Flowsheet and Dosing Calendar (Go to Encounters tab in chart review, and find the Anticoagulation Therapy Visit)    Has had recent high and low readings, denies S/S clotting and bleeding, no medication changes or diet changes.    Maria Isabel Mccoy [facility]

## 2017-12-22 ENCOUNTER — ANTICOAGULATION THERAPY VISIT (OUTPATIENT)
Dept: NURSING | Facility: CLINIC | Age: 72
End: 2017-12-22
Payer: COMMERCIAL

## 2017-12-22 LAB — INR POINT OF CARE: 1.7 (ref 0.86–1.14)

## 2017-12-22 PROCEDURE — 36416 COLLJ CAPILLARY BLOOD SPEC: CPT

## 2017-12-22 PROCEDURE — 85610 PROTHROMBIN TIME: CPT | Mod: QW

## 2017-12-22 PROCEDURE — 99207 ZZC NO CHARGE NURSE ONLY: CPT

## 2017-12-22 NOTE — PROGRESS NOTES
ANTICOAGULATION FOLLOW-UP CLINIC VISIT    Patient Name:  Keith Desir  Date:  12/22/2017  Contact Type:  Face to Face    SUBJECTIVE:     Patient Findings     Positives Antibiotic use or infection (still has URI, self treating), Unexplained INR or factor level change           OBJECTIVE    INR Protime   Date Value Ref Range Status   12/22/2017 1.7 (A) 0.86 - 1.14 Final       ASSESSMENT / PLAN  INR assessment SUB    Recheck INR In: 3 WEEKS    INR Location Clinic      Anticoagulation Summary as of 12/22/2017     INR goal 2.0-3.0   Today's INR 1.7!   Maintenance plan 7.5 mg (2.5 mg x 3) every day   Full instructions 12/22: 10 mg; Otherwise 7.5 mg every day   Weekly total 52.5 mg   Plan last modified Joan Monte RN (4/12/2017)   Next INR check 1/12/2018   Target end date     Indications   Long-term (current) use of anticoagulants [Z79.01] [Z79.01]  Chronic atrial fibrillation (HCC) [I48.2]         Anticoagulation Episode Summary     INR check location     Preferred lab     Send INR reminders to [facility] CL[facility]IC    Comments       Anticoagulation Care Providers     Provider Role Specialty Phone number    Cayla Gregorio MD [facility] [phone]            See the Encounter Report to view Anticoagulation Flowsheet and Dosing Calendar (Go to Encounters tab in chart review, and find the Anticoagulation Therapy Visit)    Keith is frustrated his INR keeps fluctuating.  He expressed that he'd like to not come in as frequently.  We discussed the risk of low INR, and reviewed S/S of clots, including speech changes, blurred vision, warm spots on legs, swelling, sudden SOB.  Will do a bump to bring into range today, and recheck in 3 weeks.      Education also given about how CHF changes affect INR.    Maria Isabel Mccoy [facility]

## 2017-12-22 NOTE — MR AVS SNAPSHOT
Juannoemi FABRICE Desir   12/22/2017 10:20 AM   Anticoagulation Therapy Visit    Description:  72 year old male   Provider:  MAJOR HERNANDEZ   Department:  [facility]           INR as of 12/22/2017     Today's INR 1.7!      Anticoagulation Summary as of 12/22/2017     INR goal 2.0-3.0   Today's INR 1.7!   Full instructions 12/22: 10 mg; Otherwise 7.5 mg every day   Next INR check 1/12/2018    Indications   Long-term (current) use of anticoagulants [Z79.01] [Z79.01]  Chronic atrial fibrillation (HCC) [I48.2]         Your next Anticoagulation Clinic appointment(s)     Jan 12, 2018 10:40 AM CST   Anticoagulation Visit with MAJOR HERNANDEZ   [facility]    [address] [place] [address] [phone]              Contact Numbers     [facility]  Please call  [phone] to cancel and/or reschedule your appointment, or with any problems or questions regarding your therapy.        December 2017 Details    Sun Mon Tue Wed Thu Fri Sat          1               2                 3               4               5               6               7               8               9                 10               11               12               13               14               15               16                 17               18               19               20               21               22      10 mg   See details      23      7.5 mg           24      7.5 mg         25      7.5 mg         26      7.5 mg         27      7.5 mg         28      7.5 mg         29      7.5 mg         30      7.5 mg           31      7.5 mg                Date Details   12/22 This INR check               How to take your warfarin dose     To take:  7.5 mg Take 3 of the 2.5 mg tablets.    To take:  10 mg Take 4 of the 2.5 mg tablets.           January 2018 Details    Sun Mon Tue Wed Thu Fri Sat      1      7.5 mg         2      7.5 mg         3      7.5 mg         4      7.5  mg         5      7.5 mg         6      7.5 mg           7      7.5 mg         8      7.5 mg         9      7.5 mg         10      7.5 mg         11      7.5 mg         12            13                 14               15               16               17               18               19               20                 21               22               23               24               25               26               27                 28               29               30               31                   Date Details   No additional details    Date of next INR:  1/12/2018         How to take your warfarin dose     To take:  7.5 mg Take 3 of the 2.5 mg tablets.

## 2017-12-23 ENCOUNTER — OFFICE VISIT (OUTPATIENT)
Dept: URGENT CARE | Facility: URGENT CARE | Age: 72
End: 2017-12-23
Payer: COMMERCIAL

## 2017-12-23 ENCOUNTER — RADIANT APPOINTMENT (OUTPATIENT)
Dept: GENERAL RADIOLOGY | Facility: CLINIC | Age: 72
End: 2017-12-23
Attending: FAMILY MEDICINE
Payer: COMMERCIAL

## 2017-12-23 VITALS
OXYGEN SATURATION: 97 % | BODY MASS INDEX: 31.6 KG/M2 | TEMPERATURE: 96.4 F | DIASTOLIC BLOOD PRESSURE: 82 MMHG | WEIGHT: 214 LBS | SYSTOLIC BLOOD PRESSURE: 147 MMHG | HEART RATE: 92 BPM

## 2017-12-23 DIAGNOSIS — R05.9 COUGH: ICD-10-CM

## 2017-12-23 DIAGNOSIS — R05.9 COUGH: Primary | ICD-10-CM

## 2017-12-23 DIAGNOSIS — J20.9 ACUTE BRONCHITIS WITH SYMPTOMS > 10 DAYS: ICD-10-CM

## 2017-12-23 DIAGNOSIS — Z72.0 TOBACCO ABUSE: ICD-10-CM

## 2017-12-23 PROCEDURE — 71020 XR CHEST 2 VW: CPT

## 2017-12-23 PROCEDURE — 99213 OFFICE O/P EST LOW 20 MIN: CPT | Performed by: FAMILY MEDICINE

## 2017-12-23 RX ORDER — PREDNISONE 10 MG/1
20 TABLET ORAL DAILY
Qty: 10 TABLET | Refills: 0 | Status: SHIPPED | OUTPATIENT
Start: 2017-12-23 | End: 2017-12-28

## 2017-12-23 RX ORDER — AMOXICILLIN 500 MG/1
500 CAPSULE ORAL 3 TIMES DAILY
Qty: 30 CAPSULE | Refills: 0 | Status: SHIPPED | OUTPATIENT
Start: 2017-12-23 | End: 2018-01-10

## 2017-12-23 NOTE — PROGRESS NOTES
Keith Desir is a 72 year old male who comes in today for cough/ wheeze    Cough for a month    Bad for last couple days short of breath and wheezing    Tried some over the counter meds    Not much phlegm    Not using inhaler albuterol much    Takes spiriva daily    Smoking 1 ppd used to be 2 ppd  Cut down 1-2 years ago    Bad pneumonia 6 years ago      Physical Exam   Constitutional: He is oriented to person, place, and time and well-developed, well-nourished, and in no distress. No distress.   HENT:   Head: Normocephalic and atraumatic.   Eyes: Conjunctivae are normal.   Neck: Carotid bruit is not present.   Cardiovascular: Normal rate, regular rhythm, normal heart sounds and intact distal pulses.  Exam reveals no gallop and no friction rub.    No murmur heard.  Pulmonary/Chest: Effort normal. No respiratory distress. He has wheezes (few scattered). He has no rales. He exhibits no tenderness.   Moderately coarse breath sounds     No back or costovertebral angle tenderness     Abdominal: Soft. There is no tenderness.   Musculoskeletal: He exhibits no edema.   Neurological: He is alert and oriented to person, place, and time.   Skin: He is not diaphoretic.   Psychiatric: Mood and affect normal.             Xray showed no infiltrate    ASSESSMENT / PLAN:  (R05) Cough  (primary encounter diagnosis)  Comment: as above   Plan: XR Chest 2 Views             (J20.9) Acute bronchitis with symptoms > 10 days  Comment: given symptoms/ smoking history, prudent to cover with antibiotics and steroids  Plan: predniSONE (DELTASONE) 10 MG tablet,         amoxicillin (AMOXIL) 500 MG capsule         Of note david interacted with warfarin so went with amox instead.  He did not want augmentin given possible diarrhea.    Tobacco: advise cessation    See AVS      I reviewed the patient's medications, allergies, medical history, family history, and social history.    Adonay Carmen MD

## 2017-12-23 NOTE — PATIENT INSTRUCTIONS
Take the antibiotics for 10 days    Prednisone for 5 days     Advise smoking cessation    Follow up as needed based on symptoms     If acutely worsening, be seen promptly

## 2017-12-23 NOTE — MR AVS SNAPSHOT
After Visit Summary   12/23/2017    Keith Desir    MRN: [id number]           Patient Information     Date Of Birth          1945        Visit Information        Provider Department      12/23/2017 10:50 AM Adonay Carmen MD [facility]        Today's Diagnoses     Cough    -  1    Acute bronchitis with symptoms > 10 days          Care Instructions    Take the antibiotics for 10 days    Prednisone for 5 days     Advise smoking cessation    Follow up as needed based on symptoms     If acutely worsening, be seen promptly           Follow-ups after your visit        Your next 10 appointments already scheduled     Jan 12, 2018 10:40 AM CST   Anticoagulation Visit with NICOLE HERNANDEZ   [facility] ([facility]    [address] [address] [phone]              Who to contact     If you have questions or need follow up information about today's clinic visit or your schedule please contact [facility] directly at [phone].  Normal or non-critical lab and imaging results will be communicated to you by MyChart, letter or phone within 4 business days after the clinic has received the results. If you do not hear from us within 7 days, please contact the clinic through Sitehearthart or phone. If you have a critical or abnormal lab result, we will notify you by phone as soon as possible.  Submit refill requests through Feedtrace or call your pharmacy and they will forward the refill request to us. Please allow 3 business days for your refill to be completed.          Additional Information About Your Visit        MyChart Information     Feedtrace gives you secure access to your electronic health record. If you see a primary care provider, you can also send messages to your care team and make appointments. If you have questions, please call your primary care clinic.  If you do not have a primary care provider, please  call [phone] and they will assist you.        Care EveryWhere ID     This is your Care EveryWhere ID. This could be used by other organizations to access your [place] medical records  [id number]        Your Vitals Were     Pulse Temperature Pulse Oximetry BMI (Body Mass Index)          92 96.4  F (35.8  C) (Tympanic) 97% 31.6 kg/m2         Blood Pressure from Last 3 Encounters:   12/23/17 147/82   11/01/17 114/78   07/03/17 136/88    Weight from Last 3 Encounters:   12/23/17 214 lb (97.1 kg)   11/01/17 214 lb (97.1 kg)   04/26/17 225 lb (102.1 kg)                 Today's Medication Changes          These changes are accurate as of: 12/23/17  1:15 PM.  If you have any questions, ask your nurse or doctor.               Start taking these medicines.        Dose/Directions    amoxicillin 500 MG capsule   Commonly known as:  AMOXIL   Used for:  Acute bronchitis with symptoms > 10 days   Started by:  Adonay Carmen MD        Dose:  500 mg   Take 1 capsule (500 mg) by mouth 3 times daily   Quantity:  30 capsule   Refills:  0       predniSONE 10 MG tablet   Commonly known as:  DELTASONE   Used for:  Acute bronchitis with symptoms > 10 days   Started by:  Adonay Carmen MD        Dose:  20 mg   Take 2 tablets (20 mg) by mouth daily for 5 days   Quantity:  10 tablet   Refills:  0            Where to get your medicines      These medications were sent to [id number] - [place], [place] - [address]  [address] [place] [address]    Hours:  test fax sent successfully 7/31/03  Phone:  [phone]     amoxicillin 500 MG capsule    predniSONE 10 MG tablet                Primary Care Provider Office Phone # Fax #    Cayla Gregorio [phone]       [address]  [facility] [address]        Equal Access to Services     QASIM COATES AH: Emily Sanchez, waaxda luqadaha, qaybta kaaljoe colindres, marii neal. So [facility]  [phone].    ATENCIÓN: Si millie mccoy, tiene a redding disposición servicios gratuitos de asistencia lingüística. Tarik brown [phone].    We comply with applicable federal civil rights laws and [place] laws. We do not discriminate on the basis of race, color, national origin, age, disability, sex, sexual orientation, or gender identity.            Thank you!     [facility]  [facility]. Our goal is always to provide you with excellent care. Hearing back from our patients is one way we can continue to improve our services. Please take a few minutes to complete the written survey that you may receive in the mail after your visit with us. Thank you!             Your Updated Medication List - Protect others around you: Learn how to safely use, store and throw away your medicines at www.disposemymeds.org.          This list is accurate as of: 12/23/17  1:15 PM.  Always use your most recent med list.                   Brand Name Dispense Instructions for use Diagnosis    albuterol 108 (90 BASE) MCG/ACT Inhaler    PROAIR HFA    1 Inhaler    Inhale 1-2 puffs into the lungs every 4 hours as needed    Pulmonary emphysema, unspecified emphysema type (H)       amoxicillin 500 MG capsule    AMOXIL    30 capsule    Take 1 capsule (500 mg) by mouth 3 times daily    Acute bronchitis with symptoms > 10 days       ASPIRIN NOT PRESCRIBED    INTENTIONAL     Antiplatelet medication not prescribed intentionally due to Current anticoagulant therapy (warfarin/enoxaparin)        atorvastatin 20 MG tablet    LIPITOR    90 tablet    TAKE 1 TABLET (20 MG) BY MOUTH DAILY    Hyperlipidemia with target LDL less than 100       digoxin 125 MCG tablet    LANOXIN    90 tablet    TAKE 1 TABLET (125 MCG) BY MOUTH DAILY    CHF (NYHA class II, ACC/AHA stage C) (H), Chronic atrial fibrillation (H)       furosemide 20 MG tablet    LASIX    180 tablet    TAKE ONE TABLET BY MOUTH TWICE DAILY    CHF (NYHA class II,  ACC/AHA stage C) (H)       lisinopril 30 MG tablet    PRINIVIL,ZESTRIL    90 tablet    Take 1 tablet (30 mg) by mouth daily    Type 2 diabetes mellitus without complication, without long-term current use of insulin (H), CHF (NYHA class II, ACC/AHA stage C) (H), Hypertension goal BP (blood pressure) < 140/90       metFORMIN 1000 MG tablet    GLUCOPHAGE    180 tablet    Take 1 tablet (1,000 mg) by mouth 2 times daily (with meals)    Type 2 diabetes mellitus without complication, without long-term current use of insulin (H)       metoprolol 100 MG tablet    LOPRESSOR    360 tablet    TAKE TWO TABLETS BY MOUTH TWICE DAILY    CHF (NYHA class II, ACC/AHA stage C) (H), Chronic atrial fibrillation (H)       order for DME     300 each    Equipment being ordered: Blood glucose monitor per insurance formulary; blood glucose test strips per formulary for use 3 time daily; lancets per formulary for use 3 time daily    Type 2 diabetes mellitus without complication, without long-term current use of insulin (H)       predniSONE 10 MG tablet    DELTASONE    10 tablet    Take 2 tablets (20 mg) by mouth daily for 5 days    Acute bronchitis with symptoms > 10 days       tiotropium 18 MCG capsule    SPIRIVA HANDIHALER    90 capsule    INHALE 1 CAPSULE (18 MCG) INTO THE LUNGS DAILY    Pulmonary emphysema, unspecified emphysema type (H)       warfarin 2.5 MG tablet    COUMADIN    270 tablet    Take 3 tablets (7.5 mg) by mouth daily    Chronic atrial fibrillation (H)

## 2017-12-23 NOTE — NURSING NOTE
"Chief Complaint   Patient presents with     Wheezing     Patient has COPD.  He has been having a runny nose, and cough x 1 month.  For the past 2 days he has been SOB and wheezing.        Initial /82  Pulse 92  Temp 96.4  F (35.8  C) (Tympanic)  Wt 214 lb (97.1 kg)  SpO2 97%  BMI 31.6 kg/m2 Estimated body mass index is 31.6 kg/(m^2) as calculated from the following:    Height as of 11/1/17: 5' 9\" (1.753 m).    Weight as of this encounter: 214 lb (97.1 kg).  Medication Reconciliation: complete     Jane Patel MA      "

## 2018-01-10 ENCOUNTER — ANTICOAGULATION THERAPY VISIT (OUTPATIENT)
Dept: FAMILY MEDICINE | Facility: CLINIC | Age: 73
End: 2018-01-10

## 2018-01-10 ENCOUNTER — DOCUMENTATION ONLY (OUTPATIENT)
Dept: LAB | Facility: CLINIC | Age: 73
End: 2018-01-10

## 2018-01-10 ENCOUNTER — TELEPHONE (OUTPATIENT)
Dept: FAMILY MEDICINE | Facility: CLINIC | Age: 73
End: 2018-01-10

## 2018-01-10 DIAGNOSIS — I48.20 CHRONIC ATRIAL FIBRILLATION (H): ICD-10-CM

## 2018-01-10 DIAGNOSIS — E11.9 TYPE 2 DIABETES MELLITUS WITHOUT COMPLICATION, WITHOUT LONG-TERM CURRENT USE OF INSULIN (H): ICD-10-CM

## 2018-01-10 DIAGNOSIS — Z79.01 LONG-TERM (CURRENT) USE OF ANTICOAGULANTS: ICD-10-CM

## 2018-01-10 DIAGNOSIS — I50.9 CHF (NYHA CLASS II, ACC/AHA STAGE C) (H): ICD-10-CM

## 2018-01-10 DIAGNOSIS — I48.20 CHRONIC ATRIAL FIBRILLATION (H): Primary | ICD-10-CM

## 2018-01-10 PROBLEM — R80.9 URINE TEST POSITIVE FOR MICROALBUMINURIA: Status: RESOLVED | Noted: 2017-11-01 | Resolved: 2018-01-10

## 2018-01-10 PROBLEM — N18.2 CKD (CHRONIC KIDNEY DISEASE) STAGE 2, GFR 60-89 ML/MIN: Status: ACTIVE | Noted: 2018-01-01

## 2018-01-10 LAB
CREAT UR-MCNC: 27 MG/DL
DIGOXIN SERPL-MCNC: 0.6 UG/L (ref 0.5–2)
INR PPP: 3.2 (ref 0.86–1.14)
MICROALBUMIN UR-MCNC: 17 MG/L
MICROALBUMIN/CREAT UR: 62.27 MG/G CR (ref 0–17)

## 2018-01-10 PROCEDURE — 36415 COLL VENOUS BLD VENIPUNCTURE: CPT | Performed by: FAMILY MEDICINE

## 2018-01-10 PROCEDURE — 82043 UR ALBUMIN QUANTITATIVE: CPT | Performed by: FAMILY MEDICINE

## 2018-01-10 PROCEDURE — 80162 ASSAY OF DIGOXIN TOTAL: CPT | Performed by: FAMILY MEDICINE

## 2018-01-10 PROCEDURE — 85610 PROTHROMBIN TIME: CPT | Performed by: FAMILY MEDICINE

## 2018-01-10 NOTE — PROGRESS NOTES
ANTICOAGULATION FOLLOW-UP CLINIC VISIT    Patient Name:  Keith Desir  Date:  1/10/2018  Contact Type:  Telephone    SUBJECTIVE:        OBJECTIVE    INR   Date Value Ref Range Status   01/10/2018 3.20 (H) 0.86 - 1.14 Final     Comment:     This test is intended for monitoring Coumadin therapy.  Results are not   accurate in patients with prolonged INR due to factor deficiency.         ASSESSMENT / PLAN  INR assessment SUPRA    Recheck INR In: 2 WEEKS    INR Location Outside lab      Anticoagulation Summary as of 1/10/2018     INR goal 2.0-3.0   Today's INR 3.20!   Maintenance plan 7.5 mg (2.5 mg x 3) every day   Full instructions 1/10: 5 mg; Otherwise 7.5 mg every day   Weekly total 52.5 mg   Plan last modified Joan Monte RN (4/12/2017)   Next INR check 1/25/2018   Target end date     Indications   Long-term (current) use of anticoagulants [Z79.01] [Z79.01]  Chronic atrial fibrillation (HCC) [I48.2]         Anticoagulation Episode Summary     INR check location     Preferred lab     Send INR reminders to [facility]    Comments       Anticoagulation Care Providers     Provider Role Specialty Phone number    Cayla Gregorio MD [facility] [phone]            See the Encounter Report to view Anticoagulation Flowsheet and Dosing Calendar (Go to Encounters tab in chart review, and find the Anticoagulation Therapy Visit)        Vickie Pastor RN

## 2018-01-10 NOTE — TELEPHONE ENCOUNTER
Reason for Call:  Other INR    Detailed comments: Pt's Spouse calling for she received a phone call and would like a phone call back for further clarification.    Phone Number Patient can be reached at: Work number on file:  [phone] (work)    Best Time: anytime    Can we leave a detailed message on this number? YES    Call taken on 1/10/2018 at 1:20 PM by oJse Andrew

## 2018-01-10 NOTE — MR AVS SNAPSHOT
Keith Reeden   1/10/2018   Anticoagulation Therapy Visit    Description:  72 year old male   Provider:  Cayla Gregorio MD   Department:  [facility]           INR as of 1/10/2018     Today's INR 3.20!      Anticoagulation Summary as of 1/10/2018     INR goal 2.0-3.0   Today's INR 3.20!   Full instructions 1/10: 5 mg; Otherwise 7.5 mg every day   Next INR check 1/25/2018    Indications   Long-term (current) use of anticoagulants [Z79.01] [Z79.01]  Chronic atrial fibrillation (HCC) [I48.2]         Your next Anticoagulation Clinic appointment(s)     Jan 25, 2018 11:00 AM CST   Anticoagulation Visit with NICOLE HERNANDEZ   [facility]    [address] [place] [address]   [phone]              January 2018 Details    Sun Mon Tue Wed Thu Fri Sat      1               2               3               4               5               6                 7               8               9               10      5 mg   See details      11      7.5 mg         12      7.5 mg         13      7.5 mg           14      7.5 mg         15      7.5 mg         16      7.5 mg         17      7.5 mg         18      7.5 mg         19      7.5 mg         20      7.5 mg           21      7.5 mg         22      7.5 mg         23      7.5 mg         24      7.5 mg         25            26               27                 28               29               30               31                   Date Details   01/10 This INR check       Date of next INR:  1/25/2018         How to take your warfarin dose     To take:  5 mg Take 2 of the 2.5 mg tablets.    To take:  7.5 mg Take 3 of the 2.5 mg tablets.

## 2018-01-10 NOTE — PROGRESS NOTES
Ed,    You have a small of protein in your urine, which in some cases represents early effects of high blood pressure or blood glucose on the kidneys. Follow-up as planned.     Cayla Gregorio MD

## 2018-01-10 NOTE — PROGRESS NOTES
This patient has a lab only appointment on 1/10/2018 (today) but does not have future orders. Please review, associate diagnosis and sign pending lab orders for the upcoming appointment. If necessary, please make this a standing order.      Thank you,    [facility]

## 2018-01-25 ENCOUNTER — ANTICOAGULATION THERAPY VISIT (OUTPATIENT)
Dept: NURSING | Facility: CLINIC | Age: 73
End: 2018-01-25
Payer: COMMERCIAL

## 2018-01-25 DIAGNOSIS — Z79.01 LONG-TERM (CURRENT) USE OF ANTICOAGULANTS: ICD-10-CM

## 2018-01-25 DIAGNOSIS — I48.20 CHRONIC ATRIAL FIBRILLATION (H): ICD-10-CM

## 2018-01-25 LAB — INR POINT OF CARE: 3.3 (ref 0.86–1.14)

## 2018-01-25 PROCEDURE — 85610 PROTHROMBIN TIME: CPT | Mod: QW

## 2018-01-25 PROCEDURE — 36416 COLLJ CAPILLARY BLOOD SPEC: CPT

## 2018-01-25 PROCEDURE — 99207 ZZC NO CHARGE NURSE ONLY: CPT

## 2018-01-25 NOTE — MR AVS SNAPSHOT
Keith Desir   1/25/2018 11:00 AM   Anticoagulation Therapy Visit    Description:  72 year old male   Provider:  MAJOR HERNANDEZ   Department:  [facility]           INR as of 1/25/2018     Today's INR 3.3!      Anticoagulation Summary as of 1/25/2018     INR goal 2.0-3.0   Today's INR 3.3!   Full instructions 1/25: 5 mg; 2/1: 5 mg; Otherwise 7.5 mg every day   Next INR check 2/8/2018    Indications   Long-term (current) use of anticoagulants [Z79.01] [Z79.01]  Chronic atrial fibrillation (HCC) [I48.2]         Your next Anticoagulation Clinic appointment(s)     Jan 25, 2018 11:00 AM CST   Anticoagulation Visit with MAJOR HERNANDEZ   [facility]    [address] [address]   [phone]            Feb 08, 2018 10:20 AM CST   Anticoagulation Visit with MAJOR HERNANDEZ   [facility] ([facility]    [address] [address]   [phone]              Contact Numbers     [facility]  Please call  [phone] to cancel and/or reschedule your appointment, or with any problems or questions regarding your therapy.        January 2018 Details    Sun Mon Tue Wed Thu Fri Sat      1               2               3               4               5               6                 7               8               9               10               11               12               13                 14               15               16               17               18               19               20                 21               22               23               24               25      5 mg   See details      26      7.5 mg         27      7.5 mg           28      7.5 mg         29      7.5 mg         30      7.5 mg         31      7.5 mg             Date Details   01/25 This INR check               How to take your warfarin dose     To take:  5 mg Take 2 of the 2.5 mg tablets.     To take:  7.5 mg Take 3 of the 2.5 mg tablets.           February 2018 Details    Sun Mon Tue Wed Thu Fri Sat         1      5 mg         2      7.5 mg         3      7.5 mg           4      7.5 mg         5      7.5 mg         6      7.5 mg         7      7.5 mg         8            9               10                 11               12               13               14               15               16               17                 18               19               20               21               22               23               24                 25               26               27               28                   Date Details   No additional details    Date of next INR:  2/8/2018         How to take your warfarin dose     To take:  5 mg Take 2 of the 2.5 mg tablets.    To take:  7.5 mg Take 3 of the 2.5 mg tablets.

## 2018-01-25 NOTE — PROGRESS NOTES
ANTICOAGULATION FOLLOW-UP CLINIC VISIT    Patient Name:  Keith Desir  Date:  1/25/2018  Contact Type:  Face to Face    SUBJECTIVE:     Patient Findings     Positives No Problem Findings           OBJECTIVE    INR Protime   Date Value Ref Range Status   01/25/2018 3.3 (A) 0.86 - 1.14 Final       ASSESSMENT / PLAN  INR assessment THER    Recheck INR In: 2 WEEKS    INR Location Clinic      Anticoagulation Summary as of 1/25/2018     INR goal 2.0-3.0   Today's INR 3.3!   Maintenance plan 7.5 mg (2.5 mg x 3) every day   Full instructions 1/25: 5 mg; 2/1: 5 mg; Otherwise 7.5 mg every day   Weekly total 52.5 mg   Plan last modified Joan Monte RN (4/12/2017)   Next INR check 2/8/2018   Target end date     Indications   Long-term (current) use of anticoagulants [Z79.01] [Z79.01]  Chronic atrial fibrillation (HCC) [I48.2]         Anticoagulation Episode Summary     INR check location     Preferred lab     Send INR reminders to [facility]    Comments       Anticoagulation Care Providers     Provider Role Specialty Phone number    Cayla Gregorio MD [facility] [phone]            See the Encounter Report to view Anticoagulation Flowsheet and Dosing Calendar (Go to Encounters tab in chart review, and find the Anticoagulation Therapy Visit)    Dosage adjustment made based on physician directed care plan. Decrease by 2.5 mg per week. Will recheck in 2 weeks, if in range will push out 4 weeks as patient is frustrated by coming in and stating that he will just not come to appointments.    Dot Ziegler, RN

## 2018-02-07 LAB — EJECTION FRACTION: 60

## 2018-02-08 ENCOUNTER — ANTICOAGULATION THERAPY VISIT (OUTPATIENT)
Dept: NURSING | Facility: CLINIC | Age: 73
End: 2018-02-08
Payer: COMMERCIAL

## 2018-02-08 DIAGNOSIS — Z79.01 LONG-TERM (CURRENT) USE OF ANTICOAGULANTS: ICD-10-CM

## 2018-02-08 DIAGNOSIS — I48.20 CHRONIC ATRIAL FIBRILLATION (H): ICD-10-CM

## 2018-02-08 LAB — INR POINT OF CARE: 3.1 (ref 0.86–1.14)

## 2018-02-08 PROCEDURE — 36416 COLLJ CAPILLARY BLOOD SPEC: CPT

## 2018-02-08 PROCEDURE — 99207 ZZC NO CHARGE NURSE ONLY: CPT

## 2018-02-08 PROCEDURE — 85610 PROTHROMBIN TIME: CPT | Mod: QW

## 2018-02-08 NOTE — PROGRESS NOTES
ANTICOAGULATION FOLLOW-UP CLINIC VISIT    Patient Name:  Keith Desir  Date:  2/8/2018  Contact Type:  Face to Face    SUBJECTIVE:     Patient Findings     Positives Unexplained INR or factor level change           OBJECTIVE    INR Protime   Date Value Ref Range Status   02/08/2018 3.1 (A) 0.86 - 1.14 Final       ASSESSMENT / PLAN  INR assessment THER    Recheck INR In: 3 WEEKS    INR Location Clinic      Anticoagulation Summary as of 2/8/2018     INR goal 2.0-3.0   Today's INR 3.1!   Maintenance plan 7.5 mg (2.5 mg x 3) every day   Full instructions 2/8: 5 mg; 2/15: 5 mg; 2/22: 5 mg; Otherwise 7.5 mg every day   Weekly total 52.5 mg   Plan last modified Joan Monte RN (4/12/2017)   Next INR check 3/1/2018   Target end date     Indications   Long-term (current) use of anticoagulants [Z79.01] [Z79.01]  Chronic atrial fibrillation (HCC) [I48.2]         Anticoagulation Episode Summary     INR check location     Preferred lab     Send INR reminders to [facility]    Comments       Anticoagulation Care Providers     Provider Role Specialty Phone number    Cayla Gregorio MD [facility] [phone]            See the Encounter Report to view Anticoagulation Flowsheet and Dosing Calendar (Go to Encounters tab in chart review, and find the Anticoagulation Therapy Visit)    Dosage adjustment made based on physician directed care plan. Patient refuses to come in before 3 weeks. Denies any changes to medications or other diet issues. Denies any missed doses or extra doses. Denies bleeding, bruising, or blood clots. Verbalizes understanding of dosing and recheck date. He is aware if signs of clotting (pain, tenderness, swelling, color change in leg or arm, SOB) and bleeding occur (blood in stool, urine, large bruising, bleeding gums, nosebleeds) to have INR check sooner. If sx severe report to ER or concerned for stroke call 911. If general questions or concerns arise, call  clinic.     Brandi Scott RN

## 2018-02-08 NOTE — MR AVS SNAPSHOT
Juannoemi FABRICE Desir   2/8/2018 10:20 AM   Anticoagulation Therapy Visit    Description:  72 year old male   Provider:  MAJOR HERNANDEZ   Department:  Major Nurse           INR as of 2/8/2018     Today's INR       Anticoagulation Summary as of 2/8/2018     INR goal 2.0-3.0   Today's INR    Full instructions 2/8: 5 mg; 2/15: 5 mg; 2/22: 5 mg; Otherwise 7.5 mg every day   Next INR check 3/1/2018    Indications   Long-term (current) use of anticoagulants [Z79.01] [Z79.01]  Chronic atrial fibrillation (HCC) [I48.2]         Your next Anticoagulation Clinic appointment(s)     Feb 08, 2018 10:20 AM CST   Anticoagulation Visit with MAJOR HERNANDEZ   [facility] ([facility])    [address] [address]   [phone]            Mar 01, 2018 10:20 AM CST   Anticoagulation Visit with MAJOR HERNANDEZ   [facility] ([facility]    [address] [address]   [phone]              Contact Numbers     [facility]  Please call  [phone] to cancel and/or reschedule your appointment, or with any problems or questions regarding your therapy.        February 2018 Details    Sun Mon Tue Wed Thu Fri Sat         1               2               3                 4               5               6               7               8      5 mg   See details      9      7.5 mg         10      7.5 mg           11      7.5 mg         12      7.5 mg         13      7.5 mg         14      7.5 mg         15      5 mg         16      7.5 mg         17      7.5 mg           18      7.5 mg         19      7.5 mg         20      7.5 mg         21      7.5 mg         22      5 mg         23      7.5 mg         24      7.5 mg           25      7.5 mg         26      7.5 mg         27      7.5 mg         28      7.5 mg             Date Details   02/08 This INR check               How to take your warfarin dose     To take:  5 mg  Take 2 of the 2.5 mg tablets.    To take:  7.5 mg Take 3 of the 2.5 mg tablets.           March 2018 Details    Sun Mon Tue Wed Thu Fri Sat         1            2               3                 4               5               6               7               8               9               10                 11               12               13               14               15               16               17                 18               19               20               21               22               23               24                 25               26               27               28               29               30               31                Date Details   No additional details    Date of next INR:  3/1/2018         How to take your warfarin dose     To take:  7.5 mg Take 3 of the 2.5 mg tablets.

## 2018-02-15 ENCOUNTER — TRANSFERRED RECORDS (OUTPATIENT)
Dept: HEALTH INFORMATION MANAGEMENT | Facility: CLINIC | Age: 73
End: 2018-02-15

## 2018-03-06 ENCOUNTER — TRANSFERRED RECORDS (OUTPATIENT)
Dept: HEALTH INFORMATION MANAGEMENT | Facility: CLINIC | Age: 73
End: 2018-03-06

## 2018-03-06 ENCOUNTER — ANTICOAGULATION THERAPY VISIT (OUTPATIENT)
Dept: NURSING | Facility: CLINIC | Age: 73
End: 2018-03-06
Payer: COMMERCIAL

## 2018-03-06 DIAGNOSIS — Z79.01 LONG-TERM (CURRENT) USE OF ANTICOAGULANTS: ICD-10-CM

## 2018-03-06 DIAGNOSIS — I48.20 CHRONIC ATRIAL FIBRILLATION (H): ICD-10-CM

## 2018-03-06 LAB — INR POINT OF CARE: 3.4 (ref 0.86–1.14)

## 2018-03-06 PROCEDURE — 99207 ZZC NO CHARGE NURSE ONLY: CPT

## 2018-03-06 PROCEDURE — 85610 PROTHROMBIN TIME: CPT | Mod: QW

## 2018-03-06 PROCEDURE — 36416 COLLJ CAPILLARY BLOOD SPEC: CPT

## 2018-03-06 NOTE — MR AVS SNAPSHOT
Keith Desir   3/6/2018 10:40 AM   Anticoagulation Therapy Visit    Description:  72 year old male   Provider:  MAJOR HERNANDEZ   Department:  [facility] Nurse           INR as of 3/6/2018     Today's INR 3.4!      Anticoagulation Summary as of 3/6/2018     INR goal 2.0-3.0   Today's INR 3.4!   Full instructions 3/6: 5 mg; 3/8: 5 mg; 3/15: 5 mg; 3/22: 5 mg; 3/29: 5 mg; Otherwise 7.5 mg every day   Next INR check 4/3/2018    Indications   Long-term (current) use of anticoagulants [Z79.01] [Z79.01]  Chronic atrial fibrillation (HCC) [I48.2]         Contact Numbers     [facility]  Please call  [phone] to cancel and/or reschedule your appointment, or with any problems or questions regarding your therapy.        March 2018 Details    Sun Mon Tue Wed Thu Fri Sat         1               2               3                 4               5               6      5 mg   See details      7      7.5 mg         8      5 mg         9      7.5 mg         10      7.5 mg           11      7.5 mg         12      7.5 mg         13      7.5 mg         14      7.5 mg         15      5 mg         16      7.5 mg         17      7.5 mg           18      7.5 mg         19      7.5 mg         20      7.5 mg         21      7.5 mg         22      5 mg         23      7.5 mg         24      7.5 mg           25      7.5 mg         26      7.5 mg         27      7.5 mg         28      7.5 mg         29      5 mg         30      7.5 mg         31      7.5 mg          Date Details   03/06 This INR check               How to take your warfarin dose     To take:  5 mg Take 2 of the 2.5 mg tablets.    To take:  7.5 mg Take 3 of the 2.5 mg tablets.           April 2018 Details    Sun Mon Tue Wed Thu Fri Sat     1      7.5 mg         2      7.5 mg         3            4               5               6               7                 8               9               10               11               12               13               14                  15               16               17               18               19               20               21                 22               23               24               25               26               27               28                 29               30                     Date Details   No additional details    Date of next INR:  4/3/2018         How to take your warfarin dose     To take:  7.5 mg Take 3 of the 2.5 mg tablets.

## 2018-03-06 NOTE — PROGRESS NOTES
ANTICOAGULATION FOLLOW-UP CLINIC VISIT    Patient Name:  Keith eDsir  Date:  3/6/2018  Contact Type:  Face to Face    SUBJECTIVE:     Patient Findings     Positives Unexplained INR or factor level change           OBJECTIVE    INR Protime   Date Value Ref Range Status   03/06/2018 3.4 (A) 0.86 - 1.14 Final       ASSESSMENT / PLAN  INR assessment SUPRA    Recheck INR In: 4 WEEKS    INR Location Clinic      Anticoagulation Summary as of 3/6/2018     INR goal 2.0-3.0   Today's INR 3.4!   Maintenance plan 7.5 mg (2.5 mg x 3) every day   Full instructions 3/6: 5 mg; 3/8: 5 mg; 3/15: 5 mg; 3/22: 5 mg; 3/29: 5 mg; Otherwise 7.5 mg every day   Weekly total 52.5 mg   Plan last modified Joan Monte, RN (4/12/2017)   Next INR check 4/3/2018   Target end date     Indications   Long-term (current) use of anticoagulants [Z79.01] [Z79.01]  Chronic atrial fibrillation (HCC) [I48.2]         Anticoagulation Episode Summary     INR check location     Preferred lab     Send INR reminders to [facility]    Comments       Anticoagulation Care Providers     Provider Role Specialty Phone number    Cayla Gregorio MD [facility] [phone]            See the Encounter Report to view Anticoagulation Flowsheet and Dosing Calendar (Go to Encounters tab in chart review, and find the Anticoagulation Therapy Visit)    Dosage adjustment made based on physician directed care plan. Patient is refusing to come in any sooner than four weeks for blood draw. Denies any changes to medications or other diet issues. Denies any missed doses or extra doses. Denies bleeding, bruising, or blood clots. Verbalizes understanding of dosing and recheck date. He is aware if signs of clotting (pain, tenderness, swelling, color change in leg or arm, SOB) and bleeding occur (blood in stool, urine, large bruising, bleeding gums, nosebleeds) to have INR check sooner. If sx severe report to ER or concerned for stroke call 911. If  general questions or concerns arise, call clinic.     Barndi Scott RN

## 2018-03-12 ENCOUNTER — MYC MEDICAL ADVICE (OUTPATIENT)
Dept: FAMILY MEDICINE | Facility: CLINIC | Age: 73
End: 2018-03-12

## 2018-03-12 DIAGNOSIS — E11.9 TYPE 2 DIABETES MELLITUS WITHOUT COMPLICATION, WITHOUT LONG-TERM CURRENT USE OF INSULIN (H): Primary | ICD-10-CM

## 2018-03-13 ENCOUNTER — TELEPHONE (OUTPATIENT)
Dept: FAMILY MEDICINE | Facility: CLINIC | Age: 73
End: 2018-03-13

## 2018-03-13 NOTE — TELEPHONE ENCOUNTER
Confirmed with pharmacy patient only needs lancets as other supplies were ordered 2/23/17.    Ashley Hooper RN

## 2018-03-13 NOTE — TELEPHONE ENCOUNTER
Reason for Call:  Other call back    Detailed comments: Pharmacy calling to see if you wanted test strips and supplies along with glucometer? Please call    Phone Number Patient can be reached at: Other phone number: [phone]     Best Time: Any    Can we leave a detailed message on this number? YES    Call taken on 3/13/2018 at 3:13 PM by Wilda Ramsay

## 2018-04-03 ENCOUNTER — ANTICOAGULATION THERAPY VISIT (OUTPATIENT)
Dept: NURSING | Facility: CLINIC | Age: 73
End: 2018-04-03
Payer: COMMERCIAL

## 2018-04-03 DIAGNOSIS — I48.20 CHRONIC ATRIAL FIBRILLATION (H): ICD-10-CM

## 2018-04-03 DIAGNOSIS — Z79.01 LONG-TERM (CURRENT) USE OF ANTICOAGULANTS: ICD-10-CM

## 2018-04-03 LAB — INR POINT OF CARE: 2.9 (ref 0.86–1.14)

## 2018-04-03 PROCEDURE — 99207 ZZC NO CHARGE NURSE ONLY: CPT

## 2018-04-03 PROCEDURE — 85610 PROTHROMBIN TIME: CPT | Mod: QW

## 2018-04-03 PROCEDURE — 36416 COLLJ CAPILLARY BLOOD SPEC: CPT

## 2018-04-03 NOTE — MR AVS SNAPSHOT
Juannoemi FABRICE Desir   4/3/2018 10:00 AM   Anticoagulation Therapy Visit    Description:  72 year old male   Provider:  MAJOR HERNANDEZ   Department:  Major Nurse           INR as of 4/3/2018     Today's INR 2.9      Anticoagulation Summary as of 4/3/2018     INR goal 2.0-3.0   Today's INR 2.9   Full instructions 5 mg on Thu; 7.5 mg all other days   Next INR check 5/8/2018    Indications   Long-term (current) use of anticoagulants [Z79.01] [Z79.01]  Chronic atrial fibrillation (HCC) [I48.2]         Your next Anticoagulation Clinic appointment(s)     Apr 03, 2018 10:00 AM CDT   Anticoagulation Visit with MAJOR HERNANDEZ   [facility] ([facility])    [address] [id number] [phone]            May 09, 2018  9:40 AM CDT   Anticoagulation Visit with MAJOR HERNANDEZ   [facility] ([facility])    [address] [id number] [phone]              Contact Numbers     [facility]  Please call  [phone] to cancel and/or reschedule your appointment, or with any problems or questions regarding your therapy.        April 2018 Details    Sun Mon Tue Wed Thu Fri Sat     1               2               3      7.5 mg   See details      4      7.5 mg         5      5 mg         6      7.5 mg         7      7.5 mg           8      7.5 mg         9      7.5 mg         10      7.5 mg         11      7.5 mg         12      5 mg         13      7.5 mg         14      7.5 mg           15      7.5 mg         16      7.5 mg         17      7.5 mg         18      7.5 mg         19      5 mg         20      7.5 mg         21      7.5 mg           22      7.5 mg         23      7.5 mg         24      7.5 mg         25      7.5 mg         26      5 mg         27      7.5 mg         28      7.5 mg           29      7.5 mg         30      7.5 mg               Date Details   04/03 This INR check                How to take your warfarin dose     To take:  5 mg Take 2 of the 2.5 mg tablets.    To take:  7.5 mg Take 3 of the 2.5 mg tablets.           May 2018 Details    Sun Mon Tue Wed Thu Fri Sat       1      7.5 mg         2      7.5 mg         3      5 mg         4      7.5 mg         5      7.5 mg           6      7.5 mg         7      7.5 mg         8            9               10               11               12                 13               14               15               16               17               18               19                 20               21               22               23               24               25               26                 27               28               29               30               31                  Date Details   No additional details    Date of next INR:  5/8/2018         How to take your warfarin dose     To take:  5 mg Take 2 of the 2.5 mg tablets.    To take:  7.5 mg Take 3 of the 2.5 mg tablets.

## 2018-04-03 NOTE — PROGRESS NOTES
ANTICOAGULATION FOLLOW-UP CLINIC VISIT    Patient Name:  Keith Desir  Date:  4/3/2018  Contact Type:  Face to Face    SUBJECTIVE:     Patient Findings     Positives No Problem Findings           OBJECTIVE    INR Protime   Date Value Ref Range Status   04/03/2018 2.9 (A) 0.86 - 1.14 Final       ASSESSMENT / PLAN  INR assessment THER    Recheck INR In: 5 WEEKS    INR Location Clinic      Anticoagulation Summary as of 4/3/2018     INR goal 2.0-3.0   Today's INR 2.9   Maintenance plan 5 mg (2.5 mg x 2) on Thu; 7.5 mg (2.5 mg x 3) all other days   Full instructions 5 mg on Thu; 7.5 mg all other days   Weekly total 50 mg   Plan last modified Dot Ziegler RN (4/3/2018)   Next INR check 5/8/2018   Target end date     Indications   Long-term (current) use of anticoagulants [Z79.01] [Z79.01]  Chronic atrial fibrillation (HCC) [I48.2]         Anticoagulation Episode Summary     INR check location     Preferred lab     Send INR reminders to [facility]    Comments       Anticoagulation Care Providers     Provider Role Specialty Phone number    Cayla Gregorio MD [facility] [phone]            See the Encounter Report to view Anticoagulation Flowsheet and Dosing Calendar (Go to Encounters tab in chart review, and find the Anticoagulation Therapy Visit)    Dot Ziegler, DIANN

## 2018-04-23 DIAGNOSIS — I50.9 CHF (NYHA CLASS II, ACC/AHA STAGE C) (H): ICD-10-CM

## 2018-04-23 DIAGNOSIS — I48.20 CHRONIC ATRIAL FIBRILLATION (H): ICD-10-CM

## 2018-04-24 RX ORDER — METOPROLOL TARTRATE 100 MG
200 TABLET ORAL 2 TIMES DAILY
Qty: 120 TABLET | Refills: 0 | Status: SHIPPED | OUTPATIENT
Start: 2018-04-24 | End: 2018-05-23

## 2018-04-24 NOTE — TELEPHONE ENCOUNTER
Pending Prescriptions:                       Disp   Refills    metoprolol tartrate (LOPRESSOR) 100 MG ta*120 ta*0            Sig: Take 2 tablets (200 mg) by mouth 2 times daily           Office visit/labs needed for further refills    Medication is being filled for 1 time refill only due to:  patient advised to follow up in 6 mths (in 5/18) at LOV 11/1/17 with Dr. Gregorio.     Mariam Venegas RN on 4/24/2018 at 3:44 PM

## 2018-05-10 ENCOUNTER — ANTICOAGULATION THERAPY VISIT (OUTPATIENT)
Dept: NURSING | Facility: CLINIC | Age: 73
End: 2018-05-10
Payer: COMMERCIAL

## 2018-05-10 DIAGNOSIS — I48.20 CHRONIC ATRIAL FIBRILLATION (H): ICD-10-CM

## 2018-05-10 DIAGNOSIS — Z79.01 LONG-TERM (CURRENT) USE OF ANTICOAGULANTS: ICD-10-CM

## 2018-05-10 LAB — INR POINT OF CARE: 4.2 (ref 0.86–1.14)

## 2018-05-10 PROCEDURE — 99207 ZZC NO CHARGE NURSE ONLY: CPT

## 2018-05-10 PROCEDURE — 36416 COLLJ CAPILLARY BLOOD SPEC: CPT

## 2018-05-10 PROCEDURE — 85610 PROTHROMBIN TIME: CPT | Mod: QW

## 2018-05-10 NOTE — PROGRESS NOTES
ANTICOAGULATION FOLLOW-UP CLINIC VISIT    Patient Name:  Keith Desir  Date:  5/10/2018  Contact Type:  Face to Face    SUBJECTIVE:     Patient Findings     Positives Unexplained INR or factor level change           OBJECTIVE    INR Protime   Date Value Ref Range Status   05/10/2018 4.2 (A) 0.86 - 1.14 Final       ASSESSMENT / PLAN  INR assessment SUPRA    Recheck INR In: 10 DAYS    INR Location Clinic      Anticoagulation Summary as of 5/10/2018     INR goal 2.0-3.0   Today's INR 4.2!   Maintenance plan 5 mg (2.5 mg x 2) on Thu; 7.5 mg (2.5 mg x 3) all other days   Full instructions 5/10: Hold; Otherwise 5 mg on Thu; 7.5 mg all other days   Weekly total 50 mg   Plan last modified Dot Ziegler RN (4/3/2018)   Next INR check 5/22/2018   Target end date     Indications   Long-term (current) use of anticoagulants [Z79.01] [Z79.01]  Chronic atrial fibrillation (HCC) [I48.2]         Anticoagulation Episode Summary     INR check location     Preferred lab     Send INR reminders to [facility]    Comments       Anticoagulation Care Providers     Provider Role Specialty Phone number    Cayla Gregorio MD [facility] [phone]            See the Encounter Report to view Anticoagulation Flowsheet and Dosing Calendar (Go to Encounters tab in chart review, and find the Anticoagulation Therapy Visit)    Patient denies any changes to diet, medications or lifestyle. No s/s of bleeding or clotting. Patient ate spinach last night so he does not know why his INR is high today. Patient did not want to come back in 1 week per recommendation. He wanted to wait two weeks so we compromised and he will be seen in 11 days.     Jermaine Johnson RN, BSN

## 2018-05-10 NOTE — MR AVS SNAPSHOT
Keith AVINA Karlee   5/10/2018 9:40 AM   Anticoagulation Therapy Visit    Description:  72 year old male   Provider:  MAJOR HERNANDEZ   Department:  Major Nurse           INR as of 5/10/2018     Today's INR 4.2!      Anticoagulation Summary as of 5/10/2018     INR goal 2.0-3.0   Today's INR 4.2!   Full instructions 5/10: Hold; Otherwise 5 mg on Thu; 7.5 mg all other days   Next INR check 5/22/2018    Indications   Long-term (current) use of anticoagulants [Z79.01] [Z79.01]  Chronic atrial fibrillation (HCC) [I48.2]         Your next Anticoagulation Clinic appointment(s)     May 22, 2018  9:40 AM CDT   Anticoagulation Visit with MAJOR HERNANDEZ   [facility]    [address] [place] [id number] [phone]              Contact Numbers     [facility]  Please call  [phone] to cancel and/or reschedule your appointment, or with any problems or questions regarding your therapy.        May 2018 Details    Sun Mon Tue Wed Thu Fri Sat       1               2               3               4               5                 6               7               8               9               10      Hold   See details      11      7.5 mg         12      7.5 mg           13      7.5 mg         14      7.5 mg         15      7.5 mg         16      7.5 mg         17      5 mg         18      7.5 mg         19      7.5 mg           20      7.5 mg         21      7.5 mg         22            23               24               25               26                 27               28               29               30               31                  Date Details   05/10 This INR check       Date of next INR:  5/22/2018         How to take your warfarin dose     To take:  5 mg Take 2 of the 2.5 mg tablets.    To take:  7.5 mg Take 3 of the 2.5 mg tablets.    Hold Do not take your warfarin dose. See the Details table to the right for additional instructions.

## 2018-05-22 ENCOUNTER — ANTICOAGULATION THERAPY VISIT (OUTPATIENT)
Dept: NURSING | Facility: CLINIC | Age: 73
End: 2018-05-22
Payer: COMMERCIAL

## 2018-05-22 DIAGNOSIS — I48.20 CHRONIC ATRIAL FIBRILLATION (H): ICD-10-CM

## 2018-05-22 DIAGNOSIS — Z79.01 LONG-TERM (CURRENT) USE OF ANTICOAGULANTS: ICD-10-CM

## 2018-05-22 LAB — INR POINT OF CARE: 3.8 (ref 0.86–1.14)

## 2018-05-22 PROCEDURE — 36416 COLLJ CAPILLARY BLOOD SPEC: CPT

## 2018-05-22 PROCEDURE — 99207 ZZC NO CHARGE NURSE ONLY: CPT

## 2018-05-22 PROCEDURE — 85610 PROTHROMBIN TIME: CPT | Mod: QW

## 2018-05-22 NOTE — MR AVS SNAPSHOT
Keith Desir   5/22/2018 9:40 AM   Anticoagulation Therapy Visit    Description:  72 year old male   Provider:  MAJOR HERNANDEZ   Department:  Major Nurse           INR as of 5/22/2018     Today's INR 3.8!      Anticoagulation Summary as of 5/22/2018     INR goal 2.0-3.0   Today's INR 3.8!   Full instructions 5/22: 5 mg; 5/26: 5 mg; 5/27: 5 mg; Otherwise 5 mg on Thu; 7.5 mg all other days   Next INR check 5/29/2018    Indications   Long-term (current) use of anticoagulants [Z79.01] [Z79.01]  Chronic atrial fibrillation (HCC) [I48.2]         Your next Anticoagulation Clinic appointment(s)     May 22, 2018  9:40 AM CDT   Anticoagulation Visit with MAJOR HERNANDEZ   [facility]    [address] [address]   [phone]            May 29, 2018 11:00 AM CDT   Anticoagulation Visit with MAJOR HERNANDEZ   [facility]    [address] [address]   [phone]              Contact Numbers     [facility]  Please call  [phone] to cancel and/or reschedule your appointment, or with any problems or questions regarding your therapy.        May 2018 Details    Sun Mon Tue Wed Thu Fri Sat       1               2               3               4               5                 6               7               8               9               10               11               12                 13               14               15               16               17               18               19                 20               21               22      5 mg   See details      23      7.5 mg         24      5 mg         25      7.5 mg         26      5 mg           27      5 mg         28      7.5 mg         29            30               31                  Date Details   05/22 This INR check       Date of next INR:  5/29/2018         How to take your warfarin dose     To  take:  5 mg Take 2 of the 2.5 mg tablets.    To take:  7.5 mg Take 3 of the 2.5 mg tablets.

## 2018-05-22 NOTE — PROGRESS NOTES
SUBJECTIVE:   Keith Desir is a 72 year old male obese smoker with atrial fibrillation and CHF who presents to clinic today for the following health issues:    Diabetes Follow-up      Patient is checking blood sugars: rarely.      Diabetic concerns: None     Symptoms of hypoglycemia (low blood sugar): none     Paresthesias (numbness or burning in feet) or sores: No     Date of last diabetic eye exam: 1 year ago, February    Hyperlipidemia Follow-Up      Rate your low fat/cholesterol diet?: good    Taking statin?  Yes, no muscle aches from statin    Other lipid medications/supplements?:  none    Hypertension Follow-up      Outpatient blood pressures are not being checked.    Low Salt Diet: no added salt    BP Readings from Last 2 Encounters:   12/23/17 147/82   11/01/17 114/78     Hemoglobin A1C (%)   Date Value   05/23/2018 8.0 (H)   11/01/2017 6.2 (H)     LDL Cholesterol Calculated (mg/dL)   Date Value   11/01/2017 50   07/20/2016 53       Amount of exercise or physical activity: some walking, up and down stairs    Problems taking medications regularly: No    Medication side effects: none    Diet: low salt    The patient also complains of 3 days of typical URI symptoms; coryza, nasal stuffiness, congestion, postnasal drip, sore throat, malaise, and dry cough, non-productive.     I have reviewed the patient's medical history in detail and updated the computerized patient record.     ROS:  CONSTITUTIONAL: POSITIVE for weight loss  INTEGUMENTARY/SKIN: NEGATIVE for worrisome rashes, moles or lesions  ENT/MOUTH: nasal congestion, rhinorrhea-clear and sore throat  RESP:cough-productive  CV: NEGATIVE for chest pain, palpitations or peripheral edema  MUSCULOSKELETAL: NEGATIVE for significant arthralgias or myalgia  NEURO: NEGATIVE for weakness, dizziness or paresthesias  ENDOCRINE: Hx diabetes    OBJECTIVE:     /78 (BP Location: Right arm, Patient Position: Chair, Cuff Size: Adult Large)  Pulse 76  Temp 96.8  " F (36  C) (Oral)  Ht 5' 9\" (1.753 m)  Wt 209 lb (94.8 kg)  SpO2 96%  BMI 30.86 kg/m2  Body mass index is 30.86 kg/(m^2).  GENERAL: alert, no distress and obese  EYES: Eyes grossly normal to inspection, PERRL and conjunctivae and sclerae normal  HENT: ear canals and TM's normal, nose and mouth without ulcers or lesions  NECK: no adenopathy, no asymmetry, masses, or scars and thyroid normal to palpation  RESP: expiratory wheezes bibasilar and decreased breath sounds throughout  CV: irregularly irregular rhythm, normal S1 S2, no S3 or S4 and opening snap present  MS: trace bipedal edema, varicosities   SKIN: no lesions   PSYCH: affect flat  Diabetic foot exam: no trophic changes or ulcerative lesions and diffusely abnormal monofilament examination     Diagnostic Test Results:  Results for orders placed or performed in visit on 05/23/18   HEMOGLOBIN A1C   Result Value Ref Range    Hemoglobin A1C 8.0 (H) 0 - 5.6 %       ASSESSMENT/PLAN:   (E11.9) Type 2 diabetes mellitus without complication, without long-term current use of insulin (H)  (primary encounter diagnosis)  (N18.2) CKD (chronic kidney disease) stage 2, GFR 60-89 ml/min  Comment: uncontrolled, patient endorses medication non compliance and increased intake of candy    Plan: BASIC METABOLIC PANEL        Follow-up 3 months     (I12.9) Renal hypertension  Comment: Well controlled with medications without side effects.   Plan: BASIC METABOLIC PANEL          (J44.1) COPD exacerbation (H)  (J43.9) Pulmonary emphysema, unspecified emphysema type (H)  Plan: tiotropium (SPIRIVA HANDIHALER) 18 MCG capsule        Albuterol neb q 4 hours as needed; follow-up if symptoms worsen or persist for prednisone taper     (Z72.0) Tobacco abuse  Plan: Urged cessation and offered my support.     (I50.9) CHF (NYHA class II, ACC/AHA stage C) (H)  Comment: euvolemic, on beta blocker and ACE/ARB   Plan: BASIC METABOLIC PANEL, furosemide (LASIX) 20 MG        tablet, lisinopril " (PRINIVIL,ZESTRIL) 30 MG         tablet, metoprolol tartrate (LOPRESSOR) 100 MG         tablet          (I48.2) Chronic atrial fibrillation (HCC)  Comment: rate controlled and anticoagulated   Plan: metoprolol tartrate (LOPRESSOR) 100 MG tablet,         warfarin (COUMADIN) 2.5 MG tablet            See Patient Instructions    Cayla Gregorio MD  [facility]

## 2018-05-22 NOTE — PROGRESS NOTES
ANTICOAGULATION FOLLOW-UP CLINIC VISIT    Patient Name:  Keith Desir  Date:  5/22/2018  Contact Type:  Face to Face    SUBJECTIVE:     Patient Findings     Positives Unexplained INR or factor level change    Comments Cold for 3 days, fatigue             OBJECTIVE    INR Protime   Date Value Ref Range Status   05/22/2018 3.8 (A) 0.86 - 1.14 Final       ASSESSMENT / PLAN  INR assessment SUPRA    Recheck INR In: 1 WEEK    INR Location Clinic      Anticoagulation Summary as of 5/22/2018     INR goal 2.0-3.0   Today's INR 3.8!   Maintenance plan 5 mg (2.5 mg x 2) on Thu; 7.5 mg (2.5 mg x 3) all other days   Full instructions 5/22: 5 mg; 5/26: 5 mg; 5/27: 5 mg; Otherwise 5 mg on Thu; 7.5 mg all other days   Weekly total 50 mg   Plan last modified Dot Ziegler, RN (4/3/2018)   Next INR check 5/29/2018   Target end date     Indications   Long-term (current) use of anticoagulants [Z79.01] [Z79.01]  Chronic atrial fibrillation (HCC) [I48.2]         Anticoagulation Episode Summary     INR check location     Preferred lab     Send INR reminders to [facility]    Comments       Anticoagulation Care Providers     Provider Role Specialty Phone number    Cayla Gregorio MD [facility] [phone]            See the Encounter Report to view Anticoagulation Flowsheet and Dosing Calendar (Go to Encounters tab in chart review, and find the Anticoagulation Therapy Visit)    Dosage adjustment made based on physician directed care plan. Decreased 15% as he has been high the last two times. Recheck in one week.    Patient states negative for signs and symptoms of bleeding or blood clots, changes in medication, changes in diet, any signs of infection or antibiotic use, anything new OTC or herbal medications, any missed or extra doses of the warfarin.    Patient informed of the symptoms to be seen for either by INR nurse or ER.      Dot Ziegler, RN

## 2018-05-22 NOTE — PATIENT INSTRUCTIONS
Please schedule your yearly eye examination.      [facility]    If you have any questions regarding to your visit please contact your care team:       Team Red:   Clinic Hours Telephone Number   Dr. Cayla Marie, NP   7am-7pm  Monday - Thursday   7am-5pm  Fridays  [phone]  (Appointment scheduling available 24/7)    Questions about your recent visit?   Team Line  [phone]   Urgent Care - [place] and [facility] - 11am-9pm Monday-Friday Saturday-Sunday- 9am-5pm   [place] - 5pm-9pm Monday-Friday Saturday-Sunday- 9am-5pm  [phone] - [place]  [phone] - [place]       What options do I have for a visit other than an office visit? We offer electronic visits (e-visits) and telephone visits, when medically appropriate.  Please check with your medical insurance to see if these types of visits are covered, as you will be responsible for any charges that are not paid by your insurance.      You can use [employer] (secure electronic communication) to access to your chart, send your primary care provider a message, or make an appointment. Ask a team member how to get started.     For a price quote for your services, please call our Consumer Price Line at [phone] or our Imaging Cost estimation line at [phone] (for imaging tests).    Discharged by Gricelda Cuenca MA.

## 2018-05-23 ENCOUNTER — OFFICE VISIT (OUTPATIENT)
Dept: FAMILY MEDICINE | Facility: CLINIC | Age: 73
End: 2018-05-23
Payer: COMMERCIAL

## 2018-05-23 ENCOUNTER — MYC MEDICAL ADVICE (OUTPATIENT)
Dept: FAMILY MEDICINE | Facility: CLINIC | Age: 73
End: 2018-05-23

## 2018-05-23 VITALS
HEIGHT: 69 IN | OXYGEN SATURATION: 96 % | DIASTOLIC BLOOD PRESSURE: 78 MMHG | SYSTOLIC BLOOD PRESSURE: 120 MMHG | TEMPERATURE: 96.8 F | HEART RATE: 76 BPM | WEIGHT: 209 LBS | BODY MASS INDEX: 30.96 KG/M2

## 2018-05-23 DIAGNOSIS — J43.9 PULMONARY EMPHYSEMA, UNSPECIFIED EMPHYSEMA TYPE (H): Primary | Chronic | ICD-10-CM

## 2018-05-23 DIAGNOSIS — N18.2 CKD (CHRONIC KIDNEY DISEASE) STAGE 2, GFR 60-89 ML/MIN: ICD-10-CM

## 2018-05-23 DIAGNOSIS — I50.9 CHF (NYHA CLASS II, ACC/AHA STAGE C) (H): ICD-10-CM

## 2018-05-23 DIAGNOSIS — J43.9 PULMONARY EMPHYSEMA, UNSPECIFIED EMPHYSEMA TYPE (H): Chronic | ICD-10-CM

## 2018-05-23 DIAGNOSIS — I48.20 CHRONIC ATRIAL FIBRILLATION (H): ICD-10-CM

## 2018-05-23 DIAGNOSIS — E11.9 TYPE 2 DIABETES MELLITUS WITHOUT COMPLICATION, WITHOUT LONG-TERM CURRENT USE OF INSULIN (H): Primary | ICD-10-CM

## 2018-05-23 DIAGNOSIS — J44.1 COPD EXACERBATION (H): ICD-10-CM

## 2018-05-23 DIAGNOSIS — Z72.0 TOBACCO ABUSE: ICD-10-CM

## 2018-05-23 DIAGNOSIS — I12.9 RENAL HYPERTENSION: ICD-10-CM

## 2018-05-23 LAB
ANION GAP SERPL CALCULATED.3IONS-SCNC: 3 MMOL/L (ref 3–14)
BUN SERPL-MCNC: 17 MG/DL (ref 7–30)
CALCIUM SERPL-MCNC: 9.6 MG/DL (ref 8.5–10.1)
CHLORIDE SERPL-SCNC: 100 MMOL/L (ref 94–109)
CO2 SERPL-SCNC: 34 MMOL/L (ref 20–32)
CREAT SERPL-MCNC: 0.91 MG/DL (ref 0.66–1.25)
GFR SERPL CREATININE-BSD FRML MDRD: 82 ML/MIN/1.7M2
GLUCOSE SERPL-MCNC: 177 MG/DL (ref 70–99)
HBA1C MFR BLD: 8 % (ref 0–5.6)
POTASSIUM SERPL-SCNC: 4.9 MMOL/L (ref 3.4–5.3)
SODIUM SERPL-SCNC: 137 MMOL/L (ref 133–144)

## 2018-05-23 PROCEDURE — 36415 COLL VENOUS BLD VENIPUNCTURE: CPT | Performed by: FAMILY MEDICINE

## 2018-05-23 PROCEDURE — 80048 BASIC METABOLIC PNL TOTAL CA: CPT | Performed by: FAMILY MEDICINE

## 2018-05-23 PROCEDURE — 83036 HEMOGLOBIN GLYCOSYLATED A1C: CPT | Performed by: FAMILY MEDICINE

## 2018-05-23 PROCEDURE — 99207 C FOOT EXAM  NO CHARGE: CPT | Performed by: FAMILY MEDICINE

## 2018-05-23 PROCEDURE — 99214 OFFICE O/P EST MOD 30 MIN: CPT | Performed by: FAMILY MEDICINE

## 2018-05-23 RX ORDER — METOPROLOL TARTRATE 100 MG
200 TABLET ORAL 2 TIMES DAILY
Qty: 360 TABLET | Refills: 3 | Status: SHIPPED | OUTPATIENT
Start: 2018-05-23 | End: 2019-03-06

## 2018-05-23 RX ORDER — GLIPIZIDE 2.5 MG/1
2.5 TABLET, EXTENDED RELEASE ORAL DAILY
Qty: 90 TABLET | Refills: 0 | Status: CANCELLED | OUTPATIENT
Start: 2018-05-23

## 2018-05-23 RX ORDER — WARFARIN SODIUM 2.5 MG/1
TABLET ORAL
Qty: 270 TABLET | Refills: 1 | Status: SHIPPED | OUTPATIENT
Start: 2018-05-23 | End: 2018-11-16

## 2018-05-23 RX ORDER — TIOTROPIUM BROMIDE 18 UG/1
CAPSULE ORAL; RESPIRATORY (INHALATION)
Qty: 90 CAPSULE | Refills: 3 | Status: SHIPPED | OUTPATIENT
Start: 2018-05-23 | End: 2019-03-06

## 2018-05-23 RX ORDER — ALBUTEROL SULFATE 0.83 MG/ML
1 SOLUTION RESPIRATORY (INHALATION) EVERY 4 HOURS PRN
COMMUNITY
Start: 2018-05-23 | End: 2022-03-09

## 2018-05-23 RX ORDER — FUROSEMIDE 20 MG
20 TABLET ORAL 2 TIMES DAILY
Qty: 180 TABLET | Refills: 1 | Status: SHIPPED | OUTPATIENT
Start: 2018-05-23 | End: 2018-11-16

## 2018-05-23 RX ORDER — LISINOPRIL 30 MG/1
30 TABLET ORAL DAILY
Qty: 90 TABLET | Refills: 3 | Status: SHIPPED | OUTPATIENT
Start: 2018-05-23 | End: 2019-06-01

## 2018-05-23 NOTE — MR AVS SNAPSHOT
After Visit Summary   5/23/2018    Keith Desir    MRN: [id number]           Patient Information     Date Of Birth          1945        Visit Information        Provider Department      5/23/2018 8:40 AM Cayla Gregorio MD [facility]        Today's Diagnoses     Type 2 diabetes mellitus without complication, without long-term current use of insulin (H)    -  1    CKD (chronic kidney disease) stage 2, GFR 60-89 ml/min        Renal hypertension        COPD exacerbation (H)        Pulmonary emphysema, unspecified emphysema type (H)        Tobacco abuse        CHF (NYHA class II, ACC/AHA stage C) (H)        Chronic atrial fibrillation (HCC)          Care Instructions    Please schedule your yearly eye examination.      [facility]    If you have any questions regarding to your visit please contact your care team:       Team Red:   Clinic Hours Telephone Number   Dr. Cayla Marie, NP   7am-7pm  Monday - Thursday   7am-5pm  Fridays  [phone]  (Appointment scheduling available 24/7)    Questions about your recent visit?   Team Line  [phone]   Urgent Care - [place] - 11am-9pm Monday-Friday Saturday-Sunday- 9am-5pm   [place] - 5pm-9pm Monday-Friday Saturday-Sunday- 9am-5pm  [phone] - [place]  [phone] - [place]       What options do I have for a visit other than an office visit? We offer electronic visits (e-visits) and telephone visits, when medically appropriate.  Please check with your medical insurance to see if these types of visits are covered, as you will be responsible for any charges that are not paid by your insurance.      You can use [employer] (secure electronic communication) to access to your chart, send your primary care provider a message, or make an appointment. Ask a team member how to get started.     For a price quote for your services, please call  our Consumer Price Line at [phone] or our Imaging Cost estimation line at [phone] (for imaging tests).    Discharged by Gricelda Cuenca MA.            Follow-ups after your visit        Follow-up notes from your care team     Return in about 3 months (around 8/23/2018), or if symptoms worsen or fail to improve, for diabetes (fasting labs up to one week prior).      Your next 10 appointments already scheduled     May 29, 2018 11:00 AM CDT   Anticoagulation Visit with NICOLE HERNANDEZ   [facility]    [address] [place] [id number] [phone]              Future tests that were ordered for you today     Open Future Orders        Priority Expected Expires Ordered    Hemoglobin A1c Routine 8/23/2018 5/23/2019 5/23/2018            Who to contact     If you have questions or need follow up information about today's clinic visit or your schedule please contact [facility] directly at [phone].  Normal or non-critical lab and imaging results will be communicated to you by MyChart, letter or phone within 4 business days after the clinic has received the results. If you do not hear from us within 7 days, please contact the clinic through [employer] or phone. If you have a critical or abnormal lab result, we will notify you by phone as soon as possible.  Submit refill requests through [employer] or call your pharmacy and they will forward the refill request to us. Please allow 3 business days for your refill to be completed.          Additional Information About Your Visit        [employer] Information     [employer] gives you secure access to your electronic health record. If you see a primary care provider, you can also send messages to your care team and make appointments. If you have questions, please call your primary care clinic.  If you do not have a primary care provider, please call [phone] and they will assist you.        Care  "EveryWhere ID     This is your Care EveryWhere ID. This could be used by other organizations to access your [place] medical records  [id number]        Your Vitals Were     Pulse Temperature Height Pulse Oximetry BMI (Body Mass Index)       76 96.8  F (36  C) (Oral) 5' 9\" (1.753 m) 96% 30.86 kg/m2        Blood Pressure from Last 3 Encounters:   05/23/18 120/78   12/23/17 147/82   11/01/17 114/78    Weight from Last 3 Encounters:   05/23/18 209 lb (94.8 kg)   12/23/17 214 lb (97.1 kg)   11/01/17 214 lb (97.1 kg)              We Performed the Following     BASIC METABOLIC PANEL     FOOT EXAM  NO CHARGE [32638.114]     HEMOGLOBIN A1C          Today's Medication Changes          These changes are accurate as of 5/23/18  9:49 AM.  If you have any questions, ask your nurse or doctor.               These medicines have changed or have updated prescriptions.        Dose/Directions    albuterol (2.5 MG/3ML) 0.083% neb solution   This may have changed:  Another medication with the same name was removed. Continue taking this medication, and follow the directions you see here.   Changed by:  Cayla Gregorio MD        Dose:  1 vial   Take 1 vial (2.5 mg) by nebulization every 4 hours as needed for shortness of breath / dyspnea   Refills:  0       furosemide 20 MG tablet   Commonly known as:  LASIX   This may have changed:  See the new instructions.   Used for:  CHF (NYHA class II, ACC/AHA stage C) (H)   Changed by:  Cayla Gregorio MD        Dose:  20 mg   Take 1 tablet (20 mg) by mouth 2 times daily   Quantity:  180 tablet   Refills:  1       metoprolol tartrate 100 MG tablet   Commonly known as:  LOPRESSOR   This may have changed:  additional instructions   Used for:  CHF (NYHA class II, ACC/AHA stage C) (H), Chronic atrial fibrillation (H)   Changed by:  Cayla Gregorio MD        Dose:  200 mg   Take 2 tablets (200 mg) by mouth 2 times daily   Quantity:  360 tablet   Refills:  3       warfarin 2.5 MG tablet   Commonly " known as:  COUMADIN   This may have changed:  See the new instructions.   Used for:  Chronic atrial fibrillation (H)   Changed by:  Cayla Gregorio MD        TAKE THREE TABLETS (7.5MG) BY MOUTH DAILY OR AS DIRECTED BY INR CLINIC   Quantity:  270 tablet   Refills:  1            Where to get your medicines      These medications were sent to [id number] - [place], [place] - [address]  [address] [place] [address]    Hours:  test fax sent successfully 7/31/03 kr Phone:  [phone]     furosemide 20 MG tablet    lisinopril 30 MG tablet    metFORMIN 1000 MG tablet    metoprolol tartrate 100 MG tablet    tiotropium 18 MCG capsule    warfarin 2.5 MG tablet                Primary Care Provider Office Phone # Fax #    Cayla Gregorio [phone] [address] [facility] [address]        Equal Access to Services     : Hadii aad ku hadasho Soomaali, waaxda luqadaha, qaybta kaalmada adeegyada, waxay idiin hayaan adeirma paez . So [facility] [phone].    ATENCIÓN: Si habla español, tiene a redding disposición servicios gratuitos de asistencia lingüística. [facility] [phone].    We comply with applicable federal civil rights laws and [place] laws. We do not discriminate on the basis of race, color, national origin, age, disability, sex, sexual orientation, or gender identity.            Thank you!     Thank you for choosing [facility]  for your care. Our goal is always to provide you with excellent care. Hearing back from our patients is one way we can continue to improve our services. Please take a few minutes to complete the written survey that you may receive in the mail after your visit with us. Thank you!             Your Updated Medication List - Protect others around you: Learn how to safely use, store and throw away your medicines at www.disposemymeds.org.          This list is accurate as of 5/23/18  9:49 AM.  Always  use your most recent med list.                   Brand Name Dispense Instructions for use Diagnosis    albuterol (2.5 MG/3ML) 0.083% neb solution      Take 1 vial (2.5 mg) by nebulization every 4 hours as needed for shortness of breath / dyspnea        ASPIRIN NOT PRESCRIBED    INTENTIONAL     Antiplatelet medication not prescribed intentionally due to Current anticoagulant therapy (warfarin/enoxaparin)        atorvastatin 20 MG tablet    LIPITOR    90 tablet    TAKE ONE TABLET BY MOUTH ONE TIME DAILY    Hyperlipidemia with target LDL less than 100       blood glucose lancing device    no brand specified    1 each    Use to test blood sugars 3 times daily or as directed.    Type 2 diabetes mellitus without complication, without long-term current use of insulin (H)       digoxin 125 MCG tablet    LANOXIN    90 tablet    TAKE ONE TABLET BY MOUTH ONE TIME DAILY    CHF (NYHA class II, ACC/AHA stage C) (H), Chronic atrial fibrillation (H)       furosemide 20 MG tablet    LASIX    180 tablet    Take 1 tablet (20 mg) by mouth 2 times daily    CHF (NYHA class II, ACC/AHA stage C) (H)       lisinopril 30 MG tablet    PRINIVIL,ZESTRIL    90 tablet    Take 1 tablet (30 mg) by mouth daily    Type 2 diabetes mellitus without complication, without long-term current use of insulin (H), CHF (NYHA class II, ACC/AHA stage C) (H)       metFORMIN 1000 MG tablet    GLUCOPHAGE    180 tablet    Take 1 tablet (1,000 mg) by mouth 2 times daily (with meals)    Type 2 diabetes mellitus without complication, without long-term current use of insulin (H)       metoprolol tartrate 100 MG tablet    LOPRESSOR    360 tablet    Take 2 tablets (200 mg) by mouth 2 times daily    CHF (NYHA class II, ACC/AHA stage C) (H), Chronic atrial fibrillation (H)       order for DME     300 each    Equipment being ordered: Blood glucose monitor per insurance formulary; blood glucose test strips per formulary for use 3 time daily; lancets per formulary for use 3  time daily    Type 2 diabetes mellitus without complication, without long-term current use of insulin (H)       tiotropium 18 MCG capsule    SPIRIVA HANDIHALER    90 capsule    INHALE 1 CAPSULE (18 MCG) INTO THE LUNGS DAILY    Pulmonary emphysema, unspecified emphysema type (H)       warfarin 2.5 MG tablet    COUMADIN    270 tablet    TAKE THREE TABLETS (7.5MG) BY MOUTH DAILY OR AS DIRECTED BY INR CLINIC    Chronic atrial fibrillation (H)

## 2018-05-23 NOTE — PROGRESS NOTES
Ed,    Your kidney test is normal.     Your diabetes is uncontrolled. Eat healthy foods like fruits, vegetables, chicken, fish, nuts, and low fat yogurt. Drink water and milk instead of pop and juice. Avoid sweets. Exercise regularly. Take your medication regularly.     See me in 3 months!    Cayla Gregorio MD

## 2018-05-24 RX ORDER — ALBUTEROL SULFATE 90 UG/1
1-2 AEROSOL, METERED RESPIRATORY (INHALATION) EVERY 4 HOURS PRN
Qty: 1 INHALER | Refills: 11 | Status: SHIPPED | OUTPATIENT
Start: 2018-05-24 | End: 2021-08-16

## 2018-05-24 NOTE — TELEPHONE ENCOUNTER
Dr. Gregorio,  Please see [employer] message below. Pt is requesting albuterol inhaler.    Claire Greer RN  [facility]

## 2018-05-28 ENCOUNTER — MYC MEDICAL ADVICE (OUTPATIENT)
Dept: FAMILY MEDICINE | Facility: CLINIC | Age: 73
End: 2018-05-28

## 2018-05-28 DIAGNOSIS — J44.1 COPD EXACERBATION (H): Primary | ICD-10-CM

## 2018-05-29 ENCOUNTER — ANTICOAGULATION THERAPY VISIT (OUTPATIENT)
Dept: NURSING | Facility: CLINIC | Age: 73
End: 2018-05-29
Payer: COMMERCIAL

## 2018-05-29 DIAGNOSIS — I48.20 CHRONIC ATRIAL FIBRILLATION (H): ICD-10-CM

## 2018-05-29 DIAGNOSIS — Z79.01 LONG-TERM (CURRENT) USE OF ANTICOAGULANTS: ICD-10-CM

## 2018-05-29 LAB — INR POINT OF CARE: 1.9 (ref 0.86–1.14)

## 2018-05-29 PROCEDURE — 36416 COLLJ CAPILLARY BLOOD SPEC: CPT

## 2018-05-29 PROCEDURE — 85610 PROTHROMBIN TIME: CPT | Mod: QW

## 2018-05-29 PROCEDURE — 99207 ZZC NO CHARGE NURSE ONLY: CPT

## 2018-05-29 NOTE — PROGRESS NOTES
ANTICOAGULATION FOLLOW-UP CLINIC VISIT    Patient Name:  Keith Desir  Date:  5/29/2018  Contact Type:  Face to Face    SUBJECTIVE:     Patient Findings     Positives Unexplained INR or factor level change (Patient reports nothing has changed and is not sure why INR was supratherapeutic last INR check.)           OBJECTIVE    INR Protime   Date Value Ref Range Status   05/29/2018 1.9 (A) 0.86 - 1.14 Final       ASSESSMENT / PLAN  INR assessment THER    Recheck INR In: 4 WEEKS    INR Location Clinic      Anticoagulation Summary as of 5/29/2018     INR goal 2.0-3.0   Today's INR 1.9!   Warfarin maintenance plan 5 mg (2.5 mg x 2) on Thu; 7.5 mg (2.5 mg x 3) all other days   Full warfarin instructions 5/29: 5 mg; 6/2: 5 mg; 6/5: 5 mg; 6/9: 5 mg; 6/12: 5 mg; 6/16: 5 mg; 6/19: 5 mg; 6/23: 5 mg; 6/26: 5 mg; Otherwise 5 mg on Thu; 7.5 mg all other days   Weekly warfarin total 50 mg   Plan last modified Dot Ziegler RN (4/3/2018)   Next INR check 6/28/2018   Target end date     Indications   Long-term (current) use of anticoagulants [Z79.01] [Z79.01]  Chronic atrial fibrillation (HCC) [I48.2]         Anticoagulation Episode Summary     INR check location     Preferred lab     Send INR reminders to [facility]    Comments       Anticoagulation Care Providers     Provider Role Specialty Phone number    Cayla Gregorio MD [facility] [phone]            See the Encounter Report to view Anticoagulation Flowsheet and Dosing Calendar (Go to Encounters tab in chart review, and find the Anticoagulation Therapy Visit)    Dosage adjustment made based on physician directed care plan. Patient has a supratherapeutic INR at last INR check on 5/22/18 and dosing was adjusted to 42.5 mg weekly of warfarin. Patient's INR dropped to 1.9 today and due to big drop, writer will slowly attempt to return patient to maintenance or near maintenance to determine what weekly total is therapeutic for patient.  Weekly total of warfarin changed to 45 mg. Patient's maintenance was 50 mg weekly.  Writer attempted to have patient return within 1 week to assess INR and determine if dosing was therapeutic for patient but patient reports he is leaving out of town for a vacation for 4 weeks. Patient reports the soonest he can return to clinic for INR recheck is 6/28/18. Until then, writer educated patient on signs and symptoms of bleeding, bruising, or blood clots that would warrant patient calling 911 and being evaluated in ED, patient states understanding. Denies any changes to medications or other diet issues. Denies any missed doses or extra doses. Denies bleeding, bruising, or blood clots. Verbalizes understanding of dosing and recheck date. Patient is aware if signs of clotting (pain, tenderness, swelling, color change in leg or arm, SOB) and bleeding occur (blood in stool, urine, large bruising, bleeding gums, nosebleeds) to have INR check sooner. If sx severe report to ER or concerned for stroke call 911. If general questions or concerns arise, call clinic.         Brandi Scott RN

## 2018-05-29 NOTE — MR AVS SNAPSHOT
Juannoemi FABRICE Desir   5/29/2018 11:00 AM   Anticoagulation Therapy Visit    Description:  72 year old male   Provider:  MAJOR HERNANDEZ   Department:  [facility]           INR as of 5/29/2018     Today's INR 1.9!      Anticoagulation Summary as of 5/29/2018     INR goal 2.0-3.0   Today's INR 1.9!   Full warfarin instructions 5/29: 5 mg; 6/2: 5 mg; 6/5: 5 mg; 6/9: 5 mg; 6/12: 5 mg; 6/16: 5 mg; 6/19: 5 mg; 6/23: 5 mg; 6/26: 5 mg; Otherwise 5 mg on Thu; 7.5 mg all other days   Next INR check 6/28/2018    Indications   Long-term (current) use of anticoagulants [Z79.01] [Z79.01]  Chronic atrial fibrillation (HCC) [I48.2]         Your next Anticoagulation Clinic appointment(s)     May 29, 2018 11:00 AM CDT   [facility]    [address] [address] [facility] [address]   [phone]            Jun 28, 2018  9:20 AM CDT   Anticoagulation Visit [facility] MAJOR [facility]DEZ   [facility]    [address] [address] [facility] [address]   [phone]              Contact Numbers     [facility]  Please call  [phone] to cancel and/or reschedule your appointment, or with any problems or questions regarding your therapy.        May 2018 Details    Sun Mon Tue Wed Thu Fri Sat       1               2               3               4               5                 6               7               8               9               10               11               12                 13               14               15               16               17               18               19                 20               21               22               23               24               25               26                 27               28               29      5 mg   See details      30      7.5 mg         31      5 mg            Date Details   05/29 This INR check               How  to take your warfarin dose     To take:  5 mg Take 2 of the 2.5 mg tablets.    To take:  7.5 mg Take 3 of the 2.5 mg tablets.           June 2018 Details    Sun Mon Tue Wed Thu Fri Sat          1      7.5 mg         2      5 mg           3      7.5 mg         4      7.5 mg         5      5 mg         6      7.5 mg         7      5 mg         8      7.5 mg         9      5 mg           10      7.5 mg         11      7.5 mg         12      5 mg         13      7.5 mg         14      5 mg         15      7.5 mg         16      5 mg           17      7.5 mg         18      7.5 mg         19      5 mg         20      7.5 mg         21      5 mg         22      7.5 mg         23      5 mg           24      7.5 mg         25      7.5 mg         26      5 mg         27      7.5 mg         28            29               30                Date Details   No additional details    Date of next INR:  6/28/2018         How to take your warfarin dose     To take:  5 mg Take 2 of the 2.5 mg tablets.    To take:  7.5 mg Take 3 of the 2.5 mg tablets.

## 2018-05-30 RX ORDER — PREDNISONE 20 MG/1
TABLET ORAL
Qty: 20 TABLET | Refills: 0 | Status: SHIPPED | OUTPATIENT
Start: 2018-05-30 | End: 2018-08-08

## 2018-05-30 NOTE — TELEPHONE ENCOUNTER
Dr. Gregorio are you will to prescribe Prednisone or is visit required. Patient is requesting prescription only. Tory Gil MA    Plan from visit on 05/23/18, below.     (J44.1) COPD exacerbation (H)  (J43.9) Pulmonary emphysema, unspecified emphysema type (H)  Plan: tiotropium (SPIRIVA HANDIHALER) 18 MCG capsule        Albuterol neb q 4 hours as needed; follow-up if symptoms worsen or persist for prednisone taper

## 2018-05-30 NOTE — TELEPHONE ENCOUNTER
Signed Prescriptions:                        Disp   Refills    predniSONE (DELTASONE) 20 MG tablet        20 tab*0        Sig: Take 3 tabs (60 mg) by mouth daily x 3 days, 2 tabs           (40 mg) daily x 3 days, 1 tab (20 mg) daily x 3           days, then 1/2 tab (10 mg) x 3 days.  Authorizing Provider: DEVON PAREDES

## 2018-06-12 NOTE — PATIENT INSTRUCTIONS
Increase the lisinopril dose to 20 mg daily.    Increase the metformin dose to 1000 mg twice daily.     Did you know you can lower your blood pressure with your daily habits?    *Losing 20 pounds of weight lowers blood pressure 5 to 20 points.  *Eating a low-fat diet rich in fruits, vegetables and low-fat dairy lowers blood pressure 8 to 14 points.  *Eating a low-salt diet lowers blood pressure 2 to 8 points.  *Exercising regularly lowers blood pressure 4 to 9 points.  *Reducing alcohol use lowers blood pressure 2 to 4 points.    [facility]    If you have any questions regarding to your visit please contact your care team:       Team Red:   Clinic Hours Telephone Number   Dr. Cayla Marie, NP   7am-7pm  Monday - Thursday   7am-5pm  Fridays  [phone]  (Appointment scheduling available 24/7)    Questions about your visit?   Team Line  [phone]   Urgent Care - [place] and [place] - 11am-9pm Monday-Friday Saturday-Sunday- 9am-5pm   [place] - 5pm-9pm Monday-Friday Saturday-Sunday- 9am-5pm  [phone] - [place]   [phone] - [place]       What options do I have for visits at the clinic other than the traditional office visit?  To expand how we care for you, many of our providers are utilizing electronic visits (e-visits) and telephone visits, when medically appropriate, for interactions with their patients rather than a visit in the clinic.   We also offer nurse visits for many medical concerns. Just like any other service, we will bill your insurance company for this type of visit based on time spent on the phone with your provider. Not all insurance companies cover these visits. Please check with your medical insurance if this type of visit is covered. You will be responsible for any charges that are not paid by your insurance.      E-visits via [employer]:  generally incur a $35.00 fee.  Telephone visits:  Time spent  on the phone: *charged based on time that is spent on the phone in increments of 10 minutes. Estimated cost:   5-10 mins $30.00   11-20 mins. $59.00   21-30 mins. $85.00     Use [facility] (secure email communication and access to your chart) to send your primary care provider a message or make an appointment. Ask someone on your Team how to sign up for [employer].  For a Price Quote for your services, please call our [facility] Price Line at [phone].      As always, Thank you for trusting us with your health care needs!    Discharged by Gricelda Cuenca MA.     Unable to offer due to clinical condition

## 2018-06-28 ENCOUNTER — ANTICOAGULATION THERAPY VISIT (OUTPATIENT)
Dept: NURSING | Facility: CLINIC | Age: 73
End: 2018-06-28
Payer: COMMERCIAL

## 2018-06-28 DIAGNOSIS — I48.20 CHRONIC ATRIAL FIBRILLATION (H): ICD-10-CM

## 2018-06-28 DIAGNOSIS — Z79.01 LONG-TERM (CURRENT) USE OF ANTICOAGULANTS: ICD-10-CM

## 2018-06-28 LAB — INR POINT OF CARE: 2.3 (ref 0.86–1.14)

## 2018-06-28 PROCEDURE — 99207 ZZC NO CHARGE NURSE ONLY: CPT

## 2018-06-28 PROCEDURE — 36416 COLLJ CAPILLARY BLOOD SPEC: CPT

## 2018-06-28 PROCEDURE — 85610 PROTHROMBIN TIME: CPT | Mod: QW

## 2018-06-28 NOTE — MR AVS SNAPSHOT
Juannoemi FABRICE Desir   6/28/2018 9:20 AM   Anticoagulation Therapy Visit    Description:  72 year old male   Provider:  MAJOR HERNANDEZ   Department:  [facility]           INR as of 6/28/2018     Today's INR 2.3      Anticoagulation Summary as of 6/28/2018     INR goal 2.0-3.0   Today's INR 2.3   Full warfarin instructions 5 mg on Tue, Thu, Sat; 7.5 mg all other days   Next INR check 7/31/2018    Indications   Long-term (current) use of anticoagulants [Z79.01] [Z79.01]  Chronic atrial fibrillation (HCC) [I48.2]         Your next Anticoagulation Clinic appointment(s)     Jul 31, 2018  9:20 AM CDT   Anticoagulation Visit with MAJOR HERNANDEZ   [facility]    [address] [place] [address] [phone]              Contact Numbers     [facility]  Please call  [phone] to cancel and/or reschedule your appointment, or with any problems or questions regarding your therapy.        June 2018 Details    Sun Mon Tue Wed Thu Fri Sat          1               2                 3               4               5               6               7               8               9                 10               11               12               13               14               15               16                 17               18               19               20               21               22               23                 24               25               26               27               28      5 mg   See details      29      7.5 mg         30      5 mg          Date Details   06/28 This INR check               How to take your warfarin dose     To take:  5 mg Take 2 of the 2.5 mg tablets.    To take:  7.5 mg Take 3 of the 2.5 mg tablets.           July 2018 Details    Sun Mon Tue Wed Thu Fri Sat     1      7.5 mg         2      7.5 mg         3      5 mg         4      7.5 mg         5      5 mg         6      7.5 mg         7      5 mg            8      7.5 mg         9      7.5 mg         10      5 mg         11      7.5 mg         12      5 mg         13      7.5 mg         14      5 mg           15      7.5 mg         16      7.5 mg         17      5 mg         18      7.5 mg         19      5 mg         20      7.5 mg         21      5 mg           22      7.5 mg         23      7.5 mg         24      5 mg         25      7.5 mg         26      5 mg         27      7.5 mg         28      5 mg           29      7.5 mg         30      7.5 mg         31                 Date Details   No additional details    Date of next INR:  7/31/2018         How to take your warfarin dose     To take:  5 mg Take 2 of the 2.5 mg tablets.    To take:  7.5 mg Take 3 of the 2.5 mg tablets.

## 2018-06-28 NOTE — PROGRESS NOTES
ANTICOAGULATION FOLLOW-UP CLINIC VISIT    Patient Name:  Keith Desir  Date:  6/28/2018  Contact Type:  Face to Face    SUBJECTIVE:     Patient Findings     Positives No Problem Findings           OBJECTIVE    INR Protime   Date Value Ref Range Status   06/28/2018 2.3 (A) 0.86 - 1.14 Final       ASSESSMENT / PLAN  INR assessment THER    Recheck INR In: 5 WEEKS    INR Location Clinic      Anticoagulation Summary as of 6/28/2018     INR goal 2.0-3.0   Today's INR 2.3   Warfarin maintenance plan 5 mg (2.5 mg x 2) on Tue, Thu, Sat; 7.5 mg (2.5 mg x 3) all other days   Full warfarin instructions 5 mg on Tue, Thu, Sat; 7.5 mg all other days   Weekly warfarin total 45 mg   Plan last modified Dot Ziegler RN (6/28/2018)   Next INR check 7/31/2018   Target end date     Indications   Long-term (current) use of anticoagulants [Z79.01] [Z79.01]  Chronic atrial fibrillation (HCC) [I48.2]         Anticoagulation Episode Summary     INR check location     Preferred lab     Send INR reminders to [facility]    Comments       Anticoagulation Care Providers     Provider Role Specialty Phone number    Cayla Gregorio MD [facility] [phone]            See the Encounter Report to view Anticoagulation Flowsheet and Dosing Calendar (Go to Encounters tab in chart review, and find the Anticoagulation Therapy Visit)    Maintenance dose was adjusted to reflect the current dosing. Patient will not come in for recheck sooner. He noted that best he will do is come in at 5 weeks instead of 6 weeks.    Patient states negative for signs and symptoms of bleeding or blood clots, changes in medication, changes in diet, any signs of infection or antibiotic use, anything new OTC or herbal medications, any missed or extra doses of the warfarin.    Patient informed of the symptoms to be seen for either by INR nurse or ER.    Dot Ziegler, DIANN

## 2018-07-31 ENCOUNTER — ANTICOAGULATION THERAPY VISIT (OUTPATIENT)
Dept: NURSING | Facility: CLINIC | Age: 73
End: 2018-07-31
Payer: COMMERCIAL

## 2018-07-31 DIAGNOSIS — Z79.01 LONG-TERM (CURRENT) USE OF ANTICOAGULANTS: ICD-10-CM

## 2018-07-31 DIAGNOSIS — I48.20 CHRONIC ATRIAL FIBRILLATION (H): ICD-10-CM

## 2018-07-31 LAB — INR POINT OF CARE: 2.2 (ref 0.86–1.14)

## 2018-07-31 PROCEDURE — 36416 COLLJ CAPILLARY BLOOD SPEC: CPT

## 2018-07-31 PROCEDURE — 85610 PROTHROMBIN TIME: CPT | Mod: QW

## 2018-07-31 PROCEDURE — 99207 ZZC NO CHARGE NURSE ONLY: CPT

## 2018-07-31 NOTE — PROGRESS NOTES
ANTICOAGULATION FOLLOW-UP CLINIC VISIT    Patient Name:  Keith Desir  Date:  7/31/2018  Contact Type:  Face to Face    SUBJECTIVE:     Patient Findings     Positives No Problem Findings           OBJECTIVE    INR Protime   Date Value Ref Range Status   07/31/2018 2.2 (A) 0.86 - 1.14 Final       ASSESSMENT / PLAN  INR assessment THER    Recheck INR In: 6 WEEKS    INR Location Clinic      Anticoagulation Summary as of 7/31/2018     INR goal 2.0-3.0   Today's INR 2.2   Warfarin maintenance plan 5 mg (2.5 mg x 2) on Tue, Thu, Sat; 7.5 mg (2.5 mg x 3) all other days   Full warfarin instructions 5 mg on Tue, Thu, Sat; 7.5 mg all other days   Weekly warfarin total 45 mg   No change documented Dot Ziegler RN   Plan last modified Dot Ziegler RN (6/28/2018)   Next INR check 9/11/2018   Target end date     Indications   Long-term (current) use of anticoagulants [Z79.01] [Z79.01]  Chronic atrial fibrillation (HCC) [I48.2]         Anticoagulation Episode Summary     INR check location     Preferred lab     Send INR reminders to [facility]    Comments don't print AVS unless change in dosing      Anticoagulation Care Providers     Provider Role Specialty Phone number    Cayla Gregorio MD [facility] [phone]            See the Encounter Report to view Anticoagulation Flowsheet and Dosing Calendar (Go to Encounters tab in chart review, and find the Anticoagulation Therapy Visit)    Therapeutic INR 2.2. Will continue maintenance dose and recheck in 6 week.    Patient states negative for signs and symptoms of bleeding or blood clots, changes in medication, changes in diet, any signs of infection or antibiotic use, anything new OTC or herbal medications, any missed or extra doses of the warfarin.    Patient informed of the symptoms to be seen for either by INR nurse or ER.    Dot Ziegler RN

## 2018-07-31 NOTE — MR AVS SNAPSHOT
Juannoemi FABRICE Desir   7/31/2018 9:20 AM   Anticoagulation Therapy Visit    Description:  72 year old male   Provider:  MAJOR HERNANDEZ   Department:  [facility]           INR as of 7/31/2018     Today's INR 2.2      Anticoagulation Summary as of 7/31/2018     INR goal 2.0-3.0   Today's INR 2.2   Full warfarin instructions 5 mg on Tue, Thu, Sat; 7.5 mg all other days   Next INR check 9/11/2018    Indications   Long-term (current) use of anticoagulants [Z79.01] [Z79.01]  Chronic atrial fibrillation (HCC) [I48.2]         Your next Anticoagulation Clinic appointment(s)     Jul 31, 2018  9:20 AM CDT   Anticoagulation [facility] MAJOR HER[facility]NDEZ   [facility]    [address] [facility] [address]   [phone]            Sep 11, 2018  9:20 AM CDT   Anticoagulation Visit with MAJOR HERNANDEZ   [facility] ([facility]    [address] [facility] [address]   [phone]              Contact Numbers     [facility]  Please call  [phone] to cancel and/or reschedule your appointment, or with any problems or questions regarding your therapy.        July 2018 Details    Sun Mon Tue Wed Thu Fri Sat     1               2               3               4               5               6               7                 8               9               10               11               12               13               14                 15               16               17               18               19               20               21                 22               23               24               25               26               27               28                 29               30               31      5 mg   See details           Date Details   07/31 This INR check               How to take your warfarin dose     To take:  5 mg Take 2 of the 2.5 mg tablets.           August 2018 Details    Sun Mon  Tue Wed Thu Fri Sat        1      7.5 mg         2      5 mg         3      7.5 mg         4      5 mg           5      7.5 mg         6      7.5 mg         7      5 mg         8      7.5 mg         9      5 mg         10      7.5 mg         11      5 mg           12      7.5 mg         13      7.5 mg         14      5 mg         15      7.5 mg         16      5 mg         17      7.5 mg         18      5 mg           19      7.5 mg         20      7.5 mg         21      5 mg         22      7.5 mg         23      5 mg         24      7.5 mg         25      5 mg           26      7.5 mg         27      7.5 mg         28      5 mg         29      7.5 mg         30      5 mg         31      7.5 mg           Date Details   No additional details            How to take your warfarin dose     To take:  5 mg Take 2 of the 2.5 mg tablets.    To take:  7.5 mg Take 3 of the 2.5 mg tablets.           September 2018 Details    Sun Mon Tue Wed Thu Fri Sat           1      5 mg           2      7.5 mg         3      7.5 mg         4      5 mg         5      7.5 mg         6      5 mg         7      7.5 mg         8      5 mg           9      7.5 mg         10      7.5 mg         11            12               13               14               15                 16               17               18               19               20               21               22                 23               24               25               26               27               28               29                 30                      Date Details   No additional details    Date of next INR:  9/11/2018         How to take your warfarin dose     To take:  5 mg Take 2 of the 2.5 mg tablets.    To take:  7.5 mg Take 3 of the 2.5 mg tablets.

## 2018-08-07 NOTE — PROGRESS NOTES
"  SUBJECTIVE:   Keith Desir is a 72 year old male with HF and atrial fibrillation who presents to clinic today for the following health issues:    Diabetes Follow-up      Patient is checking blood sugars: not at all    Diabetic concerns: None     Symptoms of hypoglycemia (low blood sugar): none     Paresthesias (numbness or burning in feet) or sores: No     Date of last diabetic eye exam: 3/2017    Diabetes Management Resources    Hyperlipidemia Follow-Up      Rate your low fat/cholesterol diet?: fair    Taking statin?  Yes, no muscle aches from statin    Other lipid medications/supplements?:  none    Hypertension Follow-up      Outpatient blood pressures are not being checked.    Low Salt Diet: low salt    BP Readings from Last 2 Encounters:   08/08/18 126/86   05/23/18 120/78     Hemoglobin A1C (%)   Date Value   08/08/2018 6.8 (H)   05/23/2018 8.0 (H)     LDL Cholesterol Calculated (mg/dL)   Date Value   11/01/2017 50   07/20/2016 53     Chronic Kidney Disease Follow-up      Current NSAID use?  No      Amount of exercise or physical activity: walks    Problems taking medications regularly: No    Medication side effects: none    Diet: low salt, low fat/cholesterol and diabetic    He is not interested in quit smoking.    I have reviewed the patient's medical history in detail and updated the computerized patient record.     ROS:  CONSTITUTIONAL: NEGATIVE for fever, chills, change in weight  INTEGUMENTARY/SKIN: NEGATIVE for worrisome rashes, moles or lesions  EYES: NEGATIVE for vision changes or irritation  RESP:Hx COPD  CV: NEGATIVE for chest pain, palpitations or peripheral edema  MUSCULOSKELETAL: NEGATIVE for significant arthralgias or myalgia  NEURO: NEGATIVE for weakness, dizziness or paresthesias  HEME/ALLERGY/IMMUNE: on coumadin     OBJECTIVE:     /86  Pulse 98  Temp 96.7  F (35.9  C) (Oral)  Resp 20  Ht 5' 9\" (1.753 m)  Wt 213 lb (96.6 kg)  SpO2 97%  BMI 31.45 kg/m2  Body mass index is 31.45 " kg/(m^2).  GENERAL: alert and no distress  NECK: no adenopathy, no asymmetry, masses, or scars and thyroid normal to palpation  RESP: lungs clear to auscultation - no rales, rhonchi or wheezes  CV: regular rate and rhythm, normal S1 S2, no S3 or S4, no murmur, click or rub, trace bipedal edema   MS: extremities normal- no gross deformities noted  SKIN: diffuse varicosities of lower extremities   PSYCH: mentation appears normal, affect normal/bright  Diabetic foot exam: no trophic changes or ulcerative lesions and normal monofilament exam    Diagnostic Test Results:  Results for orders placed or performed in visit on 08/08/18   Hemoglobin A1c   Result Value Ref Range    Hemoglobin A1C 6.8 (H) 0 - 5.6 %       ASSESSMENT/PLAN:   (E11.9) Type 2 diabetes mellitus without complication, without long-term current use of insulin (H)  (primary encounter diagnosis)  Comment: Well controlled with medications without side effects.   Plan: follow-up 6 months     (I50.9) CHF (NYHA class II, ACC/AHA stage C) (H)  Comment: euvolemic, on beta blocker and ACE/ARB     (N18.2) CKD (chronic kidney disease) stage 2, GFR 60-89 ml/min  Comment: stable  Plan: avoid NSAIDs     (I48.2) Chronic atrial fibrillation (HCC)  Comment: rate controlled and anticoagulated   Plan: Continue chronic anticoagulation under surveillance of protime clinic with goal INR 2 - 3 indefinitely      (I12.9) Renal hypertension  Comment: Well controlled with medications without side effects.     (Z72.0) Tobacco abuse  Plan: Urged cessation and offered my support.     (I83.93) Varicose veins of legs  Plan: conservative cares       See Patient Instructions    Cayla Gregorio MD  [facility]

## 2018-08-07 NOTE — PATIENT INSTRUCTIONS
Did you know you can lower your blood pressure with your daily habits?    *Losing 20 pounds of weight lowers blood pressure 5 to 20 points.  *Eating a low-fat diet rich in fruits, vegetables and low-fat dairy lowers blood pressure 8 to 14 points.  *Eating a low-salt diet lowers blood pressure 2 to 8 points.  *Exercising regularly lowers blood pressure 4 to 9 points.  *Reducing alcohol use lowers blood pressure 2 to 4 points.      [facility]    If you have any questions regarding to your visit please contact your care team:       Team Red:   Clinic Hours Telephone Number   Dr. Cayla Marie, NP   7am-7pm  Monday - Thursday   7am-5pm  Fridays  [phone]  (Appointment scheduling available 24/7)    Questions about your recent visit?   Team Line  [phone]   Urgent Care - [place] and [facility] - 11am-9pm Monday-Friday Saturday-Sunday- 9am-5pm   [place] - 5pm-9pm Monday-Friday Saturday-Sunday- 9am-5pm  [phone] - [place]  [phone] - [place]       What options do I have for a visit other than an office visit? We offer electronic visits (e-visits) and telephone visits, when medically appropriate.  Please check with your medical insurance to see if these types of visits are covered, as you will be responsible for any charges that are not paid by your insurance.      You can use [employer] (secure electronic communication) to access to your chart, send your primary care provider a message, or make an appointment. Ask a team member how to get started.     For a price quote for your services, please call our Consumer Price Line at [phone] or our Imaging Cost estimation line at [phone] (for imaging tests).  Dot SEGURA MA        HOW TO QUIT SMOKING  Smoking is one of the hardest habits to break. About half of all those who have ever smoked have been able to quit, and most of those (about 70%) who still smoke want to  quit. Here are some of the best ways to stop smoking.     KEEP TRYING:  It takes most smokers about 8 tries before they are finally able to fully quit. So, the more often you try and fail, the better your chance of quitting the next time! So, don't give up!    GO COLD TURKEY:  Most ex-smokers quit cold turkey. Trying to cut back gradually doesn't seem to work as well, perhaps because it continues the smoking habit. Also, it is possible to fool yourself by inhaling more while smoking fewer cigarettes. This results in the same amount of nicotine in your body!    GET SUPPORT:  Support programs can make an important difference, especially for the heavy smoker. These groups offer lectures, methods to change your behavior and peer support. Call the free national Quitline for more information. 800-QUIT-NOW (191-157-7250). Low-cost or free programs are offered by many hospitals, local chapters of the American Lung Association (195-924-1327) and the American Cancer Society (369-695-2144). Support at home is important too. Non-smokers can help by offering praise and encouragement. If the smoker fails to quit, encourage them to try again!    OVER-THE-COUNTER MEDICINES:  For those who can't quit on their own, Nicotine Replacement Therapy (NRT) may make quitting much easier. Certain aids such as the nicotine patch, gum and lozenge are available without a prescription. However, it is best to use these under the guidance of your doctor. The skin patch provides a steady supply of nicotine to the body. Nicotine gum and lozenge gives temporary bursts of low levels of nicotine. Both methods take the edge off the craving for cigarettes. WARNING: If you feel symptoms of nicotine overdose, such as nausea, vomiting, dizziness, weakness, or fast heartbeat, stop using these and see your doctor.    PRESCRIPTION MEDICINES:  After evaluating your smoking patterns and prior attempts at quitting, your doctor may offer a prescription medicine  such as bupropion (Zyban, Wellbutrin), varenicline (Chantix, Champix), a niocotine inhaler or nasal spray. Each has its unique advantage and side effects which your doctor can review with you.    HEALTH BENEFITS OF QUITTING:  The benefits of quitting start right away and keep improving the longer you go without smokin minutes: blood pressure and pulse return to normal  8 hours: oxygen levels return to normal  2 days: ability to smell and taste begins to improve as damaged nerves start to regrow  2-3 weeks: circulation and lung function improves  1-9 months: decreased cough, congestion and shortness of breath; less tired  1 year: risk of heart attack decreases by half  5 years: risk of lung cancer decreases by half; risk of stroke becomes the same as a non-smoker  For information about how to quit smoking, visit the following links:  National [employer] ,   Clearing the Air, Quit Smoking Today   - an online booklet. http://www.smokefree.gov/pubs/clearing_the_air.pdf  Smokefree.gov http://smokefree.gov/  QuitNet http://www.quitnet.com/    6501-6165 Scott Palmer, [address] [place] [address]. All rights reserved. This information is not intended as a substitute for professional medical care. Always follow your healthcare professional's instructions.    The Benefits of Living Smoke Free  What do you want to gain from quitting? Check off some reasons to quit.  Health Benefits  ___ Reduce my risk of lung cancer, heart disease, chronic lung disease  ___ Have fewer wrinkles and softer skin  ___ Improve my sense of taste and smell  ___ For pregnant women--reduce the risk of having a miscarriage, stillbirth, premature birth, or low-birth-weight baby  Personal Benefits  ___ Feel more in control of my life  ___ Have better-smelling hair, breath, clothes, home, and car  ___ Save time by not having to take smoke breaks, buy cigarettes, or hunt for a light  ___ Have whiter teeth  Family Benefits  ___  Reduce my children s respiratory tract infections  ___ Set a good example for my children  ___ Reduce my family s cancer risk  Financial Benefits  ___ Save hundreds of dollars each year that would be spent on cigarettes  ___ Save money on medical bills  ___ Save on life, health, and car insurance premiums    Those Dollars Add Up!  Cigarettes are expensive, and getting more expensive all the time. Do you realize how much money you are spending on cigarettes per year? What is the average amount you spend on a pack of cigarettes? What is the average number of packs that you smoke per day? Using your answers to these questions, fill in this formula to help you find out:  ($ _____ per pack) ×  ( _____ number of packs per day) × (365 days) =  $ _____ yearly cost of smoking  Besides tobacco, there are other costs, including extra cleaning bills and replacement costs for clothing and furniture; medical expenses for smoking-related illnesses; and higher health, life, and car insurance premiums.    Cigars and Pipes Count Too!  Cigars and pipes are also dangerous. So are smokeless (chewing) tobacco and snuff. All of these products contain nicotine, a highly addictive substance that has harmful effects on your body. Quitting smoking means giving up all tobacco products.      4767-0345 Scott Palmer, [address] [place] [address], [place] [id number]. All rights reserved. This information is not intended as a substitute for professional medical care. Always follow your healthcare professional's instructions.

## 2018-08-08 ENCOUNTER — OFFICE VISIT (OUTPATIENT)
Dept: FAMILY MEDICINE | Facility: CLINIC | Age: 73
End: 2018-08-08
Payer: COMMERCIAL

## 2018-08-08 VITALS
SYSTOLIC BLOOD PRESSURE: 126 MMHG | TEMPERATURE: 96.7 F | WEIGHT: 213 LBS | DIASTOLIC BLOOD PRESSURE: 86 MMHG | HEIGHT: 69 IN | HEART RATE: 98 BPM | BODY MASS INDEX: 31.55 KG/M2 | OXYGEN SATURATION: 97 % | RESPIRATION RATE: 20 BRPM

## 2018-08-08 DIAGNOSIS — E11.9 TYPE 2 DIABETES MELLITUS WITHOUT COMPLICATION, WITHOUT LONG-TERM CURRENT USE OF INSULIN (H): Primary | ICD-10-CM

## 2018-08-08 DIAGNOSIS — N18.2 CKD (CHRONIC KIDNEY DISEASE) STAGE 2, GFR 60-89 ML/MIN: ICD-10-CM

## 2018-08-08 DIAGNOSIS — I12.9 RENAL HYPERTENSION: ICD-10-CM

## 2018-08-08 DIAGNOSIS — I83.93 VARICOSE VEINS OF LEGS: ICD-10-CM

## 2018-08-08 DIAGNOSIS — Z72.0 TOBACCO ABUSE: ICD-10-CM

## 2018-08-08 DIAGNOSIS — I50.9 CHF (NYHA CLASS II, ACC/AHA STAGE C) (H): ICD-10-CM

## 2018-08-08 DIAGNOSIS — I48.20 CHRONIC ATRIAL FIBRILLATION (H): ICD-10-CM

## 2018-08-08 LAB — HBA1C MFR BLD: 6.8 % (ref 0–5.6)

## 2018-08-08 PROCEDURE — 36415 COLL VENOUS BLD VENIPUNCTURE: CPT | Performed by: FAMILY MEDICINE

## 2018-08-08 PROCEDURE — 83036 HEMOGLOBIN GLYCOSYLATED A1C: CPT | Performed by: FAMILY MEDICINE

## 2018-08-08 PROCEDURE — 99214 OFFICE O/P EST MOD 30 MIN: CPT | Performed by: FAMILY MEDICINE

## 2018-08-08 RX ORDER — LISINOPRIL 30 MG/1
30 TABLET ORAL DAILY
Qty: 90 TABLET | Refills: 3 | Status: CANCELLED | OUTPATIENT
Start: 2018-08-08

## 2018-08-08 RX ORDER — DIGOXIN 125 MCG
125 TABLET ORAL DAILY
Qty: 90 TABLET | Refills: 1 | Status: CANCELLED | OUTPATIENT
Start: 2018-08-08

## 2018-08-08 RX ORDER — ALBUTEROL SULFATE 0.83 MG/ML
2.5 SOLUTION RESPIRATORY (INHALATION) EVERY 4 HOURS PRN
Qty: 360 ML | Status: CANCELLED | OUTPATIENT
Start: 2018-08-08

## 2018-08-08 RX ORDER — FUROSEMIDE 20 MG
20 TABLET ORAL 2 TIMES DAILY
Qty: 180 TABLET | Refills: 1 | Status: CANCELLED | OUTPATIENT
Start: 2018-08-08

## 2018-08-08 RX ORDER — METOPROLOL TARTRATE 100 MG
200 TABLET ORAL 2 TIMES DAILY
Qty: 360 TABLET | Refills: 3 | Status: CANCELLED | OUTPATIENT
Start: 2018-08-08

## 2018-08-08 NOTE — MR AVS SNAPSHOT
After Visit Summary   8/8/2018    Keith Desir    MRN: [id number]           Patient Information     Date Of Birth          1945        Visit Information        Provider Department      8/8/2018 8:00 AM Cayla Gregorio MD [facility]        Today's Diagnoses     Type 2 diabetes mellitus without complication, without long-term current use of insulin (H)    -  1    CHF (NYHA class II, ACC/AHA stage C) (H)        CKD (chronic kidney disease) stage 2, GFR 60-89 ml/min        Chronic atrial fibrillation (HCC)        Renal hypertension        Tobacco abuse        Varicose veins of legs          Care Instructions    Did you know you can lower your blood pressure with your daily habits?    *Losing 20 pounds of weight lowers blood pressure 5 to 20 points.  *Eating a low-fat diet rich in fruits, vegetables and low-fat dairy lowers blood pressure 8 to 14 points.  *Eating a low-salt diet lowers blood pressure 2 to 8 points.  *Exercising regularly lowers blood pressure 4 to 9 points.  *Reducing alcohol use lowers blood pressure 2 to 4 points.      [facility]    If you have any questions regarding to your visit please contact your care team:       Team Red:   Clinic Hours Telephone Number   Dr. Cayla Marie, NP   7am-7pm  Monday - Thursday   7am-5pm  Fridays  [phone]  (Appointment scheduling available 24/7)    Questions about your recent visit?   Team Line  [phone]   Urgent Care - [place] [facility] - 11am-9pm Monday-Friday Saturday-Sunday- 9am-5pm   [place] - 5pm-9pm Monday-Friday Saturday-Sunday- 9am-5pm  [phone] - [place]  [phone] - [place]       What options do I have for a visit other than an office visit? We offer electronic visits (e-visits) and telephone visits, when medically appropriate.  Please check with your medical insurance to see if these types of visits are  covered, as you will be responsible for any charges that are not paid by your insurance.      You can use [employer] (secure electronic communication) to access to your chart, send your primary care provider a message, or make an appointment. Ask a team member how to get started.     For a price quote for your services, please call our Consumer Price Line at [phone] or our Imaging Cost estimation line at [phone] (for imaging tests).  Dot SEGURA MA        HOW TO QUIT SMOKING  Smoking is one of the hardest habits to break. About half of all those who have ever smoked have been able to quit, and most of those (about 70%) who still smoke want to quit. Here are some of the best ways to stop smoking.     KEEP TRYING:  It takes most smokers about 8 tries before they are finally able to fully quit. So, the more often you try and fail, the better your chance of quitting the next time! So, don't give up!    GO COLD TURKEY:  Most ex-smokers quit cold turkey. Trying to cut back gradually doesn't seem to work as well, perhaps because it continues the smoking habit. Also, it is possible to fool yourself by inhaling more while smoking fewer cigarettes. This results in the same amount of nicotine in your body!    GET SUPPORT:  Support programs can make an important difference, especially for the heavy smoker. These groups offer lectures, methods to change your behavior and peer support. Call the free national Quitline for more information. 800-QUIT-NOW (697-740-6895). Low-cost or free programs are offered by many hospitals, local chapters of the American Lung Association (062-817-1017) and the American Cancer Society (970-730-6635). Support at home is important too. Non-smokers can help by offering praise and encouragement. If the smoker fails to quit, encourage them to try again!    OVER-THE-COUNTER MEDICINES:  For those who can't quit on their own, Nicotine Replacement Therapy (NRT) may make quitting much easier. Certain  aids such as the nicotine patch, gum and lozenge are available without a prescription. However, it is best to use these under the guidance of your doctor. The skin patch provides a steady supply of nicotine to the body. Nicotine gum and lozenge gives temporary bursts of low levels of nicotine. Both methods take the edge off the craving for cigarettes. WARNING: If you feel symptoms of nicotine overdose, such as nausea, vomiting, dizziness, weakness, or fast heartbeat, stop using these and see your doctor.    PRESCRIPTION MEDICINES:  After evaluating your smoking patterns and prior attempts at quitting, your doctor may offer a prescription medicine such as bupropion (Zyban, Wellbutrin), varenicline (Chantix, Champix), a niocotine inhaler or nasal spray. Each has its unique advantage and side effects which your doctor can review with you.    HEALTH BENEFITS OF QUITTING:  The benefits of quitting start right away and keep improving the longer you go without smokin minutes: blood pressure and pulse return to normal  8 hours: oxygen levels return to normal  2 days: ability to smell and taste begins to improve as damaged nerves start to regrow  2-3 weeks: circulation and lung function improves  1-9 months: decreased cough, congestion and shortness of breath; less tired  1 year: risk of heart attack decreases by half  5 years: risk of lung cancer decreases by half; risk of stroke becomes the same as a non-smoker  For information about how to quit smoking, visit the following links:  [employer] ,   Clearing the Air, Quit Smoking Today   - an online booklet. http://www.smokefree.gov/pubs/clearing_the_air.pdf  Smokefree.gov http://smokefree.gov/  QuitNet http://www.quitnet.com/    1055-4498 Scott Palmer, [address] [place] [address], [place] [address]. All rights reserved. This information is not intended as a substitute for professional medical care. Always follow your healthcare professional's  instructions.    The Benefits of Living Smoke Free  What do you want to gain from quitting? Check off some reasons to quit.  Health Benefits  ___ Reduce my risk of lung cancer, heart disease, chronic lung disease  ___ Have fewer wrinkles and softer skin  ___ Improve my sense of taste and smell  ___ For pregnant women--reduce the risk of having a miscarriage, stillbirth, premature birth, or low-birth-weight baby  Personal Benefits  ___ Feel more in control of my life  ___ Have better-smelling hair, breath, clothes, home, and car  ___ Save time by not having to take smoke breaks, buy cigarettes, or hunt for a light  ___ Have whiter teeth  Family Benefits  ___ Reduce my children s respiratory tract infections  ___ Set a good example for my children  ___ Reduce my family s cancer risk  Financial Benefits  ___ Save hundreds of dollars each year that would be spent on cigarettes  ___ Save money on medical bills  ___ Save on life, health, and car insurance premiums    Those Dollars Add Up!  Cigarettes are expensive, and getting more expensive all the time. Do you realize how much money you are spending on cigarettes per year? What is the average amount you spend on a pack of cigarettes? What is the average number of packs that you smoke per day? Using your answers to these questions, fill in this formula to help you find out:  ($ _____ per pack) ×  ( _____ number of packs per day) × (365 days) =  $ _____ yearly cost of smoking  Besides tobacco, there are other costs, including extra cleaning bills and replacement costs for clothing and furniture; medical expenses for smoking-related illnesses; and higher health, life, and car insurance premiums.    Cigars and Pipes Count Too!  Cigars and pipes are also dangerous. So are smokeless (chewing) tobacco and snuff. All of these products contain nicotine, a highly addictive substance that has harmful effects on your body. Quitting smoking means giving up all tobacco products.       9793-1966 Scott \A Chronology of [place] [facility]\"", [address] [place] [address]. All rights reserved. This information is not intended as a substitute for professional medical care. Always follow your healthcare professional's instructions.          Follow-ups after your visit        Follow-up notes from your care team     Return in about 1 month (around 9/8/2018) for free nurse only blood pressure check.      Your next 10 appointments already scheduled     Sep 11, 2018  9:20 AM CDT   Anticoagulation Visit with NICOLE HERNANDEZ   [facility]    [address] [place] [id number] [phone]              Who to contact     If you have questions or need follow up information about today's clinic visit or your schedule please contact [facility] directly at [phone].  Normal or non-critical lab and imaging results will be communicated to you by MyChart, letter or phone within 4 business days after the clinic has received the results. If you do not hear from us within 7 days, please contact the clinic through MyChart or phone. If you have a critical or abnormal lab result, we will notify you by phone as soon as possible.  Submit refill requests through [employer] or call your pharmacy and they will forward the refill request to us. Please allow 3 business days for your refill to be completed.          Additional Information About Your Visit        MyChart Information     [employer] gives you secure access to your electronic health record. If you see a primary care provider, you can also send messages to your care team and make appointments. If you have questions, please call your primary care clinic.  If you do not have a primary care provider, please call [phone] and they will assist you.        Care EveryWhere ID     This is your Care EveryWhere ID. This could be used by other organizations to access your [place] medical records  [id number]    "     Your Vitals Were     Pulse Temperature Respirations Height Pulse Oximetry BMI (Body Mass Index)    98 96.7  F (35.9  C) (Oral) 20 5' 9\" (1.753 m) 97% 31.45 kg/m2       Blood Pressure from Last 3 Encounters:   08/08/18 126/90   05/23/18 120/78   12/23/17 147/82    Weight from Last 3 Encounters:   08/08/18 213 lb (96.6 kg)   05/23/18 209 lb (94.8 kg)   12/23/17 214 lb (97.1 kg)              We Performed the Following     Hemoglobin A1c        Primary Care Provider Office Phone # Fax #    Cayla Gregorio [phone]       [address] [facility] [address]        Equal Access to Services     QASIM COATES : Emily goetzo Somoshe, waaxda luqadaha, qaybta kaalmada adeegyada, marii paez . So [facility] [phone].    ATENCIÓN: Si habla español, tiene a redding disposición servicios gratuitos de asistencia lingüística. [facility] [phone].    We comply with applicable federal civil rights laws and [place] laws. We do not discriminate on the basis of race, color, national origin, age, disability, sex, sexual orientation, or gender identity.            Thank you!     Thank you for choosing [facility]  for your care. Our goal is always to provide you with excellent care. Hearing back from our patients is one way we can continue to improve our services. Please take a few minutes to complete the written survey that you may receive in the mail after your visit with us. Thank you!             Your Updated Medication List - Protect others around you: Learn how to safely use, store and throw away your medicines at www.disposemymeds.org.          This list is accurate as of 8/8/18  8:32 AM.  Always use your most recent med list.                   Brand Name Dispense Instructions for use Diagnosis    * albuterol (2.5 MG/3ML) 0.083% neb solution      Take 1 vial (2.5 mg) by nebulization every 4 hours as needed for shortness of breath / dyspnea        * albuterol " 108 (90 Base) MCG/ACT Inhaler    PROAIR HFA/PROVENTIL HFA/VENTOLIN HFA    1 Inhaler    Inhale 1-2 puffs into the lungs every 4 hours as needed for shortness of breath / dyspnea    Pulmonary emphysema, unspecified emphysema type (H)       ASPIRIN NOT PRESCRIBED    INTENTIONAL     Antiplatelet medication not prescribed intentionally due to Current anticoagulant therapy (warfarin/enoxaparin)        atorvastatin 20 MG tablet    LIPITOR    90 tablet    TAKE ONE TABLET BY MOUTH ONE TIME DAILY    Hyperlipidemia with target LDL less than 100       blood glucose lancing device    no brand specified    1 each    Use to test blood sugars 3 times daily or as directed.    Type 2 diabetes mellitus without complication, without long-term current use of insulin (H)       digoxin 125 MCG tablet    LANOXIN    90 tablet    TAKE ONE TABLET BY MOUTH ONE TIME DAILY    CHF (NYHA class II, ACC/AHA stage C) (H), Chronic atrial fibrillation (H)       furosemide 20 MG tablet    LASIX    180 tablet    Take 1 tablet (20 mg) by mouth 2 times daily    CHF (NYHA class II, ACC/AHA stage C) (H)       lisinopril 30 MG tablet    PRINIVIL,ZESTRIL    90 tablet    Take 1 tablet (30 mg) by mouth daily    Type 2 diabetes mellitus without complication, without long-term current use of insulin (H), CHF (NYHA class II, ACC/AHA stage C) (H)       metFORMIN 1000 MG tablet    GLUCOPHAGE    180 tablet    Take 1 tablet (1,000 mg) by mouth 2 times daily (with meals)    Type 2 diabetes mellitus without complication, without long-term current use of insulin (H)       metoprolol tartrate 100 MG tablet    LOPRESSOR    360 tablet    Take 2 tablets (200 mg) by mouth 2 times daily    CHF (NYHA class II, ACC/AHA stage C) (H), Chronic atrial fibrillation (H)       order for DME     300 each    Equipment being ordered: Blood glucose monitor per insurance formulary; blood glucose test strips per formulary for use 3 time daily; lancets per formulary for use 3 time daily     Type 2 diabetes mellitus without complication, without long-term current use of insulin (H)       tiotropium 18 MCG capsule    SPIRIVA HANDIHALER    90 capsule    INHALE 1 CAPSULE (18 MCG) INTO THE LUNGS DAILY    Pulmonary emphysema, unspecified emphysema type (H)       warfarin 2.5 MG tablet    COUMADIN    270 tablet    TAKE THREE TABLETS (7.5MG) BY MOUTH DAILY OR AS DIRECTED BY INR CLINIC    Chronic atrial fibrillation (H)       * Notice:  This list has 2 medication(s) that are the same as other medications prescribed for you. Read the directions carefully, and ask your doctor or other care provider to review them with you.

## 2018-08-22 DIAGNOSIS — I50.9 CHF (NYHA CLASS II, ACC/AHA STAGE C) (H): ICD-10-CM

## 2018-08-22 DIAGNOSIS — I48.20 CHRONIC ATRIAL FIBRILLATION (H): ICD-10-CM

## 2018-08-22 RX ORDER — DIGOXIN 125 UG/1
TABLET ORAL
Qty: 90 TABLET | Refills: 0 | Status: SHIPPED | OUTPATIENT
Start: 2018-08-22 | End: 2018-12-20

## 2018-09-11 ENCOUNTER — ANTICOAGULATION THERAPY VISIT (OUTPATIENT)
Dept: NURSING | Facility: CLINIC | Age: 73
End: 2018-09-11
Payer: COMMERCIAL

## 2018-09-11 DIAGNOSIS — I48.20 CHRONIC ATRIAL FIBRILLATION (H): ICD-10-CM

## 2018-09-11 DIAGNOSIS — Z79.01 LONG-TERM (CURRENT) USE OF ANTICOAGULANTS: ICD-10-CM

## 2018-09-11 LAB — INR POINT OF CARE: 1.6 (ref 0.86–1.14)

## 2018-09-11 PROCEDURE — 85610 PROTHROMBIN TIME: CPT | Mod: QW

## 2018-09-11 PROCEDURE — 36416 COLLJ CAPILLARY BLOOD SPEC: CPT

## 2018-09-11 PROCEDURE — 99207 ZZC NO CHARGE NURSE ONLY: CPT

## 2018-09-11 NOTE — PROGRESS NOTES
ANTICOAGULATION FOLLOW-UP CLINIC VISIT    Patient Name:  Keith Desir  Date:  9/11/2018  Contact Type:  Face to Face    SUBJECTIVE:     Patient Findings     Positives Change in diet/appetite (Patient typically eats greens a set amount each week, ate spinach recently which is higher in Vitamin K than he typically eats. )           OBJECTIVE    INR Protime   Date Value Ref Range Status   09/11/2018 1.6 (A) 0.86 - 1.14 Final       ASSESSMENT / PLAN  INR assessment SUB    Recheck INR In: 4 WEEKS    INR Location Clinic      Anticoagulation Summary as of 9/11/2018     INR goal 2.0-3.0   Today's INR 1.6!   Warfarin maintenance plan 5 mg (2.5 mg x 2) on Tue, Thu, Sat; 7.5 mg (2.5 mg x 3) all other days   Full warfarin instructions 9/11: 7.5 mg; 9/13: 7.5 mg; Otherwise 5 mg on Tue, Thu, Sat; 7.5 mg all other days   Weekly warfarin total 45 mg   Plan last modified Dot Ziegler RN (6/28/2018)   Next INR check 10/12/2018   Target end date     Indications   Long-term (current) use of anticoagulants [Z79.01] [Z79.01]  Chronic atrial fibrillation (HCC) [I48.2]         Anticoagulation Episode Summary     INR check location     Preferred lab     Send INR reminders to [facility]    Comments don't print AVS unless change in dosing      Anticoagulation Care Providers     Provider Role Specialty Phone number    Cayla Gregorio MD [facility] [phone]            See the Encounter Report to view Anticoagulation Flowsheet and Dosing Calendar (Go to Encounters tab in chart review, and find the Anticoagulation Therapy Visit)    Dosage adjustment made based on physician directed care plan. Per [facility] Maintenance Dosing protocol, writer increased patient's weekly warfarin dosing by a little more than 10% this next week. Writer advised patient to return to clinic within 1 week to check INR as his INR is sub-therapeutic today. Patient states that the soonest he is returning to clinic for next INR check is  "in 4 weeks and said \"I am not going to fight you on this\". Writer advised that this could be detrimental to health, as INR is subtherapeutic, patient states understanding and continues to state he will return in 4 weeks. Writer reviewed and educated on signs and symptoms of bleeding, bruising, or blood clots that would warrant being seen sooner in clinic for INR check or calling 911 to be evaluated in the ED - patient states understanding.   After a little more than a 10% increase to weekly warfarin dosing this week, patient will return to his maintenance warfarin dosing until his next INR recheck date.   Per patient report of increased high vitamin K greens, this likely decreased INR value today.   Denies any changes to medications or other diet issues. Denies any missed doses or extra doses. Denies bleeding, bruising, or blood clots. Verbalizes understanding of dosing and recheck date.   Patient is aware if signs of clotting (pain, tenderness, swelling, color change in leg or arm, SOB) and bleeding occur (blood in stool, urine, large bruising, bleeding gums, nosebleeds) to have INR check sooner. If sx severe report to ER or concerned for stroke call 911. If general questions or concerns arise, call clinic.       Brandi Scott RN               "

## 2018-09-11 NOTE — MR AVS SNAPSHOT
Keith FABRICE [place]   9/11/2018 9:20 AM   Anticoagulation Therapy Visit    Description:  72 year old male   Provider:  MAJOR HERNANDEZ   Department:  Major Nurse           INR as of 9/11/2018     Today's INR 1.6!      Anticoagulation Summary as of 9/11/2018     INR goal 2.0-3.0   Today's INR 1.6!   Full warfarin instructions 9/11: 7.5 mg; 9/13: 7.5 mg; Otherwise 5 mg on Tue, Thu, Sat; 7.5 mg all other days   Next INR check 10/12/2018    Indications   Long-term (current) use of anticoagulants [Z79.01] [Z79.01]  Chronic atrial fibrillation (HCC) [I48.2]         Your next Anticoagulation Clinic appointment(s)     Oct 12, 2018  9:00 AM CDT   An[facility] MAJOR HER[facility]NDEZ   [facility]    [address] [place] [id number]   [phone]              Contact Numbers     [facility]  Please call  [phone] to cancel and/or reschedule your appointment, or with any problems or questions regarding your therapy.        September 2018 Details    Sun Mon Tue Wed Thu Fri Sat           1                 2               3               4               5               6               7               8                 9               10               11      7.5 mg   See details      12      7.5 mg         13      7.5 mg         14      7.5 mg         15      5 mg           16      7.5 mg         17      7.5 mg         18      5 mg         19      7.5 mg         20      5 mg         21      7.5 mg         22      5 mg           23      7.5 mg         24      7.5 mg         25      5 mg         26      7.5 mg         27      5 mg         28      7.5 mg         29      5 mg           30      7.5 mg                Date Details   09/11 This INR check               How to take your warfarin dose     To take:  5 mg Take 2 of the 2.5 mg tablets.    To take:  7.5 mg Take 3 of the 2.5 mg tablets.           October 2018 Details    Sun Mon Tue Wed Thu Fri Sat       1      7.5 mg         2      5 mg         3      7.5 mg         4      5 mg         5      7.5 mg         6      5 mg           7      7.5 mg         8      7.5 mg         9      5 mg         10      7.5 mg         11      5 mg         12            13                 14               15               16               17               18               19               20                 21               22               23               24               25               26               27                 28               29               30               31                   Date Details   No additional details    Date of next INR:  10/12/2018         How to take your warfarin dose     To take:  5 mg Take 2 of the 2.5 mg tablets.    To take:  7.5 mg Take 3 of the 2.5 mg tablets.

## 2018-10-12 ENCOUNTER — ANTICOAGULATION THERAPY VISIT (OUTPATIENT)
Dept: NURSING | Facility: CLINIC | Age: 73
End: 2018-10-12
Payer: COMMERCIAL

## 2018-10-12 ENCOUNTER — TELEPHONE (OUTPATIENT)
Dept: FAMILY MEDICINE | Facility: CLINIC | Age: 73
End: 2018-10-12

## 2018-10-12 DIAGNOSIS — I48.20 CHRONIC ATRIAL FIBRILLATION (H): ICD-10-CM

## 2018-10-12 DIAGNOSIS — I48.20 CHRONIC ATRIAL FIBRILLATION (H): Primary | ICD-10-CM

## 2018-10-12 LAB — INR POINT OF CARE: 2.4 (ref 0.86–1.14)

## 2018-10-12 PROCEDURE — 36416 COLLJ CAPILLARY BLOOD SPEC: CPT

## 2018-10-12 PROCEDURE — 85610 PROTHROMBIN TIME: CPT | Mod: QW

## 2018-10-12 PROCEDURE — 99207 ZZC NO CHARGE NURSE ONLY: CPT

## 2018-10-12 NOTE — MR AVS SNAPSHOT
Keith Reeden   10/12/2018 9:00 AM   Anticoagulation Therapy Visit    Description:  72 year old male   Provider:  MAJOR HERNANDEZ   Department:  Major Nurse           INR as of 10/12/2018     Today's INR 2.4      Anticoagulation Summary as of 10/12/2018     INR goal 2.0-3.0   Today's INR 2.4   Full warfarin instructions 5 mg on Tue, Thu, Sat; 7.5 mg all other days   Next INR check 11/20/2018    Indications   Long-term (current) use of anticoagulants [Z79.01] [Z79.01]  Chronic atrial fibrillation (HCC) [I48.2]         Your next Anticoagulation Clinic appointment(s)     Nov 20, 2018 10:00 AM CST   Anticoagulation Visit with MAJOR HERNANDEZ   [facility] ([facility])    [address] [place] [address] [phone]              Contact Numbers     [facility]  Please call  [phone] to cancel and/or reschedule your appointment, or with any problems or questions regarding your therapy.        October 2018 Details    Sun Mon Tue Wed Thu Fri Sat      1               2               3               4               5               6                 7               8               9               10               11               12      7.5 mg   See details      13      5 mg           14      7.5 mg         15      7.5 mg         16      5 mg         17      7.5 mg         18      5 mg         19      7.5 mg         20      5 mg           21      7.5 mg         22      7.5 mg         23      5 mg         24      7.5 mg         25      5 mg         26      7.5 mg         27      5 mg           28      7.5 mg         29      7.5 mg         30      5 mg         31      7.5 mg             Date Details   10/12 This INR check               How to take your warfarin dose     To take:  5 mg Take 2 of the 2.5 mg tablets.    To take:  7.5 mg Take 3 of the 2.5 mg tablets.           November 2018 Details    Sun Mon Tue Wed Thu Fri Sat         1      5 mg         2       7.5 mg         3      5 mg           4      7.5 mg         5      7.5 mg         6      5 mg         7      7.5 mg         8      5 mg         9      7.5 mg         10      5 mg           11      7.5 mg         12      7.5 mg         13      5 mg         14      7.5 mg         15      5 mg         16      7.5 mg         17      5 mg           18      7.5 mg         19      7.5 mg         20            21               22               23               24                 25               26               27               28               29               30                 Date Details   No additional details    Date of next INR:  11/20/2018         How to take your warfarin dose     To take:  5 mg Take 2 of the 2.5 mg tablets.    To take:  7.5 mg Take 3 of the 2.5 mg tablets.

## 2018-10-12 NOTE — PROGRESS NOTES
ANTICOAGULATION FOLLOW-UP CLINIC VISIT    Patient Name:  Keith Desir  Date:  10/12/2018  Contact Type:  Face to Face    SUBJECTIVE:     Patient Findings     Positives No Problem Findings           OBJECTIVE    INR Protime   Date Value Ref Range Status   10/12/2018 2.4 (A) 0.86 - 1.14 Final       ASSESSMENT / PLAN  INR assessment THER    Recheck INR In: 6 WEEKS    INR Location Clinic      Anticoagulation Summary as of 10/12/2018     INR goal 2.0-3.0   Today's INR 2.4   Warfarin maintenance plan 5 mg (2.5 mg x 2) on Tue, Thu, Sat; 7.5 mg (2.5 mg x 3) all other days   Full warfarin instructions 5 mg on Tue, Thu, Sat; 7.5 mg all other days   Weekly warfarin total 45 mg   No change documented Brandi Scott, DIANN   Plan last modified Dot Ziegler RN (6/28/2018)   Next INR check 11/20/2018   Target end date     Indications   Long-term (current) use of anticoagulants [Z79.01] [Z79.01]  Chronic atrial fibrillation (HCC) [I48.2]         Anticoagulation Episode Summary     INR check location     Preferred lab     Send INR reminders to [facility]    Comments don't print AVS unless change in dosing      Anticoagulation Care Providers     Provider Role Specialty Phone number    Cayla Gregorio MD [facility] [phone]            See the Encounter Report to view Anticoagulation Flowsheet and Dosing Calendar (Go to Encounters tab in chart review, and find the Anticoagulation Therapy Visit)    Continue with maintenance warfarin dosing. Denies any changes to medications or other diet issues. Denies any missed doses or extra doses. Denies bleeding, bruising, or blood clots. Verbalizes understanding of dosing and recheck date. Patient is aware if signs of clotting (pain, tenderness, swelling, color change in leg or arm, SOB) and bleeding occur (blood in stool, urine, large bruising, bleeding gums, nosebleeds) to have INR check sooner. If sx severe report to ER or concerned for stroke call 911. If  general questions or concerns arise, call clinic.         Brandi Scott RN

## 2018-11-10 DIAGNOSIS — E78.5 HYPERLIPIDEMIA WITH TARGET LDL LESS THAN 100: ICD-10-CM

## 2018-11-12 RX ORDER — ATORVASTATIN CALCIUM 20 MG/1
20 TABLET, FILM COATED ORAL DAILY
Qty: 30 TABLET | Refills: 0 | Status: SHIPPED | OUTPATIENT
Start: 2018-11-12 | End: 2019-02-04

## 2018-11-16 DIAGNOSIS — I50.9 CHF (NYHA CLASS II, ACC/AHA STAGE C) (H): ICD-10-CM

## 2018-11-16 DIAGNOSIS — I48.20 CHRONIC ATRIAL FIBRILLATION (H): ICD-10-CM

## 2018-11-19 RX ORDER — FUROSEMIDE 20 MG
TABLET ORAL
Qty: 180 TABLET | Refills: 0 | Status: SHIPPED | OUTPATIENT
Start: 2018-11-19 | End: 2019-02-15

## 2018-11-19 RX ORDER — WARFARIN SODIUM 2.5 MG/1
TABLET ORAL
Qty: 270 TABLET | Refills: 0 | Status: SHIPPED | OUTPATIENT
Start: 2018-11-19 | End: 2019-05-29

## 2018-11-19 NOTE — TELEPHONE ENCOUNTER
"Pres[place] per [facility] [place].    Requested Prescriptions   Signed Prescriptions Disp Refills     furosemide (LASIX) 20 MG tablet 180 tablet 0     Sig: Take 1 tablet (20 mg) by mouth 2 times daily    Diuretics (Including Combos) Protocol Passed    11/16/2018  8:05 AM       Passed - Blood pressure under 140/90 in past 12 months    BP Readings from Last 3 Encounters:   08/08/18 126/86   05/23/18 120/78   12/23/17 147/82                Passed - Recent (12 mo) or future (30 days) visit within the authorizing provider's specialty    Patient had office visit in the last 12 months or has a visit in the next 30 days with authorizing provider or within the authorizing provider's specialty.  See \"Patient Info\" tab in inbasket, or \"Choose Columns\" in Meds & Orders section of the refill encounter.             Passed - Patient is age 18 or older       Passed - Normal serum creatinine on file in past 12 months    Recent Labs   Lab Test  05/23/18   0839   CR  0.91             Passed - Normal serum potassium on file in past 12 months    Recent Labs   Lab Test  05/23/18   0839   POTASSIUM  4.9                   Passed - Normal serum sodium on file in past 12 months    Recent Labs   Lab Test  05/23/18   0839   NA  137              warfarin (COUMADIN) 2.5 MG tablet 270 tablet 0     Sig: Take 5mg (2 tabs) on Tuesdays, Thursdays and Saturdays, take 7.5mg (3 tabs) all other days or as directed by INR clinic    Vitamin K Antagonists Failed    11/16/2018  8:05 AM       Failed - INR is within goal in the past 6 weeks    Confirm INR is within goal in the past 6 weeks.     Recent Labs   Lab Test 10/12/18   INR  2.4*                      Passed - Recent (12 mo) or future (30 days) visit within the authorizing provider's specialty    Patient had office visit in the last 12 months or has a visit in the next 30 days with authorizing provider or within the authorizing provider's specialty.  See \"Patient Info\" tab in inbasket, or " "\"Choose Columns\" in Meds & Orders section of the refill encounter.             Passed - Patient is 18 years of age or older          Claire Greer RN  [facility]    "

## 2018-11-20 ENCOUNTER — ANTICOAGULATION THERAPY VISIT (OUTPATIENT)
Dept: NURSING | Facility: CLINIC | Age: 73
End: 2018-11-20
Payer: COMMERCIAL

## 2018-11-20 DIAGNOSIS — I48.20 CHRONIC ATRIAL FIBRILLATION (H): ICD-10-CM

## 2018-11-20 LAB — INR POINT OF CARE: 2.6 (ref 0.86–1.14)

## 2018-11-20 PROCEDURE — 85610 PROTHROMBIN TIME: CPT | Mod: QW

## 2018-11-20 PROCEDURE — 36416 COLLJ CAPILLARY BLOOD SPEC: CPT

## 2018-11-20 PROCEDURE — 99207 ZZC NO CHARGE NURSE ONLY: CPT

## 2018-11-20 NOTE — MR AVS SNAPSHOT
Keith Reeden   11/20/2018 10:00 AM   Anticoagulation Therapy Visit    Description:  72 year old male   Provider:  MAJOR HERNANDEZ   Department:  [facility]           INR as of 11/20/2018     Today's INR 2.6      Anticoagulation Summary as of 11/20/2018     INR goal 2.0-3.0   Today's INR 2.6   Full warfarin instructions 5 mg on Tue, Thu, Sat; 7.5 mg all other days   Next INR check 1/8/2019    Indications   Long-term (current) use of anticoagulants [Z79.01] [Z79.01]  Chronic atrial fibrillation (HCC) [I48.2]         Your next Anticoagulation Clinic appointment(s)     Jan 08, 2019  9:40 AM CST   Anticoagulation Visit with MAJOR HERNANDEZ   [facility]    [address] [place] [id number] [phone]              Contact Numbers     [facility]  Please call  [phone] to cancel and/or reschedule your appointment, or with any problems or questions regarding your therapy.        November 2018 Details    Sun Mon Tue Wed Thu Fri Sat         1               2               3                 4               5               6               7               8               9               10                 11               12               13               14               15               16               17                 18               19               20      5 mg   See details      21      7.5 mg         22      5 mg         23      7.5 mg         24      5 mg           25      7.5 mg         26      7.5 mg         27      5 mg         28      7.5 mg         29      5 mg         30      7.5 mg           Date Details   11/20 This INR check               How to take your warfarin dose     To take:  5 mg Take 2 of the 2.5 mg tablets.    To take:  7.5 mg Take 3 of the 2.5 mg tablets.           December 2018 Details    Sun Mon Tue Wed Thu Fri Sat           1      5 mg           2      7.5 mg         3      7.5 mg         4      5 mg         5       7.5 mg         6      5 mg         7      7.5 mg         8      5 mg           9      7.5 mg         10      7.5 mg         11      5 mg         12      7.5 mg         13      5 mg         14      7.5 mg         15      5 mg           16      7.5 mg         17      7.5 mg         18      5 mg         19      7.5 mg         20      5 mg         21      7.5 mg         22      5 mg           23      7.5 mg         24      7.5 mg         25      5 mg         26      7.5 mg         27      5 mg         28      7.5 mg         29      5 mg           30      7.5 mg         31      7.5 mg               Date Details   No additional details            How to take your warfarin dose     To take:  5 mg Take 2 of the 2.5 mg tablets.    To take:  7.5 mg Take 3 of the 2.5 mg tablets.           January 2019 Details    Sun Mon Tue Wed Thu Fri Sat       1      5 mg         2      7.5 mg         3      5 mg         4      7.5 mg         5      5 mg           6      7.5 mg         7      7.5 mg         8            9               10               11               12                 13               14               15               16               17               18               19                 20               21               22               23               24               25               26                 27               28               29               30               31                  Date Details   No additional details    Date of next INR:  1/8/2019         How to take your warfarin dose     To take:  5 mg Take 2 of the 2.5 mg tablets.    To take:  7.5 mg Take 3 of the 2.5 mg tablets.

## 2018-11-20 NOTE — PROGRESS NOTES
ANTICOAGULATION FOLLOW-UP CLINIC VISIT    Patient Name:  Keith Desir  Date:  11/20/2018  Contact Type:  Face to Face    SUBJECTIVE:     Patient Findings     Positives No Problem Findings           OBJECTIVE    INR Protime   Date Value Ref Range Status   11/20/2018 2.6 (A) 0.86 - 1.14 Final       ASSESSMENT / PLAN  INR assessment THER    Recheck INR In: 7 WEEKS    INR Location Clinic      Anticoagulation Summary as of 11/20/2018     INR goal 2.0-3.0   Today's INR 2.6   Warfarin maintenance plan 5 mg (2.5 mg x 2) on Tue, Thu, Sat; 7.5 mg (2.5 mg x 3) all other days   Full warfarin instructions 5 mg on Tue, Thu, Sat; 7.5 mg all other days   Weekly warfarin total 45 mg   No change documented Jermaine Johnson, RN   Plan last modified Dot Ziegler RN (6/28/2018)   Next INR check 1/8/2019   Target end date     Indications   Long-term (current) use of anticoagulants [Z79.01] [Z79.01]  Chronic atrial fibrillation (HCC) [I48.2]         Anticoagulation Episode Summary     INR check location     Preferred lab     Send INR reminders to [facility]    Comments don't print AVS unless change in dosing      Anticoagulation Care Providers     Provider Role Specialty Phone number    Cayla Gregorio MD [facility] [phone]            See the Encounter Report to view Anticoagulation Flowsheet and Dosing Calendar (Go to Encounters tab in chart review, and find the Anticoagulation Therapy Visit)    Denies any changes to medications or other diet issues. Denies any missed doses or extra doses. Denies bleeding, bruising, or blood clots. Verbalizes understanding of dosing and recheck date. Patient is aware if signs of clotting (pain, tenderness, swelling, color change in leg or arm, SOB) and bleeding occur (blood in stool, urine, large bruising, bleeding gums, nosebleeds) to have INR check sooner. If sx severe report to ER or concerned for stroke call 911. If general questions or concerns arise, call  clinic.    Jermaine Johnson RN, BSN

## 2018-12-20 DIAGNOSIS — I48.20 CHRONIC ATRIAL FIBRILLATION (H): ICD-10-CM

## 2018-12-20 DIAGNOSIS — I50.9 CHF (NYHA CLASS II, ACC/AHA STAGE C) (H): ICD-10-CM

## 2018-12-20 RX ORDER — DIGOXIN 125 UG/1
TABLET ORAL
Qty: 90 TABLET | Refills: 0 | Status: SHIPPED | OUTPATIENT
Start: 2018-12-20 | End: 2019-02-18

## 2019-01-08 ENCOUNTER — ANTICOAGULATION THERAPY VISIT (OUTPATIENT)
Dept: NURSING | Facility: CLINIC | Age: 74
End: 2019-01-08
Payer: MEDICARE

## 2019-01-08 DIAGNOSIS — I48.20 CHRONIC ATRIAL FIBRILLATION (H): ICD-10-CM

## 2019-01-08 LAB — INR POINT OF CARE: 2 (ref 0.86–1.14)

## 2019-01-08 PROCEDURE — 85610 PROTHROMBIN TIME: CPT | Mod: QW

## 2019-01-08 PROCEDURE — 99207 ZZC NO CHARGE NURSE ONLY: CPT

## 2019-01-08 PROCEDURE — 36416 COLLJ CAPILLARY BLOOD SPEC: CPT

## 2019-01-08 NOTE — PATIENT INSTRUCTIONS
Some signs and symptoms of bleeding include: Nose bleed or cut that does not stop bleeding in 10 minutes, bleeding of the gums, vomiting (will look like coffee grounds) or coughing up blood, unusual, easy or large areas of bruising, increased or unexpected vaginal bleeding or increased menstrual flow, red or black stools, red or orange urine, prolonged or severe headache, pale skin, unusual or constant tiredness.  If you have these please call 911 or seek medical care immediately.     Some signs and symptoms of clots include: pain or tenderness in arm or leg, swelling in arm or leg, changes in skin color, or area is warm to touch, shortness or breath, trouble breathing.  Numbness or weakness especially on 1 side of the body, sudden trouble speaking or swallowing, sudden trouble seeing, sudden confusion, dizzy spells or headache.  If you have these please call 911 or seek medical care immediately.

## 2019-01-08 NOTE — PROGRESS NOTES
ANTICOAGULATION FOLLOW-UP CLINIC VISIT    Patient Name:  Keith Desir  Date:  1/8/2019  Contact Type:  Face to Face    SUBJECTIVE:     Patient Findings     Positives:   No Problem Findings           OBJECTIVE    INR Protime   Date Value Ref Range Status   11/20/2018 2.6 (A) 0.86 - 1.14 Final       ASSESSMENT / PLAN  INR assessment THER    Recheck INR In: 6 WEEKS    INR Location Clinic      Anticoagulation Summary  As of 1/8/2019    INR goal:   2.0-3.0   TTR:   47.2 % (2.6 y)   INR used for dosing:      Plan last modified:   Dot Ziegler RN (6/28/2018)   Next INR check:      Target end date:       Indications    Long-term (current) use of anticoagulants [Z79.01] [Z79.01]  Chronic atrial fibrillation (HCC) [I48.2]             Anticoagulation Episode Summary     INR check location:       Preferred lab:       [facility]    Comments:   don't print AVS unless change in dosing      Anticoagulation Care Providers     Provider Role Specialty Phone number    Cayla Gregorio MD [facility] [phone]            See the Encounter Report to view Anticoagulation Flowsheet and Dosing Calendar (Go to Encounters tab in chart review, and find the Anticoagulation Therapy Visit)    Therapeutic INR 2.0. Will continue maintenance dose and recheck in 6 week(s).    Patient states negative for signs and symptoms of bleeding or blood clots, changes in medication, changes in diet, any signs of infection or antibiotic use, anything new OTC or herbal medications, any missed or extra doses of the warfarin.    Patient informed of the symptoms to be seen for either by INR nurse or ER.    Dot Ziegler RN

## 2019-02-04 ENCOUNTER — MYC REFILL (OUTPATIENT)
Dept: FAMILY MEDICINE | Facility: CLINIC | Age: 74
End: 2019-02-04

## 2019-02-04 DIAGNOSIS — E78.5 HYPERLIPIDEMIA WITH TARGET LDL LESS THAN 100: ICD-10-CM

## 2019-02-05 RX ORDER — ATORVASTATIN CALCIUM 20 MG/1
20 TABLET, FILM COATED ORAL DAILY
Qty: 15 TABLET | Refills: 0 | Status: SHIPPED | OUTPATIENT
Start: 2019-02-05 | End: 2019-02-11

## 2019-02-05 NOTE — TELEPHONE ENCOUNTER
"Routing refill request to provider for review/approval because:  Labs not current:  LDL    Requested Prescriptions   Pending Prescriptions Disp Refills     atorvastatin (LIPITOR) 20 MG tablet 30 tablet 0     Sig: Take 1 tablet (20 mg) by mouth daily Need fasting labs drawn for further refills    Statins Protocol Failed - 2/5/2019  7:09 AM       Failed - LDL on file in past 12 months    Recent Labs   Lab Test 11/01/17  0715   LDL 50            Passed - No abnormal creatine kinase in past 12 months    No lab results found.            Passed - Recent (12 mo) or future (30 days) visit within the authorizing provider's specialty    Patient had office visit in the last 12 months or has a visit in the next 30 days with authorizing provider or within the authorizing provider's specialty.  See \"Patient Info\" tab in inbasket, or \"Choose Columns\" in Meds & Orders section of the refill encounter.             Passed - Medication is active on med list       Passed - Patient is age 18 or older        Vickie Pearson RN  "

## 2019-02-11 ENCOUNTER — TELEPHONE (OUTPATIENT)
Dept: FAMILY MEDICINE | Facility: CLINIC | Age: 74
End: 2019-02-11

## 2019-02-11 DIAGNOSIS — E78.5 HYPERLIPIDEMIA WITH TARGET LDL LESS THAN 100: ICD-10-CM

## 2019-02-11 RX ORDER — ATORVASTATIN CALCIUM 20 MG/1
20 TABLET, FILM COATED ORAL DAILY
Qty: 15 TABLET | Refills: 0 | Status: SHIPPED | OUTPATIENT
Start: 2019-02-11 | End: 2019-03-06

## 2019-02-11 NOTE — TELEPHONE ENCOUNTER
Reason for call:  Medication     If this is a refill request, has the caller requested the refill from the pharmacy already? Yes     Will the patient be using a [facility]? No     Name of the pharmacy and phone number for the current request: [facility] [place] [phone]    Name of the medication requested: atorvastatin (LIPITOR) 20 MG tablet    Other request: switched pharmacy    Phone number to reach patient:  [phone]    Best Time:  any    Can we leave a detailed message on this number?  YES

## 2019-02-15 DIAGNOSIS — I50.9 CHF (NYHA CLASS II, ACC/AHA STAGE C) (H): ICD-10-CM

## 2019-02-15 RX ORDER — FUROSEMIDE 20 MG
TABLET ORAL
Qty: 180 TABLET | Refills: 0 | Status: SHIPPED | OUTPATIENT
Start: 2019-02-15 | End: 2019-02-28

## 2019-02-18 DIAGNOSIS — I50.9 CHF (NYHA CLASS II, ACC/AHA STAGE C) (H): ICD-10-CM

## 2019-02-18 DIAGNOSIS — I48.20 CHRONIC ATRIAL FIBRILLATION (H): ICD-10-CM

## 2019-02-19 ENCOUNTER — ANTICOAGULATION THERAPY VISIT (OUTPATIENT)
Dept: NURSING | Facility: CLINIC | Age: 74
End: 2019-02-19
Payer: MEDICARE

## 2019-02-19 DIAGNOSIS — I48.20 CHRONIC ATRIAL FIBRILLATION (H): ICD-10-CM

## 2019-02-19 LAB — INR POINT OF CARE: 2.1 (ref 0.86–1.14)

## 2019-02-19 PROCEDURE — 36416 COLLJ CAPILLARY BLOOD SPEC: CPT

## 2019-02-19 PROCEDURE — 85610 PROTHROMBIN TIME: CPT | Mod: QW

## 2019-02-19 PROCEDURE — 99207 ZZC NO CHARGE NURSE ONLY: CPT

## 2019-02-19 RX ORDER — DIGOXIN 125 MCG
125 TABLET ORAL DAILY
Qty: 90 TABLET | Refills: 0 | Status: SHIPPED | OUTPATIENT
Start: 2019-02-19 | End: 2019-03-06

## 2019-02-19 NOTE — TELEPHONE ENCOUNTER
"Routing refill request to provider for review/approval because:  Labs not current:  Dig level    Requested Prescriptions   Pending Prescriptions Disp Refills     digoxin (DIGOX) 125 MCG tablet 90 tablet 0     Sig: Take 1 tablet (125 mcg) by mouth daily    Cardiac Glycoside Agents Protocol Failed - 2/18/2019  5:06 PM       Failed - Normal digoxin level on file in past 12 mos    Recent Labs   Lab Test 01/10/18  1010   DIGOXIN 0.6            Passed - Medication is active on med list       Passed - Patient is 18 years of age or older       Passed - Normal serum creatinine on file in past 12 mos    Recent Labs   Lab Test 05/23/18  0839   CR 0.91            Passed - Recent (6 mo) or future (30 days) visit within the authorizing provider's specialty    Patient had office visit in the last 6 months or has a visit in the next 30 days with authorizing provider or within the authorizing provider's specialty.  See \"Patient Info\" tab in inbasket, or \"Choose Columns\" in Meds & Orders section of the refill encounter.            Vickie Pearson RN    "

## 2019-02-19 NOTE — PROGRESS NOTES
ANTICOAGULATION FOLLOW-UP CLINIC VISIT    Patient Name:  Keith Desir  Date:  2019  Contact Type:  Face to Face    SUBJECTIVE:     Patient Findings     Positives:   No Problem Findings           OBJECTIVE    INR Protime   Date Value Ref Range Status   2019 2.1 (A) 0.86 - 1.14 Final       ASSESSMENT / PLAN  INR assessment THER    Recheck INR In: 6 WEEKS    INR Location Clinic      Anticoagulation Summary  As of 2019    INR goal:   2.0-3.0   TTR:   51.8 % (2.9 y)   INR used for dosin.1 (2019)   Warfarin maintenance plan:   5 mg (2.5 mg x 2) every Tue, Thu, Sat; 7.5 mg (2.5 mg x 3) all other days   Full warfarin instructions:   5 mg every Tue, Thu, Sat; 7.5 mg all other days   Weekly warfarin total:   45 mg   No change documented:   Brandi Scott, RN   Plan last modified:   Dot Ziegler RN (2018)   Next INR check:   2019   Target end date:       Indications    Long-term (current) use of anticoagulants [Z79.01] [Z79.01]  Chronic atrial fibrillation (HCC) [I48.2]             Anticoagulation Episode Summary     INR check location:       Preferred lab:       [facility] reminders to[facility]   [facility]    Comments:   don't print AVS unless change in dosing      Anticoagulation Care Providers     Provider Role Specialty Phone number    Cayla Gregorio MD [facility] [phone]            See the Encounter Report to view Anticoagulation Flowsheet and Dosing Calendar (Go to Encounters tab in chart review, and find the Anticoagulation Therapy Visit)    Continue maintenance warfarin dosing. Denies any changes to medications or other diet issues. Denies any missed doses or extra doses. Denies bleeding, bruising, or blood clots. Verbalizes understanding of dosing and recheck date. Patient is aware if signs of clotting (pain, tenderness, swelling, color change in leg or arm, SOB) and bleeding occur (blood in stool, urine, large bruising, bleeding gums, nosebleeds) to  have INR check sooner. If sx severe report to ER or concerned for stroke call 911. If general questions or concerns arise, call clinic.         Brandi Scott RN

## 2019-02-28 NOTE — PROGRESS NOTES
"  SUBJECTIVE:   Keith Desir is a 73 year old male smoker with COPD and CHF who presents to clinic today for the following health issues:    Diabetes Follow-up      Patient is checking blood sugars: once every three weeks    Diabetic concerns: None     Symptoms of hypoglycemia (low blood sugar): none     Paresthesias (numbness or burning in feet) or sores: No     Date of last diabetic eye exam: 3-2016    Diabetes Management Resources    Hyperlipidemia Follow-Up      Rate your low fat/cholesterol diet?: fair    Taking statin?  Yes, no muscle aches from statin    Other lipid medications/supplements?:  none    Hypertension Follow-up      Outpatient blood pressures are not being checked.    Low Salt Diet: low salt    BP Readings from Last 2 Encounters:   08/08/18 126/86   05/23/18 120/78     Hemoglobin A1C (%)   Date Value   08/08/2018 6.8 (H)   05/23/2018 8.0 (H)     LDL Cholesterol Calculated (mg/dL)   Date Value   11/01/2017 50   07/20/2016 53       Amount of exercise or physical activity: None    Problems taking medications regularly: No    Medication side effects: lightheadedness    Diet: low salt, low fat/cholesterol and diabetic    I have reviewed the patient's medical history in detail and updated the computerized patient record.    ROS:  CONSTITUTIONAL: NEGATIVE for fever, chills, change in weight  INTEGUMENTARY/SKIN: NEGATIVE for worrisome rashes, moles or lesions  EYES: NEGATIVE for vision changes or irritation  ENT/MOUTH: NEGATIVE for ear, mouth and throat problems  RESP: NEGATIVE for significant cough or SOB  CV: NEGATIVE for chest pain, palpitations or peripheral edema  MUSCULOSKELETAL: NEGATIVE for significant arthralgias or myalgia  NEURO: NEGATIVE for weakness, dizziness or paresthesias  ENDOCRINE: Hx diabetes  PSYCHIATRIC: NEGATIVE for changes in mood or affect    OBJECTIVE:     /70   Pulse 75   Temp 97.2  F (36.2  C) (Oral)   Resp (!) 32   Ht 1.753 m (5' 9\")   Wt 97.5 kg (215 lb)   SpO2 " 95%   BMI 31.75 kg/m    Body mass index is 31.75 kg/m .  GENERAL: alert, no distress and obese  NECK: no adenopathy, no asymmetry, masses, or scars and thyroid normal to palpation  RESP: decreased breath sounds throughout  CV: regular rate and rhythm, normal S1 S2, no S3 or S4, no murmur, click or rub, trace bipedal edema   MS: extremities normal- no gross deformities noted  NEURO: mentation intact  PSYCH: affect flat    Diagnostic Test Results:  Results for orders placed or performed in visit on 03/06/19   HEMOGLOBIN A1C   Result Value Ref Range    Hemoglobin A1C 7.5 (H) 0 - 5.6 %   CBC with platelets differential   Result Value Ref Range    WBC 9.0 4.0 - 11.0 10e9/L    RBC Count 5.06 4.4 - 5.9 10e12/L    Hemoglobin 16.9 13.3 - 17.7 g/dL    Hematocrit 49.5 40.0 - 53.0 %    MCV 98 78 - 100 fl    MCH 33.4 (H) 26.5 - 33.0 pg    MCHC 34.1 31.5 - 36.5 g/dL    RDW 13.7 10.0 - 15.0 %    Platelet Count 229 150 - 450 10e9/L    % Neutrophils 59.3 %    % Lymphocytes 29.1 %    % Monocytes 8.4 %    % Eosinophils 2.9 %    % Basophils 0.3 %    Absolute Neutrophil 5.3 1.6 - 8.3 10e9/L    Absolute Lymphocytes 2.6 0.8 - 5.3 10e9/L    Absolute Monocytes 0.8 0.0 - 1.3 10e9/L    Absolute Eosinophils 0.3 0.0 - 0.7 10e9/L    Absolute Basophils 0.0 0.0 - 0.2 10e9/L    Diff Method Automated Method        ASSESSMENT/PLAN:   (E11.9) Type 2 diabetes mellitus without complication, without long-term current use of insulin (H)  (primary encounter diagnosis)  Comment: fair control   Plan: Lipid panel reflex to direct LDL Fasting,         metFORMIN (GLUCOPHAGE) 1000 MG tablet,         Comprehensive metabolic panel, CANCELED:         Albumin Random Urine Quantitative with Creat         Ratio        Counseled to make better food choices, exercise as tolerated, and lose weight.     (J43.9) Pulmonary emphysema, unspecified emphysema type (H)  Comment: Well controlled with medications without side effects.   Plan: tiotropium (SPIRIVA HANDIHALER) 18  MCG inhaled         capsule, Comprehensive metabolic panel        Recommended smoking cessation and offered my support     (I50.9) CHF (NYHA class II, ACC/AHA stage C) (H)  Comment: euvolemic; on beta blocker and ACE-I   Plan: digoxin (DIGOX) 125 MCG tablet, furosemide         (LASIX) 20 MG tablet, metoprolol tartrate         (LOPRESSOR) 100 MG tablet, Comprehensive         metabolic panel, CBC with platelets     (I48.2) Chronic atrial fibrillation (HCC)  Comment: rate controlled and anticoagulated   Plan: digoxin (DIGOX) 125 MCG tablet, metoprolol         tartrate (LOPRESSOR) 100 MG tablet,         Comprehensive metabolic panel         Continue chronic anticoagulation under surveillance of protime clinic with goal INR 2 - 3 indefinitely      (E78.5) Hyperlipidemia with target LDL less than 100  Comment: Well controlled with medications without side effects.   Plan: Lipid panel reflex to direct LDL Fasting,         atorvastatin (LIPITOR) 20 MG tablet,         Comprehensive metabolic panel          (Z12.11) Screen for colon cancer  Plan: Fecal colorectal cancer screen (FIT)            See Patient Instructions    Cayla Gregorio MD  [facility]

## 2019-03-06 ENCOUNTER — OFFICE VISIT (OUTPATIENT)
Dept: FAMILY MEDICINE | Facility: CLINIC | Age: 74
End: 2019-03-06
Payer: MEDICARE

## 2019-03-06 VITALS
WEIGHT: 215 LBS | OXYGEN SATURATION: 95 % | RESPIRATION RATE: 32 BRPM | HEART RATE: 75 BPM | TEMPERATURE: 97.2 F | SYSTOLIC BLOOD PRESSURE: 130 MMHG | DIASTOLIC BLOOD PRESSURE: 70 MMHG | HEIGHT: 69 IN | BODY MASS INDEX: 31.84 KG/M2

## 2019-03-06 DIAGNOSIS — E11.9 TYPE 2 DIABETES MELLITUS WITHOUT COMPLICATION, WITHOUT LONG-TERM CURRENT USE OF INSULIN (H): Primary | ICD-10-CM

## 2019-03-06 DIAGNOSIS — E78.5 HYPERLIPIDEMIA WITH TARGET LDL LESS THAN 100: ICD-10-CM

## 2019-03-06 DIAGNOSIS — I48.20 CHRONIC ATRIAL FIBRILLATION (H): ICD-10-CM

## 2019-03-06 DIAGNOSIS — J43.9 PULMONARY EMPHYSEMA, UNSPECIFIED EMPHYSEMA TYPE (H): Chronic | ICD-10-CM

## 2019-03-06 DIAGNOSIS — I50.9 CHF (NYHA CLASS II, ACC/AHA STAGE C) (H): ICD-10-CM

## 2019-03-06 DIAGNOSIS — Z12.11 SCREEN FOR COLON CANCER: ICD-10-CM

## 2019-03-06 LAB
ALBUMIN SERPL-MCNC: 3.7 G/DL (ref 3.4–5)
ALP SERPL-CCNC: 136 U/L (ref 40–150)
ALT SERPL W P-5'-P-CCNC: 26 U/L (ref 0–70)
ANION GAP SERPL CALCULATED.3IONS-SCNC: 6 MMOL/L (ref 3–14)
AST SERPL W P-5'-P-CCNC: 23 U/L (ref 0–45)
BASOPHILS # BLD AUTO: 0 10E9/L (ref 0–0.2)
BASOPHILS NFR BLD AUTO: 0.3 %
BILIRUB SERPL-MCNC: 1 MG/DL (ref 0.2–1.3)
BUN SERPL-MCNC: 15 MG/DL (ref 7–30)
CALCIUM SERPL-MCNC: 9.1 MG/DL (ref 8.5–10.1)
CHLORIDE SERPL-SCNC: 102 MMOL/L (ref 94–109)
CHOLEST SERPL-MCNC: 116 MG/DL
CO2 SERPL-SCNC: 31 MMOL/L (ref 20–32)
CREAT SERPL-MCNC: 0.89 MG/DL (ref 0.66–1.25)
DIFFERENTIAL METHOD BLD: ABNORMAL
EOSINOPHIL # BLD AUTO: 0.3 10E9/L (ref 0–0.7)
EOSINOPHIL NFR BLD AUTO: 2.9 %
ERYTHROCYTE [DISTWIDTH] IN BLOOD BY AUTOMATED COUNT: 13.7 % (ref 10–15)
GFR SERPL CREATININE-BSD FRML MDRD: 86 ML/MIN/{1.73_M2}
GLUCOSE SERPL-MCNC: 165 MG/DL (ref 70–99)
HBA1C MFR BLD: 7.5 % (ref 0–5.6)
HCT VFR BLD AUTO: 49.5 % (ref 40–53)
HDLC SERPL-MCNC: 31 MG/DL
HGB BLD-MCNC: 16.9 G/DL (ref 13.3–17.7)
LDLC SERPL CALC-MCNC: 47 MG/DL
LYMPHOCYTES # BLD AUTO: 2.6 10E9/L (ref 0.8–5.3)
LYMPHOCYTES NFR BLD AUTO: 29.1 %
MCH RBC QN AUTO: 33.4 PG (ref 26.5–33)
MCHC RBC AUTO-ENTMCNC: 34.1 G/DL (ref 31.5–36.5)
MCV RBC AUTO: 98 FL (ref 78–100)
MONOCYTES # BLD AUTO: 0.8 10E9/L (ref 0–1.3)
MONOCYTES NFR BLD AUTO: 8.4 %
NEUTROPHILS # BLD AUTO: 5.3 10E9/L (ref 1.6–8.3)
NEUTROPHILS NFR BLD AUTO: 59.3 %
NONHDLC SERPL-MCNC: 85 MG/DL
PLATELET # BLD AUTO: 229 10E9/L (ref 150–450)
POTASSIUM SERPL-SCNC: 4.4 MMOL/L (ref 3.4–5.3)
PROT SERPL-MCNC: 7.2 G/DL (ref 6.8–8.8)
RBC # BLD AUTO: 5.06 10E12/L (ref 4.4–5.9)
SODIUM SERPL-SCNC: 139 MMOL/L (ref 133–144)
TRIGL SERPL-MCNC: 190 MG/DL
WBC # BLD AUTO: 9 10E9/L (ref 4–11)

## 2019-03-06 PROCEDURE — 36415 COLL VENOUS BLD VENIPUNCTURE: CPT | Performed by: FAMILY MEDICINE

## 2019-03-06 PROCEDURE — 80061 LIPID PANEL: CPT | Performed by: FAMILY MEDICINE

## 2019-03-06 PROCEDURE — 85025 COMPLETE CBC W/AUTO DIFF WBC: CPT | Performed by: FAMILY MEDICINE

## 2019-03-06 PROCEDURE — 80053 COMPREHEN METABOLIC PANEL: CPT | Performed by: FAMILY MEDICINE

## 2019-03-06 PROCEDURE — 99214 OFFICE O/P EST MOD 30 MIN: CPT | Performed by: FAMILY MEDICINE

## 2019-03-06 PROCEDURE — 83036 HEMOGLOBIN GLYCOSYLATED A1C: CPT | Performed by: FAMILY MEDICINE

## 2019-03-06 RX ORDER — ATORVASTATIN CALCIUM 20 MG/1
20 TABLET, FILM COATED ORAL DAILY
Qty: 90 TABLET | Refills: 3 | Status: SHIPPED | OUTPATIENT
Start: 2019-03-06 | End: 2020-02-26

## 2019-03-06 RX ORDER — FUROSEMIDE 20 MG
TABLET ORAL
Qty: 90 TABLET | Refills: 1 | Status: SHIPPED | OUTPATIENT
Start: 2019-03-06 | End: 2019-08-27

## 2019-03-06 RX ORDER — DIGOXIN 125 MCG
125 TABLET ORAL DAILY
Qty: 90 TABLET | Refills: 3 | Status: SHIPPED | OUTPATIENT
Start: 2019-03-06 | End: 2020-02-26

## 2019-03-06 RX ORDER — METOPROLOL TARTRATE 100 MG
200 TABLET ORAL 2 TIMES DAILY
Qty: 360 TABLET | Refills: 3 | Status: SHIPPED | OUTPATIENT
Start: 2019-03-06 | End: 2020-02-26

## 2019-03-06 RX ORDER — TIOTROPIUM BROMIDE 18 UG/1
CAPSULE ORAL; RESPIRATORY (INHALATION)
Qty: 90 CAPSULE | Refills: 3 | Status: SHIPPED | OUTPATIENT
Start: 2019-03-06 | End: 2024-09-11

## 2019-03-06 RX ORDER — LISINOPRIL 30 MG/1
30 TABLET ORAL DAILY
Qty: 90 TABLET | Refills: 3 | Status: CANCELLED | OUTPATIENT
Start: 2019-03-06

## 2019-03-06 ASSESSMENT — MIFFLIN-ST. JEOR: SCORE: 1720.61

## 2019-03-06 NOTE — RESULT ENCOUNTER NOTE
Ed,    Your liver, kidney and blood count tests are fine. Your cholesterol and diabetes are fairly controlled. Eat healthy foods like fruits, vegetables, beans, nuts, seeds, and whole grains (such as wheat pasta and wheat bread). Drink water and milk instead of pop and juice. Avoid sweets. Exercise regularly.     You are also due for digoxin level testing and your yearly FIT stool card for colon cancer testing. These can be done at a lab only appointment anytime.     Cayla Gregorio MD

## 2019-03-06 NOTE — RESULT ENCOUNTER NOTE
Your final test results are pending.  Please check your chart again within 3 to 5 days. You will receive further instruction when your full test result panel is complete.

## 2019-03-06 NOTE — LETTER
My COPD Action Plan     Name: Keith Desir    YOB: 1945   Date: 3/6/2019    My doctor: Cayla Gregorio MD   My clinic: [address]  [place] [id number] [phone]  My Controller Medicine: Tiotropium (Spiriva)   Dose: 1 puff daily      My Rescue Medicine: Albuterol (Proair/Ventolin/Proventil) inhaler   Dose: 2 puffs every 4 hours as needed      My Flare Up Medicine: Prednisone   Dose: return to clinic  FEV-1 (no units)   Date Value   09/25/2012 68     FEV1/FVC (no units)   Date Value   04/24/2012 88      My COPD Severity: Moderate = FeV1 < 79% -50%      Use of Oxygen: Oxygen Not Prescribed      Make sure you've had your pneumonia   vaccines.          GREEN ZONE       Doing well today      Usual level of activity and exercise    Usual amount of cough and mucus    No shortness of breath    Usual level of health (thinking clearly, sleeping well, feel like eating) Actions:      Take daily medicines    Use oxygen as prescribed    Follow regular exercise and diet plan    Avoid cigarette smoke and other irritants that harm the lungs           YELLOW ZONE          Having a bad day or flare up      Short of breath more than usual    A lot more sputum (mucus) than usual    Sputum looks yellow, green, tan, brown or bloody    More coughing or wheezing    Fever or chills    Less energy; trouble completing activities    Trouble thinking or focusing    Using quick relief inhaler or nebulizer more often    Poor sleep; symptoms wake me up    Do not feel like eating Actions:      Get plenty of rest    Take daily medicines    Use quick relief inhaler every 4 hours    If you use oxygen, call you doctor to see if you should adjust your oxygen    Do breathing exercises or other things to help you relax    Let a loved one, friend or neighbor know you are feeling worse    Call your care team if you have 2 or more symptoms.  Start taking steroids or antibiotics if directed by  your care team           RED ZONE       Need medical care now      Severe shortness of breath (feel you can't breathe)    Fever, chills    Not enough breath to do any activity    Trouble coughing up mucus, walking or talking    Blood in mucus    Frequent coughing   Rescue medicines are not working    Not able to sleep because of breathing    Feel confused or drowsy    Chest pain    Actions:      Call your health care team.  If you cannot reach your care team, call 911 or go to the emergency room.        Annual Reminders:  Meet with Care Team, Flu Shot every Fall  Pharmacy:    [id number] - [place] [facility] [place] - [address] [facility] - [place], [place] - [address] - [place], [place] - [address] [facility]

## 2019-04-02 ENCOUNTER — ANTICOAGULATION THERAPY VISIT (OUTPATIENT)
Dept: NURSING | Facility: CLINIC | Age: 74
End: 2019-04-02
Payer: MEDICARE

## 2019-04-02 DIAGNOSIS — I48.20 CHRONIC ATRIAL FIBRILLATION (H): ICD-10-CM

## 2019-04-02 LAB — INR POINT OF CARE: 2.8 (ref 0.86–1.14)

## 2019-04-02 PROCEDURE — 36416 COLLJ CAPILLARY BLOOD SPEC: CPT

## 2019-04-02 PROCEDURE — 85610 PROTHROMBIN TIME: CPT | Mod: QW

## 2019-04-02 PROCEDURE — 99207 ZZC NO CHARGE NURSE ONLY: CPT

## 2019-04-02 NOTE — PROGRESS NOTES
ANTICOAGULATION FOLLOW-UP CLINIC VISIT    Patient Name:  Keith Desir  Date:  2019  Contact Type:  Face to Face    SUBJECTIVE:     Patient Findings            OBJECTIVE    INR Protime   Date Value Ref Range Status   2019 2.8 (A) 0.86 - 1.14 Final       ASSESSMENT / PLAN  INR assessment THER    Recheck INR In: 6 WEEKS    INR Location Clinic      Anticoagulation Summary  As of 2019    INR goal:   2.0-3.0   TTR:   53.6 % (3 y)   INR used for dosin.8 (2019)   Warfarin maintenance plan:   5 mg (2.5 mg x 2) every Tue, Thu, Sat; 7.5 mg (2.5 mg x 3) all other days   Full warfarin instructions:   5 mg every Tue, Thu, Sat; 7.5 mg all other days   Weekly warfarin total:   45 mg   No change documented:   Brandi Scott, RN   Plan last modified:   Dot Ziegler RN (2018)   Next INR check:   2019   Target end date:       Indications    Long-term (current) use of anticoagulants [Z79.01] [Z79.01]  Chronic atrial fibrillation (HCC) [I48.2]             Anticoagulation Episode Summary     INR check location:       Preferred lab:       Send INR reminders to:   [facility]    Comments:   don't print AVS unless change in dosing      Anticoagulation Care Providers     Provider Role Specialty Phone number    Cayla Gregorio MD [facility] [phone]            See the Encounter Report to view Anticoagulation Flowsheet and Dosing Calendar (Go to Encounters tab in chart review, and find the Anticoagulation Therapy Visit)    Continue maintenance warfarin dosing. Denies any changes to medications or other diet issues. Denies any missed doses or extra doses. Denies bleeding, bruising, or blood clots. Verbalizes understanding of dosing and recheck date. Patient is aware if signs of clotting (pain, tenderness, swelling, color change in leg or arm, SOB) and bleeding occur (blood in stool, urine, large bruising, bleeding gums, nosebleeds) to have INR check sooner. If sx severe  report to ER or concerned for stroke call 911. If general questions or concerns arise, call clinic.         Brandi Scott RN

## 2019-04-02 NOTE — PATIENT INSTRUCTIONS
Some signs and symptoms of bleeding include: Nose bleed or cut that does not stop bleeding in 10 minutes, bleeding of the gums, vomiting (will look like coffee grounds) or coughing up blood, unusual, easy or large areas of bruising, increased or unexpected vaginal bleeding or increased menstrual flow, red or black stools, red or orange urine, prolonged or severe headache, pale skin, unusual or constant tiredness.  If you have these please call 911 or seek medical care immediately.          Some signs and symptoms of clots include: pain or tenderness in arm or leg, swelling in arm or leg, changes in skin color, or area is warm to touch, shortness or breath, trouble breathing.  Numbness or weakness especially on 1 side of the body, sudden trouble speaking or swallowing, sudden trouble seeing, sudden confusion, dizzy spells or headache.  If you have these please call 911 or seek medical care immediately.     Tristian Anton-Oncology

## 2019-04-09 ENCOUNTER — TRANSFERRED RECORDS (OUTPATIENT)
Dept: HEALTH INFORMATION MANAGEMENT | Facility: CLINIC | Age: 74
End: 2019-04-09

## 2019-04-11 ENCOUNTER — MYC MEDICAL ADVICE (OUTPATIENT)
Dept: FAMILY MEDICINE | Facility: CLINIC | Age: 74
End: 2019-04-11

## 2019-04-11 DIAGNOSIS — E11.9 TYPE 2 DIABETES MELLITUS WITHOUT COMPLICATION, WITHOUT LONG-TERM CURRENT USE OF INSULIN (H): Primary | ICD-10-CM

## 2019-04-11 RX ORDER — GLIPIZIDE 5 MG/1
5 TABLET ORAL
Qty: 30 TABLET | Refills: 0 | Status: SHIPPED | OUTPATIENT
Start: 2019-04-11 | End: 2019-08-27

## 2019-04-11 NOTE — TELEPHONE ENCOUNTER
Called wife with instruction to add glipizide and follow-up as planned.  Signed Prescriptions:                        Disp   Refills    glipiZIDE (GLUCOTROL) 5 MG tablet          30 tab*0        Sig: Take 1 tablet (5 mg) by mouth 2 times daily (before           meals)  Authorizing Provider: DEVON PAREDES

## 2019-04-11 NOTE — TELEPHONE ENCOUNTER
Patients wife sent the following messages:    Good afternoon. Ed just came home from [facility] yesterday. Has influence A and pneumonia. He is prednisone and his blood sugars are high. Hospital was giving him insulin. He is still light headed. Can I give him 2 metformin twice a day ?    When he was there they were around 200. Have not tested since been home.  Melissa OLIVEROS patient is scheduled for OV on 4/16/19.  Please advise   Vanessa Gregory RN

## 2019-04-16 ENCOUNTER — OFFICE VISIT (OUTPATIENT)
Dept: FAMILY MEDICINE | Facility: CLINIC | Age: 74
End: 2019-04-16
Payer: MEDICARE

## 2019-04-16 VITALS
HEIGHT: 69 IN | TEMPERATURE: 96.7 F | SYSTOLIC BLOOD PRESSURE: 132 MMHG | OXYGEN SATURATION: 97 % | HEART RATE: 83 BPM | WEIGHT: 207 LBS | DIASTOLIC BLOOD PRESSURE: 78 MMHG | RESPIRATION RATE: 16 BRPM | BODY MASS INDEX: 30.66 KG/M2

## 2019-04-16 DIAGNOSIS — J44.1 CHRONIC OBSTRUCTIVE PULMONARY DISEASE WITH ACUTE EXACERBATION (H): ICD-10-CM

## 2019-04-16 DIAGNOSIS — Z79.01 LONG TERM CURRENT USE OF ANTICOAGULANT THERAPY: ICD-10-CM

## 2019-04-16 DIAGNOSIS — I12.9 RENAL HYPERTENSION: ICD-10-CM

## 2019-04-16 DIAGNOSIS — J18.9 PNEUMONIA DUE TO INFECTIOUS ORGANISM, UNSPECIFIED LATERALITY, UNSPECIFIED PART OF LUNG: ICD-10-CM

## 2019-04-16 DIAGNOSIS — I50.9 CHF (NYHA CLASS II, ACC/AHA STAGE C) (H): ICD-10-CM

## 2019-04-16 DIAGNOSIS — I48.20 CHRONIC ATRIAL FIBRILLATION (H): ICD-10-CM

## 2019-04-16 DIAGNOSIS — E11.9 TYPE 2 DIABETES MELLITUS WITHOUT COMPLICATION, WITHOUT LONG-TERM CURRENT USE OF INSULIN (H): ICD-10-CM

## 2019-04-16 DIAGNOSIS — Z72.0 TOBACCO ABUSE: ICD-10-CM

## 2019-04-16 DIAGNOSIS — J10.1 INFLUENZA A: ICD-10-CM

## 2019-04-16 DIAGNOSIS — N18.2 CKD (CHRONIC KIDNEY DISEASE) STAGE 2, GFR 60-89 ML/MIN: ICD-10-CM

## 2019-04-16 DIAGNOSIS — Z12.11 SCREEN FOR COLON CANCER: ICD-10-CM

## 2019-04-16 LAB
DIGOXIN SERPL-MCNC: 0.6 UG/L (ref 0.5–2)
INR PPP: 3.03 (ref 0.86–1.14)

## 2019-04-16 PROCEDURE — 36415 COLL VENOUS BLD VENIPUNCTURE: CPT | Performed by: FAMILY MEDICINE

## 2019-04-16 PROCEDURE — 99495 TRANSJ CARE MGMT MOD F2F 14D: CPT | Performed by: FAMILY MEDICINE

## 2019-04-16 PROCEDURE — 80162 ASSAY OF DIGOXIN TOTAL: CPT | Performed by: FAMILY MEDICINE

## 2019-04-16 PROCEDURE — 85610 PROTHROMBIN TIME: CPT | Performed by: FAMILY MEDICINE

## 2019-04-16 RX ORDER — NITROGLYCERIN 0.4 MG/1
0.4 TABLET SUBLINGUAL
COMMUNITY
Start: 2011-06-15 | End: 2020-02-26

## 2019-04-16 ASSESSMENT — MIFFLIN-ST. JEOR: SCORE: 1684.33

## 2019-04-16 NOTE — PROGRESS NOTES
SUBJECTIVE:   Keith Desir is a 73 year old male who presents to clinic today for the following   health issues:    Hospital Follow-up Visit:    Hospital/Nursing Home/IP Rehab Facility: [facility]  Date of Admission: 04/09/19  Date of Discharge: 04/10/19  Reason(s) for Admission: Influenza (Primary Dx);   Pneumonia of right middle lobe due to infectious organism (HC);   Other emphysema (HC)            Problems taking medications regularly:  None       Medication changes since discharge: Med Rec Complete         Problems adhering to non-medication therapy:  None    Summary of hospitalization:  CareEverywhere information obtained and reviewed  Diagnostic Tests/Treatments reviewed.  Follow up needed: none  Other Healthcare Providers Involved in Patient s Care:         None  Update since discharge: improved.     Post Discharge Medication Reconciliation: discharge medications reconciled, continue medications without change.  Plan of care communicated with patient     Coding guidelines for this visit:  Type of Medical   Decision Making Face-to-Face Visit       within 7 Days of discharge Face-to-Face Visit        within 14 days of discharge   Moderate Complexity [id number]   High Complexity [id number]          His Blood sugars are doing well    Diabetes Follow-up      Patient is checking blood sugars: not at all    Diabetic concerns: None     Symptoms of hypoglycemia (low blood sugar): none     Paresthesias (numbness or burning in feet) or sores: No     Date of last diabetic eye exam:     Diabetes Management Resources    Hyperlipidemia Follow-Up      Rate your low fat/cholesterol diet?: good    Taking statin?  Yes, no muscle aches from statin    Other lipid medications/supplements?:  none    Hypertension Follow-up      Outpatient blood pressures are not being checked.    Low Salt Diet: no added salt    BP Readings from Last 2 Encounters:   04/16/19 132/78   03/06/19 130/70     Hemoglobin A1C (%)   Date Value    03/06/2019 7.5 (H)   08/08/2018 6.8 (H)     LDL Cholesterol Calculated (mg/dL)   Date Value   03/06/2019 47   11/01/2017 50     Heart Failure Follow-up    Symptoms:    Shortness of breath: none    Lower extremity edema: none    Chest pain: No    Using more pillows than normal: No    Cough at night: Yes-  Due to Pneumonia and smoking  Cardiology visits, ER/UC, or hospital admissions since last visit: Hospitalizations - as above for Pneumonia    Medication side effects: none    Pt was on Prednisone for copd and accchecks were High in Hospital  But generally does well  He is feeling better  He continues to smoke  Additional history: as documented    Reviewed  and updated as needed this visit by clinical staff         Reviewed and updated as needed this visit by Provider         Patient Active Problem List   Diagnosis     Hyperlipidemia with target LDL less than 100     Tobacco abuse     Chronic atrial fibrillation (HCC)     Renal hypertension     Advanced directives, counseling/discussion     Health Care Home     CHF (NYHA class II, ACC/AHA stage C) (H)     Polycythemia secondary to smoking     Elevated alkaline phosphatase level     Long term current use of anticoagulant therapy     Non morbid obesity due to excess calories     Type 2 diabetes mellitus without complication, without long-term current use of insulin (H)     Pulmonary emphysema, unspecified emphysema type (H)     NAFLD (nonalcoholic fatty liver disease)     CKD (chronic kidney disease) stage 2, GFR 60-89 ml/min     Varicose veins of legs     Past Surgical History:   Procedure Laterality Date     ANGIOGRAM  8/2006    normal     TONSILLECTOMY & ADENOIDECTOMY         Social History     Tobacco Use     Smoking status: Current Every Day Smoker     Packs/day: 1.00     Years: 45.00     Pack years: 45.00     Types: Cigarettes     Smokeless tobacco: Never Used   Substance Use Topics     Alcohol use: Yes     Alcohol/week: 0.0 - 2.5 oz     Family History    Problem Relation Age of Onset     Diabetes Mother      Cerebrovascular Disease Father      Heart Disease Brother         pacer      Cancer No family hx of          Current Outpatient Medications   Medication Sig Dispense Refill     albuterol (PROAIR HFA/PROVENTIL HFA/VENTOLIN HFA) 108 (90 Base) MCG/ACT Inhaler Inhale 1-2 puffs into the lungs every 4 hours as needed for shortness of breath / dyspnea 1 Inhaler 11     ASPIRIN NOT PRESCRIBED, INTENTIONAL, Antiplatelet medication not prescribed intentionally due to Current anticoagulant therapy (warfarin/enoxaparin)       atorvastatin (LIPITOR) 20 MG tablet Take 1 tablet (20 mg) by mouth daily 90 tablet 3     blood glucose (NO BRAND SPECIFIED) lancing device Use to test blood sugars 3 times daily or as directed. 1 each 3     digoxin (DIGOX) 125 MCG tablet Take 1 tablet (125 mcg) by mouth daily 90 tablet 3     furosemide (LASIX) 20 MG tablet Take 1 tablet (20 mg) by mouth 1 times daily 90 tablet 1     glipiZIDE (GLUCOTROL) 5 MG tablet Take 1 tablet (5 mg) by mouth 2 times daily (before meals) 30 tablet 0     lisinopril (PRINIVIL,ZESTRIL) 30 MG tablet Take 1 tablet (30 mg) by mouth daily 90 tablet 3     metFORMIN (GLUCOPHAGE) 1000 MG tablet Take 1 tablet (1,000 mg) by mouth 2 times daily (with meals) 180 tablet 3     metoprolol tartrate (LOPRESSOR) 100 MG tablet Take 2 tablets (200 mg) by mouth 2 times daily 360 tablet 3     nitroGLYcerin (NITROSTAT) 0.4 MG sublingual tablet Place 0.4 mg under the tongue Place 1 tablet under the tongue every 5 minutes if needed for Chest Pain       order for DME Equipment being ordered: Blood glucose monitor per insurance formulary; blood glucose test strips per formulary for use 3 time daily; lancets per formulary for use 3 time daily 300 each 3     tiotropium (SPIRIVA HANDIHALER) 18 MCG inhaled capsule INHALE 1 CAPSULE (18 MCG) INTO THE LUNGS DAILY 90 capsule 3     warfarin (COUMADIN) 2.5 MG tablet Take 5mg (2 tabs) on Tuesdays,  Thursdays and Saturdays, take 7.5mg (3 tabs) all other days or as directed by INR clinic 270 tablet 0     albuterol (2.5 MG/3ML) 0.083% neb solution Take 1 vial (2.5 mg) by nebulization every 4 hours as needed for shortness of breath / dyspnea       Allergies   Allergen Reactions     Nkda [No Known Drug Allergies]      Recent Labs   Lab Test 03/06/19  0849 08/08/18  0754 05/23/18  0839 11/01/17  0715 04/26/17  0721  07/20/16  0708  07/21/15  1216   A1C 7.5* 6.8* 8.0* 6.2* 7.1*   < > 6.8*   < > 7.4*   LDL 47  --   --  50  --   --  53  --   --    HDL 31*  --   --  33*  --   --  31*  --   --    TRIG 190*  --   --  185*  --   --  159*  --   --    ALT 26  --   --  20 26   < >  --    < >  --    CR 0.89  --  0.91 0.86 0.87   < >  --    < >  --    GFRESTIMATED 86  --  82 88 87   < >  --    < >  --    GFRESTBLACK >90  --  >90 >90 >90  African American GFR Calc     < >  --    < >  --    POTASSIUM 4.4  --  4.9 4.5 4.9   < >  --    < >  --    TSH  --   --   --  1.77  --   --   --   --  1.53    < > = values in this interval not displayed.      BP Readings from Last 3 Encounters:   04/16/19 132/78   03/06/19 130/70   08/08/18 126/86    Wt Readings from Last 3 Encounters:   04/16/19 93.9 kg (207 lb)   03/06/19 97.5 kg (215 lb)   08/08/18 96.6 kg (213 lb)                  Labs reviewed in EPIC    ROS:  CONSTITUTIONAL: NEGATIVE for fever, chills, change in weight  INTEGUMENTARY/SKIN: NEGATIVE for worrisome rashes, moles or lesions  ENT/MOUTH: NEGATIVE for ear, mouth and throat problems  RESP: NEGATIVE for significant cough or SOB  CV: NEGATIVE for chest pain, palpitations or peripheral edema  CV: NEGATIVE for chest pain, palpitations or peripheral edema  MUSCULOSKELETAL: NEGATIVE for significant arthralgias or myalgia  PSYCHIATRIC: NEGATIVE for changes in mood or affect  Rest of the ROS is Negative except see above and Problem list [stable]      OBJECTIVE:     /78   Pulse 83   Temp 96.7  F (35.9  C) (Oral)   Resp 16   Ht  "1.753 m (5' 9\")   Wt 93.9 kg (207 lb)   SpO2 97%   BMI 30.57 kg/m    Body mass index is 30.57 kg/m .  GENERAL: healthy, alert and no distress  EYES: Eyes grossly normal to inspection, PERRL and conjunctivae and sclerae normal  HENT: ear canals and TM's normal, nose and mouth without ulcers or lesions  NECK: no adenopathy, no asymmetry, masses, or scars and thyroid normal to palpation  RESP: lungs clear to auscultation - no rales, rhonchi or wheezes  CV:  Irregularly Irregular, no murmur, click or rub, no peripheral edema and peripheral pulses strong  ABDOMEN: soft, nontender, no hepatosplenomegaly, no masses and bowel sounds normal  MS: no gross musculoskeletal defects noted, no edema  PSYCH: mentation appears normal    Diagnostic Test Results:  Pending     ASSESSMENT/PLAN:         Tobacco Cessation:   reports that he has been smoking cigarettes.  He has a 45.00 pack-year smoking history. He has never used smokeless tobacco.  Tobacco Cessation Action Plan: Information offered: Patient not interested at this time      1. Pneumonia due to infectious organism, unspecified laterality, unspecified part of lung  Better  Advised follow up PMD for repeat CXR 5 -6 weeks    2. Chronic obstructive pulmonary disease with acute exacerbation (H)  Advised needs to stop smoking  Continue Inhalers    3. Influenza A  better    4. Tobacco abuse  As above    5. Type 2 diabetes mellitus without complication, without long-term current use of insulin (H)  Has been stable     6. Renal hypertension  Stable     7. Long term current use of anticoagulant therapy  Pending   - INR    8. CKD (chronic kidney disease) stage 2, GFR 60-89 ml/min    - Albumin Random Urine Quantitative with Creat Ratio    9. Chronic atrial fibrillation (HCC)  Pending   - Digoxin level    10. CHF (NYHA class II, ACC/AHA stage C) (H)  Stable     11. Screen for colon cancer  Advised   - Fecal colorectal cancer screen (FIT); Future  Follow up as above  Nany Mederos" MD  [facility] [place]

## 2019-04-17 ENCOUNTER — ANTICOAGULATION THERAPY VISIT (OUTPATIENT)
Dept: NURSING | Facility: CLINIC | Age: 74
End: 2019-04-17
Payer: MEDICARE

## 2019-04-17 DIAGNOSIS — I48.20 CHRONIC ATRIAL FIBRILLATION (H): ICD-10-CM

## 2019-04-17 DIAGNOSIS — Z79.01 LONG TERM CURRENT USE OF ANTICOAGULANT THERAPY: ICD-10-CM

## 2019-04-17 NOTE — PROGRESS NOTES
ANTICOAGULATION FOLLOW-UP CLINIC VISIT    Patient Name:  Keith Desir  Date:  4/17/2019  Contact Type:  Telephone/ Ed    SUBJECTIVE:     Patient Findings     Positives:   Change in health (Patient hospitalized on 4/9/19 for Influenza and Pneumonia.), Change in medications (Patient finished all Tamiflu and antibiotics last Saturday 4/13/19.)           OBJECTIVE    INR   Date Value Ref Range Status   04/16/2019 3.03 (H) 0.86 - 1.14 Final       ASSESSMENT / PLAN  INR assessment THER    Recheck INR In: 4 WEEKS    INR Location Clinic      Anticoagulation Summary  As of 4/17/2019    INR goal:   2.0-3.0   TTR:   54.1 % (3 y)   INR used for dosing:   3.03! (4/16/2019)   Warfarin maintenance plan:   5 mg (2.5 mg x 2) every Tue, Thu, Sat; 7.5 mg (2.5 mg x 3) all other days   Full warfarin instructions:   5 mg every Tue, Thu, Sat; 7.5 mg all other days   Weekly warfarin total:   45 mg   No change documented:   Brandi Scott RN   Plan last modified:   Dot Ziegler RN (6/28/2018)   Next INR check:   5/14/2019   Target end date:       Indications    Long term current use of anticoagulant therapy [Z79.01]  Chronic atrial fibrillation (HCC) [I48.2]             Anticoagulation Episode Summary     INR check location:       Preferred lab:       Send INR reminders to:   [facility]    Comments:   don't print AVS unless change in dosing      Anticoagulation Care Providers     Provider Role Specialty Phone number    Cayla Gregorio MD [facility] [phone]            See the Encounter Report to view Anticoagulation Flowsheet and Dosing Calendar (Go to Encounters tab in chart review, and find the Anticoagulation Therapy Visit)    Patient seen by a provider yesterday for follow-up OV after hospitalization for Pneumonia and Influenza. Patient has since finished his antibiotics and Tamiflu this past Saturday 4/13/19. Writer advised patient to return in one week to ensure INR does not elevate. Patient is  refusing to come sooner than four weeks out at his originally scheduled ACC visit. Writer education patient on signs and symptoms of bleeding, bruising, and blood clots that would warrant patient being seen sooner in clinic for INR recheck. Patient states understanding.     Patient is aware if signs of clotting (pain, tenderness, swelling, color change in leg or arm, SOB) and bleeding occur (blood in stool, urine, large bruising, bleeding gums, nosebleeds) to have INR check sooner. If sx severe report to ER or concerned for stroke call 911. If general questions or concerns arise, call clinic.         Brandi Scott RN

## 2019-04-18 PROCEDURE — 82274 ASSAY TEST FOR BLOOD FECAL: CPT | Performed by: FAMILY MEDICINE

## 2019-04-20 LAB — HEMOCCULT STL QL IA: POSITIVE

## 2019-04-22 DIAGNOSIS — Z12.11 SPECIAL SCREENING FOR MALIGNANT NEOPLASMS, COLON: Primary | ICD-10-CM

## 2019-04-22 DIAGNOSIS — Z12.11 SCREEN FOR COLON CANCER: ICD-10-CM

## 2019-04-22 NOTE — RESULT ENCOUNTER NOTE
Please call patient:    Your stool test is not normal, with blood detected in your stool.  While this does not necessarily mean that you have colon cancer, further evaluation is recommended with colonoscopy.  Colonoscopy can prevent colon cancer by detecting and removing pre-cancerous colon polyps.  A  will call you to arrange this test.      You will need to hold your coumadin medication for 5 days prior to the test. The day of your test, do not take glipizide or metformin.     Cayla Gregorio MD

## 2019-04-23 ENCOUNTER — HOSPITAL ENCOUNTER (OUTPATIENT)
Facility: AMBULATORY SURGERY CENTER | Age: 74
End: 2019-04-23
Attending: INTERNAL MEDICINE | Admitting: INTERNAL MEDICINE
Payer: MEDICARE

## 2019-04-23 RX ORDER — LIDOCAINE 40 MG/G
CREAM TOPICAL
Status: CANCELLED | OUTPATIENT
Start: 2019-04-23

## 2019-04-23 RX ORDER — ONDANSETRON 2 MG/ML
4 INJECTION INTRAMUSCULAR; INTRAVENOUS
Status: CANCELLED | OUTPATIENT
Start: 2019-04-23

## 2019-04-24 ENCOUNTER — MYC MEDICAL ADVICE (OUTPATIENT)
Dept: FAMILY MEDICINE | Facility: CLINIC | Age: 74
End: 2019-04-24

## 2019-04-24 DIAGNOSIS — Z12.11 SPECIAL SCREENING FOR MALIGNANT NEOPLASMS, COLON: Primary | ICD-10-CM

## 2019-04-24 NOTE — OR NURSING
GI MD reviewed pt's chart. Due to pt's recent illness and current medical history, GI MD felt pt should be done in a hospital setting. GI MD felt Pt should wait another week or two to recover from his recent flu and pneumonia before trying to drink the prep and do the colonoscopy. Call placed to pt's cell and info relayed to pt that procedure at [facility] would be cancelled.

## 2019-05-02 ENCOUNTER — TELEPHONE (OUTPATIENT)
Dept: FAMILY MEDICINE | Facility: CLINIC | Age: 74
End: 2019-05-02

## 2019-05-02 NOTE — TELEPHONE ENCOUNTER
Reason for Call:  Other appointment    Detailed comments: [facility] needs to have hold and bridging orders for his warfarin to set up colonoscopy. Please call. Thank you    Phone Number Patient can be reached at: Other phone number:     [facility] (Provider) [phone]     Option 1 twice    Best Time: ASAP    Can we leave a detailed message on this number? YES    Call taken on 5/2/2019 at 11:06 AM by Wilda Ramsay  Hold for patient warfarin and any bridging?

## 2019-05-02 NOTE — TELEPHONE ENCOUNTER
Patient taking warfarin for A-fib.  [facility] would like hold order and or bridging if needed for colonoscopy.    Please advise   Vanessa Gregory RN

## 2019-05-02 NOTE — TELEPHONE ENCOUNTER
Spoke with DEEPTHI VERDIN and gave hold orders below,  verbalized understanding.   Routing to [facility] as ARLIN Gregory RN

## 2019-05-06 NOTE — TELEPHONE ENCOUNTER
Called wife and she notes that colonoscopy is canceled till 8/1/19 as he needs to go to the hospital.     Dot Ziegler RN, [facility]

## 2019-05-14 ENCOUNTER — ANTICOAGULATION THERAPY VISIT (OUTPATIENT)
Dept: NURSING | Facility: CLINIC | Age: 74
End: 2019-05-14
Payer: MEDICARE

## 2019-05-14 DIAGNOSIS — I48.20 CHRONIC ATRIAL FIBRILLATION (H): ICD-10-CM

## 2019-05-14 DIAGNOSIS — Z79.01 LONG TERM CURRENT USE OF ANTICOAGULANT THERAPY: ICD-10-CM

## 2019-05-14 LAB — INR POINT OF CARE: 3.2 (ref 0.86–1.14)

## 2019-05-14 PROCEDURE — 85610 PROTHROMBIN TIME: CPT | Mod: QW

## 2019-05-14 PROCEDURE — 99207 ZZC NO CHARGE NURSE ONLY: CPT

## 2019-05-14 PROCEDURE — 36416 COLLJ CAPILLARY BLOOD SPEC: CPT

## 2019-05-14 NOTE — PROGRESS NOTES
ANTICOAGULATION FOLLOW-UP CLINIC VISIT    Patient Name:  Keith Desir  Date:  5/14/2019  Contact Type:  Face to Face    SUBJECTIVE:  Patient Findings     Positives:   Change in diet/appetite (Patient has not been having his usual amount of green, leafy vegetables or salads in the last couple of weeks. Patient is planning on resuming his usual amount now.), Hospital admission (As mentioned at last INR result, patient had been in hospital for Pneumonia and Influenza in early April 2019. Patient is feeling much better now.)        Clinical Outcomes     Negatives:   Major bleeding event, Thromboembolic event, Anticoagulation-related hospital admission, Anticoagulation-related ED visit, Anticoagulation-related fatality           OBJECTIVE    INR Protime   Date Value Ref Range Status   05/14/2019 3.2 (A) 0.86 - 1.14 Final       ASSESSMENT / PLAN  INR assessment SUPRA    Recheck INR In: 6 WEEKS    INR Location Clinic      Anticoagulation Summary  As of 5/14/2019    INR goal:   2.0-3.0   TTR:   52.8 % (3.1 y)   INR used for dosing:   3.2! (5/14/2019)   Warfarin maintenance plan:   5 mg (2.5 mg x 2) every Tue, Thu, Sat; 7.5 mg (2.5 mg x 3) all other days   Full warfarin instructions:   5 mg every Tue, Thu, Sat; 7.5 mg all other days   Weekly warfarin total:   45 mg   No change documented:   Brandi Scott, DIANN   Plan last modified:   Dot Ziegler RN (6/28/2018)   Next INR check:   6/27/2019   Target end date:       Indications    Long term current use of anticoagulant therapy [Z79.01]  Chronic atrial fibrillation (HCC) [I48.2]             Anticoagulation Episode Summary     INR check location:       Preferred lab:       Send INR reminders to:   [facility]    Comments:   don't print AVS unless change in dosing      Anticoagulation Care Providers     Provider Role Specialty Phone number    Cayla Gregorio MD [facility] [phone]            See the Encounter Report to view Anticoagulation  Flowsheet and Dosing Calendar (Go to Encounters tab in chart review, and find the Anticoagulation Therapy Visit)    Patient to continue maintenance warfarin dosing. Denies any changes to medications. Patient states he has not been eating his usual amount of green, leafy vegetables or salads in the last couple of weeks, he is planning to resume his usual diet with these, the lack of them recently is likely contributing to slightly elevated INR value, patient resuming his greens/vegetabels in diet as normal should help bring INR back into range. Denies any missed doses or extra doses. Denies bleeding, bruising, or blood clots. Verbalizes understanding of dosing and recheck date.   Patient is aware if signs of clotting (pain, tenderness, swelling, color change in leg or arm, SOB) and bleeding occur (blood in stool, urine, large bruising, bleeding gums, nosebleeds) to have INR check sooner. If sx severe report to ER or concerned for stroke call 911. If general questions or concerns arise, call clinic.    Patient will be out of town on vacations for the next 6 weeks. Patient is scheduled for next INR check the day after he returns to [place]. Writer advised patient on signs and symptoms of bleeding, bruising, or blood clots that would warrant him seeking medical attention in whatever state he is in, patient states understanding.       Brandi Scott RN, BSN

## 2019-05-28 DIAGNOSIS — I48.20 CHRONIC ATRIAL FIBRILLATION (H): ICD-10-CM

## 2019-05-29 ENCOUNTER — OFFICE VISIT (OUTPATIENT)
Dept: FAMILY MEDICINE | Facility: CLINIC | Age: 74
End: 2019-05-29
Payer: MEDICARE

## 2019-05-29 ENCOUNTER — ANCILLARY PROCEDURE (OUTPATIENT)
Dept: GENERAL RADIOLOGY | Facility: CLINIC | Age: 74
End: 2019-05-29
Attending: NURSE PRACTITIONER
Payer: MEDICARE

## 2019-05-29 VITALS
TEMPERATURE: 97 F | WEIGHT: 212 LBS | HEIGHT: 69 IN | DIASTOLIC BLOOD PRESSURE: 84 MMHG | OXYGEN SATURATION: 97 % | SYSTOLIC BLOOD PRESSURE: 134 MMHG | BODY MASS INDEX: 31.4 KG/M2 | HEART RATE: 94 BPM

## 2019-05-29 DIAGNOSIS — I48.20 CHRONIC ATRIAL FIBRILLATION (H): ICD-10-CM

## 2019-05-29 DIAGNOSIS — J43.9 PULMONARY EMPHYSEMA, UNSPECIFIED EMPHYSEMA TYPE (H): Chronic | ICD-10-CM

## 2019-05-29 DIAGNOSIS — J18.9 PNEUMONIA OF RIGHT MIDDLE LOBE DUE TO INFECTIOUS ORGANISM: Primary | ICD-10-CM

## 2019-05-29 DIAGNOSIS — J18.9 PNEUMONIA OF RIGHT MIDDLE LOBE DUE TO INFECTIOUS ORGANISM: ICD-10-CM

## 2019-05-29 PROCEDURE — 71046 X-RAY EXAM CHEST 2 VIEWS: CPT

## 2019-05-29 PROCEDURE — 99213 OFFICE O/P EST LOW 20 MIN: CPT | Performed by: NURSE PRACTITIONER

## 2019-05-29 RX ORDER — WARFARIN SODIUM 2.5 MG/1
TABLET ORAL
Qty: 270 TABLET | Refills: 1 | Status: SHIPPED | OUTPATIENT
Start: 2019-05-29 | End: 2020-01-02

## 2019-05-29 RX ORDER — WARFARIN SODIUM 2.5 MG/1
TABLET ORAL
Qty: 40 TABLET | Refills: 0 | OUTPATIENT
Start: 2019-05-29

## 2019-05-29 ASSESSMENT — MIFFLIN-ST. JEOR: SCORE: 1707.01

## 2019-05-29 ASSESSMENT — PAIN SCALES - GENERAL: PAINLEVEL: NO PAIN (0)

## 2019-05-29 NOTE — PROGRESS NOTES
Subjective     Keith Desir is a 73 year old male who presents to clinic today for the following health issues:    HPI     Chief Complaint   Patient presents with     Cough     Fup from 4/16 visit with Dr. Mederos     Patient with emphysema, T2DM, A Fib, CHF hospitalized for 1 night in April for Influenza. Responded quickly to treatment and notes that his SOB and cough are at his baseline. He is using Spiriva twice weekly and Albuterol twice/day.    Patient Active Problem List   Diagnosis     Hyperlipidemia with target LDL less than 100     Tobacco abuse     Chronic atrial fibrillation (HCC)     Renal hypertension     Advanced directives, counseling/discussion     Health Care Home     CHF (NYHA class II, ACC/AHA stage C) (H)     Polycythemia secondary to smoking     Elevated alkaline phosphatase level     Long term current use of anticoagulant therapy     Non morbid obesity due to excess calories     Type 2 diabetes mellitus without complication, without long-term current use of insulin (H)     Pulmonary emphysema, unspecified emphysema type (H)     NAFLD (nonalcoholic fatty liver disease)     CKD (chronic kidney disease) stage 2, GFR 60-89 ml/min     Varicose veins of legs     Past Surgical History:   Procedure Laterality Date     ANGIOGRAM  8/2006    normal     TONSILLECTOMY & ADENOIDECTOMY         Social History     Tobacco Use     Smoking status: Current Every Day Smoker     Packs/day: 1.00     Years: 45.00     Pack years: 45.00     Types: Cigarettes     Smokeless tobacco: Never Used   Substance Use Topics     Alcohol use: Yes     Alcohol/week: 0.0 - 2.5 oz     Family History   Problem Relation Age of Onset     Diabetes Mother      Cerebrovascular Disease Father      Heart Disease Brother         pacer      Cancer No family hx of            Reviewed and updated as needed this visit by Provider         Review of Systems   ROS COMP: Constitutional, HEENT, cardiovascular, pulmonary, gi and gu systems are negative,  "except as otherwise noted.      Objective    /84 (BP Location: Right arm, Patient Position: Chair, Cuff Size: Adult Large)   Pulse 94   Temp 97  F (36.1  C) (Oral)   Ht 1.753 m (5' 9\")   Wt 96.2 kg (212 lb)   SpO2 97%   BMI 31.31 kg/m    Body mass index is 31.31 kg/m .  Physical Exam   GENERAL: healthy, alert and no distress  RESP: lungs clear to auscultation - no rales, rhonchi or wheezes  CV: irregularly irregular rhythm, normal S1 S2, no S3 or S4 and no murmur, click or rub  PSYCH: mentation appears normal, affect normal/bright    Diagnostic Test Results:  Labs reviewed in Epic  No results found for this or any previous visit (from the past 24 hour(s)).        Assessment & Plan     1. Pneumonia of right middle lobe due to infectious organism (H)  Follow-up CXR today to ensure resolution  - XR Chest 2 Views; Future    2. Pulmonary emphysema, unspecified emphysema type (H)  Patient using inhalers incorrectly. Counseled to use Spiriva daily regardless of symptoms and Albuterol PRN cough, wheeze, etc.    3. Chronic atrial fibrillation (HCC)  Medications refilled  - warfarin (COUMADIN) 2.5 MG tablet; Take 5mg (2 tabs) on Tuesdays, Thursdays and Saturdays, take 7.5mg (3 tabs) all other days or as directed by INR clinic  Dispense: 270 tablet; Refill: 1       See Patient Instructions    Return in about 6 weeks (around 7/10/2019) for as scheduled with Dr. Mederos.    IDANIA Hoffman [facility]        "

## 2019-05-29 NOTE — TELEPHONE ENCOUNTER
Outpatient Medication Detail      Disp Refills Start End WILMAN   warfarin (COUMADIN) 2.5 MG tablet 270 tablet 1 5/29/2019  No   Sig: Take 5mg (2 tabs) on Tuesdays, Thursdays and Saturdays, take 7.5mg (3 tabs) all other days or as directed by INR clinic   Sent to pharmacy as: warfarin (COUMADIN) 2.5 MG tablet   Class: E-Prescribe   Order: [id number]   E-Prescribing Status: Receipt confirmed by pharmacy (5/29/2019 11:17 AM CDT)   Printout Tracking     External Result Report   Medication Administration Instructions     Take 5mg (2 tabs) on Tuesdays, Thursdays and Saturdays, take 7.5mg (3 tabs) all other days or as directed by INR clinic   Pharmacy     [facility] - [place], [place] - [address] [place] [address]     Rx sent today by care team, to requesting pharmacy. Closing duplicate request.    Brandi Scott RN, BSN

## 2019-06-01 DIAGNOSIS — I50.9 CHF (NYHA CLASS II, ACC/AHA STAGE C) (H): ICD-10-CM

## 2019-06-01 DIAGNOSIS — E11.9 TYPE 2 DIABETES MELLITUS WITHOUT COMPLICATION, WITHOUT LONG-TERM CURRENT USE OF INSULIN (H): ICD-10-CM

## 2019-06-01 LAB — RETINOPATHY: NORMAL

## 2019-06-03 RX ORDER — LISINOPRIL 30 MG/1
TABLET ORAL
Qty: 90 TABLET | Refills: 2 | Status: SHIPPED | OUTPATIENT
Start: 2019-06-03 | End: 2020-02-17

## 2019-06-27 ENCOUNTER — ANTICOAGULATION THERAPY VISIT (OUTPATIENT)
Dept: NURSING | Facility: CLINIC | Age: 74
End: 2019-06-27
Payer: MEDICARE

## 2019-06-27 DIAGNOSIS — I48.20 CHRONIC ATRIAL FIBRILLATION (H): ICD-10-CM

## 2019-06-27 DIAGNOSIS — Z79.01 LONG TERM CURRENT USE OF ANTICOAGULANT THERAPY: ICD-10-CM

## 2019-06-27 LAB — INR POINT OF CARE: 2.4 (ref 0.86–1.14)

## 2019-06-27 PROCEDURE — 85610 PROTHROMBIN TIME: CPT | Mod: QW

## 2019-06-27 PROCEDURE — 99207 ZZC NO CHARGE NURSE ONLY: CPT

## 2019-06-27 PROCEDURE — 36416 COLLJ CAPILLARY BLOOD SPEC: CPT

## 2019-06-27 NOTE — PROGRESS NOTES
ANTICOAGULATION FOLLOW-UP CLINIC VISIT    Patient Name:  Keith Desir  Date:  2019  Contact Type:  Face to Face    SUBJECTIVE:  Patient Findings         Clinical Outcomes     Negatives:   Major bleeding event, Thromboembolic event, Anticoagulation-related hospital admission, Anticoagulation-related ED visit, Anticoagulation-related fatality           OBJECTIVE    INR Protime   Date Value Ref Range Status   2019 2.4 (A) 0.86 - 1.14 Final       ASSESSMENT / PLAN  INR assessment THER    Recheck INR In: 6 WEEKS    INR Location Clinic      Anticoagulation Summary  As of 2019    INR goal:   2.0-3.0   TTR:   53.6 % (3.2 y)   INR used for dosin.4 (2019)   Warfarin maintenance plan:   5 mg (2.5 mg x 2) every Tue, Thu, Sat; 7.5 mg (2.5 mg x 3) all other days   Full warfarin instructions:   5 mg every Tue, Thu, Sat; 7.5 mg all other days   Weekly warfarin total:   45 mg   No change documented:   Dot Ziegler RN   Plan last modified:   Dot Ziegler RN (2018)   Next INR check:   2019   Target end date:       Indications    Long term current use of anticoagulant therapy [Z79.01]  Chronic atrial fibrillation (HCC) [I48.2]             Anticoagulation Episode Summary     INR check location:       Preferred lab:       Send INR reminders to:   [facility]INT[facility]    Comments:   don't print AVS unless change in dosing      Anticoagulation Care Providers     Provider Role Specialty Phone number    Cayla Gregorio MD [facility] [phone]            See the Encounter Report to view Anticoagulation Flowsheet and Dosing Calendar (Go to Encounters tab in chart review, and find the Anticoagulation Therapy Visit)    Therapeutic INR 2.4. Will continue maintenance dose and recheck in 6 week(s).      Patient states negative for signs and symptoms of bleeding or blood clots, changes in medication, changes in diet, any signs of infection or antibiotic use, anything new  OTC or herbal medications, any missed or extra doses of the warfarin.    Patient informed of the symptoms to be seen for either by INR nurse or ER.      Dot Ziegler RN

## 2019-07-02 ENCOUNTER — OFFICE VISIT (OUTPATIENT)
Dept: OPTOMETRY | Facility: CLINIC | Age: 74
End: 2019-07-02
Payer: MEDICARE

## 2019-07-02 DIAGNOSIS — E11.9 TYPE 2 DIABETES MELLITUS WITHOUT COMPLICATION, WITHOUT LONG-TERM CURRENT USE OF INSULIN (H): Primary | ICD-10-CM

## 2019-07-02 DIAGNOSIS — H52.03 HYPEROPIA, BILATERAL: ICD-10-CM

## 2019-07-02 DIAGNOSIS — H52.223 REGULAR ASTIGMATISM OF BOTH EYES: ICD-10-CM

## 2019-07-02 DIAGNOSIS — H52.4 PRESBYOPIA: ICD-10-CM

## 2019-07-02 PROCEDURE — 92014 COMPRE OPH EXAM EST PT 1/>: CPT | Performed by: OPTOMETRIST

## 2019-07-02 ASSESSMENT — REFRACTION_MANIFEST
OD_ADD: +2.00
OS_CYLINDER: +1.25
OD_AXIS: 109
OS_ADD: +2.00
OD_CYLINDER: +0.25
OD_CYLINDER: +0.25
OS_SPHERE: +1.00
OD_AXIS: 105
OS_CYLINDER: +1.00
OS_AXIS: 105
OD_SPHERE: +1.75
OD_SPHERE: +1.75
OS_SPHERE: +1.00
OS_AXIS: 097
METHOD_AUTOREFRACTION: 1

## 2019-07-02 ASSESSMENT — REFRACTION_WEARINGRX
OD_SPHERE: +2.75
OS_ADD: +2.00
OS_CYLINDER: +1.25
OD_AXIS: 117
OS_SPHERE: +2.00
OD_CYLINDER: +0.50
SPECS_TYPE: BIFOCAL
OS_AXIS: 085
OD_ADD: +2.00

## 2019-07-02 ASSESSMENT — CONF VISUAL FIELD
OD_NORMAL: 1
METHOD: COUNTING FINGERS
OS_NORMAL: 1

## 2019-07-02 ASSESSMENT — KERATOMETRY
OS_AXISANGLE2_DEGREES: 3
OS_K2POWER_DIOPTERS: 48.00
OD_K1POWER_DIOPTERS: 45.50
OD_AXISANGLE2_DEGREES: 13
OD_K2POWER_DIOPTERS: 46.75
OS_K1POWER_DIOPTERS: 46.00

## 2019-07-02 ASSESSMENT — TONOMETRY
IOP_METHOD: APPLANATION
OS_IOP_MMHG: 20
OD_IOP_MMHG: 20

## 2019-07-02 ASSESSMENT — VISUAL ACUITY
OD_PH_CC+: -3
METHOD: SNELLEN - LINEAR
OS_CC: 20/20
OD_CC: 20/70
CORRECTION_TYPE: GLASSES
OD_CC+: -1
OD_PH_CC: 20/30
OS_CC+: -1
OD_CC: 20/30-1
OS_CC: 20/30

## 2019-07-02 ASSESSMENT — SLIT LAMP EXAM - LIDS
COMMENTS: LID RETRACTION: LOWER LID
COMMENTS: LID RETRACTION: LOWER LID

## 2019-07-02 ASSESSMENT — EXTERNAL EXAM - RIGHT EYE: OD_EXAM: NORMAL

## 2019-07-02 ASSESSMENT — CUP TO DISC RATIO
OD_RATIO: 0.5
OS_RATIO: 0.5

## 2019-07-02 ASSESSMENT — EXTERNAL EXAM - LEFT EYE: OS_EXAM: NORMAL

## 2019-07-02 NOTE — PATIENT INSTRUCTIONS
Patient was advised of today's exam findings.  Fill glasses prescription  Allow 2 weeks to adapt to change in glasses  Monitor mild cataracts   Keep blood sugar under good control  Return in 1 year for diabetic eye exam      Diabetes weakens the blood vessels all over the body, including the eyes. Damage to the blood vessels in the eyes can cause swelling or bleeding into part of the eye (called the retina). This is called diabetic retinopathy (YING-tin--pu-thee). If not treated, this disease can cause vision loss or blindness.   Symptoms may include blurred or distorted vision, but many people have no symptoms. It's important to see your eye doctor regularly to check for problems.   Early treatment and good control can help protect your vision. Here are the things you can do to help prevent vision loss:      1. Keep your blood sugar levels under tight control.      2. Bring high blood pressure under control.      3. No smoking.      4. Have yearly dilated eye exams.      Kristy Rosales O.D.  [facility]   [address] [place] [address]  [phone]

## 2019-07-02 NOTE — PROGRESS NOTES
Chief Complaint   Patient presents with     Diabetic Eye Exam     Accompanied by wife, she's out in the [employer] [place]     Lab Results   Component Value Date    A1C 7.5 03/06/2019    A1C 6.8 08/08/2018    A1C 8.0 05/23/2018    A1C 6.2 11/01/2017    A1C 7.1 04/26/2017       Last Eye Exam: [place], [place] sure   Dilated Previously: Yes    What are you currently using to see?  Glasses, is wearing an older pair, the ones from his last exam don't work well for him -non adapt to executive style.    Distance Vision Acuity: Satisfied with vision, thinks that things are about the same     Near Vision Acuity: Satisfied with vision while reading  with glasses    Eye Comfort: good  Do you use eye drops? : No  Occupation or Hobbies: Retired     [employer]      Medical, surgical and family histories reviewed and updated 7/2/2019.       OBJECTIVE: See Ophthalmology exam    ASSESSMENT:    ICD-10-CM    1. Hyperopia, bilateral H52.03    2. Presbyopia H52.4    3. 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  Patient Instructions   Patient was advised of today's exam findings.  Fill glasses prescription  Allow 2 weeks to adapt to change in glasses  Monitor mild cataracts   Keep blood sugar under good control  Return in 1 year for diabetic eye exam      Diabetes weakens the blood vessels all over the body, including the eyes. Damage to the blood vessels in the eyes can cause swelling or bleeding into part of the eye (called the retina). This is called diabetic retinopathy (YING-tin--puh-thee). If not treated, this disease can cause vision loss or blindness.   Symptoms may include blurred or distorted vision, but many people have no symptoms. It's important to see your eye doctor regularly to check for problems.   Early treatment and good control can help protect your vision. Here are the things you can do to help  prevent vision loss:      1. Keep your blood sugar levels under tight control.      2. Bring high blood pressure under control.      3. No smoking.      4. Have yearly dilated eye exams.      Kristy Rosales O.D.  [facility]   [address] [place], [place] [id number] [phone]

## 2019-07-02 NOTE — LETTER
7/2/2019         RE: Keith Desir  [address] [facility] [address]        Dear Colleague,    Thank you [facility] your [facility], Keith Desir[facility]. No diabetic retinopathy was found at eye exam. See attached chart note for more details.        Again, thank you for allowing me to participate in the care of your patient.        Sincerely,        Kristy Rosales, OD

## 2019-07-22 ENCOUNTER — OFFICE VISIT (OUTPATIENT)
Dept: OPTOMETRY | Facility: CLINIC | Age: 74
End: 2019-07-22
Payer: MEDICARE

## 2019-07-22 ENCOUNTER — OFFICE VISIT (OUTPATIENT)
Dept: FAMILY MEDICINE | Facility: CLINIC | Age: 74
End: 2019-07-22
Payer: MEDICARE

## 2019-07-22 VITALS
OXYGEN SATURATION: 97 % | DIASTOLIC BLOOD PRESSURE: 82 MMHG | HEART RATE: 97 BPM | TEMPERATURE: 97 F | BODY MASS INDEX: 31.84 KG/M2 | HEIGHT: 69 IN | WEIGHT: 215 LBS | RESPIRATION RATE: 16 BRPM | SYSTOLIC BLOOD PRESSURE: 136 MMHG

## 2019-07-22 DIAGNOSIS — I12.9 RENAL HYPERTENSION: ICD-10-CM

## 2019-07-22 DIAGNOSIS — I48.20 CHRONIC ATRIAL FIBRILLATION (H): ICD-10-CM

## 2019-07-22 DIAGNOSIS — I50.9 CHF (NYHA CLASS II, ACC/AHA STAGE C) (H): ICD-10-CM

## 2019-07-22 DIAGNOSIS — D75.1 POLYCYTHEMIA SECONDARY TO SMOKING: ICD-10-CM

## 2019-07-22 DIAGNOSIS — Z72.0 TOBACCO ABUSE: ICD-10-CM

## 2019-07-22 DIAGNOSIS — E11.9 TYPE 2 DIABETES MELLITUS WITHOUT COMPLICATION, WITHOUT LONG-TERM CURRENT USE OF INSULIN (H): ICD-10-CM

## 2019-07-22 DIAGNOSIS — Z01.818 PREOP GENERAL PHYSICAL EXAM: Primary | ICD-10-CM

## 2019-07-22 DIAGNOSIS — H52.222 REGULAR ASTIGMATISM OF LEFT EYE: ICD-10-CM

## 2019-07-22 DIAGNOSIS — E78.5 HYPERLIPIDEMIA LDL GOAL <70: ICD-10-CM

## 2019-07-22 DIAGNOSIS — Z79.01 LONG TERM CURRENT USE OF ANTICOAGULANT THERAPY: ICD-10-CM

## 2019-07-22 DIAGNOSIS — H52.03 HYPEROPIA, BILATERAL: Primary | ICD-10-CM

## 2019-07-22 DIAGNOSIS — K76.0 NAFLD (NONALCOHOLIC FATTY LIVER DISEASE): ICD-10-CM

## 2019-07-22 DIAGNOSIS — J43.9 PULMONARY EMPHYSEMA, UNSPECIFIED EMPHYSEMA TYPE (H): Chronic | ICD-10-CM

## 2019-07-22 DIAGNOSIS — E66.09 NON MORBID OBESITY DUE TO EXCESS CALORIES: ICD-10-CM

## 2019-07-22 DIAGNOSIS — N18.2 CKD (CHRONIC KIDNEY DISEASE) STAGE 2, GFR 60-89 ML/MIN: ICD-10-CM

## 2019-07-22 LAB
CREAT UR-MCNC: 35 MG/DL
HBA1C MFR BLD: 7.4 % (ref 0–5.6)
HGB BLD-MCNC: 17.5 G/DL (ref 13.3–17.7)
MICROALBUMIN UR-MCNC: 12 MG/L
MICROALBUMIN/CREAT UR: 33.62 MG/G CR (ref 0–17)
POTASSIUM SERPL-SCNC: 4.9 MMOL/L (ref 3.4–5.3)

## 2019-07-22 PROCEDURE — 83036 HEMOGLOBIN GLYCOSYLATED A1C: CPT | Performed by: FAMILY MEDICINE

## 2019-07-22 PROCEDURE — 84132 ASSAY OF SERUM POTASSIUM: CPT | Performed by: FAMILY MEDICINE

## 2019-07-22 PROCEDURE — 82043 UR ALBUMIN QUANTITATIVE: CPT | Performed by: FAMILY MEDICINE

## 2019-07-22 PROCEDURE — 93000 ELECTROCARDIOGRAM COMPLETE: CPT | Performed by: FAMILY MEDICINE

## 2019-07-22 PROCEDURE — 99214 OFFICE O/P EST MOD 30 MIN: CPT | Performed by: FAMILY MEDICINE

## 2019-07-22 PROCEDURE — 36415 COLL VENOUS BLD VENIPUNCTURE: CPT | Performed by: FAMILY MEDICINE

## 2019-07-22 PROCEDURE — 99207 ZZC NO BILLABLE SERVICE THIS VISIT: CPT | Performed by: OPTOMETRIST

## 2019-07-22 PROCEDURE — 85018 HEMOGLOBIN: CPT | Performed by: FAMILY MEDICINE

## 2019-07-22 ASSESSMENT — KERATOMETRY
OS_K1POWER_DIOPTERS: 46.25
OD_K1POWER_DIOPTERS: 46.00
OD_K2POWER_DIOPTERS: 46.75
OS_K2POWER_DIOPTERS: 48.25
OD_AXISANGLE2_DEGREES: 10
OS_AXISANGLE2_DEGREES: 3

## 2019-07-22 ASSESSMENT — REFRACTION_MANIFEST
OS_CYLINDER: +0.75
OS_AXIS: 091
OD_SPHERE: +1.75
METHOD_AUTOREFRACTION: 1
OD_SPHERE: +2.00
OS_ADD: +2.50
OS_SPHERE: +1.25
OS_SPHERE: +1.25
OS_AXIS: 105
OD_ADD: +2.50
OD_CYLINDER: SPHERE
OS_CYLINDER: +0.75

## 2019-07-22 ASSESSMENT — REFRACTION_WEARINGRX
OS_AXIS: 105
SPECS_TYPE: BIFOCAL
OD_AXIS: 105
OS_SPHERE: +1.00
OS_SPHERE: +2.00
OD_SPHERE: +1.75
SPECS_TYPE: BIFOCAL
OD_CYLINDER: +0.50
OD_CYLINDER: +0.25
OS_CYLINDER: +1.25
OS_ADD: +2.00
OS_AXIS: 085
OD_ADD: +1.95
OD_SPHERE: +2.75
OS_ADD: 1.96
OD_AXIS: 113
OD_ADD: +2.00
OS_CYLINDER: +1.25

## 2019-07-22 ASSESSMENT — VISUAL ACUITY
OS_CC: 20/25
CORRECTION_TYPE: GLASSES
OD_CC: 20/25
OS_CC+: -2
OD_CC: 20/30+3
OD_CC+: +2
METHOD: SNELLEN - LINEAR
OS_CC: 20/40-1

## 2019-07-22 ASSESSMENT — MIFFLIN-ST. JEOR: SCORE: 1720.61

## 2019-07-22 NOTE — PATIENT INSTRUCTIONS
Before Your Surgery      Call your surgeon if there is any change in your health. This includes signs of a cold or flu (such as a sore throat, runny nose, cough, rash or fever).    Do not smoke, drink alcohol or take over the counter medicine (unless your surgeon or primary care doctor tells you to) for the 24 hours before and after surgery.    If you take prescribed drugs: Follow your doctor s orders about which medicines to take and which to stop until after surgery.    Eating and drinking prior to surgery: follow the instructions from your surgeon    Take a shower or bath the night before surgery. Use the soap your surgeon gave you to gently clean your skin. If you do not have soap from your surgeon, use your regular soap. Do not shave or scrub the surgery site.  Wear clean pajamas and have clean sheets on your bed.           Please call DR Jiang about stopping coumadin   Nany Mederos MD

## 2019-07-22 NOTE — PATIENT INSTRUCTIONS
Allow 2 weeks to adapt to change in glasses.  Return to clinic as needed.    Kristy Rosales O.D.  [facility]   [address] [place], [place] [id number] [phone]

## 2019-07-22 NOTE — PROGRESS NOTES
[facility]  [address] [place] [address]  [place] [address]  [phone]  Dept: [phone]    PRE-OP EVALUATION:  Today's date: 2019    Keith Desir (: 1945) presents for pre-operative evaluation assessment as requested by Dr. Desir.  He requires evaluation and anesthesia risk assessment prior to undergoing surgery/procedure for treatment of Colonoscopy .    Proposed Surgery/ Procedure: Colonoscopy  Date of Surgery/ Procedure: 19  Time of Surgery/ Procedure: 7:00am  Hospital/Surgical Facility: [facility]    Primary Physician: Cayla Gregorio  Type of Anesthesia Anticipated: to be determined    Patient has a Health Care Directive or Living Will:  NO    1. NO - Do you have a history of heart attack, stroke, stent, bypass or surgery on an artery in the head, neck, heart or legs?  2. No - Do you ever have any pain or discomfort in your chest?has atrial fibrillation  3. NO - Do you have a history of  Heart Failure?  4. YES - Are you troubled by shortness of breath when: walking on the level, up a slight hill or at night?at Baseline  5. NO - Do you currently have a cold, bronchitis or other respiratory infection?  6. YES - Do you have a cough, shortness of breath or wheezing?has COPD and it is at Baseline  7. NO - Do you sometimes get pains in the calves of your legs when you walk?  8. NO - Do you or anyone in your family have previous history of blood clots?  9. NO - Do you or does anyone in your family have a serious bleeding problem such as prolonged bleeding following surgeries or cuts?  10. NO - Have you ever had problems with anemia or been told to take iron pills?  11. NO - Have you had any abnormal blood loss such as black, tarry or bloody stools, or abnormal vaginal bleeding?  12. NO - Have you ever had a blood transfusion?  13. NO - Have you or any of your relatives ever had problems with anesthesia?  14. NO - Do you have sleep apnea, excessive snoring or daytime  drowsiness?  15. NO - Do you have any prosthetic heart valves?  16. NO - Do you have prosthetic joints?  17. NO - Is there any chance that you may be pregnant?      HPI:     HPI related to upcoming procedure: pt scheduled for  Colonoscopy as he had a Positive FIT      A-FIB - Patient has a longstanding history of chronic A-fib currently on rate control. Current treatment regimen includes Warfarin for stroke prevention and denies significant symptoms of lightheadedness, palpitations or dyspnea.     CHF - Patient has a longstanding history of moderate-severe CHF. Exacerbating conditions include COPD. Currently the patient's condition is same. Current treatment regimen includes see medicines sheet. The patient denies chest pain, edema, orthopnea, SOB or recent weight gain, EKG atrial fib.     COPD - Patient has a longstanding history of moderate-severe COPD . Patient has been doing well overall noting at baseline and continues on medication regimen consisting of Inhalers without adverse reactions or side effects.    DIABETES - Patient has a longstanding history of DiabetesType Type II . Patient is being treated with diet and oral agents and denies significant side effects. Control has been fair. Complicating factors include but are not limited to: hypertension, hyperlipidemia and chronic kidney disease.     HYPERLIPIDEMIA - Patient has a long history of significant Hyperlipidemia requiring medication for treatment with recent good control. Patient reports no problems or side effects with the medication.     HYPERTENSION - Patient has longstanding history of HTN , currently denies any symptoms referable to elevated blood pressure. Specifically denies chest pain, palpitations, dyspnea, orthopnea, PND or peripheral edema. Blood pressure readings have been in normal range. Current medication regimen is as listed below. Patient denies any side effects of medication.       MEDICAL HISTORY:        Past Medical History:    Diagnosis Date     AF (atrial fibrillation) (H)      CHF (congestive heart failure), NYHA class II (H)      CKD (chronic kidney disease) stage 2, GFR 60-89 ml/min 2018     COPD (chronic obstructive pulmonary disease) (H)      Diabetes mellitus, type 2 (H) 4/22/2015     Elevated alkaline phosphatase level      Fracture, scapula      HDL deficiency 4/10/2013     Hepatitis C      HTN (hypertension)      Hyperlipidemia      Morbid obesity (H)      NAFLD (nonalcoholic fatty liver disease)      Polycythemia secondary to smoking      Varicose veins of legs 8/8/2018     Past Surgical History:   Procedure Laterality Date     ANGIOGRAM  8/2006    normal     TONSILLECTOMY & ADENOIDECTOMY       Current Outpatient Medications   Medication Sig Dispense Refill     albuterol (2.5 MG/3ML) 0.083% neb solution Take 1 vial (2.5 mg) by nebulization every 4 hours as needed for shortness of breath / dyspnea       albuterol (PROAIR HFA/PROVENTIL HFA/VENTOLIN HFA) 108 (90 Base) MCG/ACT Inhaler Inhale 1-2 puffs into the lungs every 4 hours as needed for shortness of breath / dyspnea 1 Inhaler 11     ASPIRIN NOT PRESCRIBED, INTENTIONAL, Antiplatelet medication not prescribed intentionally due to Current anticoagulant therapy (warfarin/enoxaparin)       atorvastatin (LIPITOR) 20 MG tablet Take 1 tablet (20 mg) by mouth daily 90 tablet 3     blood glucose (NO BRAND SPECIFIED) lancing device Use to test blood sugars 3 times daily or as directed. 1 each 3     digoxin (DIGOX) 125 MCG tablet Take 1 tablet (125 mcg) by mouth daily 90 tablet 3     furosemide (LASIX) 20 MG tablet Take 1 tablet (20 mg) by mouth 1 times daily 90 tablet 1     glipiZIDE (GLUCOTROL) 5 MG tablet Take 1 tablet (5 mg) by mouth 2 times daily (before meals) 30 tablet 0     lisinopril (PRINIVIL/ZESTRIL) 30 MG tablet TAKE 1 TABLET BY MOUTH ONCE DAILY 90 tablet 2     metFORMIN (GLUCOPHAGE) 1000 MG tablet Take 1 tablet (1,000 mg) by mouth 2 times daily (with meals) 180 tablet 3      metoprolol tartrate (LOPRESSOR) 100 MG tablet Take 2 tablets (200 mg) by mouth 2 times daily 360 tablet 3     order for DME Equipment being ordered: Blood glucose monitor per insurance formulary; blood glucose test strips per formulary for use 3 time daily; lancets per formulary for use 3 time daily 300 each 3     tiotropium (SPIRIVA HANDIHALER) 18 MCG inhaled capsule INHALE 1 CAPSULE (18 MCG) INTO THE LUNGS DAILY 90 capsule 3     warfarin (COUMADIN) 2.5 MG tablet Take 5mg (2 tabs) on Tuesdays, Thursdays and Saturdays, take 7.5mg (3 tabs) all other days or as directed by INR clinic 270 tablet 1     nitroGLYcerin (NITROSTAT) 0.4 MG sublingual tablet Place 0.4 mg under the tongue Place 1 tablet under the tongue every 5 minutes if needed for Chest Pain       OTC products: None, except as noted above    Allergies   Allergen Reactions     Nkda [No Known Drug Allergies]       Latex Allergy: NO    Social History     Tobacco Use     Smoking status: Current Every Day Smoker     Packs/day: 1.00     Years: 45.00     Pack years: 45.00     Types: Cigarettes     Smokeless tobacco: Never Used   Substance Use Topics     Alcohol use: Yes     Alcohol/week: 0.0 - 2.5 oz     History   Drug Use No     Past medical history, family history, medications and social history reviewed today and updated in EPIC.    Social History     Socioeconomic History     Marital status:      Spouse name: Melissa     Number of children: 4     Years of education: 7     Highest education level: Not on file   Occupational History     Occupation: foundry work     Employer: DISABLED     Employer: RETIRED   Social Needs     Financial resource strain: Not on file     Food insecurity:     Worry: Not on file     Inability: Not on file     Transportation needs:     Medical: Not on file     Non-medical: Not on file   Tobacco Use     Smoking status: Current Every Day Smoker     Packs/day: 1.00     Years: 45.00     Pack years: 45.00     Types: Cigarettes      Smokeless tobacco: Never Used   Substance and Sexual Activity     Alcohol use: Yes     Alcohol/week: 0.0 - 2.5 oz     Drug use: No     Sexual activity: Not Currently     Partners: Female   Lifestyle     Physical activity:     Days per week: Not on file     Minutes per session: Not on file     Stress: Not on file   Relationships     Social connections:     Talks on phone: Not on file     Gets together: Not on file     Attends [employer] service: Not on file     Active member of club or organization: Not on file     Attends meetings of clubs or organizations: Not on file     Relationship status: Not on file     Intimate partner violence:     Fear of current or ex partner: Not on file     Emotionally abused: Not on file     Physically abused: Not on file     Forced sexual activity: Not on file   Other Topics Concern     Parent/sibling w/ CABG, MI or angioplasty before 65F 55M? Not Asked   Social History Narrative     Not on file     Family History   Problem Relation Age of Onset     Diabetes Mother      Cerebrovascular Disease Father      Heart Disease Brother         pacer      Cancer No family hx of      Glaucoma No family hx of      Macular Degeneration No family hx of        REVIEW OF SYSTEMS:   CONSTITUTIONAL: NEGATIVE for fever, chills, change in weight  INTEGUMENTARY/SKIN: NEGATIVE for worrisome rashes, moles or lesions  EYES: NEGATIVE for vision changes or irritation  ENT/MOUTH: NEGATIVE for ear, mouth and throat problems  RESP: NEGATIVE for significant cough or SOB  CV: NEGATIVE for chest pain, palpitations or peripheral edema  GI: NEGATIVE for nausea, abdominal pain, heartburn, or change in bowel habits  : NEGATIVE for frequency, dysuria, or hematuria  MUSCULOSKELETAL: NEGATIVE for significant arthralgias or myalgia  NEURO: NEGATIVE for weakness, dizziness or paresthesias  ENDOCRINE: NEGATIVE for temperature intolerance, skin/hair changes  HEME: NEGATIVE for bleeding problems  PSYCHIATRIC: NEGATIVE for  "changes in mood or affect    EXAM:   /82   Pulse 97   Temp 97  F (36.1  C) (Oral)   Resp 16   Ht 1.753 m (5' 9\")   Wt 97.5 kg (215 lb)   SpO2 97%   BMI 31.75 kg/m      GENERAL APPEARANCE: healthy, alert and no distress     EYES: EOMI,  PERRL     HENT: ear canals and TM's normal and nose and mouth without ulcers or lesions     NECK: no adenopathy, no asymmetry, masses, or scars and thyroid normal to palpation     RESP: lungs clear to auscultation - no rales, rhonchi or wheezes     CV: irregular rhythm at stable rate-repeat 86,no Murmurs     ABDOMEN:  soft, nontender, no HSM or masses and bowel sounds normal     MS: extremities normal- no gross deformities noted, no evidence of inflammation in joints, FROM in all extremities.     SKIN: no suspicious lesions or rashes     NEURO: Normal strength and tone, sensory exam grossly normal, mentation intact and speech normal     PSYCH: mentation appears normal. and affect normal/bright     LYMPHATICS: No cervical adenopathy    DIAGNOSTICS:     EKG: atrial fibrillation, rate stable , unchanged from previous tracings  Labs Drawn and in Process:   Unresulted Labs Ordered in the Past 30 Days of this Admission     Date and Time Order Name Status Description    7/22/2019 1116 ALBUMIN RANDOM URINE QUANTITATIVE In process           Recent Labs   Lab Test 06/27/19 05/14/19 03/06/19  0849  08/08/18  0754  05/23/18  0839  11/01/17  0715   HGB  --   --   --  16.9  --   --   --   --   --  17.8*   PLT  --   --   --  229  --   --   --   --   --  211   INR 2.4* 3.2*   < >  --    < >  --    < >  --    < >  --    NA  --   --   --  139  --   --   --  137  --  138   POTASSIUM  --   --   --  4.4  --   --   --  4.9  --  4.5   CR  --   --   --  0.89  --   --   --  0.91  --  0.86   A1C  --   --   --  7.5*  --  6.8*  --  8.0*  --  6.2*    < > = values in this interval not displayed.    Past medical history, family history, medications and social history reviewed today and updated in " EPIC.        IMPRESSION:   Reason for surgery/procedure: as above  Diagnosis/reason for consult: preop    The proposed surgical procedure is considered INTERMEDIATE risk.    REVISED CARDIAC RISK INDEX  The patient has the following serious cardiovascular risks for perioperative complications such as (MI, PE, VFib and 3  AV Block):  Congestive Heart Failure (pulmonary edema, PND, s3 jenae, CXR with pulmonary congestion, basilar rales)  INTERPRETATION: 1 risks: Class II (low risk - 0.9% complication rate)    The patient has the following additional risks for perioperative complications:  The ASCVD Risk score (Carlos BEY Jr., et al., 2013) failed to calculate for the following reasons:    The valid total cholesterol range is 130 to 320 mg/dL      ICD-10-CM    1. Preop general physical exam Z01.818 Albumin Random Urine Quantitative with Creat Ratio     EKG 12-lead complete w/read - Clinics     Potassium     Hemoglobin   2. Type 2 diabetes mellitus without complication, without long-term current use of insulin (H) E11.9 Hemoglobin A1c   3. Tobacco abuse Z72.0    4. Renal hypertension I12.9    5. Pulmonary emphysema, unspecified emphysema type (H) J43.9    6. Polycythemia secondary to smoking D75.1    7. CHF (NYHA class II, ACC/AHA stage C) (H) I50.9    8. Non morbid obesity due to excess calories E66.09    9. NAFLD (nonalcoholic fatty liver disease) K76.0    10. Long term current use of anticoagulant therapy Z79.01    11. Hyperlipidemia LDL goal <70 E78.5    12. CKD (chronic kidney disease) stage 2, GFR 60-89 ml/min N18.2    13. Chronic atrial fibrillation (HCC) I48.2        RECOMMENDATIONS:             --Patient is to take all scheduled medications on the day of surgery EXCEPT for modifications listed below.    Anticoagulant or Antiplatelet Medication Use  WARFARIN: Thromboembolic risk is moderate (e.g. DVT/PE 3-12 months ago, recurrent DVT/PE, A fib and [id number] = 5 to 6 without prior CVA/TIA), hold warfarin 5 days  (INR >/= 2) without bridging before procedure.    Pt can also  discuss with His Cardiologist    ACE Inhibitor or Angiotensin Receptor Blocker (ARB) Use  Ace inhibitor or Angiotensin Receptor Blocker (ARB) and should HOLD this medication for the 24 hours prior to surgery.      APPROVAL GIVEN to proceed with proposed procedure, without further diagnostic evaluation          Signed Electronically by: Nany Mederos MD         Copy of this evaluation report is provided to requesting physician.    [place] Preop Guidelines    Revised Cardiac Risk Index

## 2019-07-22 NOTE — PROGRESS NOTES
Chief Complaint   Patient presents with     Glasses Problems     Glasses/RX check        Source of glasses: Had glasses made here  How long have you tried the glasses:Had them about 3 weeks   What is not working with glasses: Not able to read the paper. The distance seems fine, but the near is not good. Can't see score in the paper like with old glasses     Maira Tijerina Optometric Assistant       OBJECTIVE: See Ophthalmology exam    ASSESSMENT:    ICD-10-CM    1. Hyperopia, bilateral H52.03    2. Regular astigmatism of left eye H52.222      PLAN:  Patient Instructions   Allow 2 weeks to adapt to change in glasses.  Return to clinic as needed.    Kristy Rosales O.D.  [facility]   [address] [place] [address] [phone]

## 2019-08-13 ENCOUNTER — ANTICOAGULATION THERAPY VISIT (OUTPATIENT)
Dept: NURSING | Facility: CLINIC | Age: 74
End: 2019-08-13
Payer: MEDICARE

## 2019-08-13 DIAGNOSIS — I48.20 CHRONIC ATRIAL FIBRILLATION (H): ICD-10-CM

## 2019-08-13 DIAGNOSIS — Z79.01 LONG TERM CURRENT USE OF ANTICOAGULANT THERAPY: ICD-10-CM

## 2019-08-13 LAB — INR POINT OF CARE: 2.6 (ref 0.86–1.14)

## 2019-08-13 PROCEDURE — 85610 PROTHROMBIN TIME: CPT | Mod: QW

## 2019-08-13 PROCEDURE — 99207 ZZC NO CHARGE NURSE ONLY: CPT

## 2019-08-13 PROCEDURE — 36416 COLLJ CAPILLARY BLOOD SPEC: CPT

## 2019-08-13 NOTE — PROGRESS NOTES
ANTICOAGULATION FOLLOW-UP CLINIC VISIT    Patient Name:  Keith Desir  Date:  2019  Contact Type:  Face to Face    SUBJECTIVE:  Patient Findings         Clinical Outcomes     Negatives:   Major bleeding event, Thromboembolic event, Anticoagulation-related hospital admission, Anticoagulation-related ED visit, Anticoagulation-related fatality           OBJECTIVE    INR Protime   Date Value Ref Range Status   2019 2.6 (A) 0.86 - 1.14 Final       ASSESSMENT / PLAN  INR assessment THER    Recheck INR In: 6 WEEKS    INR Location Clinic      Anticoagulation Summary  As of 2019    INR goal:   2.0-3.0   TTR:   55.4 % (3.4 y)   INR used for dosin.6 (2019)   Warfarin maintenance plan:   5 mg (2.5 mg x 2) every Tue, Thu, Sat; 7.5 mg (2.5 mg x 3) all other days   Full warfarin instructions:   5 mg every Tue, Thu, Sat; 7.5 mg all other days   Weekly warfarin total:   45 mg   No change documented:   Dot Ziegler RN   Plan last modified:   Dot Ziegler RN (2018)   Next INR check:   2019   Target end date:       Indications    Long term current use of anticoagulant therapy [Z79.01]  Chronic atrial fibrillation (HCC) [I48.2]             Anticoagulation Episode Summary     INR check location:       Preferred lab:       Send INR reminders to:   AJ MCINTOSH    Comments:   don't print AVS unless change in dosing      Anticoagulation Care Providers     Provider Role Specialty Phone number    Cayla Gregorio MD [facility] [phone]            See the Encounter Report to view Anticoagulation Flowsheet and Dosing Calendar (Go to Encounters tab in chart review, and find the Anticoagulation Therapy Visit)    Therapeutic INR 2.8. Will continue maintenance dose and recheck in 6 week(s).    Patient states negative for signs and symptoms of bleeding or blood clots, changes in medication, changes in diet, any signs of infection or antibiotic use, anything new  OTC or herbal medications, any missed or extra doses of the warfarin.    Patient informed of the symptoms to be seen for either by INR nurse or ER.    Dot Ziegler RN

## 2019-08-27 ENCOUNTER — OFFICE VISIT (OUTPATIENT)
Dept: FAMILY MEDICINE | Facility: CLINIC | Age: 74
End: 2019-08-27
Payer: MEDICARE

## 2019-08-27 VITALS
WEIGHT: 210 LBS | HEIGHT: 69 IN | HEART RATE: 89 BPM | DIASTOLIC BLOOD PRESSURE: 80 MMHG | RESPIRATION RATE: 20 BRPM | OXYGEN SATURATION: 97 % | BODY MASS INDEX: 31.1 KG/M2 | TEMPERATURE: 96.9 F | SYSTOLIC BLOOD PRESSURE: 130 MMHG

## 2019-08-27 DIAGNOSIS — E11.9 TYPE 2 DIABETES MELLITUS WITHOUT COMPLICATION, WITHOUT LONG-TERM CURRENT USE OF INSULIN (H): ICD-10-CM

## 2019-08-27 DIAGNOSIS — Z00.00 ENCOUNTER FOR MEDICARE ANNUAL WELLNESS EXAM: Primary | ICD-10-CM

## 2019-08-27 DIAGNOSIS — I50.9 CHF (NYHA CLASS II, ACC/AHA STAGE C) (H): ICD-10-CM

## 2019-08-27 DIAGNOSIS — I48.20 CHRONIC ATRIAL FIBRILLATION (H): ICD-10-CM

## 2019-08-27 PROCEDURE — G0438 PPPS, INITIAL VISIT: HCPCS | Performed by: FAMILY MEDICINE

## 2019-08-27 RX ORDER — TIOTROPIUM BROMIDE 18 UG/1
CAPSULE ORAL; RESPIRATORY (INHALATION)
Qty: 90 CAPSULE | Refills: 3 | Status: CANCELLED | OUTPATIENT
Start: 2019-08-27

## 2019-08-27 RX ORDER — FUROSEMIDE 20 MG
TABLET ORAL
Qty: 90 TABLET | Refills: 1 | Status: SHIPPED | OUTPATIENT
Start: 2019-08-27 | End: 2020-02-26

## 2019-08-27 RX ORDER — METOPROLOL TARTRATE 100 MG
200 TABLET ORAL 2 TIMES DAILY
Qty: 360 TABLET | Refills: 3 | Status: CANCELLED | OUTPATIENT
Start: 2019-08-27

## 2019-08-27 RX ORDER — GLIPIZIDE 5 MG/1
5 TABLET ORAL
Qty: 180 TABLET | Refills: 3 | Status: SHIPPED | OUTPATIENT
Start: 2019-08-27 | End: 2020-08-26

## 2019-08-27 RX ORDER — DIGOXIN 125 MCG
125 TABLET ORAL DAILY
Qty: 90 TABLET | Refills: 3 | Status: CANCELLED | OUTPATIENT
Start: 2019-08-27

## 2019-08-27 RX ORDER — LISINOPRIL 30 MG/1
30 TABLET ORAL DAILY
Qty: 90 TABLET | Refills: 3 | Status: CANCELLED | OUTPATIENT
Start: 2019-08-27

## 2019-08-27 ASSESSMENT — MIFFLIN-ST. JEOR: SCORE: 1697.93

## 2019-08-27 ASSESSMENT — ACTIVITIES OF DAILY LIVING (ADL): CURRENT_FUNCTION: NO ASSISTANCE NEEDED

## 2019-08-27 NOTE — PATIENT INSTRUCTIONS
Patient Education   Personalized Prevention Plan  You are due for the preventive services outlined below.  Your care team is available to assist you in scheduling these services.  If you have already completed any of these items, please share that information with your care team to update in your medical record.  Health Maintenance Due   Topic Date Due     Zoster (Shingles) Vaccine (2 of 3) 06/19/2012     Annual Wellness Visit  04/07/2015     Heart Failure Action Plan  07/20/2019     Diabetic Foot Exam  08/08/2019     Basic Metabolic Panel  09/06/2019       Exercise for a Healthier Heart     Exercise with a friend. When activity is fun, you're more likely to stick with it.   You may wonder how you can improve the health of your heart. If you re thinking about exercise, you re on the right track. You don t need to become an athlete, but you do need a certain amount of brisk exercise to help strengthen your heart. If you have been diagnosed with a heart condition, your doctor may recommend exercise to help stabilize your condition. To help make exercise a habit, choose safe, fun activities.  Be sure to check with your healthcare provider before starting an exercise program.   Why exercise?  Exercising regularly offers many healthy rewards. It can help you do all of the following:    Improve your blood cholesterol level to help prevent further heart trouble    Lower your blood pressure to help prevent a stroke or heart attack    Control diabetes, or reduce your risk of getting this disease    Improve your heart and lung function    Reach and maintain a healthy weight    Make your muscles stronger and more limber so you can stay active    Prevent falls and fractures by slowing the loss of bone mass (osteoporosis)    Manage stress better    Reduce your blood pressure    Improve your sense of self and your body image  Exercise tips  Ease into your routine. Set small goals. Then build on them.  Exercise on most days. Aim  for a total of 150 or more minutes of moderate to  vigorous intensity activity each week. Consider 40 minutes, 3 to 4 times a week. For best results, activity should last for 40 minutes on average. It is OK to work up to the 40 minute period over time. Examples of moderate-intensity activity is walking 1 mile in 15 minutes or 30 to 45 minutes of yard work.  Step up your daily activity level. Along with your exercise program, try being more active throughout the day. Walk instead of drive. Do more household tasks or yard work.  Choose one or more activities you enjoy. Walking is one of the easiest things you can do. You can also try swimming, riding a bike, dancing, or taking an exercise class.  Stop exercising and call your doctor if you:    Have chest pain or feel dizzy or lightheaded    Feel burning, tightness, pressure, or heaviness in your chest, neck, shoulders, back, or arms    Have unusual shortness of breath    Have increased joint or muscle pain    Have palpitations or an irregular heartbeat   Date Last Reviewed: 5/1/2016 2000-2018 [employer]. [address] [place] [address]. All rights reserved. This information is not intended as a substitute for professional medical care. Always follow your healthcare professional's instructions.          Understanding USDA MyPlate  The USDA (U.S. Department of Agriculture) has guidelines to help you make healthy food choices. These are called MyPlate. MyPlate shows the food groups that make up healthy meals using the image of a place setting. Before you eat, think about the healthiest choices for what to put onto your plate or into your cup or bowl. To learn more about building a healthy plate, visit www.choosemyplate.gov.    The food groups    Fruits. Any fruit or 100% fruit juice counts as part of the Fruit Group. Fruits may be fresh, canned, frozen, or dried, and may be whole, cut-up, or pureed. Make half your plate fruits and  vegetables.    Vegetables. Any vegetable or 100% vegetable juice counts as a member of the Vegetable Group. Vegetables may be fresh, frozen, canned, or dried. They can be served raw or cooked and may be whole, cut-up, or mashed. Make half your plate fruits and vegetables.    Grains. All foods made from grains are part of the Grains Group. These include wheat, rice, oats, cornmeal, and barley such as bread, pasta, oatmeal, cereal, tortillas, and grits. Grains should be no more than a quarter of your plate. At least half of your grains should be whole grains.    Protein. This group includes meat, poultry, seafood, beans and peas, eggs, processed soy products (like tofu), nuts (including nut butters), and seeds. Make protein choices no more than a quarter of your plate. Meat and poultry choices should be lean or low fat.    Dairy. All fluid milk products and foods made from milk that contain calcium, like yogurt and cheese, are part of the Dairy Group. (Foods that have little calcium, such as cream, butter, and cream cheese, are not part of the group.) Most dairy choices should be low-fat or fat-free.    Oils. These are fats that are liquid at room temperature. They include canola, corn, olive, soybean, and sunflower oil. Foods that are mainly oil include mayonnaise, certain salad dressings, and soft margarines. You should have only 5 to 7 teaspoons of oils a day. You probably already get this much from the food you eat.  Date Last Reviewed: 8/1/2017 2000-2018 [employer]. [address] [place] [address], [place] [address]. All rights reserved. This information is not intended as a substitute for professional medical care. Always follow your healthcare professional's instructions.          Signs of Hearing Loss     Hearing much better with one ear can be a sign of hearing loss.     Hearing loss is a problem shared by many people. In fact, it is one of the most common health conditions, particularly as people  age. Most people over age 65 have some hearing loss, and by age 80, almost everyone does. Because hearing loss usually occurs slowly over the years, you may not realize your hearing ability has gotten worse.  Have your hearing checked  Contact your healthcare provider if you:    Have to strain to hear normal conversation    Have to watch other people s faces very carefully to follow what they re saying    Need to ask people to repeat what they ve said    Often misunderstand what people are saying    Turn the volume of the television or radio up so high that others complain    Feel that people are mumbling when they re talking to you    Find that the effort to hear leaves you feeling tired and irritated    Notice, when using the phone, that you hear better with one ear than the other  Date Last Reviewed: 12/1/2016 2000-2018 [employer]. [address] [place] [address], [place] [address]. All rights reserved. This information is not intended as a substitute for professional medical care. Always follow your healthcare professional's instructions.          It is recommended that you get a vaccination for shingles called Shingrix (given as 2 shots, 2 to 6 months apart), even if you have already had the Zostavax vaccine. Discuss getting the Shingix vaccine from your pharmacist, or schedule an ancillary shot visit here. Some insurances do not cover the cost of these vaccines.

## 2019-08-27 NOTE — PROGRESS NOTES
He is at risk for lack of exercise and has been provided with information to increase physical activity for the benefit of his well-being.  The patient was counseled and encouraged to consider modifying their diet and eating habits. He was provided with information on recommended healthy diet options.  The patient was provided with written information regarding signs of hearing loss.

## 2019-08-27 NOTE — LETTER
My Heart Failure Action Plan   Name: Keith Desir    YOB: 1945   Date: 8/27/2019    My doctor: Cayla Gregorio [address]  [place] [address] [phone]  My Diagnosis: Preserved (HFp)- EF:Over 50%   My Exercise Goal: 30 minutes daily  .     My Weight Plan:   Wt Readings from Last 2 Encounters:   08/27/19 95.3 kg (210 lb)   07/22/19 97.5 kg (215 lb)     Weigh yourself daily using the same scale. If you gain more than 2 pounds in 24 hours or 5 pounds in a week call the clinic    My Diet Goal: No added salt    Emergency Room Visits:    Our goal is to improve your quality of life and help you avoid a visit to the emergency room or hospital.  If we work together, we can achieve this goal. But, if you feel you need to call 911 or go to the emergency room, please do so.  If you go to the emergency room, please bring your list of medicines and your daily weight chart with you.       GREEN ZONE     Doing well today    Weight gained is no more than 2 pounds a day or 5 pounds a week.    No swelling in feet, ankles, legs or stomach.    No more swelling than usual.    No more trouble breathing than usual.    No change in my sleep.    No other problems. Actions:    I am doing fine.  I will take my medicine, follow my diet, see my doctor, exercise, and watch for symptoms.           YELLOW ZONE         Having a bad day or flare up    Weight gain of more than 2 pounds in one day or 5 pounds in one week.    New swelling in ankle, leg, knee or thigh.    Bloating in belly, pants feel tighter.    Swelling in hands or face.    Coughing or trouble breathing while walking or talking.    Harder to breathe last night.    Have trouble sleeping, wake up short of breath.    Much more tired than usual.    Not eating.    Pain in my chest or bad leg cramps.    Feel weak or dizzy. Actions:    I need to take action and call my doctor or nurse today.                 RED ZONE          Need medical care now    Weight gain of 5 pounds overnight.    Chest pain or pressure that does not go away.    Feel less alert.    Wheezing or have trouble breathing when at rest.    Cannot sleep lying down.    Cannot take my water pill.    Pass out or faint. Actions:    I need to call my doctor or nurse now!    Call 911 if I have chest pain or cannot breathe.

## 2019-08-27 NOTE — PROGRESS NOTES
"SUBJECTIVE:   Keith Desir is a 73 year old male who presents for Preventive Visit. Are you in the first 12 months of your Medicare coverage?  No    Healthy Habits:     In general, how would you rate your overall health?  Good    Frequency of exercise:  None    Do you usually eat at least 4 servings of fruit and vegetables a day, include whole grains    & fiber and avoid regularly eating high fat or \"junk\" foods?  No    Taking medications regularly:  Yes    Barriers to taking medications:  None    Medication side effects:  None    Ability to successfully perform activities of daily living:  No assistance needed    Hearing Impairment:  Difficulty following a conversation in a noisy restaurant or crowded room    In the past 6 months, have you been bothered by leaking of urine?  No    In general, how would you rate your overall mental or emotional health?  Good      PHQ-2 Total Score: 0    Additional concerns today:  Yes    Do you feel safe in your environment? Yes    Do you have a Health Care Directive? No: Advance care planning was reviewed with patient; patient declined at this time.      Fall risk  Fallen 2 or more times in the past year?: No  Any fall with injury in the past year?: No    Cognitive Screening   1) Repeat 3 items (Leader, Season, Table)    2) Clock draw: NORMAL  3) 3 item recall: Recalls 2 objects   Results: NORMAL clock, 1-2 items recalled: COGNITIVE IMPAIRMENT LESS LIKELY    Mini-CogTM Copyright [employer]. Licensed by the author for use in [facility]; reprinted with permission (magaly@.[facility]). All rights reserved.      Do you have sleep apnea, excessive snoring or daytime drowsiness?: no    Reviewed and updated as needed this visit by clinical staff  Tobacco  Allergies  Meds  Med Hx  Surg Hx  Fam Hx  Soc Hx        Reviewed and updated as needed this visit by Provider        Social History     Tobacco Use     Smoking status: Current Every Day Smoker     Packs/day: 1.00     Years: " 45.00     Pack years: 45.00     Types: Cigarettes     Smokeless tobacco: Never Used   Substance Use Topics     Alcohol use: Yes     Alcohol/week: 0.0 - 2.5 oz         Alcohol Use 4/7/2014   Prescreen: >3 drinks/day or >7 drinks/week? The patient does not drink >3 drinks per day nor >7 drinks per week.     Current providers sharing in care for this patient include:     Patient Care Team:  Cayla Gregorio MD as PCP - General  Cayla Gregorio MD as Assigned PCP    The following health maintenance items are reviewed in Epic and correct as of today:  Health Maintenance   Topic Date Due     ZOSTER IMMUNIZATION (2 of 3) 06/19/2012     MEDICARE ANNUAL WELLNESS VISIT  04/07/2015     HF ACTION PLAN  07/20/2019     DIABETIC FOOT EXAM  08/08/2019     BMP  09/06/2019     INFLUENZA VACCINE (1) 09/01/2019     OP ANNUAL INR REFERRAL  10/12/2019     TSH W/FREE T4 REFLEX  11/01/2019     A1C  01/22/2020     ALT  03/06/2020     LIPID  03/06/2020     FALL RISK ASSESSMENT  03/06/2020     CBC  03/06/2020     DIGOXIN  04/16/2020     FIT  04/18/2020     EYE EXAM  07/02/2020     MICROALBUMIN  07/22/2020     DTAP/TDAP/TD IMMUNIZATION (3 - Td) 07/12/2021     ADVANCE CARE PLANNING  07/20/2021     SPIROMETRY  Completed     COPD ACTION PLAN  Completed     PHQ-2  Completed     PNEUMOCOCCAL IMMUNIZATION 65+ LOW/MEDIUM RISK  Completed     AORTIC ANEURYSM SCREENING (SYSTEM ASSIGNED)  Completed     IPV IMMUNIZATION  Aged Out     MENINGITIS IMMUNIZATION  Aged Out     Patient Active Problem List   Diagnosis     Hyperlipidemia LDL goal <70     Tobacco abuse     Chronic atrial fibrillation (HCC)     Renal hypertension     Advanced directives, counseling/discussion     Health Care Home     CHF (NYHA class II, ACC/AHA stage C) (H)     Polycythemia secondary to smoking     Elevated alkaline phosphatase level     Long term current use of anticoagulant therapy     Non morbid obesity due to excess calories     Type 2 diabetes mellitus without complication,  without long-term current use of insulin (H)     Pulmonary emphysema, unspecified emphysema type (H)     NAFLD (nonalcoholic fatty liver disease)     CKD (chronic kidney disease) stage 2, GFR 60-89 ml/min     Varicose veins of legs     Past Surgical History:   Procedure Laterality Date     ANGIOGRAM  8/2006    normal     TONSILLECTOMY & ADENOIDECTOMY         Social History     Tobacco Use     Smoking status: Current Every Day Smoker     Packs/day: 1.00     Years: 45.00     Pack years: 45.00     Types: Cigarettes     Smokeless tobacco: Never Used   Substance Use Topics     Alcohol use: Yes     Alcohol/week: 0.0 - 2.5 oz     Family History   Problem Relation Age of Onset     Diabetes Mother      Cerebrovascular Disease Father      Heart Disease Brother         pacer      Cancer No family hx of      Glaucoma No family hx of      Macular Degeneration No family hx of          Current Outpatient Medications   Medication Sig Dispense Refill     albuterol (2.5 MG/3ML) 0.083% neb solution Take 1 vial (2.5 mg) by nebulization every 4 hours as needed for shortness of breath / dyspnea       albuterol (PROAIR HFA/PROVENTIL HFA/VENTOLIN HFA) 108 (90 Base) MCG/ACT Inhaler Inhale 1-2 puffs into the lungs every 4 hours as needed for shortness of breath / dyspnea 1 Inhaler 11     ASPIRIN NOT PRESCRIBED, INTENTIONAL, Antiplatelet medication not prescribed intentionally due to Current anticoagulant therapy (warfarin/enoxaparin)       atorvastatin (LIPITOR) 20 MG tablet Take 1 tablet (20 mg) by mouth daily 90 tablet 3     blood glucose (NO BRAND SPECIFIED) lancing device Use to test blood sugars 3 times daily or as directed. 1 each 3     digoxin (DIGOX) 125 MCG tablet Take 1 tablet (125 mcg) by mouth daily 90 tablet 3     furosemide (LASIX) 20 MG tablet Take 1 tablet (20 mg) by mouth 1 times daily 90 tablet 1     glipiZIDE (GLUCOTROL) 5 MG tablet Take 1 tablet (5 mg) by mouth 2 times daily (before meals) 180 tablet 3     lisinopril  (PRINIVIL/ZESTRIL) 30 MG tablet TAKE 1 TABLET BY MOUTH ONCE DAILY 90 tablet 2     metFORMIN (GLUCOPHAGE) 1000 MG tablet Take 1 tablet (1,000 mg) by mouth 2 times daily (with meals) 180 tablet 3     metoprolol tartrate (LOPRESSOR) 100 MG tablet Take 2 tablets (200 mg) by mouth 2 times daily 360 tablet 3     order for DME Equipment being ordered: Blood glucose monitor per insurance formulary; blood glucose test strips per formulary for use 3 time daily; lancets per formulary for use 3 time daily 300 each 3     tiotropium (SPIRIVA HANDIHALER) 18 MCG inhaled capsule INHALE 1 CAPSULE (18 MCG) INTO THE LUNGS DAILY 90 capsule 3     warfarin (COUMADIN) 2.5 MG tablet Take 5mg (2 tabs) on Tuesdays, Thursdays and Saturdays, take 7.5mg (3 tabs) all other days or as directed by INR clinic 270 tablet 1     nitroGLYcerin (NITROSTAT) 0.4 MG sublingual tablet Place 0.4 mg under the tongue Place 1 tablet under the tongue every 5 minutes if needed for Chest Pain       Allergies   Allergen Reactions     Nkda [No Known Drug Allergies]      Review of Systems  CONSTITUTIONAL: NEGATIVE for fever, chills, change in weight  INTEGUMENTARY/SKIN: NEGATIVE for worrisome rashes, moles or lesions  EYES: NEGATIVE for vision changes or irritation  ENT/MOUTH: NEGATIVE for ear, mouth and throat problems  RESP: NEGATIVE for significant cough or SOB  CV: NEGATIVE for chest pain, palpitations or peripheral edema  GI: NEGATIVE for nausea, abdominal pain, heartburn, or change in bowel habits  : NEGATIVE for frequency, dysuria, or hematuria  MUSCULOSKELETAL: NEGATIVE for significant arthralgias or myalgia  NEURO: occasional dizziness and paresthesias   ENDOCRINE: NEGATIVE for temperature intolerance, skin/hair changes  HEME: NEGATIVE for bleeding problems  PSYCHIATRIC: NEGATIVE for changes in mood or affect    OBJECTIVE:   There were no vitals taken for this visit. Estimated body mass index is 31.75 kg/m  as calculated from the following:    Height as of  "7/22/19: 1.753 m (5' 9\").    Weight as of 7/22/19: 97.5 kg (215 lb).  Physical Exam  GENERAL: alert and no distress  EYES: Eyes grossly normal to inspection, PERRL and conjunctivae and sclerae normal  HENT: ear canals and TM's normal, nose and mouth without ulcers or lesions  NECK: no adenopathy, no asymmetry, masses, or scars and thyroid normal to palpation  RESP: lungs clear to auscultation - no rales, rhonchi or wheezes  CV: regular rate and rhythm, normal S1 S2, no S3 or S4, no murmur, click or rub, trace bipedal edema   ABDOMEN: soft, nontender, no hepatosplenomegaly, no masses and bowel sounds normal  MS: no gross musculoskeletal defects noted, no edema  SKIN: no suspicious lesions or rashes  NEURO: Normal strength and tone, mentation intact and speech normal  PSYCH: mentation appears normal, affect normal/bright  Diabetic foot exam: no trophic changes or ulcerative lesions and reduced sensation at all points to monofilament bilaterally     Diagnostic Test Results:  Labs reviewed in Epic  Results for orders placed or performed in visit on 08/13/19   INR point of care   Result Value Ref Range    INR Protime 2.6 (A) 0.86 - 1.14       ASSESSMENT / PLAN:   (Z00.00) Encounter for Medicare annual wellness exam  (primary encounter diagnosis)    (E11.9) Type 2 diabetes mellitus without complication, without long-term current use of insulin (H)  Plan: glipiZIDE (GLUCOTROL) 5 MG tablet          (I50.9) CHF (NYHA class II, ACC/AHA stage C) (H)  (I48.2) Chronic atrial fibrillation (HCC)  Plan: furosemide (LASIX) 20 MG tablet        Follow-up with cardiology as planned     End of Life Planning:  Patient currently has an advanced directive: No.  I have verified the patient's ablity to prepare an advanced directive/make health care decisions.  Literature was provided to assist patient in preparing an advanced directive.    COUNSELING:  Reviewed preventive health counseling, as reflected in patient instructions  Special " "attention given to:       Regular exercise       Healthy diet/nutrition       Vision screening       Hepatitis C screening       Colon cancer screening       Osteoporosis Prevention/Bone Health       The ASCVD Risk score (Carlosaniceto BEY Jr., et al., 2013) failed to calculate for the following reasons:    The valid total cholesterol range is 130 to 320 mg/dL    Estimated body mass index is 31.75 kg/m  as calculated from the following:    Height as of 7/22/19: 1.753 m (5' 9\").    Weight as of 7/22/19: 97.5 kg (215 lb).    Weight management plan: Discussed healthy diet and exercise guidelines     reports that he has been smoking cigarettes.  He has a 45.00 pack-year smoking history. He has never used smokeless tobacco.  Tobacco Cessation Action Plan: Information offered: Patient not interested at this time    Appropriate preventive services were discussed with this patient, including applicable screening as appropriate for cardiovascular disease, diabetes, osteopenia/osteoporosis, and glaucoma.  As appropriate for age/gender, discussed screening for colorectal cancer, prostate cancer, breast cancer, and cervical cancer. Checklist reviewing preventive services available has been given to the patient.    Reviewed patients plan of care and provided an AVS. The Intermediate Care Plan ( asthma action plan, low back pain action plan, and migraine action plan) for Keith meets the Care Plan requirement. This Care Plan has been established and reviewed with the Patient and spouse.    Counseling Resources:  ATP IV Guidelines  Pooled Cohorts Equation Calculator  Breast Cancer Risk Calculator  FRAX Risk Assessment  ICSI Preventive Guidelines  Dietary Guidelines for Americans, 2010  [employer]'s MyPlate  ASA Prophylaxis  Lung CA Screening    Cayla Gregorio MD  [facility]    Identified Health Risks:  "

## 2019-09-24 ENCOUNTER — ANTICOAGULATION THERAPY VISIT (OUTPATIENT)
Dept: NURSING | Facility: CLINIC | Age: 74
End: 2019-09-24
Payer: MEDICARE

## 2019-09-24 DIAGNOSIS — Z79.01 LONG TERM CURRENT USE OF ANTICOAGULANT THERAPY: ICD-10-CM

## 2019-09-24 DIAGNOSIS — I48.20 CHRONIC ATRIAL FIBRILLATION (H): ICD-10-CM

## 2019-09-24 LAB — INR POINT OF CARE: 2.8 (ref 0.86–1.14)

## 2019-09-24 PROCEDURE — 85610 PROTHROMBIN TIME: CPT | Mod: QW

## 2019-09-24 PROCEDURE — 36416 COLLJ CAPILLARY BLOOD SPEC: CPT

## 2019-09-24 PROCEDURE — 99207 ZZC NO CHARGE NURSE ONLY: CPT

## 2019-09-24 NOTE — PROGRESS NOTES
ANTICOAGULATION FOLLOW-UP CLINIC VISIT    Patient Name:  Keith Desir  Date:  2019  Contact Type:  Face to Face    SUBJECTIVE:  Patient Findings         Clinical Outcomes     Negatives:   Major bleeding event, Thromboembolic event, Anticoagulation-related hospital admission, Anticoagulation-related ED visit, Anticoagulation-related fatality           OBJECTIVE    INR Protime   Date Value Ref Range Status   2019 2.8 (A) 0.86 - 1.14 Final       ASSESSMENT / PLAN  INR assessment THER    Recheck INR In: 6 WEEKS    INR Location Clinic      Anticoagulation Summary  As of 2019    INR goal:   2.0-3.0   TTR:   56.8 % (3.5 y)   INR used for dosin.8 (2019)   Warfarin maintenance plan:   5 mg (2.5 mg x 2) every Tue, Thu, Sat; 7.5 mg (2.5 mg x 3) all other days   Full warfarin instructions:   5 mg every Tue, Thu, Sat; 7.5 mg all other days   Weekly warfarin total:   45 mg   No change documented:   Dot Ziegler RN   Plan last modified:   Dot Ziegler RN (2018)   Next INR check:   2019   Target end date:       Indications    Long term current use of anticoagulant therapy [Z79.01]  Chronic atrial fibrillation (HCC) [I48.2]             Anticoagulation Episode Summary     INR check location:       Preferred lab:       Send INR reminders to:   AJ MCINTOSH    Comments:   don't print AVS unless change in dosing      Anticoagulation Care Providers     Provider Role Specialty Phone number    Cayla Gregorio MD [facility] [phone]            See the Encounter Report to view Anticoagulation Flowsheet and Dosing Calendar (Go to Encounters tab in chart review, and find the Anticoagulation Therapy Visit)    Therapeutic INR 2.8. Will continue maintenance dose and recheck in 6 week(s).    Patient states negative for signs and symptoms of bleeding or blood clots, changes in medication, changes in diet, any signs of infection or antibiotic use,  anything new OTC or herbal medications, any missed or extra doses of the warfarin.    Patient informed of the symptoms to be seen for either by INR nurse or ER.    Patient ask if refill of warfarin is needed. Rx sent no    Dot Ziegler RN

## 2019-11-05 ENCOUNTER — ANTICOAGULATION THERAPY VISIT (OUTPATIENT)
Dept: NURSING | Facility: CLINIC | Age: 74
End: 2019-11-05
Payer: MEDICARE

## 2019-11-05 ENCOUNTER — TELEPHONE (OUTPATIENT)
Dept: FAMILY MEDICINE | Facility: CLINIC | Age: 74
End: 2019-11-05

## 2019-11-05 DIAGNOSIS — I48.20 CHRONIC ATRIAL FIBRILLATION (H): ICD-10-CM

## 2019-11-05 DIAGNOSIS — Z79.01 LONG TERM CURRENT USE OF ANTICOAGULANT THERAPY: ICD-10-CM

## 2019-11-05 DIAGNOSIS — I48.20 CHRONIC ATRIAL FIBRILLATION (H): Primary | ICD-10-CM

## 2019-11-05 LAB — INR POINT OF CARE: 2.1 (ref 0.86–1.14)

## 2019-11-05 PROCEDURE — 36416 COLLJ CAPILLARY BLOOD SPEC: CPT

## 2019-11-05 PROCEDURE — 85610 PROTHROMBIN TIME: CPT | Mod: QW

## 2019-11-05 PROCEDURE — 99207 ZZC NO CHARGE NURSE ONLY: CPT

## 2019-11-05 NOTE — PROGRESS NOTES
ANTICOAGULATION FOLLOW-UP CLINIC VISIT    Patient Name:  Keith Desir  Date:  2019  Contact Type:  Face to Face    SUBJECTIVE:  Patient Findings         Clinical Outcomes     Negatives:   Major bleeding event, Thromboembolic event, Anticoagulation-related hospital admission, Anticoagulation-related ED visit, Anticoagulation-related fatality           OBJECTIVE    INR Protime   Date Value Ref Range Status   2019 2.1 (A) 0.86 - 1.14 Final       ASSESSMENT / PLAN  INR assessment THER    Recheck INR In: 5 WEEKS    INR Location Clinic      Anticoagulation Summary  As of 2019    INR goal:   2.0-3.0   TTR:   58.2 % (3.6 y)   INR used for dosin.1 (2019)   Warfarin maintenance plan:   5 mg (2.5 mg x 2) every Tue, Thu, Sat; 7.5 mg (2.5 mg x 3) all other days   Full warfarin instructions:   5 mg every Tue, Thu, Sat; 7.5 mg all other days   Weekly warfarin total:   45 mg   No change documented:   Dot Ziegler RN   Plan last modified:   Dot Ziegler RN (2018)   Next INR check:   12/10/2019   Target end date:       Indications    Long term current use of anticoagulant therapy [Z79.01]  Chronic atrial fibrillation (HCC) [I48.20]             Anticoagulation Episode Summary     INR check location:       Preferred lab:       Send INR reminders to:   AJ MCINTOSH    Comments:   don't print AVS unless change in dosing      Anticoagulation Care Providers     Provider Role Specialty Phone number    Cayla Gregorio MD [facility] [phone]            See the Encounter Report to view Anticoagulation Flowsheet and Dosing Calendar (Go to Encounters tab in chart review, and find the Anticoagulation Therapy Visit)    Therapeutic INR 2.1. Will continue maintenance dose and recheck in 5 week(s).    Patient states negative for signs and symptoms of bleeding or blood clots, changes in medication, changes in diet, any signs of infection or antibiotic use,  anything new OTC or herbal medications, any missed or extra doses of the warfarin.    Patient informed of the symptoms to be seen for either by INR nurse or ER.    Patient ask if refill of warfarin is needed. Rx sent no      Dot Ziegler RN

## 2019-11-05 NOTE — TELEPHONE ENCOUNTER
Has the patient previously taken warfarin? yes  If yes, for what indication? Chronic atrial fibrillation    Does the patient have any of the following indications for a higher range of 2.5-3.5:    Mitral position mechanical valve? no    Earnestine-Shiley, Ball and Cage or Monoleaflet valve (regardless of position) no    Other (if yes, please explain) no    Please sign INR referral. Please review and complete drop boxes.     Indication for Anticoagulation:  If nonstandard INR is desired, indicate goal range and explanation:   Expected Duration of Therapy:     Send back to mikki Peter.    Dot Ziegler RN, [facility]

## 2019-12-03 ENCOUNTER — TRANSFERRED RECORDS (OUTPATIENT)
Dept: HEALTH INFORMATION MANAGEMENT | Facility: CLINIC | Age: 74
End: 2019-12-03

## 2019-12-03 LAB — INR PPP: 2

## 2019-12-04 ENCOUNTER — TELEPHONE (OUTPATIENT)
Dept: FAMILY MEDICINE | Facility: CLINIC | Age: 74
End: 2019-12-04

## 2019-12-04 ENCOUNTER — ANTICOAGULATION THERAPY VISIT (OUTPATIENT)
Dept: NURSING | Facility: CLINIC | Age: 74
End: 2019-12-04
Payer: MEDICARE

## 2019-12-04 DIAGNOSIS — I48.20 CHRONIC ATRIAL FIBRILLATION (H): ICD-10-CM

## 2019-12-04 DIAGNOSIS — Z79.01 LONG TERM CURRENT USE OF ANTICOAGULANT THERAPY: ICD-10-CM

## 2019-12-04 NOTE — PROGRESS NOTES
ANTICOAGULATION FOLLOW-UP CLINIC VISIT    Patient Name:  Keith Desir   Date:  2019  Contact Type:  Telephone/ Florencia wife    SUBJECTIVE:  Patient Findings     Positives:   Emergency department visit (For dizziness)        Clinical Outcomes     Negatives:   Major bleeding event, Thromboembolic event, Anticoagulation-related hospital admission, Anticoagulation-related ED visit, Anticoagulation-related fatality           OBJECTIVE    INR   Date Value Ref Range Status   2019 2.0  Final       ASSESSMENT / PLAN  INR assessment THER    Recheck INR In: 6 WEEKS    INR Location Clinic      Anticoagulation Summary  As of 2019    INR goal:   2.0-3.0   TTR:   88.9 % (1 y)   INR used for dosin.0 (12/3/2019)   Warfarin maintenance plan:   5 mg (2.5 mg x 2) every Tue, Thu, Sat; 7.5 mg (2.5 mg x 3) all other days   Full warfarin instructions:   5 mg every Tue, Thu, Sat; 7.5 mg all other days   Weekly warfarin total:   45 mg   No change documented:   Jermaine Johnson RN   Plan last modified:   Dot Ziegler RN (2018)   Next INR check:   2020   Target end date:       Indications    Long term current use of anticoagulant therapy [Z79.01]  Chronic atrial fibrillation (HCC) [I48.20]             Anticoagulation Episode Summary     INR check location:       Preferred lab:       Send INR reminders to:   AJ MCINTOSH    Comments:   don't print AVS unless change in dosing      Anticoagulation Care Providers     Provider Role Specialty Phone number    JgCayla MD [facility] [phone]            See the Encounter Report to view Anticoagulation Flowsheet and Dosing Calendar (Go to Encounters tab in chart review, and find the Anticoagulation Therapy Visit)    Patient was seen in ED 12/3/19 for vertigo and INR was checked. It was 2.0 in range so discussed with wife about keeping dosing the same and will recheck 6 weeks after that. The patient was assessed for  diet, medication, and activity level changes, missed or extra doses, bruising or bleeding, with no problem findings.      Jermaine Johnson RN, BSN, PHN

## 2019-12-04 NOTE — TELEPHONE ENCOUNTER
Please see the December 4, 2019 Anticoagulation encounter for further information.      Jermaine Johnson RN, BSN, PHN, [facility]

## 2019-12-04 NOTE — TELEPHONE ENCOUNTER
Patient wife is calling because they need to cancel and reschedule the appt.on 12/10/19.      Please call and advise  [phone]

## 2020-01-02 DIAGNOSIS — I48.20 CHRONIC ATRIAL FIBRILLATION (H): ICD-10-CM

## 2020-01-02 RX ORDER — WARFARIN SODIUM 2.5 MG/1
TABLET ORAL
Qty: 270 TABLET | Refills: 0 | Status: SHIPPED | OUTPATIENT
Start: 2020-01-02 | End: 2020-04-13

## 2020-01-14 ENCOUNTER — ANTICOAGULATION THERAPY VISIT (OUTPATIENT)
Dept: NURSING | Facility: CLINIC | Age: 75
End: 2020-01-14
Payer: MEDICARE

## 2020-01-14 DIAGNOSIS — Z79.01 LONG TERM CURRENT USE OF ANTICOAGULANT THERAPY: ICD-10-CM

## 2020-01-14 DIAGNOSIS — I48.20 CHRONIC ATRIAL FIBRILLATION (H): ICD-10-CM

## 2020-01-14 LAB — INR POINT OF CARE: 3 (ref 0.86–1.14)

## 2020-01-14 PROCEDURE — 36416 COLLJ CAPILLARY BLOOD SPEC: CPT

## 2020-01-14 PROCEDURE — 99207 ZZC NO CHARGE NURSE ONLY: CPT

## 2020-01-14 PROCEDURE — 85610 PROTHROMBIN TIME: CPT | Mod: QW

## 2020-01-14 NOTE — PROGRESS NOTES
ANTICOAGULATION FOLLOW-UP CLINIC VISIT    Patient Name:  Keith Desir  Date:  1/14/2020  Contact Type:  Face to Face    SUBJECTIVE:  Patient Findings         Clinical Outcomes     Negatives:   Major bleeding event, Thromboembolic event, Anticoagulation-related hospital admission, Anticoagulation-related ED visit, Anticoagulation-related fatality           OBJECTIVE    INR Protime   Date Value Ref Range Status   01/14/2020 3.0 (A) 0.86 - 1.14 Final       ASSESSMENT / PLAN  INR assessment THER    Recheck INR In: 6 WEEKS    INR Location Clinic      Anticoagulation Summary  As of 1/14/2020    INR goal:   2.0-3.0   TTR:   88.9 % (1 y)   INR used for dosing:   3.0 (1/14/2020)   Warfarin maintenance plan:   5 mg (2.5 mg x 2) every Tue, Thu, Sat; 7.5 mg (2.5 mg x 3) all other days   Full warfarin instructions:   5 mg every Tue, Thu, Sat; 7.5 mg all other days   Weekly warfarin total:   45 mg   No change documented:   Dot Ziegler RN   Plan last modified:   Dot Ziegler RN (6/28/2018)   Next INR check:   2/25/2020   Target end date:       Indications    Long term current use of anticoagulant therapy [Z79.01]  Chronic atrial fibrillation (HCC) [I48.20]             Anticoagulation Episode Summary     INR check location:       Preferred lab:       Send INR reminders to:   AJ MCINTOSH    Comments:   don't print AVS unless change in dosing      Anticoagulation Care Providers     Provider Role Specialty Phone number    Cayla Gregorio MD [facility] [phone]            See the Encounter Report to view Anticoagulation Flowsheet and Dosing Calendar (Go to Encounters tab in chart review, and find the Anticoagulation Therapy Visit)    Therapeutic INR 3.0. Will continue maintenance dose and recheck in 6 week(s).    Patient states negative for signs and symptoms of bleeding or blood clots, changes in medication, changes in diet, any signs of infection or antibiotic use,  anything new OTC or herbal medications, any missed or extra doses of the warfarin.    Patient informed of the symptoms to be seen for either by INR nurse or ER.    Patient ask if refill of warfarin is needed. Rx sent no    Dot Ziegler RN

## 2020-02-10 ENCOUNTER — TRANSFERRED RECORDS (OUTPATIENT)
Dept: HEALTH INFORMATION MANAGEMENT | Facility: CLINIC | Age: 75
End: 2020-02-10

## 2020-02-17 DIAGNOSIS — E11.9 TYPE 2 DIABETES MELLITUS WITHOUT COMPLICATION, WITHOUT LONG-TERM CURRENT USE OF INSULIN (H): ICD-10-CM

## 2020-02-17 DIAGNOSIS — I50.9 CHF (NYHA CLASS II, ACC/AHA STAGE C) (H): ICD-10-CM

## 2020-02-17 RX ORDER — LISINOPRIL 30 MG/1
TABLET ORAL
Qty: 90 TABLET | Refills: 0 | Status: SHIPPED | OUTPATIENT
Start: 2020-02-17 | End: 2020-02-19

## 2020-02-19 NOTE — PROGRESS NOTES
Subjective     Keith Desir is a 74 year old male smoker also followed by cardiology for HF and atrial fibrillation who presents to clinic today with his wife for the following health issues:    History of Present Illness        Diabetes:   He presents for follow up of diabetes.  He is not checking blood glucose. When his blood glucose is low, the patient is asymptomatic for confusion, blurred vision, lethargy and reports not feeling dizzy, shaky, or weak.  He has no concerns regarding his diabetes at this time.  He is not experiencing numbness or burning in feet, excessive thirst, blurry vision, weight changes or redness, sores or blisters on feet. The patient has had a diabetic eye exam in the last 12 months. Eye exam performed on 6/2019. Location of last eye exam andover.        Hyperlipidemia:  He presents for follow up of hyperlipidemia.  He is taking medication to lower cholesterol. He is not having myalgia or other side effects to statin medications.    Hypertension: He presents for follow up of hypertension.  He does not check blood pressure  regularly outside of the clinic.  He follows a low salt diet.     He eats 0-1 servings of fruits and vegetables daily.He consumes 0 sweetened beverage(s) daily.   He is taking medications regularly.     Reviewed and updated as needed this visit by Provider  Tobacco  Allergies  Meds  Problems  Med Hx  Surg Hx  Fam Hx  Soc Hx          Review of Systems   ROS COMP: CONSTITUTIONAL: NEGATIVE for fever, chills, change in weight  INTEGUMENTARY/SKIN: NEGATIVE for worrisome rashes, moles or lesions  EYES: NEGATIVE for vision changes or irritation  RESP: NEGATIVE for significant cough or SOB  CV: NEGATIVE for chest pain, palpitations or peripheral edema  MUSCULOSKELETAL: NEGATIVE for significant arthralgias or myalgia  NEURO: NEGATIVE for weakness, dizziness or paresthesias  ENDOCRINE: Hx diabetes  HEME: NEGATIVE for bleeding problems  PSYCHIATRIC: NEGATIVE for changes  "in mood or affect      Objective    BP (!) 150/82   Pulse 95   Temp 96.7  F (35.9  C) (Oral)   Resp 18   Ht 1.727 m (5' 8\")   Wt 101.2 kg (223 lb)   SpO2 96%   BMI 33.91 kg/m    Body mass index is 33.91 kg/m .  Physical Exam   GENERAL: alert, no distress and obese  NECK: no adenopathy, no asymmetry, masses, or scars and thyroid normal to palpation  RESP: lungs clear to auscultation - no rales, rhonchi or wheezes  CV: regular rate and rhythm, normal S1 S2, no S3 or S4, no murmur, click or rub, mild woody bipedal edema   MS: extremities normal- no gross deformities noted  SKIN: no suspicious lesions or rashes  PSYCH: mentation appears normal, affect normal/bright  Diabetic foot exam: no trophic changes or ulcerative lesions, reduced sensation at distal feet and scattered spider veins     Diagnostic Test Results:  Labs reviewed in Epic  Results for orders placed or performed in visit on 02/26/20 (from the past 24 hour(s))   HEMOGLOBIN A1C   Result Value Ref Range    Hemoglobin A1C 6.9 (H) 0 - 5.6 %           Assessment & Plan     (E11.22,  N18.2) 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   Plan: Lipid panel reflex to direct LDL Fasting,         metFORMIN (GLUCOPHAGE) 1000 MG tablet, Basic         metabolic panel           (I50.9) CHF (NYHA class II, ACC/AHA stage C) (H)  Comment: euvolemic, on beta blocker and ACE/ARB   Plan: Lipid panel reflex to direct LDL Fasting, CBC         with Platelets  , digoxin (DIGOX) 125 MCG         tablet, furosemide (LASIX) 20 MG tablet,         metoprolol tartrate (LOPRESSOR) 100 MG tablet,         Comprehensive metabolic panel, lisinopril         (ZESTRIL) 40 MG tablet, Basic metabolic panel  Increase lisinopril to 40 mg for blood pressure " "control and follow-up at next INR visit by patient request     (I48.20) Chronic atrial fibrillation (HCC)  Comment: rate controlled and anticoagulated   Plan: digoxin (DIGOX) 125 MCG tablet, metoprolol         tartrate (LOPRESSOR) 100 MG tablet,         Comprehensive metabolic panel         Follow-up with cardiology as planned     (J43.9) Pulmonary emphysema, unspecified emphysema type (H)  Comment: Well controlled with medications without side effects.     (Z72.0) Tobacco abuse  Comment: Urged cessation and offered my support.     (E78.5) Hyperlipidemia with target LDL less than 100  Comment: Well controlled with medications without side effects.   Plan: Lipid panel reflex to direct LDL Fasting,         atorvastatin (LIPITOR) 20 MG tablet,         Comprehensive metabolic panel             BMI:   Estimated body mass index is 33.91 kg/m  as calculated from the following:    Height as of this encounter: 1.727 m (5' 8\").    Weight as of this encounter: 101.2 kg (223 lb).   Weight management plan: Discussed healthy diet and exercise guidelines            Return in about 6 weeks (around 4/7/2020) for free nurse only blood pressure check, lab only check.    Cayla Gregorio MD  [facility]    "

## 2020-02-25 ENCOUNTER — ANTICOAGULATION THERAPY VISIT (OUTPATIENT)
Dept: NURSING | Facility: CLINIC | Age: 75
End: 2020-02-25
Payer: MEDICARE

## 2020-02-25 DIAGNOSIS — Z79.01 LONG TERM CURRENT USE OF ANTICOAGULANT THERAPY: ICD-10-CM

## 2020-02-25 DIAGNOSIS — I48.20 CHRONIC ATRIAL FIBRILLATION (H): ICD-10-CM

## 2020-02-25 LAB — INR POINT OF CARE: 2 (ref 0.86–1.14)

## 2020-02-25 PROCEDURE — 99207 ZZC NO CHARGE NURSE ONLY: CPT

## 2020-02-25 PROCEDURE — 85610 PROTHROMBIN TIME: CPT | Mod: QW

## 2020-02-25 PROCEDURE — 36416 COLLJ CAPILLARY BLOOD SPEC: CPT

## 2020-02-25 NOTE — PROGRESS NOTES
ANTICOAGULATION FOLLOW-UP CLINIC VISIT    Patient Name:  Keith Desir  Date:  2020  Contact Type:  Face to Face    SUBJECTIVE:  Patient Findings         Clinical Outcomes     Negatives:   Major bleeding event, Thromboembolic event, Anticoagulation-related hospital admission, Anticoagulation-related ED visit, Anticoagulation-related fatality           OBJECTIVE    INR Protime   Date Value Ref Range Status   2020 2.0 (A) 0.86 - 1.14 Final       ASSESSMENT / PLAN  INR assessment THER    Recheck INR In: 6 WEEKS    INR Location Clinic      Anticoagulation Summary  As of 2020    INR goal:   2.0-3.0   TTR:   88.9 % (1 y)   INR used for dosin.0 (2020)   Warfarin maintenance plan:   5 mg (2.5 mg x 2) every Tue, Thu, Sat; 7.5 mg (2.5 mg x 3) all other days   Full warfarin instructions:   5 mg every Tue, Thu, Sat; 7.5 mg all other days   Weekly warfarin total:   45 mg   No change documented:   Dot Ziegler RN   Plan last modified:   Dot Ziegler RN (2018)   Next INR check:   2020   Priority:   Maintenance   Target end date:       Indications    Long term current use of anticoagulant therapy [Z79.01]  Chronic atrial fibrillation (HCC) [I48.20]             Anticoagulation Episode Summary     INR check location:       Preferred lab:       Send INR reminders to:   AJ MCINTOSH    Comments:   don't print AVS unless change in dosing      Anticoagulation Care Providers     Provider Role Specialty Phone number    OlayinkacarinaCayla MD [facility] [phone]            See the Encounter Report to view Anticoagulation Flowsheet and Dosing Calendar (Go to Encounters tab in chart review, and find the Anticoagulation Therapy Visit)    Therapeutic INR 2.0. Will continue maintenance dose and recheck in 6 week(s).    Patient states negative for signs and symptoms of bleeding or blood clots, changes in medication, changes in diet, any signs of infection  or antibiotic use, anything new OTC or herbal medications, any missed or extra doses of the warfarin.    Patient informed of the symptoms to be seen for either by INR nurse or ER.    Patient ask if refill of warfarin is needed. Rx sent no    Dot Ziegler RN

## 2020-02-26 ENCOUNTER — OFFICE VISIT (OUTPATIENT)
Dept: FAMILY MEDICINE | Facility: CLINIC | Age: 75
End: 2020-02-26
Payer: MEDICARE

## 2020-02-26 ENCOUNTER — TRANSFERRED RECORDS (OUTPATIENT)
Dept: MULTI SPECIALTY CLINIC | Facility: CLINIC | Age: 75
End: 2020-02-26

## 2020-02-26 VITALS
SYSTOLIC BLOOD PRESSURE: 128 MMHG | BODY MASS INDEX: 33.8 KG/M2 | HEART RATE: 95 BPM | OXYGEN SATURATION: 96 % | HEIGHT: 68 IN | DIASTOLIC BLOOD PRESSURE: 88 MMHG | RESPIRATION RATE: 18 BRPM | TEMPERATURE: 96.7 F | WEIGHT: 223 LBS

## 2020-02-26 DIAGNOSIS — E11.42 DIABETIC POLYNEUROPATHY ASSOCIATED WITH TYPE 2 DIABETES MELLITUS (H): ICD-10-CM

## 2020-02-26 DIAGNOSIS — E11.22 TYPE 2 DIABETES MELLITUS WITH STAGE 2 CHRONIC KIDNEY DISEASE, WITHOUT LONG-TERM CURRENT USE OF INSULIN (H): Primary | ICD-10-CM

## 2020-02-26 DIAGNOSIS — J43.9 PULMONARY EMPHYSEMA, UNSPECIFIED EMPHYSEMA TYPE (H): ICD-10-CM

## 2020-02-26 DIAGNOSIS — E78.5 HYPERLIPIDEMIA WITH TARGET LDL LESS THAN 100: ICD-10-CM

## 2020-02-26 DIAGNOSIS — Z72.0 TOBACCO ABUSE: ICD-10-CM

## 2020-02-26 DIAGNOSIS — I50.9 CHF (NYHA CLASS II, ACC/AHA STAGE C) (H): ICD-10-CM

## 2020-02-26 DIAGNOSIS — I48.20 CHRONIC ATRIAL FIBRILLATION (H): ICD-10-CM

## 2020-02-26 DIAGNOSIS — N18.2 TYPE 2 DIABETES MELLITUS WITH STAGE 2 CHRONIC KIDNEY DISEASE, WITHOUT LONG-TERM CURRENT USE OF INSULIN (H): Primary | ICD-10-CM

## 2020-02-26 PROBLEM — E11.65 TYPE 2 DIABETES MELLITUS WITH HYPERGLYCEMIA, WITHOUT LONG-TERM CURRENT USE OF INSULIN (H): Status: ACTIVE | Noted: 2020-02-26

## 2020-02-26 PROBLEM — E11.65 TYPE 2 DIABETES MELLITUS WITH HYPERGLYCEMIA, WITHOUT LONG-TERM CURRENT USE OF INSULIN (H): Status: RESOLVED | Noted: 2020-02-26 | Resolved: 2020-02-26

## 2020-02-26 PROBLEM — E11.9 TYPE 2 DIABETES MELLITUS WITHOUT COMPLICATION, WITHOUT LONG-TERM CURRENT USE OF INSULIN (H): Status: RESOLVED | Noted: 2017-02-15 | Resolved: 2020-02-26

## 2020-02-26 LAB
ALBUMIN SERPL-MCNC: 3.8 G/DL (ref 3.4–5)
ALP SERPL-CCNC: 141 U/L (ref 40–150)
ALT SERPL W P-5'-P-CCNC: 35 U/L (ref 0–70)
ANION GAP SERPL CALCULATED.3IONS-SCNC: 4 MMOL/L (ref 3–14)
AST SERPL W P-5'-P-CCNC: 22 U/L (ref 0–45)
BILIRUB SERPL-MCNC: 0.9 MG/DL (ref 0.2–1.3)
BUN SERPL-MCNC: 22 MG/DL (ref 7–30)
CALCIUM SERPL-MCNC: 9.5 MG/DL (ref 8.5–10.1)
CHLORIDE SERPL-SCNC: 105 MMOL/L (ref 94–109)
CHOLEST SERPL-MCNC: 109 MG/DL
CO2 SERPL-SCNC: 31 MMOL/L (ref 20–32)
CREAT SERPL-MCNC: 0.94 MG/DL (ref 0.66–1.25)
ERYTHROCYTE [DISTWIDTH] IN BLOOD BY AUTOMATED COUNT: 14.5 % (ref 10–15)
GFR SERPL CREATININE-BSD FRML MDRD: 81 ML/MIN/{1.73_M2}
GLUCOSE SERPL-MCNC: 135 MG/DL (ref 70–99)
HBA1C MFR BLD: 6.9 % (ref 0–5.6)
HCT VFR BLD AUTO: 49.6 % (ref 40–53)
HDLC SERPL-MCNC: 29 MG/DL
HGB BLD-MCNC: 16.9 G/DL (ref 13.3–17.7)
LDLC SERPL CALC-MCNC: 40 MG/DL
MCH RBC QN AUTO: 33.3 PG (ref 26.5–33)
MCHC RBC AUTO-ENTMCNC: 34.1 G/DL (ref 31.5–36.5)
MCV RBC AUTO: 98 FL (ref 78–100)
NONHDLC SERPL-MCNC: 80 MG/DL
PLATELET # BLD AUTO: 230 10E9/L (ref 150–450)
POTASSIUM SERPL-SCNC: 5.1 MMOL/L (ref 3.4–5.3)
PROT SERPL-MCNC: 7.4 G/DL (ref 6.8–8.8)
RBC # BLD AUTO: 5.08 10E12/L (ref 4.4–5.9)
SODIUM SERPL-SCNC: 140 MMOL/L (ref 133–144)
TRIGL SERPL-MCNC: 202 MG/DL
WBC # BLD AUTO: 11 10E9/L (ref 4–11)

## 2020-02-26 PROCEDURE — 99214 OFFICE O/P EST MOD 30 MIN: CPT | Performed by: FAMILY MEDICINE

## 2020-02-26 PROCEDURE — 85027 COMPLETE CBC AUTOMATED: CPT | Performed by: FAMILY MEDICINE

## 2020-02-26 PROCEDURE — 80061 LIPID PANEL: CPT | Performed by: FAMILY MEDICINE

## 2020-02-26 PROCEDURE — 83036 HEMOGLOBIN GLYCOSYLATED A1C: CPT | Performed by: FAMILY MEDICINE

## 2020-02-26 PROCEDURE — 80053 COMPREHEN METABOLIC PANEL: CPT | Performed by: FAMILY MEDICINE

## 2020-02-26 PROCEDURE — 36415 COLL VENOUS BLD VENIPUNCTURE: CPT | Performed by: FAMILY MEDICINE

## 2020-02-26 RX ORDER — LISINOPRIL 40 MG/1
40 TABLET ORAL DAILY
Qty: 90 TABLET | Refills: 0 | Status: SHIPPED | OUTPATIENT
Start: 2020-02-26 | End: 2020-08-26

## 2020-02-26 RX ORDER — LISINOPRIL 30 MG/1
30 TABLET ORAL DAILY
Qty: 90 TABLET | Refills: 3 | Status: SHIPPED | OUTPATIENT
Start: 2020-02-26 | End: 2020-02-26 | Stop reason: DRUGHIGH

## 2020-02-26 RX ORDER — METOPROLOL TARTRATE 100 MG
200 TABLET ORAL 2 TIMES DAILY
Qty: 360 TABLET | Refills: 3 | Status: SHIPPED | OUTPATIENT
Start: 2020-02-26 | End: 2021-03-02

## 2020-02-26 RX ORDER — FUROSEMIDE 20 MG
TABLET ORAL
Qty: 90 TABLET | Refills: 1 | Status: SHIPPED | OUTPATIENT
Start: 2020-02-26 | End: 2020-09-02

## 2020-02-26 RX ORDER — ATORVASTATIN CALCIUM 20 MG/1
20 TABLET, FILM COATED ORAL DAILY
Qty: 90 TABLET | Refills: 3 | Status: SHIPPED | OUTPATIENT
Start: 2020-02-26 | End: 2021-03-02

## 2020-02-26 RX ORDER — DIGOXIN 125 MCG
125 TABLET ORAL DAILY
Qty: 90 TABLET | Refills: 3 | Status: SHIPPED | OUTPATIENT
Start: 2020-02-26 | End: 2021-02-16

## 2020-02-26 ASSESSMENT — MIFFLIN-ST. JEOR: SCORE: 1736.02

## 2020-02-26 NOTE — RESULT ENCOUNTER NOTE
"Ed,    Your diabetes is well controlled. Your blood counts, liver and kidney tests are normal.     You triglycerides are high and your HDL \"good\" cholesterol is low. I recommend increasing exercise and eating at least 4 to 5 servings of fruits and vegetables daily.      See you in 6 months!    Cayla Gregorio MD  "

## 2020-04-03 ENCOUNTER — TELEPHONE (OUTPATIENT)
Dept: FAMILY MEDICINE | Facility: CLINIC | Age: 75
End: 2020-04-03

## 2020-04-03 DIAGNOSIS — I48.20 CHRONIC ATRIAL FIBRILLATION (H): Primary | ICD-10-CM

## 2020-04-03 NOTE — TELEPHONE ENCOUNTER
Called and rescheduled the patient for lab only. Order placed.    Dot Ziegler RN, [facility] Triage

## 2020-04-08 ENCOUNTER — TELEPHONE (OUTPATIENT)
Dept: FAMILY MEDICINE | Facility: CLINIC | Age: 75
End: 2020-04-08

## 2020-04-08 DIAGNOSIS — E11.22 TYPE 2 DIABETES MELLITUS WITH STAGE 2 CHRONIC KIDNEY DISEASE, WITHOUT LONG-TERM CURRENT USE OF INSULIN (H): ICD-10-CM

## 2020-04-08 DIAGNOSIS — I50.9 CHF (NYHA CLASS II, ACC/AHA STAGE C) (H): ICD-10-CM

## 2020-04-08 DIAGNOSIS — Z79.01 LONG TERM CURRENT USE OF ANTICOAGULANT THERAPY: ICD-10-CM

## 2020-04-08 DIAGNOSIS — I48.20 CHRONIC ATRIAL FIBRILLATION (H): ICD-10-CM

## 2020-04-08 DIAGNOSIS — N18.2 TYPE 2 DIABETES MELLITUS WITH STAGE 2 CHRONIC KIDNEY DISEASE, WITHOUT LONG-TERM CURRENT USE OF INSULIN (H): ICD-10-CM

## 2020-04-08 LAB
ANION GAP SERPL CALCULATED.3IONS-SCNC: 4 MMOL/L (ref 3–14)
BUN SERPL-MCNC: 16 MG/DL (ref 7–30)
CALCIUM SERPL-MCNC: 9.2 MG/DL (ref 8.5–10.1)
CHLORIDE SERPL-SCNC: 102 MMOL/L (ref 94–109)
CO2 SERPL-SCNC: 31 MMOL/L (ref 20–32)
CREAT SERPL-MCNC: 0.78 MG/DL (ref 0.66–1.25)
GFR SERPL CREATININE-BSD FRML MDRD: >90 ML/MIN/{1.73_M2}
GLUCOSE SERPL-MCNC: 127 MG/DL (ref 70–99)
INR PPP: 1.7 (ref 0.86–1.14)
POTASSIUM SERPL-SCNC: 4.1 MMOL/L (ref 3.4–5.3)
SODIUM SERPL-SCNC: 137 MMOL/L (ref 133–144)

## 2020-04-08 PROCEDURE — 80048 BASIC METABOLIC PNL TOTAL CA: CPT | Performed by: FAMILY MEDICINE

## 2020-04-08 PROCEDURE — 85610 PROTHROMBIN TIME: CPT | Performed by: FAMILY MEDICINE

## 2020-04-08 PROCEDURE — 36415 COLL VENOUS BLD VENIPUNCTURE: CPT | Performed by: FAMILY MEDICINE

## 2020-04-08 NOTE — TELEPHONE ENCOUNTER
ANTICOAGULATION MANAGEMENT     Patient Name:  Keith Desir  Date:  2020    ASSESSMENT /SUBJECTIVE:    Today's INR result of 1.7 is subtherapeutic. Goal INR of 2.0-3.0      Warfarin dose taken: Warfarin taken as previously instructed    Diet: Decreased greens/vitamin K intake may be affecting INR    Medication changes/ interactions: No new medications/supplements affecting INR    Previous INR: Therapeutic     S/S of bleeding or thromboembolism: No    New injury or illness: No    Upcoming surgery, procedure or cardioversion: No    Additional findings: Last 2 INRs therapeutic but INR trending down. Will increase maintenance by 2.5 mg (5.6%).      PLAN:    Spoke with Sharron regarding INR result and instructed:     Warfarin Dosing Instructions: Change your warfarin dose to 5 mg Tues/Sat and 7.5 mg ROW    Instructed patient to follow up no later than: 2 weeks  Lab visit scheduled    Education provided: None required      Melissa verbalizes understanding and agrees to warfarin dosing plan.    Instructed to call the Anticoagulation Clinic for any changes, questions or concerns. (#[phone])        OBJECTIVE:  INR   Date Value Ref Range Status   2020 1.70 (H) 0.86 - 1.14 Final     Comment:     performed on [employer]             Anticoagulation Summary  As of 2020    INR goal:   2.0-3.0   TTR:   77.0 % (1 y)   INR used for dosin.70! (2020)   Warfarin maintenance plan:   5 mg (2.5 mg x 2) every Tue, Sat; 7.5 mg (2.5 mg x 3) all other days   Full warfarin instructions:   5 mg every Tue, Sat; 7.5 mg all other days   Weekly warfarin total:   47.5 mg   Plan last modified:   Margaret Roland, RN (2020)   Next INR check:   2020   Priority:   Maintenance   Target end date:       Indications    Long term current use of anticoagulant therapy [Z79.01]  Chronic atrial fibrillation (HCC) [I48.20]             Anticoagulation Episode Summary     INR check location:       Preferred lab:       Send INR  reminders to:   AJ MCINTOSH    Comments:   don't print AVS unless change in dosing      Anticoagulation Care Providers     Provider Role Specialty Phone number    Cayla Gregorio MD [facility] [phone]

## 2020-04-13 DIAGNOSIS — I48.20 CHRONIC ATRIAL FIBRILLATION (H): ICD-10-CM

## 2020-04-13 RX ORDER — WARFARIN SODIUM 2.5 MG/1
TABLET ORAL
Qty: 240 TABLET | Refills: 0 | Status: SHIPPED | OUTPATIENT
Start: 2020-04-13 | End: 2020-07-13

## 2020-04-13 NOTE — TELEPHONE ENCOUNTER
Last INR was 1.7 on 4/8/2020  Dose: 5 mg every Tue, Sat; 7.5 mg all other days    Prescription approved per [facility] Refill Protocol.

## 2020-04-22 ENCOUNTER — TELEPHONE (OUTPATIENT)
Dept: FAMILY MEDICINE | Facility: CLINIC | Age: 75
End: 2020-04-22

## 2020-04-22 DIAGNOSIS — I48.20 CHRONIC ATRIAL FIBRILLATION (H): ICD-10-CM

## 2020-04-22 DIAGNOSIS — Z79.01 LONG TERM CURRENT USE OF ANTICOAGULANT THERAPY: ICD-10-CM

## 2020-04-22 LAB
CAPILLARY BLOOD COLLECTION: NORMAL
INR PPP: 3.2 (ref 0.86–1.14)

## 2020-04-22 PROCEDURE — 85610 PROTHROMBIN TIME: CPT | Performed by: FAMILY MEDICINE

## 2020-04-22 PROCEDURE — 36416 COLLJ CAPILLARY BLOOD SPEC: CPT | Performed by: FAMILY MEDICINE

## 2020-04-22 NOTE — TELEPHONE ENCOUNTER
ANTICOAGULATION MANAGEMENT     Patient Name:  Keith Desir  Date:  4/22/2020    ASSESSMENT /SUBJECTIVE:    Today's INR result of 3.2 is supratherapeutic. Goal INR of 2.0-3.0      Warfarin dose taken: Warfarin taken as previously instructed    Diet: No new diet changes affecting INR    Medication changes/ interactions: No new medications/supplements affecting INR    Previous INR: Subtherapeutic     S/S of bleeding or thromboembolism: No    New injury or illness: No    Upcoming surgery, procedure or cardioversion: No    Additional findings: None      PLAN:    Spoke with Keith regarding INR result and instructed:     Warfarin Dosing Instructions: Change your warfarin dose to 5 mg Tues/Th/Sat and 7.5 mg ROW.    Instructed patient to follow up no later than: 2 weeks. Patient unwilling to come in in 2 weeks. Agreed to 3 week recheck.  Lab visit scheduled    Education provided: None required      Ed verbalizes understanding and agrees to warfarin dosing plan.    Instructed to call the Anticoagulation Clinic for any changes, questions or concerns. (#[phone])        OBJECTIVE:  INR   Date Value Ref Range Status   04/22/2020 3.20 (H) 0.86 - 1.14 Final     Comment:     This test is intended for monitoring Coumadin therapy.  Results are not   accurate in patients with prolonged INR due to factor deficiency.               Anticoagulation Summary  As of 4/22/2020    INR goal:   2.0-3.0   TTR:   78.1 % (1 y)   INR used for dosing:      Warfarin maintenance plan:   5 mg (2.5 mg x 2) every Tue, Thu, Sat; 7.5 mg (2.5 mg x 3) all other days   Full warfarin instructions:   5 mg every Tue, Thu, Sat; 7.5 mg all other days   Weekly warfarin total:   45 mg   Plan last modified:   Margaret Roland RN (4/22/2020)   Next INR check:   5/13/2020   Priority:   Maintenance   Target end date:       Indications    Long term current use of anticoagulant therapy [Z79.01]  Chronic atrial fibrillation (HCC) [I48.20]              Anticoagulation Episode Summary     INR check location:       Preferred lab:       Send INR reminders to:   AJ MCINTOSH    Comments:   don't print AVS unless change in dosing      Anticoagulation Care Providers     Provider Role Specialty Phone number    Cayla Gregorio MD [facility] [phone]

## 2020-05-19 ENCOUNTER — TELEPHONE (OUTPATIENT)
Dept: NURSING | Facility: CLINIC | Age: 75
End: 2020-05-19

## 2020-05-19 ENCOUNTER — ANTICOAGULATION THERAPY VISIT (OUTPATIENT)
Dept: NURSING | Facility: CLINIC | Age: 75
End: 2020-05-19

## 2020-05-19 DIAGNOSIS — I48.20 CHRONIC ATRIAL FIBRILLATION (H): ICD-10-CM

## 2020-05-19 DIAGNOSIS — Z79.01 LONG TERM CURRENT USE OF ANTICOAGULANT THERAPY: ICD-10-CM

## 2020-05-19 LAB — INR PPP: 1.9 (ref 0.86–1.14)

## 2020-05-19 PROCEDURE — 85610 PROTHROMBIN TIME: CPT | Performed by: FAMILY MEDICINE

## 2020-05-19 PROCEDURE — 36416 COLLJ CAPILLARY BLOOD SPEC: CPT | Performed by: FAMILY MEDICINE

## 2020-05-19 NOTE — PROGRESS NOTES
ANTICOAGULATION FOLLOW-UP CLINIC VISIT    Patient Name:  Keith Desir  Date:  2020  Contact Type:  Telephone    SUBJECTIVE:  Patient Findings         Clinical Outcomes     Negatives:   Major bleeding event, Thromboembolic event, Anticoagulation-related hospital admission, Anticoagulation-related ED visit, Anticoagulation-related fatality           OBJECTIVE    Recent labs: (last 7 days)     20  0907   INR 1.90*       ASSESSMENT / PLAN  INR assessment SUB    Recheck INR In: 3 WEEKS    INR Location Clinic      Anticoagulation Summary  As of 2020    INR goal:   2.0-3.0   TTR:   83.7 % (1 y)   INR used for dosin.90! (2020)   Warfarin maintenance plan:   5 mg (2.5 mg x 2) every Tue, Sat; 7.5 mg (2.5 mg x 3) all other days   Full warfarin instructions:   5 mg every Tue, Sat; 7.5 mg all other days   Weekly warfarin total:   47.5 mg   Plan last modified:   Dot Ziegler RN (2020)   Next INR check:   2020   Priority:   Maintenance   Target end date:       Indications    Long term current use of anticoagulant therapy [Z79.01]  Chronic atrial fibrillation (HCC) [I48.20]             Anticoagulation Episode Summary     INR check location:       Preferred lab:       Send INR reminders to:   AJ MCINTOSH    Comments:   don't print AVS unless change in dosing      Anticoagulation Care Providers     Provider Role Specialty Phone number    Cayla Gregorio MD [facility] [phone]            See the Encounter Report to view Anticoagulation Flowsheet and Dosing Calendar (Go to Encounters tab in chart review, and find the Anticoagulation Therapy Visit)    Dosage adjustment made based on physician directed care plan. Increased weekly dose by 5.6%. recheck in 3 weeks as patient will not come in any sooner.    Patient states negative for signs and symptoms of bleeding or blood clots, changes in medication, changes in diet, any signs of infection or  antibiotic use, anything new OTC or herbal medications, any missed or extra doses of the warfarin.    Patient informed of the symptoms to be seen for either by INR nurse or ER.    Patient ask if refill of warfarin is needed. Rx sent no      Dot Ziegler RN

## 2020-05-19 NOTE — TELEPHONE ENCOUNTER
Please see the May 19, 2020 Anticoagulation encounter for further information.  Dot Ziegler RN, [facility] Triage

## 2020-05-19 NOTE — TELEPHONE ENCOUNTER
This writer attempted to contact Ed on 05/19/20      Reason for call INR results and left message.      If patient calls back:   INR Registered Nurse called. Inform patient that someone from the INR group will contact them, document that pt called and route to [facility] [[id number]]        Dot Ziegler, RN

## 2020-06-09 ENCOUNTER — ANTICOAGULATION THERAPY VISIT (OUTPATIENT)
Dept: NURSING | Facility: CLINIC | Age: 75
End: 2020-06-09

## 2020-06-09 ENCOUNTER — TELEPHONE (OUTPATIENT)
Dept: NURSING | Facility: CLINIC | Age: 75
End: 2020-06-09

## 2020-06-09 DIAGNOSIS — I48.20 CHRONIC ATRIAL FIBRILLATION (H): ICD-10-CM

## 2020-06-09 DIAGNOSIS — Z79.01 LONG TERM CURRENT USE OF ANTICOAGULANT THERAPY: ICD-10-CM

## 2020-06-09 LAB
CAPILLARY BLOOD COLLECTION: NORMAL
INR PPP: 2 (ref 0.86–1.14)

## 2020-06-09 PROCEDURE — 85610 PROTHROMBIN TIME: CPT | Performed by: FAMILY MEDICINE

## 2020-06-09 PROCEDURE — 36416 COLLJ CAPILLARY BLOOD SPEC: CPT | Performed by: FAMILY MEDICINE

## 2020-06-09 NOTE — TELEPHONE ENCOUNTER
Please see the June 9, 2020 Anticoagulation encounter for further information.  Dot Ziegler RN, [facility] Triage

## 2020-06-09 NOTE — TELEPHONE ENCOUNTER
This writer attempted to contact Ed on 06/09/20      Reason for call INR results and questions and left message.      If patient calls back:   INR Registered Nurse called. Inform patient that someone from the INR group will contact them, document that pt called and route to [facility] [[id number]]        Dot Ziegler, RN

## 2020-07-13 DIAGNOSIS — I48.20 CHRONIC ATRIAL FIBRILLATION (H): ICD-10-CM

## 2020-07-13 RX ORDER — WARFARIN SODIUM 2.5 MG/1
TABLET ORAL
Qty: 240 TABLET | Refills: 1 | Status: SHIPPED | OUTPATIENT
Start: 2020-07-13 | End: 2020-12-28

## 2020-07-14 ENCOUNTER — ANTICOAGULATION THERAPY VISIT (OUTPATIENT)
Dept: NURSING | Facility: CLINIC | Age: 75
End: 2020-07-14

## 2020-07-14 DIAGNOSIS — Z79.01 LONG TERM CURRENT USE OF ANTICOAGULANT THERAPY: ICD-10-CM

## 2020-07-14 DIAGNOSIS — I48.20 CHRONIC ATRIAL FIBRILLATION (H): ICD-10-CM

## 2020-07-14 LAB
CAPILLARY BLOOD COLLECTION: NORMAL
INR PPP: 2.3 (ref 0.86–1.14)

## 2020-07-14 PROCEDURE — 36416 COLLJ CAPILLARY BLOOD SPEC: CPT | Performed by: FAMILY MEDICINE

## 2020-07-14 PROCEDURE — 85610 PROTHROMBIN TIME: CPT | Performed by: FAMILY MEDICINE

## 2020-07-14 NOTE — PROGRESS NOTES
ANTICOAGULATION FOLLOW-UP CLINIC VISIT    Patient Name:  Keith Desir  Date:  2020  Contact Type:  Telephone    SUBJECTIVE:  Patient Findings     Positives:   Change in medications (increase BP medication)        Clinical Outcomes     Negatives:   Major bleeding event, Thromboembolic event, Anticoagulation-related hospital admission, Anticoagulation-related ED visit, Anticoagulation-related fatality           OBJECTIVE    Recent labs: (last 7 days)     20  0921   INR 2.30*       ASSESSMENT / PLAN  INR assessment THER    Recheck INR In: 6 WEEKS    INR Location Clinic      Anticoagulation Summary  As of 2020    INR goal:   2.0-3.0   TTR:   79.4 % (1 y)   INR used for dosin.30 (2020)   Warfarin maintenance plan:   5 mg (2.5 mg x 2) every Tue, Sat; 7.5 mg (2.5 mg x 3) all other days   Full warfarin instructions:   5 mg every Tue, Sat; 7.5 mg all other days   Weekly warfarin total:   47.5 mg   No change documented:   Dot Ziegler RN   Plan last modified:   Dot Ziegler RN (2020)   Next INR check:   2020   Priority:   Maintenance   Target end date:       Indications    Long term current use of anticoagulant therapy [Z79.01]  Chronic atrial fibrillation (HCC) [I48.20]             Anticoagulation Episode Summary     INR check location:       Preferred lab:       Send INR reminders to:   AJ MCINTOSH    Comments:   don't print AVS unless change in dosing      Anticoagulation Care Providers     Provider Role Specialty Phone number    Cayla Gregorio MD [facility] [phone]            See the Encounter Report to view Anticoagulation Flowsheet and Dosing Calendar (Go to Encounters tab in chart review, and find the Anticoagulation Therapy Visit)    Therapeutic INR 2.3. Will continue maintenance dose and recheck in 6 week(s).    Patient states negative for signs and symptoms of bleeding or blood clots, changes in medication, changes in  diet, any signs of infection or antibiotic use, anything new OTC or herbal medications, any missed or extra doses of the warfarin.    Patient informed of the symptoms to be seen for either by INR nurse or ER.    Patient ask if refill of warfarin is needed. Rx sent no    Dot Ziegler RN

## 2020-08-25 ENCOUNTER — ANTICOAGULATION THERAPY VISIT (OUTPATIENT)
Dept: NURSING | Facility: CLINIC | Age: 75
End: 2020-08-25

## 2020-08-25 DIAGNOSIS — Z79.01 LONG TERM CURRENT USE OF ANTICOAGULANT THERAPY: ICD-10-CM

## 2020-08-25 DIAGNOSIS — I48.20 CHRONIC ATRIAL FIBRILLATION (H): ICD-10-CM

## 2020-08-25 LAB
CAPILLARY BLOOD COLLECTION: NORMAL
INR PPP: 2.7 (ref 0.86–1.14)

## 2020-08-25 PROCEDURE — 85610 PROTHROMBIN TIME: CPT | Performed by: FAMILY MEDICINE

## 2020-08-25 PROCEDURE — 36416 COLLJ CAPILLARY BLOOD SPEC: CPT | Performed by: FAMILY MEDICINE

## 2020-08-25 NOTE — PROGRESS NOTES
ANTICOAGULATION FOLLOW-UP CLINIC VISIT    Patient Name:  Keith Desir  Date:  2020  Contact Type:  Telephone    SUBJECTIVE:  Patient Findings         Clinical Outcomes     Negatives:   Major bleeding event, Thromboembolic event, Anticoagulation-related hospital admission, Anticoagulation-related ED visit, Anticoagulation-related fatality           OBJECTIVE    Recent labs: (last 7 days)     20  0920   INR 2.70*       ASSESSMENT / PLAN  INR assessment THER    Recheck INR In: 6 WEEKS    INR Location Clinic      Anticoagulation Summary  As of 2020    INR goal:   2.0-3.0   TTR:   79.4 % (1 y)   INR used for dosin.70 (2020)   Warfarin maintenance plan:   5 mg (2.5 mg x 2) every Tue, Sat; 7.5 mg (2.5 mg x 3) all other days   Full warfarin instructions:   5 mg every Tue, Sat; 7.5 mg all other days   Weekly warfarin total:   47.5 mg   No change documented:   Dot Ziegler RN   Plan last modified:   Dot Ziegler RN (2020)   Next INR check:   10/6/2020   Priority:   Maintenance   Target end date:       Indications    Long term current use of anticoagulant therapy [Z79.01]  Chronic atrial fibrillation (HCC) [I48.20]             Anticoagulation Episode Summary     INR check location:       Preferred lab:       Send INR reminders to:   AJ MCINTOSH    Comments:   don't print AVS unless change in dosing      Anticoagulation Care Providers     Provider Role Specialty Phone number    Cayla Gregorio MD [facility] [phone]            See the Encounter Report to view Anticoagulation Flowsheet and Dosing Calendar (Go to Encounters tab in chart review, and find the Anticoagulation Therapy Visit)    Therapeutic INR 2.7. Will continue maintenance dose and recheck in 6 week(s).    Patient states negative for signs and symptoms of bleeding or blood clots, changes in medication, changes in diet, any signs of infection or antibiotic use, anything new  OTC or herbal medications, any missed or extra doses of the warfarin.    Patient informed of the symptoms to be seen for either by INR nurse or ER.    Patient ask if refill of warfarin is needed. Rx sent no    Dot Ziegler RN

## 2020-08-26 DIAGNOSIS — E11.9 TYPE 2 DIABETES MELLITUS WITHOUT COMPLICATION, WITHOUT LONG-TERM CURRENT USE OF INSULIN (H): ICD-10-CM

## 2020-08-26 DIAGNOSIS — I50.9 CHF (NYHA CLASS II, ACC/AHA STAGE C) (H): ICD-10-CM

## 2020-08-26 RX ORDER — GLIPIZIDE 5 MG/1
TABLET ORAL
Qty: 180 TABLET | Refills: 0 | Status: SHIPPED | OUTPATIENT
Start: 2020-08-26 | End: 2020-11-23

## 2020-08-26 RX ORDER — LISINOPRIL 40 MG/1
TABLET ORAL
Qty: 90 TABLET | Refills: 1 | Status: SHIPPED | OUTPATIENT
Start: 2020-08-26 | End: 2021-02-16

## 2020-08-26 NOTE — TELEPHONE ENCOUNTER
Routing refill request to provider for review/approval because:  Labs not current:  A1c    Hemoglobin A1C   Date Value Ref Range Status   02/26/2020 6.9 (H) 0 - 5.6 % Final     Comment:     Normal <5.7% Prediabetes 5.7-6.4%  Diabetes 6.5% or higher - adopted from ADA   consensus guidelines.

## 2020-09-02 ENCOUNTER — OFFICE VISIT (OUTPATIENT)
Dept: FAMILY MEDICINE | Facility: CLINIC | Age: 75
End: 2020-09-02
Payer: MEDICARE

## 2020-09-02 VITALS
HEART RATE: 84 BPM | DIASTOLIC BLOOD PRESSURE: 88 MMHG | TEMPERATURE: 97.8 F | BODY MASS INDEX: 34.14 KG/M2 | WEIGHT: 224.5 LBS | OXYGEN SATURATION: 96 % | SYSTOLIC BLOOD PRESSURE: 130 MMHG | RESPIRATION RATE: 15 BRPM

## 2020-09-02 DIAGNOSIS — N18.2 TYPE 2 DIABETES MELLITUS WITH STAGE 2 CHRONIC KIDNEY DISEASE, WITHOUT LONG-TERM CURRENT USE OF INSULIN (H): ICD-10-CM

## 2020-09-02 DIAGNOSIS — E78.5 HYPERLIPIDEMIA LDL GOAL <70: ICD-10-CM

## 2020-09-02 DIAGNOSIS — Z72.0 TOBACCO ABUSE: ICD-10-CM

## 2020-09-02 DIAGNOSIS — E11.42 DIABETIC POLYNEUROPATHY ASSOCIATED WITH TYPE 2 DIABETES MELLITUS (H): ICD-10-CM

## 2020-09-02 DIAGNOSIS — I12.9 RENAL HYPERTENSION: ICD-10-CM

## 2020-09-02 DIAGNOSIS — I50.9 CHF (NYHA CLASS II, ACC/AHA STAGE C) (H): ICD-10-CM

## 2020-09-02 DIAGNOSIS — I48.20 CHRONIC ATRIAL FIBRILLATION (H): ICD-10-CM

## 2020-09-02 DIAGNOSIS — J43.9 PULMONARY EMPHYSEMA, UNSPECIFIED EMPHYSEMA TYPE (H): Chronic | ICD-10-CM

## 2020-09-02 DIAGNOSIS — Z00.00 ROUTINE GENERAL MEDICAL EXAMINATION AT A HEALTH CARE FACILITY: Primary | ICD-10-CM

## 2020-09-02 DIAGNOSIS — E11.22 TYPE 2 DIABETES MELLITUS WITH STAGE 2 CHRONIC KIDNEY DISEASE, WITHOUT LONG-TERM CURRENT USE OF INSULIN (H): ICD-10-CM

## 2020-09-02 LAB — HBA1C MFR BLD: 7.5 % (ref 0–5.6)

## 2020-09-02 PROCEDURE — 90662 IIV NO PRSV INCREASED AG IM: CPT | Performed by: FAMILY MEDICINE

## 2020-09-02 PROCEDURE — G0008 ADMIN INFLUENZA VIRUS VAC: HCPCS | Performed by: FAMILY MEDICINE

## 2020-09-02 PROCEDURE — 83036 HEMOGLOBIN GLYCOSYLATED A1C: CPT | Performed by: FAMILY MEDICINE

## 2020-09-02 PROCEDURE — 80048 BASIC METABOLIC PNL TOTAL CA: CPT | Performed by: FAMILY MEDICINE

## 2020-09-02 PROCEDURE — 99214 OFFICE O/P EST MOD 30 MIN: CPT | Mod: 25 | Performed by: FAMILY MEDICINE

## 2020-09-02 PROCEDURE — G0439 PPPS, SUBSEQ VISIT: HCPCS | Performed by: FAMILY MEDICINE

## 2020-09-02 PROCEDURE — 36415 COLL VENOUS BLD VENIPUNCTURE: CPT | Performed by: FAMILY MEDICINE

## 2020-09-02 RX ORDER — FUROSEMIDE 20 MG
TABLET ORAL
Qty: 90 TABLET | Refills: 1 | Status: SHIPPED | OUTPATIENT
Start: 2020-09-02 | End: 2021-03-02

## 2020-09-02 NOTE — PROGRESS NOTES
"SUBJECTIVE:   Keith Desir is a 74 year old male who presents for Preventive Visit. Are you in the first 12 months of your Medicare Part B coverage?      Patient also presents to follow-up diabetes. Diabetic Review of Systems - Medication compliance:  compliant all of the time, Diabetic diet compliance:  compliant most of the time, Home glucose monitoring:  blood glucose record WAS NOT brought in today, Diabetic ROS: no polyuria or polydipsia, no chest pain, dyspnea or TIA's, no numbness, tingling or pain in extremities, no unusual visual symptoms, no hypoglycemia, no medication side effects noted, Last eye exam approximately 1 year ago. Hypertension well controlled on current medications without side effects, chest pain, or dyspnea. Hypercholesterolemia well controlled with current treatment plan without side effects. COPD well controlled with medications without worsening dyspnea, wheezing or cough. He has cut down smoking to half ppd and wants to start home exercise.     Physical Health:    In general, how would you rate your overall physical health? good    Outside of work, how many days during the week do you exercise? none    Outside of work, approximately how many minutes a day do you exercise?less than 15 minutes    If you drink alcohol do you typically have >3 drinks per day or >7 drinks per week? No    Do you usually eat at least 4 servings of fruit and vegetables a day, include whole grains & fiber and avoid regularly eating high fat or \"junk\" foods? NO    Do you have any problems taking medications regularly?  No    Do you have any side effects from medications? none    Needs assistance for the following daily activities: no assistance needed    Which of the following safety concerns are present in your home?  none identified     Hearing impairment: Yes, Need to ask people to speak up or repeat themselves.    In the past 6 months, have you been bothered by leaking of urine? no    Mental Health:    In " general, how would you rate your overall mental or emotional health? good  PHQ-2 Score:      Do you feel safe in your environment? Yes    Have you ever done Advance Care Planning? (For example, a Health Directive, POLST, or a discussion with a medical provider or your loved ones about your wishes): No, advance care planning information given to patient to review.  Patient declined advance care planning discussion at this time.    Additional concerns to address?  No    Fall risk:       Cognitive Screenin) Repeat 3 items (Leader, Season, Table)    2) Clock draw: NORMAL  3) 3 item recall: Recalls 3 objects  Results: 3 items recalled: COGNITIVE IMPAIRMENT LESS LIKELY    Mini-CogTM Copyright S Brigida. Licensed by the author for use in [facility]; reprinted with permission (sobhavesh@[email or web address]). All rights reserved.      Do you have sleep apnea, excessive snoring or daytime drowsiness?: no            Reviewed and updated as needed this visit by clinical staff  Tobacco  Allergies  Meds  Problems  Med Hx  Surg Hx  Fam Hx  Soc Hx          Reviewed and updated as needed this visit by Provider  Tobacco  Allergies  Meds  Problems  Med Hx  Surg Hx  Fam Hx  Soc Hx         Social History     Tobacco Use     Smoking status: Current Every Day Smoker     Packs/day: 1.00     Years: 45.00     Pack years: 45.00     Types: Cigarettes     Smokeless tobacco: Never Used     Tobacco comment: cut down to 0.5 ppd    Substance Use Topics     Alcohol use: Yes     Alcohol/week: 0.0 - 4.2 standard drinks                           Current providers sharing in care for this patient include:    Patient Care Team:  Cayla Gregorio MD as PCP - General  Cayla Gregorio MD as Assigned PCP    The following health maintenance items are reviewed in Epic and correct as of today:  Health Maintenance   Topic Date Due     ZOSTER IMMUNIZATION (2 of 3) 2012     DIGOXIN  2020     EYE EXAM  2020      MICROALBUMIN  07/22/2020     INFLUENZA VACCINE (1) 09/01/2020     BMP  10/08/2020     ALT  02/26/2021     LIPID  02/26/2021     DIABETIC FOOT EXAM  02/26/2021     FALL RISK ASSESSMENT  02/26/2021     CBC  02/26/2021     A1C  03/02/2021     DTAP/TDAP/TD IMMUNIZATION (3 - Td) 07/12/2021     MEDICARE ANNUAL WELLNESS VISIT  09/02/2021     COLORECTAL CANCER SCREENING  08/01/2022     HF ACTION PLAN  08/27/2022     ADVANCE CARE PLANNING  09/02/2025     TSH W/FREE T4 REFLEX  Completed     SPIROMETRY  Completed     HEPATITIS C SCREENING  Completed     COPD ACTION PLAN  Completed     PHQ-2  Completed     PNEUMOCOCCAL IMMUNIZATION 65+ LOW/MEDIUM RISK  Completed     HEPATITIS B IMMUNIZATION  Completed     AORTIC ANEURYSM SCREENING (SYSTEM ASSIGNED)  Completed     IPV IMMUNIZATION  Aged Out     MENINGITIS IMMUNIZATION  Aged Out     Patient Active Problem List   Diagnosis     Hyperlipidemia LDL goal <70     Tobacco abuse     Chronic atrial fibrillation (HCC)     Renal hypertension     Advanced directives, counseling/discussion     Health Care Home     CHF (NYHA class II, ACC/AHA stage C) (H)     Polycythemia secondary to smoking     Elevated alkaline phosphatase level     Long term current use of anticoagulant therapy     Non morbid obesity due to excess calories     Pulmonary emphysema, unspecified emphysema type (H)     NAFLD (nonalcoholic fatty liver disease)     Varicose veins of legs     Type 2 diabetes mellitus with stage 2 chronic kidney disease, without long-term current use of insulin (H)     Diabetic neuropathy (H)     Past Surgical History:   Procedure Laterality Date     ANGIOGRAM  8/2006    normal     TONSILLECTOMY & ADENOIDECTOMY         Social History     Tobacco Use     Smoking status: Current Every Day Smoker     Packs/day: 1.00     Years: 45.00     Pack years: 45.00     Types: Cigarettes     Smokeless tobacco: Never Used     Tobacco comment: cut down to 0.5 ppd    Substance Use Topics     Alcohol use: Yes      Alcohol/week: 0.0 - 4.2 standard drinks     Family History   Problem Relation Age of Onset     Diabetes Mother      Cerebrovascular Disease Father      Heart Disease Brother         pacer      Cancer No family hx of      Glaucoma No family hx of      Macular Degeneration No family hx of          Current Outpatient Medications   Medication Sig Dispense Refill     albuterol (2.5 MG/3ML) 0.083% neb solution Take 1 vial (2.5 mg) by nebulization every 4 hours as needed for shortness of breath / dyspnea       albuterol (PROAIR HFA/PROVENTIL HFA/VENTOLIN HFA) 108 (90 Base) MCG/ACT Inhaler Inhale 1-2 puffs into the lungs every 4 hours as needed for shortness of breath / dyspnea 1 Inhaler 11     ASPIRIN NOT PRESCRIBED, INTENTIONAL, Antiplatelet medication not prescribed intentionally due to Current anticoagulant therapy (warfarin/enoxaparin)       atorvastatin (LIPITOR) 20 MG tablet Take 1 tablet (20 mg) by mouth daily 90 tablet 3     digoxin (DIGOX) 125 MCG tablet Take 1 tablet (125 mcg) by mouth daily 90 tablet 3     dimenhyDRINATE (DRAMAMINE PO) Take by mouth as needed       furosemide (LASIX) 20 MG tablet Take 1 tablet (20 mg) by mouth 1 times daily 90 tablet 1     glipiZIDE (GLUCOTROL) 5 MG tablet TAKE 1 TABLET BY MOUTH TWICE DAILY BEFORE MEAL(S) 180 tablet 0     lisinopril (ZESTRIL) 40 MG tablet Take 1 tablet by mouth once daily 90 tablet 1     metFORMIN (GLUCOPHAGE) 1000 MG tablet Take 1 tablet (1,000 mg) by mouth 2 times daily (with meals) 180 tablet 3     metoprolol tartrate (LOPRESSOR) 100 MG tablet Take 2 tablets (200 mg) by mouth 2 times daily 360 tablet 3     tiotropium (SPIRIVA HANDIHALER) 18 MCG inhaled capsule INHALE 1 CAPSULE (18 MCG) INTO THE LUNGS DAILY 90 capsule 3     warfarin ANTICOAGULANT (COUMADIN) 2.5 MG tablet TAKE 2 TABLETS (5MG) BY MOUTH ON TUES AND SAT. TAKE 3 TABLETS (7.5MG) ALL OTHER DAYS OR AS DIRECTED BY INR CLINIC 240 tablet 1     Allergies   Allergen Reactions     Nkda [No Known Drug  "Allergies]          ROS:  CONSTITUTIONAL: NEGATIVE for fever, chills, change in weight  INTEGUMENTARY/SKIN: NEGATIVE for worrisome rashes, moles or lesions  EYES: NEGATIVE for vision changes or irritation  ENT/MOUTH: NEGATIVE for ear, mouth and throat problems  RESP:as above  CV: NEGATIVE for chest pain/chest pressure and palpitations  GI: NEGATIVE for nausea, abdominal pain, heartburn, or change in bowel habits  : NEGATIVE for frequency, dysuria, or hematuria  MUSCULOSKELETAL: NEGATIVE for significant arthralgias or myalgia  NEURO: NEGATIVE for weakness, dizziness or paresthesias  ENDOCRINE: NEGATIVE for temperature intolerance, skin/hair changes  HEME: NEGATIVE for bleeding problems  PSYCHIATRIC: NEGATIVE for changes in mood or affect    OBJECTIVE:   /88   Pulse 84   Temp 97.8  F (36.6  C) (Oral)   Resp 15   Wt 101.8 kg (224 lb 8 oz)   SpO2 96%   BMI 34.14 kg/m   Estimated body mass index is 34.14 kg/m  as calculated from the following:    Height as of 2/26/20: 1.727 m (5' 8\").    Weight as of this encounter: 101.8 kg (224 lb 8 oz).  EXAM:   GENERAL: alert and no distress  EYES: Eyes grossly normal to inspection, PERRL and conjunctivae and sclerae normal  HENT: ear canals and TM's normal, nose and mouth without ulcers or lesions  NECK: no adenopathy, no asymmetry, masses, or scars and thyroid normal to palpation  RESP: lungs clear to auscultation - no rales, rhonchi or wheezes  CV: irregularly irregular rhythm, normal S1 S2, no S3 or S4 and trace bipedal edema   ABDOMEN: soft, nontender, no hepatosplenomegaly, no masses and bowel sounds normal  MS: no gross musculoskeletal defects noted, no edema  SKIN: no suspicious lesions or rashes  NEURO: Normal strength and tone, mentation intact and speech normal  PSYCH: mentation appears normal, affect normal/bright    Diagnostic Test Results:  Labs reviewed in Epic    ASSESSMENT / PLAN:   (Z00.00) Routine general medical examination at a health care facility  " (primary encounter diagnosis)    (E11.22,  N18.2) Type 2 diabetes mellitus with stage 2 chronic kidney disease, without long-term current use of insulin (H)  (E11.42) Diabetic polyneuropathy associated with type 2 diabetes mellitus (H)  Comment: reasonable control   Plan: Basic metabolic panel, OFFICE/OUTPT         VISIT,EST,LEVL IV           (I50.9) CHF (NYHA class II, ACC/AHA stage C) (H)  Comment: euvolemic, on beta blocker and ACE/ARB   Plan: furosemide (LASIX) 20 MG tablet, Digoxin level,        Basic metabolic panel, OFFICE/OUTPT         VISIT,EST,LEVL IV        Follow-up 6 months     (I48.20) Chronic atrial fibrillation (HCC)  Comment: rate controlled and anticoagulated   Plan: Digoxin level, Basic metabolic panel,         OFFICE/OUTPT VISIT,EST,LEVL IV        Continue chronic anticoagulation under surveillance of protime clinic with goal INR 2 - 3 indefinitely      (I12.9) Renal hypertension  Comment: Well controlled with medications without side effects.   Plan: Albumin Random Urine Quantitative with Creat         Ratio, Basic metabolic panel, OFFICE/OUTPT         VISIT,EST,LEVL IV          (E78.5) Hyperlipidemia LDL goal <70  Comment: Well controlled with medications without side effects.   Plan: OFFICE/OUTPT VISIT,EST,LEVL IV          (J43.9) Pulmonary emphysema, unspecified emphysema type (H)  (Z72.0) Tobacco abuse  Plan: OFFICE/OUTPT VISIT,EST,LEVL IV        Urged cessation and offered my support.       COUNSELING:  Reviewed preventive health counseling, as reflected in patient instructions  Special attention given to:       Regular exercise       Healthy diet/nutrition       Vision screening       Hepatitis C screening       HIV screening for high risk patient       Colon cancer screening       Osteoporosis Prevention/Bone Health       The ASCVD Risk score (Carlos SOTERO Jr., et al., 2013) failed to calculate for the following reasons:    The valid total cholesterol range is 130 to 320 mg/dL    Estimated  "body mass index is 34.14 kg/m  as calculated from the following:    Height as of 2/26/20: 1.727 m (5' 8\").    Weight as of this encounter: 101.8 kg (224 lb 8 oz).    Weight management plan: Discussed healthy diet and exercise guidelines    He reports that he has been smoking cigarettes. He has a 45.00 pack-year smoking history. He has never used smokeless tobacco.  Tobacco Cessation Action Plan:   Information offered: Patient not interested at this time    Appropriate preventive services were discussed with this patient, including applicable screening as appropriate for cardiovascular disease, diabetes, osteopenia/osteoporosis, and glaucoma.  As appropriate for age/gender, discussed screening for colorectal cancer, prostate cancer, breast cancer, and cervical cancer. Checklist reviewing preventive services available has been given to the patient.    Reviewed patients plan of care and provided an AVS. The Intermediate Care Plan ( asthma action plan, low back pain action plan, and migraine action plan) for Keith meets the Care Plan requirement. This Care Plan has been established and reviewed with the Patient and spouse.    Counseling Resources:  ATP IV Guidelines  Pooled Cohorts Equation Calculator  Breast Cancer Risk Calculator  BRCA-Related Cancer Risk Assessment: FHS-7 Tool  FRAX Risk Assessment  ICSI Preventive Guidelines  Dietary Guidelines for Americans, 2010  USDA's MyPlate  ASA Prophylaxis  Lung CA Screening    Cayla Gregorio MD  [facility]  "

## 2020-09-02 NOTE — PATIENT INSTRUCTIONS
Preventive Health Recommendations:     See your health care provider every year to    Review health changes.     Discuss preventive care.      Review your medicines if your doctor has prescribed any.      Talk with your health care provider about whether you should have a test to screen for prostate cancer (PSA).    Every 3 years, have a diabetes test (fasting glucose). If you are at risk for diabetes, you should have this test more often.    Every 5 years, have a cholesterol test. Have this test more often if you are at risk for high cholesterol or heart disease.     Every 10 years, have a colonoscopy. Or, have a yearly FIT test (stool test). These exams will check for colon cancer.    Talk to with your health care provider about screening for Abdominal Aortic Aneurysm if you have a family history of AAA or have a history of smoking.    Shots:     Get a flu shot each year.     Get a tetanus shot every 10 years.     Talk to your doctor about your pneumonia vaccines. There are now two you should receive - Pneumovax (PPSV 23) and Prevnar (PCV 13).     Talk to your pharmacist about a shingles vaccine.     Talk to your doctor about the hepatitis B vaccine.  Nutrition:     Eat at least 5 servings of fruits and vegetables each day.     Eat whole-grain bread, whole-wheat pasta and brown rice instead of white grains and rice.     Get adequate Calcium and Vitamin D.   Lifestyle    Exercise for at least 150 minutes a week (30 minutes a day, 5 days a week). This will help you control your weight and prevent disease.     Limit alcohol to one drink per day.     No smoking.     Wear sunscreen to prevent skin cancer.    See your dentist every six months for an exam and cleaning.    See your eye doctor every 1 to 2 years to screen for conditions such as glaucoma, macular degeneration, cataracts, etc.    Personalized Prevention Plan  You are due for the preventive services outlined below.  Your care team is available to assist you  in scheduling these services.  If you have already completed any of these items, please share that information with your care team to update in your medical record.  Health Maintenance Due   Topic Date Due     Zoster (Shingles) Vaccine (2 of 3) 06/19/2012     Digoxin Lab  04/16/2020     Eye Exam  07/02/2020     Kidney Microalbumin Urine Test  07/22/2020     A1C Lab  08/26/2020     Flu Vaccine (1) 09/01/2020     Annual Wellness Visit  08/27/2020     It is recommended that you get a vaccination for shingles called Shingrix (given as 2 shots, 2 to 6 months apart), even if you have already had the Zostavax vaccine. Discuss getting the Shingix vaccine from your pharmacist, or schedule an ancillary shot visit here. Some insurances do not cover the cost of these vaccines.      Please schedule your yearly eye examination.

## 2020-09-03 LAB
ANION GAP SERPL CALCULATED.3IONS-SCNC: 8 MMOL/L (ref 3–14)
ANION GAP SERPL CALCULATED.3IONS-SCNC: NORMAL MMOL/L (ref 6–17)
BUN SERPL-MCNC: 15 MG/DL (ref 7–30)
BUN SERPL-MCNC: NORMAL MG/DL (ref 7–30)
CALCIUM SERPL-MCNC: 9.3 MG/DL (ref 8.5–10.1)
CALCIUM SERPL-MCNC: NORMAL MG/DL (ref 8.5–10.1)
CHLORIDE SERPL-SCNC: 102 MMOL/L (ref 94–109)
CHLORIDE SERPL-SCNC: NORMAL MMOL/L (ref 94–109)
CO2 SERPL-SCNC: 27 MMOL/L (ref 20–32)
CO2 SERPL-SCNC: NORMAL MMOL/L (ref 20–32)
CREAT SERPL-MCNC: 0.78 MG/DL (ref 0.66–1.25)
CREAT SERPL-MCNC: NORMAL MG/DL (ref 0.66–1.25)
DIGOXIN SERPL-MCNC: 0.6 UG/L (ref 0.5–2)
DIGOXIN SERPL-MCNC: NORMAL UG/L (ref 0.5–2)
GFR SERPL CREATININE-BSD FRML MDRD: 90 ML/MIN/{1.73_M2}
GFR SERPL CREATININE-BSD FRML MDRD: NORMAL ML/MIN/{1.73_M2}
GLUCOSE SERPL-MCNC: 173 MG/DL (ref 70–99)
GLUCOSE SERPL-MCNC: NORMAL MG/DL (ref 70–99)
POTASSIUM SERPL-SCNC: 4.6 MMOL/L (ref 3.4–5.3)
POTASSIUM SERPL-SCNC: NORMAL MMOL/L (ref 3.4–5.3)
SODIUM SERPL-SCNC: 137 MMOL/L (ref 133–144)
SODIUM SERPL-SCNC: NORMAL MMOL/L (ref 133–144)

## 2020-09-03 PROCEDURE — 80162 ASSAY OF DIGOXIN TOTAL: CPT | Performed by: FAMILY MEDICINE

## 2020-09-04 ENCOUNTER — MYC MEDICAL ADVICE (OUTPATIENT)
Dept: FAMILY MEDICINE | Facility: CLINIC | Age: 75
End: 2020-09-04

## 2020-09-04 ENCOUNTER — TELEPHONE (OUTPATIENT)
Dept: FAMILY MEDICINE | Facility: CLINIC | Age: 75
End: 2020-09-04

## 2020-09-04 DIAGNOSIS — E11.9 TYPE 2 DIABETES MELLITUS WITHOUT COMPLICATION, WITHOUT LONG-TERM CURRENT USE OF INSULIN (H): ICD-10-CM

## 2020-09-04 NOTE — TELEPHONE ENCOUNTER
Voice mail left for patient to call RN hotline at [phone].      Your digoxin and kidney tests are normal.       Your blood glucose is higher than goal. We have an option to add a medication for diabetes (Empagliflozin/Jardiance) that can lower risk for heart problems too. I have sent a prescription when you're ready. Follow-up for a lab only check in 3 months.       Increase your intake of vegetables and fruits to 4 - 5 servings per day. Nuts, seeds, and whole grains (such as wheat pasta and wheat bread) have been shown to be beneficial for long-term health. Drink water and milk instead of pop and juice. Exercise regularly. Exercising 30 minutes 5 times a week is encouraged.      Cayla Gregory RN

## 2020-09-04 NOTE — TELEPHONE ENCOUNTER
Wife then sent message: Good afternoon. Ed has decided to take new medication. Will also try and cut back on candy.  Thank you.  Melissa Gregory RN

## 2020-09-04 NOTE — RESULT ENCOUNTER NOTE
Please call patient:    Your digoxin and kidney tests are normal.     Your blood glucose is higher than goal. We have an option to add a medication for diabetes that can lower risk for heart problems too. I have sent a prescription when you're ready. Follow-up for a lab only check in 3 months.     Increase your intake of vegetables and fruits to 4 - 5 servings per day. Nuts, seeds, and whole grains (such as wheat pasta and wheat bread) have been shown to be beneficial for long-term health. Drink water and milk instead of pop and juice. Exercise regularly. Exercising 30 minutes 5 times a week is encouraged.      Cayla Gregorio MD

## 2020-09-04 NOTE — TELEPHONE ENCOUNTER
Spoke with patient wife and gave message below.  She states patient has a small bowl of candy with chocolates and caramel that he eats every night.  Some days he eats everything in the bowl and other times it lasts 2 days.  She rather have patient cut back on candy instead of adding another medication.  She will check with patient to see if he is agreeable and mychart response back.  Did advise if patient is unwilling to give up candy the medication would be helpful.   Vanessa Gregory RN

## 2020-09-09 NOTE — TELEPHONE ENCOUNTER
Please review and renew this patients INR referral, orders pending. Thank you!      Joan Monte RN    
Never smoker

## 2020-10-06 ENCOUNTER — MYC MEDICAL ADVICE (OUTPATIENT)
Dept: FAMILY MEDICINE | Facility: CLINIC | Age: 75
End: 2020-10-06

## 2020-10-06 ENCOUNTER — TELEPHONE (OUTPATIENT)
Dept: FAMILY MEDICINE | Facility: CLINIC | Age: 75
End: 2020-10-06

## 2020-10-06 ENCOUNTER — ANTICOAGULATION THERAPY VISIT (OUTPATIENT)
Dept: NURSING | Facility: CLINIC | Age: 75
End: 2020-10-06

## 2020-10-06 DIAGNOSIS — Z79.01 LONG TERM CURRENT USE OF ANTICOAGULANT THERAPY: ICD-10-CM

## 2020-10-06 DIAGNOSIS — I48.20 CHRONIC ATRIAL FIBRILLATION (H): ICD-10-CM

## 2020-10-06 DIAGNOSIS — I48.20 CHRONIC ATRIAL FIBRILLATION (H): Primary | ICD-10-CM

## 2020-10-06 LAB
CAPILLARY BLOOD COLLECTION: NORMAL
INR PPP: 2.8 (ref 0.86–1.14)

## 2020-10-06 PROCEDURE — 36416 COLLJ CAPILLARY BLOOD SPEC: CPT | Performed by: FAMILY MEDICINE

## 2020-10-06 PROCEDURE — 85610 PROTHROMBIN TIME: CPT | Performed by: FAMILY MEDICINE

## 2020-10-06 NOTE — PROGRESS NOTES
ANTICOAGULATION FOLLOW-UP CLINIC VISIT    Patient Name:  Keith Desir  Date:  10/6/2020  Contact Type:  Telephone    SUBJECTIVE:  Patient Findings         Clinical Outcomes     Negatives:  Major bleeding event, Thromboembolic event, Anticoagulation-related hospital admission, Anticoagulation-related ED visit, Anticoagulation-related fatality           OBJECTIVE    Recent labs: (last 7 days)     10/06/20  0922   INR 2.80*       ASSESSMENT / PLAN  INR assessment THER    Recheck INR In: 6 WEEKS    INR Location Clinic      Anticoagulation Summary  As of 10/6/2020    INR goal:  2.0-3.0   TTR:  79.4 % (1 y)   INR used for dosin.80 (10/6/2020)   Warfarin maintenance plan:  5 mg (2.5 mg x 2) every Tue, Sat; 7.5 mg (2.5 mg x 3) all other days   Full warfarin instructions:  5 mg every Tue, Sat; 7.5 mg all other days   Weekly warfarin total:  47.5 mg   No change documented:  Dot Ziegler RN   Plan last modified:  Dot Ziegler RN (2020)   Next INR check:  2020   Priority:  Maintenance   Target end date:      Indications    Long term current use of anticoagulant therapy [Z79.01]  Chronic atrial fibrillation (HCC) [I48.20]             Anticoagulation Episode Summary     INR check location:      Preferred lab:      Send INR reminders to:  AJ MCINTOSH    Comments:  don't print AVS unless change in dosing      Anticoagulation Care Providers     Provider Role Specialty Phone number    Cayla Gregorio MD [facility] [phone]            See the Encounter Report to view Anticoagulation Flowsheet and Dosing Calendar (Go to Encounters tab in chart review, and find the Anticoagulation Therapy Visit)    Therapeutic INR 2.8. Will continue maintenance dose and recheck in 6 week(s).    Melissa states negative for signs and symptoms of bleeding or blood clots, changes in medication, changes in diet, any signs of infection or antibiotic use, anything new OTC or herbal  medications, any missed or extra doses of the warfarin.    Patient informed of the symptoms to be seen for either by INR nurse or ER.    Patient ask if refill of warfarin is needed. Rx sent not needed at this time.    Dot Ziegler RN

## 2020-10-06 NOTE — TELEPHONE ENCOUNTER
Please advise if ok to do A1C early?    Claire Greer RN  [facility]

## 2020-10-06 NOTE — TELEPHONE ENCOUNTER
ANTICOAGULATION MANAGEMENT      Keith Desir due for annual renewal of referral to anticoagulation monitoring. Order pended for your review and signature.      ANTICOAGULATION SUMMARY      Warfarin indication(s)     Atrial fibrillation    Heart valve present?  NO       Current goal range   INR: 2.0-3.0     Goal appropriate for indication? Yes, INR 2-3 appropriate for hx of DVT, PE, hypercoagulable state, Afib, LVAD, or bileaflet AVR without risk factors     Current duration of therapy Indefinite/long term therapy   Time in Therapeutic Range (TTR)  (Goal > 60%) 79.4 %       Office visit with referring provider's group within last year yes on 9/2/2020       Dot Ziegler, RN

## 2020-10-08 ENCOUNTER — OFFICE VISIT (OUTPATIENT)
Dept: URGENT CARE | Facility: URGENT CARE | Age: 75
End: 2020-10-08
Payer: MEDICARE

## 2020-10-08 VITALS
SYSTOLIC BLOOD PRESSURE: 161 MMHG | OXYGEN SATURATION: 96 % | WEIGHT: 219.2 LBS | HEART RATE: 90 BPM | DIASTOLIC BLOOD PRESSURE: 89 MMHG | RESPIRATION RATE: 16 BRPM | TEMPERATURE: 98.3 F | BODY MASS INDEX: 33.33 KG/M2

## 2020-10-08 DIAGNOSIS — B35.3 TINEA PEDIS OF RIGHT FOOT: ICD-10-CM

## 2020-10-08 DIAGNOSIS — L03.031 CELLULITIS OF RIGHT TOE: Primary | ICD-10-CM

## 2020-10-08 DIAGNOSIS — E11.9 TYPE 2 DIABETES MELLITUS WITHOUT COMPLICATION, WITHOUT LONG-TERM CURRENT USE OF INSULIN (H): ICD-10-CM

## 2020-10-08 DIAGNOSIS — Z92.29 HISTORY OF COUMADIN THERAPY: ICD-10-CM

## 2020-10-08 PROCEDURE — 99214 OFFICE O/P EST MOD 30 MIN: CPT | Performed by: PHYSICIAN ASSISTANT

## 2020-10-08 RX ORDER — KETOCONAZOLE 20 MG/G
CREAM TOPICAL DAILY
Qty: 30 G | Refills: 0 | Status: SHIPPED | OUTPATIENT
Start: 2020-10-08 | End: 2020-10-22

## 2020-10-08 NOTE — PROGRESS NOTES
S: 74-year-old male presents for blister on his right toe for 1 month but last 3 days the toe has become red and swollen.  History of diabetes and Coumadin therapy for chronic atrial fib.  No fever.  He states it is not painful.  He has not been monitoring his blood sugar although he has testing supplies at home.  Mild drainage from the toe starting today.      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/2013     Hepatitis C      HTN (hypertension)      Hyperlipidemia      Morbid obesity (H)      NAFLD (nonalcoholic fatty liver disease)      Polycythemia secondary to smoking      Varicose veins of legs 8/8/2018            albuterol (2.5 MG/3ML) 0.083% neb solution, Take 1 vial (2.5 mg) by nebulization every 4 hours as needed for shortness of breath / dyspnea       albuterol (PROAIR HFA/PROVENTIL HFA/VENTOLIN HFA) 108 (90 Base) MCG/ACT Inhaler, Inhale 1-2 puffs into the lungs every 4 hours as needed for shortness of breath / dyspnea       ASPIRIN NOT PRESCRIBED, INTENTIONAL,, Antiplatelet medication not prescribed intentionally due to Current anticoagulant therapy (warfarin/enoxaparin)       atorvastatin (LIPITOR) 20 MG tablet, Take 1 tablet (20 mg) by mouth daily       digoxin (DIGOX) 125 MCG tablet, Take 1 tablet (125 mcg) by mouth daily       dimenhyDRINATE (DRAMAMINE PO), Take by mouth as needed       empagliflozin (JARDIANCE) 10 MG TABS tablet, Take 1 tablet (10 mg) by mouth daily       furosemide (LASIX) 20 MG tablet, Take 1 tablet (20 mg) by mouth 1 times daily       glipiZIDE  (GLUCOTROL) 5 MG tablet, TAKE 1 TABLET BY MOUTH TWICE DAILY BEFORE MEAL(S)       lisinopril (ZESTRIL) 40 MG tablet, Take 1 tablet by mouth once daily       metFORMIN (GLUCOPHAGE) 1000 MG tablet, Take 1 tablet (1,000 mg) by mouth 2 times daily (with meals)       metoprolol tartrate (LOPRESSOR) 100 MG tablet, Take 2 tablets (200 mg) by mouth 2 times daily       tiotropium (SPIRIVA HANDIHALER) 18 MCG inhaled capsule, INHALE 1 CAPSULE (18 MCG) INTO THE LUNGS DAILY       warfarin ANTICOAGULANT (COUMADIN) 2.5 MG tablet, TAKE 2 TABLETS (5MG) BY MOUTH ON TUES AND SAT. TAKE 3 TABLETS (7.5MG) ALL OTHER DAYS OR AS DIRECTED BY INR CLINIC    No current facility-administered medications on file prior to visit.       Social History     Tobacco Use     Smoking status: Current Every Day Smoker     Packs/day: 1.00     Years: 45.00     Pack years: 45.00     Types: Cigarettes     Smokeless tobacco: Never Used     Tobacco comment: cut down to 0.5 ppd    Substance Use Topics     Alcohol use: Yes     Alcohol/week: 0.0 - 4.2 standard drinks       ROS:  CONSTITUTIONAL: Negative for fatigue or fever.  EYES: Negative for eye problems.  ENT: As above.  RESP: As above.  CV: Negative for chest pains.  GI: Negative for vomiting.  MUSCULOSKELETAL:  Negative for significant muscle or joint pains.  NEUROLOGIC: Negative for headaches.  SKIN: Negative for rash.  PSYCH: Normal mentation for age.    OBJECTIVE:  BP (!) 161/89   Pulse 90   Temp 98.3  F (36.8  C) (Oral)   Resp 16   Wt 99.4 kg (219 lb 3.2 oz)   SpO2 96%   BMI 33.33 kg/m    GENERAL APPEARANCE: Healthy, alert and no distress.  EYES:Conjunctiva/sclera clear.  EARS: No cerumen.   Ear canals w/o erythema.  TM's intact w/o erythema.    NOSE/MOUTH: Nose without ulcers, erythema or lesions.  SINUSES: No maxillary sinus tenderness.  THROAT: No erythema w/o tonsillar enlargement . No exudates.  NECK: Supple, nontender, no lymphadenopathy.  RESP: Lungs clear to auscultation - no rales, rhonchi  or wheezes  CV: Regular rate and rhythm, normal S1 S2, no murmur noted.  NEURO: Awake, alert    SKIN: He has scaling between all the toes consistent with tinea pedis.  Nails are thickened and yellow.  Right big toe nail base is with redness and swelling.  Some surrounding paronychial tissue redness and inflammation.  Mildly tender no palpable fluctuance.  Mild drainage in the right lower hand corner.  Possible early granulation tissue.    A HIDA on this want    ASSESSMENT:     ICD-10-CM    1. Cellulitis of right toe  L03.031 amoxicillin-clavulanate (AUGMENTIN) 875-125 MG tablet     ketoconazole (NIZORAL) 2 % external cream     Orthopedic & Spine  Referral   2. Tinea pedis of right foot  B35.3 amoxicillin-clavulanate (AUGMENTIN) 875-125 MG tablet     ketoconazole (NIZORAL) 2 % external cream     Orthopedic & Spine  Referral         PLAN: Monitor blood sugars.  Recheck INR 3 to 5 days.  Warm soaks and Epson salts 2 times daily for 10 minutes.  See podiatry.  I have discussed clinical findings with patient.  Side effects of medications discussed.  Symptomatic care is discussed.  I have discussed the possibility of  worsening symptoms and indication to RTC or go to the ER if they occur.  All questions are answered, patient indicates understanding of these issues and is in agreement with plan.   Patient care instructions are discussed/given at the end of visit.   Lots of rest and fluids.    Dannielle Mckinney PA-C

## 2020-10-16 ENCOUNTER — OFFICE VISIT (OUTPATIENT)
Dept: PODIATRY | Facility: CLINIC | Age: 75
End: 2020-10-16
Payer: MEDICARE

## 2020-10-16 VITALS
BODY MASS INDEX: 33.3 KG/M2 | HEART RATE: 96 BPM | DIASTOLIC BLOOD PRESSURE: 95 MMHG | WEIGHT: 219 LBS | SYSTOLIC BLOOD PRESSURE: 138 MMHG

## 2020-10-16 DIAGNOSIS — L60.0 INGROWING NAIL: Primary | ICD-10-CM

## 2020-10-16 DIAGNOSIS — L03.031 CELLULITIS OF RIGHT TOE: ICD-10-CM

## 2020-10-16 PROBLEM — J10.1 INFLUENZA A: Status: ACTIVE | Noted: 2019-04-09

## 2020-10-16 PROCEDURE — 99203 OFFICE O/P NEW LOW 30 MIN: CPT | Mod: 25 | Performed by: PODIATRIST

## 2020-10-16 PROCEDURE — 11730 AVULSION NAIL PLATE SIMPLE 1: CPT | Mod: T5 | Performed by: PODIATRIST

## 2020-10-16 NOTE — PATIENT INSTRUCTIONS
We wish you continued good healing. If you have any questions or concerns, please do not hesitate to contact us at [phone]    [facility] (secure e-mail communication and access to your chart) to send a message or to make an appointment.    Please remember to call and schedule a follow up appointment if one was recommended at your earliest convenience.     +++OF MARCH 2020+++ LOCATION AND HOURS HAVE CHANGED    PLEASE CALL CLINICS TO VERIFY DAYS AND TIMES  PODIATRY CLINIC HOURS  TELEPHONE NUMBER    Dr. Keyon GÓMEZPXIOMARA Located within [facility]        Clinics:  [facility]   Tuesday 1PM-6PM  [place]  Wednesday 745AM-330PM  [place]  Thursday/Friday 745AM-230PM  [place]     Specialty schedulers:   [phone] to make an appointment with any Specialty Provider.               If you need a medication refill, please contact us you may need lab work and/or a follow up visit prior to your refill (i.e. Antifungal medications).    If MRI needed please call Imaging at [phone]    Knee scooters can be picked up at ANY Medical Supply stores. For a list of suppliers please ask.

## 2020-10-16 NOTE — LETTER
10/16/2020         RE: Keith Desir  [id number] [facility] [address]        Dear Colleague,    Thank you for referring your patient, Keith Desir, to the [facility]. Please see a copy of my visit note below.    Subjective:    Pt is seen today in consult from Kristen gutiérrez/ the c/c of red swollen right big toe.  States he had a blister on his right toe for 1 month.  Recently become more red and swollen.  Has not really had any pain.  He denies numbness or tingling in his feet.  He is concerned because he has diabetes on Coumadin for chronic atrial fib.  He denies any purulence or odor.  He denies any fever or chills.  He has had a slight amount of drainage.    ROS:  A 10-point review of systems was performed and is positive for that noted in the HPI and as seen below.  All other areas are negative.     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/2013     Hepatitis C      HTN (hypertension)      Hyperlipidemia      Morbid obesity (H)      NAFLD (nonalcoholic fatty liver disease)      Polycythemia secondary to smoking      Varicose veins of legs 8/8/2018       Past Surgical History:   Procedure Laterality Date     ANGIOGRAM  8/2006    normal     TONSILLECTOMY & ADENOIDECTOMY         Family History   Problem Relation Age of Onset     Diabetes Mother      Cerebrovascular Disease Father      Heart Disease Brother         pacer      Cancer No family hx of      Glaucoma No family hx of      Macular Degeneration No family hx of        Social History     Tobacco Use     Smoking status: Current Every Day Smoker     Packs/day:  1.00     Years: 45.00     Pack years: 45.00     Types: Cigarettes     Smokeless tobacco: Never Used     Tobacco comment: cut down to 0.5 ppd    Substance Use Topics     Alcohol use: Yes     Alcohol/week: 0.0 - 4.2 standard drinks         Current Outpatient Medications:      albuterol (2.5 MG/3ML) 0.083% neb solution, Take 1 vial (2.5 mg) by nebulization every 4 hours as needed for shortness of breath / dyspnea, Disp: , Rfl:      albuterol (PROAIR HFA/PROVENTIL HFA/VENTOLIN HFA) 108 (90 Base) MCG/ACT Inhaler, Inhale 1-2 puffs into the lungs every 4 hours as needed for shortness of breath / dyspnea, Disp: 1 Inhaler, Rfl: 11     amoxicillin-clavulanate (AUGMENTIN) 875-125 MG tablet, Take 1 tablet by mouth 2 times daily, Disp: 20 tablet, Rfl: 0     ASPIRIN NOT PRESCRIBED, INTENTIONAL,, Antiplatelet medication not prescribed intentionally due to Current anticoagulant therapy (warfarin/enoxaparin), Disp: , Rfl:      atorvastatin (LIPITOR) 20 MG tablet, Take 1 tablet (20 mg) by mouth daily, Disp: 90 tablet, Rfl: 3     digoxin (DIGOX) 125 MCG tablet, Take 1 tablet (125 mcg) by mouth daily, Disp: 90 tablet, Rfl: 3     dimenhyDRINATE (DRAMAMINE PO), Take by mouth as needed, Disp: , Rfl:      empagliflozin (JARDIANCE) 10 MG TABS tablet, Take 1 tablet (10 mg) by mouth daily, Disp: 90 tablet, Rfl: 3     furosemide (LASIX) 20 MG tablet, Take 1 tablet (20 mg) by mouth 1 times daily, Disp: 90 tablet, Rfl: 1     glipiZIDE (GLUCOTROL) 5 MG tablet, TAKE 1 TABLET BY MOUTH TWICE DAILY BEFORE MEAL(S), Disp: 180 tablet, Rfl: 0     ketoconazole (NIZORAL) 2 % external cream, Apply topically daily for 14 days, Disp: 30 g, Rfl: 0     lisinopril (ZESTRIL) 40 MG tablet, Take 1 tablet by mouth once daily, Disp: 90 tablet, Rfl: 1     metFORMIN (GLUCOPHAGE) 1000 MG tablet, Take 1 tablet (1,000 mg) by mouth 2 times daily (with meals), Disp: 180 tablet, Rfl: 3     metoprolol tartrate (LOPRESSOR) 100 MG tablet, Take 2 tablets (200 mg) by mouth 2  times daily, Disp: 360 tablet, Rfl: 3     tiotropium (SPIRIVA HANDIHALER) 18 MCG inhaled capsule, INHALE 1 CAPSULE (18 MCG) INTO THE LUNGS DAILY, Disp: 90 capsule, Rfl: 3     warfarin ANTICOAGULANT (COUMADIN) 2.5 MG tablet, TAKE 2 TABLETS (5MG) BY MOUTH ON TUES AND SAT. TAKE 3 TABLETS (7.5MG) ALL OTHER DAYS OR AS DIRECTED BY INR CLINIC, Disp: 240 tablet, Rfl: 1       Allergies   Allergen Reactions     Nkda [No Known Drug Allergies]        BP (!) 138/95   Pulse 96   Wt 99.3 kg (219 lb)   BMI 33.30 kg/m  .      Constitutional/ general:  Pt is in no apparent distress, appears well-nourished.  Cooperative with history and physical exam.  Seen with his wife    Psych:  The patient answered questions appropriately.  Normal affect.  Seems to have reasonable expectations, in terms of treatment.     Eyes:  Visual scanning/ tracking without deficit.     Ears:  Response to auditory stimuli is normal.   Auricles in proper alignment.     Lymphatic:  Popliteal lymph nodes not enlarged.     Lungs:  Non labored breathing, non labored speech. No cough.  No audible wheezing. Even, quiet breathing.       Vascular:  Pedal pulses are palpable bilaterally for both the DP.  Patient has ankle edema and varicosities difficult to palpate his tibial arteries.     Neuro:  Alert and oriented x 3. Coordinated gait.  Light touch sensation is intact to the L4, L5, S1 distributions. No obvious deficits.  No evidence of neurological-based weakness, spasticity, or contracture in the lower extremities.  Monofilament intact in the digits.    Derm: Thin and shiny with no hair growth noted    Musculoskeletal:     Patient is ambulatory without an assistive device or brace.  No gross deformities.  Normal arch with weightbearing.  No forefoot or rear foot deformities noted.  MS 5/5 all compartments.  Normal ROM all fore foot and rearfoot joints.  No equinus.  right great toe nail lateral border shows soft tissue impingement with localized erythema.  There  is also erythema proximal nail border but most acute lateral side negative active drainage/purulence at this time.  No sinus tracts.  No nailbed masses or exostosis.  No pain with range of motion of IPJ or MTPJ.  No ascending cellulitis.    ASSESSMENT:    Onychocryptosis with paronychia right toe.  Diabetes     Discussed etiology and treatment options in detail w/ the pt.  The potential causes and nature of an ingrown toenail were discussed with the patient.  We reviewed the natural history/prognosis of the condition and potential risks if no treatment is provided.      Treatment options discussed included conservative management (oral antibiotics, soaking of foot, adequate width shoes)  as well as surgical management (partial or total nail removal).  The pros and cons of both forms of treatment were reviewed.  Handout given to patient.      After thorough discussion and answering all questions, the patient elected to have nail avulsion.  Obtained consent, used 3cc of 1% lidocaine plain to block right first toe.  Sterile prep, then avulsed the affected border(s).  No evidence of deep abscess noted.  Pt tolerated procedure well.  Sterile bandage placed, gave wound care instruction.  Patient instructed to keep nails trimmed shorter and to make sure shoes are not too tight.  Return to clinic prn.  Thank you for allowing me participate in the care of this patient.        Keyon Carlson DPM DPM, FACFAS        Again, thank you for allowing me to participate in the care of your patient.        Sincerely,        Keyon Carlson DPM

## 2020-11-17 ENCOUNTER — ANTICOAGULATION THERAPY VISIT (OUTPATIENT)
Dept: NURSING | Facility: CLINIC | Age: 75
End: 2020-11-17

## 2020-11-17 DIAGNOSIS — I48.20 CHRONIC ATRIAL FIBRILLATION (H): ICD-10-CM

## 2020-11-17 DIAGNOSIS — E11.9 TYPE 2 DIABETES MELLITUS WITHOUT COMPLICATION, WITHOUT LONG-TERM CURRENT USE OF INSULIN (H): ICD-10-CM

## 2020-11-17 DIAGNOSIS — Z79.01 LONG TERM CURRENT USE OF ANTICOAGULANT THERAPY: ICD-10-CM

## 2020-11-17 LAB
HBA1C MFR BLD: 6.1 % (ref 0–5.6)
INR PPP: 2.2 (ref 0.86–1.14)

## 2020-11-17 PROCEDURE — 85610 PROTHROMBIN TIME: CPT | Performed by: FAMILY MEDICINE

## 2020-11-17 PROCEDURE — 83036 HEMOGLOBIN GLYCOSYLATED A1C: CPT | Performed by: FAMILY MEDICINE

## 2020-11-17 PROCEDURE — 36415 COLL VENOUS BLD VENIPUNCTURE: CPT | Performed by: FAMILY MEDICINE

## 2020-11-17 NOTE — PROGRESS NOTES
ANTICOAGULATION MANAGEMENT     Patient Name:  Keith Desir  Date:  2020    ASSESSMENT /SUBJECTIVE:    Today's INR result of 2.2 is therapeutic. Goal INR of 2.0-3.0      Warfarin dose taken: Warfarin taken as instructed    Diet: No new diet changes affecting INR    Medication changes/ interactions: No new medications/supplements affecting INR    Previous INR: Therapeutic     S/S of bleeding or thromboembolism: No    New injury or illness: No    Upcoming surgery, procedure or cardioversion: No    Additional findings: None      PLAN:    Telephone call with  Melissa wife regarding INR result and instructed:     Warfarin Dosing Instructions: Continue your current warfarin dose 5 mg on Tue, Sat; 7.5 mg all other days.    Instructed patient to follow up no later than: 6 weeks  Lab visit scheduled    Education provided: None required      Melissa verbalizes understanding and agrees to warfarin dosing plan.    Instructed to call the Anticoagulation Clinic for any changes, questions or concerns. (#[phone])        Dot Ziegler RN      OBJECTIVE:  Recent labs: (last 7 days)     20  0922   INR 2.20*         INR assessment THER    Recheck INR In: 6 WEEKS    INR Location Clinic      Anticoagulation Summary  As of 2020    INR goal:  2.0-3.0   TTR:  79.4 % (1 y)   INR used for dosin.20 (2020)   Warfarin maintenance plan:  5 mg (2.5 mg x 2) every Tue, Sat; 7.5 mg (2.5 mg x 3) all other days   Full warfarin instructions:  5 mg every Tue, Sat; 7.5 mg all other days   Weekly warfarin total:  47.5 mg   No change documented:  Dot Ziegler RN   Plan last modified:  Dot Ziegler RN (2020)   Next INR check:  2020   Priority:  Maintenance   Target end date:  Indefinite    Indications    Long term current use of anticoagulant therapy [Z79.01]  Chronic atrial fibrillation (HCC) [I48.20]             Anticoagulation Episode Summary     INR check location:      Preferred lab:       Send INR reminders to:  AJ MCINTOSH    Comments:  don't print AVS unless change in dosing      Anticoagulation Care Providers     Provider Role Specialty Phone number    Cayla Gregorio MD Referring Family Medicine [phone]

## 2020-11-22 DIAGNOSIS — E11.9 TYPE 2 DIABETES MELLITUS WITHOUT COMPLICATION, WITHOUT LONG-TERM CURRENT USE OF INSULIN (H): ICD-10-CM

## 2020-11-23 RX ORDER — GLIPIZIDE 5 MG/1
TABLET ORAL
Qty: 180 TABLET | Refills: 0 | Status: SHIPPED | OUTPATIENT
Start: 2020-11-23 | End: 2021-02-16

## 2020-12-27 DIAGNOSIS — I48.20 CHRONIC ATRIAL FIBRILLATION (H): ICD-10-CM

## 2020-12-28 RX ORDER — WARFARIN SODIUM 2.5 MG/1
TABLET ORAL
Qty: 240 TABLET | Refills: 1 | Status: SHIPPED | OUTPATIENT
Start: 2020-12-28 | End: 2021-06-10

## 2020-12-28 NOTE — TELEPHONE ENCOUNTER
Refill Request  Warfarin 2.5 mg  Current Dosing Instructions: 5 mg Tues/Sat and 7.5 mg ROW  Tabs: 240  Refills: 1  Last INR: 11/17/20  Next INR due: 12/29/20  Last OV with responsible provider: 9/2/20

## 2020-12-29 ENCOUNTER — ANTICOAGULATION THERAPY VISIT (OUTPATIENT)
Dept: NURSING | Facility: CLINIC | Age: 75
End: 2020-12-29

## 2020-12-29 DIAGNOSIS — Z79.01 LONG TERM CURRENT USE OF ANTICOAGULANT THERAPY: ICD-10-CM

## 2020-12-29 DIAGNOSIS — I48.20 CHRONIC ATRIAL FIBRILLATION (H): ICD-10-CM

## 2020-12-29 LAB
CAPILLARY BLOOD COLLECTION: NORMAL
INR PPP: 3.3 (ref 0.86–1.14)

## 2020-12-29 PROCEDURE — 85610 PROTHROMBIN TIME: CPT | Performed by: FAMILY MEDICINE

## 2020-12-29 PROCEDURE — 36416 COLLJ CAPILLARY BLOOD SPEC: CPT | Performed by: FAMILY MEDICINE

## 2020-12-29 NOTE — PROGRESS NOTES
ANTICOAGULATION MANAGEMENT     Patient Name:  Keith Desir  Date:  12/29/2020    ASSESSMENT /SUBJECTIVE:    Today's INR result of 3.30 is supratherapeutic. Goal INR of 2.0-3.0      Warfarin dose taken: Warfarin taken as instructed    Diet: Decreased greens/vitamin K in diet which may be affecting INR; plans to resume previous intake    Medication changes/ interactions: No new medications/supplements affecting INR    Previous INR: Therapeutic     S/S of bleeding or thromboembolism: No    New injury or illness: No    Upcoming surgery, procedure or cardioversion: No    Additional findings: None      PLAN:    Telephone call with Keith regarding INR result and instructed:     Warfarin Dosing Instructions: 2.5 mg today then continue your current warfarin dose of 5 mg Tue, Sat; 7.5 mg all other days    Instructed patient to follow up no later than: 2 weeks  Lab visit scheduled    Education provided: None required      Melissa verbalizes understanding and agrees to warfarin dosing plan.    Instructed to call the Anticoagulation Clinic for any changes, questions or concerns. (#[phone])        Dot Ziegler RN      OBJECTIVE:  Recent labs: (last 7 days)     12/29/20  0917   INR 3.30*         INR assessment SUPRA    Recheck INR In: 2 WEEKS    INR Location Clinic      Anticoagulation Summary  As of 12/29/2020    INR goal:  2.0-3.0   TTR:  76.2 % (1 y)   INR used for dosing:  3.30 (12/29/2020)   Warfarin maintenance plan:  5 mg (2.5 mg x 2) every Tue, Sat; 7.5 mg (2.5 mg x 3) all other days   Full warfarin instructions:  12/29: 2.5 mg; Otherwise 5 mg every Tue, Sat; 7.5 mg all other days   Weekly warfarin total:  47.5 mg   Plan last modified:  Dot Ziegler RN (5/19/2020)   Next INR check:  1/12/2021   Priority:  Maintenance   Target end date:  Indefinite    Indications    Long term current use of anticoagulant therapy [Z79.01]  Chronic atrial fibrillation (HCC) [I48.20]             Anticoagulation Episode  Summary     INR check location:      Preferred lab:      Send INR reminders to:  AJ MCINTOSH    Comments:  don't print AVS unless change in dosing      Anticoagulation Care Providers     Provider Role Specialty Phone number    Cayla Gregorio MD [facility] [phone]

## 2021-01-12 ENCOUNTER — ANTICOAGULATION THERAPY VISIT (OUTPATIENT)
Dept: NURSING | Facility: CLINIC | Age: 76
End: 2021-01-12

## 2021-01-12 DIAGNOSIS — Z79.01 LONG TERM CURRENT USE OF ANTICOAGULANT THERAPY: ICD-10-CM

## 2021-01-12 DIAGNOSIS — I48.20 CHRONIC ATRIAL FIBRILLATION (H): ICD-10-CM

## 2021-01-12 LAB
CAPILLARY BLOOD COLLECTION: NORMAL
INR PPP: 2.5 (ref 0.86–1.14)

## 2021-01-12 PROCEDURE — 36416 COLLJ CAPILLARY BLOOD SPEC: CPT | Performed by: FAMILY MEDICINE

## 2021-01-12 PROCEDURE — 85610 PROTHROMBIN TIME: CPT | Performed by: FAMILY MEDICINE

## 2021-01-12 NOTE — PROGRESS NOTES
ANTICOAGULATION MANAGEMENT     Patient Name:  Keith Desir  Date:  2021    ASSESSMENT /SUBJECTIVE:    Today's INR result of 2.50 is therapeutic. Goal INR of 2.0-3.0      Warfarin dose taken: Warfarin taken as instructed    Diet: No new diet changes affecting INR    Medication changes/ interactions: No new medications/supplements affecting INR    Previous INR: Supratherapeutic     S/S of bleeding or thromboembolism: No    New injury or illness: No    Upcoming surgery, procedure or cardioversion: No    Additional findings: had alcohol when the last result was high      PLAN:    Telephone call with Keith regarding INR result and instructed:     Warfarin Dosing Instructions: Continue your current warfarin dose 5 mg Tue, Sat; 7.5 mg all other days    Instructed patient to follow up no later than: 6 weeks  Lab visit scheduled    Education provided: None required      Melissa verbalizes understanding and agrees to warfarin dosing plan.    Instructed to call the Anticoagulation Clinic for any changes, questions or concerns. (#[phone])        Dot Ziegler RN      OBJECTIVE:  Recent labs: (last 7 days)     21  0920   INR 2.50*         INR assessment THER    Recheck INR In: 6 WEEKS    INR Location Clinic      Anticoagulation Summary  As of 2021    INR goal:  2.0-3.0   TTR:  74.8 % (1 y)   INR used for dosin.50 (2021)   Warfarin maintenance plan:  5 mg (2.5 mg x 2) every Tue, Sat; 7.5 mg (2.5 mg x 3) all other days   Full warfarin instructions:  5 mg every Tue, Sat; 7.5 mg all other days   Weekly warfarin total:  47.5 mg   No change documented:  Dot Ziegler RN   Plan last modified:  Dot Ziegler RN (2020)   Next INR check:  2021   Priority:  Maintenance   Target end date:  Indefinite    Indications    Long term current use of anticoagulant therapy [Z79.01]  Chronic atrial fibrillation (HCC) [I48.20]             Anticoagulation Episode Summary     INR check  location:      Preferred lab:      Send INR reminders to:  AJ MCINTOSH    Comments:  don't print AVS unless change in dosing      Anticoagulation Care Providers     Provider Role Specialty Phone number    Cayla Gregorio MD Referring Family Medicine [phone]

## 2021-02-16 DIAGNOSIS — I50.9 CHF (NYHA CLASS II, ACC/AHA STAGE C) (H): ICD-10-CM

## 2021-02-16 DIAGNOSIS — I48.20 CHRONIC ATRIAL FIBRILLATION (H): ICD-10-CM

## 2021-02-16 DIAGNOSIS — E11.9 TYPE 2 DIABETES MELLITUS WITHOUT COMPLICATION, WITHOUT LONG-TERM CURRENT USE OF INSULIN (H): ICD-10-CM

## 2021-02-16 RX ORDER — DIGOXIN 125 MCG
TABLET ORAL
Qty: 90 TABLET | Refills: 1 | Status: SHIPPED | OUTPATIENT
Start: 2021-02-16 | End: 2021-09-07

## 2021-02-16 RX ORDER — GLIPIZIDE 5 MG/1
TABLET ORAL
Qty: 180 TABLET | Refills: 0 | Status: SHIPPED | OUTPATIENT
Start: 2021-02-16 | End: 2021-03-01

## 2021-02-16 RX ORDER — LISINOPRIL 40 MG/1
TABLET ORAL
Qty: 30 TABLET | Refills: 0 | Status: SHIPPED | OUTPATIENT
Start: 2021-02-16 | End: 2021-03-01

## 2021-02-23 ENCOUNTER — ANTICOAGULATION THERAPY VISIT (OUTPATIENT)
Dept: NURSING | Facility: CLINIC | Age: 76
End: 2021-02-23

## 2021-02-23 DIAGNOSIS — I48.20 CHRONIC ATRIAL FIBRILLATION (H): ICD-10-CM

## 2021-02-23 DIAGNOSIS — Z79.01 LONG TERM CURRENT USE OF ANTICOAGULANT THERAPY: ICD-10-CM

## 2021-02-23 LAB
CAPILLARY BLOOD COLLECTION: NORMAL
INR PPP: 2 (ref 0.86–1.14)

## 2021-02-23 PROCEDURE — 36416 COLLJ CAPILLARY BLOOD SPEC: CPT | Performed by: FAMILY MEDICINE

## 2021-02-23 PROCEDURE — 85610 PROTHROMBIN TIME: CPT | Performed by: FAMILY MEDICINE

## 2021-02-23 NOTE — PROGRESS NOTES
ANTICOAGULATION MANAGEMENT     Patient Name:  Keith Desir  Date:  2021    ASSESSMENT /SUBJECTIVE:    Today's INR result of 2.00 is therapeutic. Goal INR of 2.0-3.0      Warfarin dose taken: Warfarin taken as instructed    Diet: No new diet changes affecting INR    Medication changes/ interactions: No new medications/supplements affecting INR    Previous INR: Therapeutic     S/S of bleeding or thromboembolism: No    New injury or illness: No    Upcoming surgery, procedure or cardioversion: No    Additional findings: None      PLAN:    Telephone call with Keith regarding INR result and instructed:     Warfarin Dosing Instructions: Continue your current warfarin dose 5 mg Tue, Sat; 7.5 mg all other days    Instructed patient to follow up no later than: 6 weeks  Lab visit scheduled    Education provided: None required      Ed verbalizes understanding and agrees to warfarin dosing plan.    Instructed to call the Anticoagulation Clinic for any changes, questions or concerns. (#[phone])        oDt Ziegler RN      OBJECTIVE:  Recent labs: (last 7 days)     21  0921   INR 2.00*         INR assessment THER    Recheck INR In: 6 WEEKS    INR Location Clinic      Anticoagulation Summary  As of 2021    INR goal:  2.0-3.0   TTR:  74.8 % (1 y)   INR used for dosin.00 (2021)   Warfarin maintenance plan:  5 mg (2.5 mg x 2) every Tue, Sat; 7.5 mg (2.5 mg x 3) all other days   Full warfarin instructions:  5 mg every Tue, Sat; 7.5 mg all other days   Weekly warfarin total:  47.5 mg   No change documented:  Dot Ziegler RN   Plan last modified:  Dot Ziegler RN (2020)   Next INR check:  2021   Priority:  Maintenance   Target end date:  Indefinite    Indications    Long term current use of anticoagulant therapy [Z79.01]  Chronic atrial fibrillation (HCC) [I48.20]             Anticoagulation Episode Summary     INR check location:      Preferred lab:      Send INR  reminders to:  AJ MCINTOSH    Comments:  don't print AVS unless change in dosing      Anticoagulation Care Providers     Provider Role Specialty Phone number    Cayla Gregorio MD Referring Family Medicine [phone]

## 2021-03-02 ENCOUNTER — OFFICE VISIT (OUTPATIENT)
Dept: FAMILY MEDICINE | Facility: CLINIC | Age: 76
End: 2021-03-02
Payer: MEDICARE

## 2021-03-02 VITALS
HEART RATE: 83 BPM | HEIGHT: 68 IN | TEMPERATURE: 97.7 F | DIASTOLIC BLOOD PRESSURE: 86 MMHG | WEIGHT: 219 LBS | BODY MASS INDEX: 33.19 KG/M2 | SYSTOLIC BLOOD PRESSURE: 146 MMHG | OXYGEN SATURATION: 95 %

## 2021-03-02 DIAGNOSIS — E11.22 TYPE 2 DIABETES MELLITUS WITH STAGE 2 CHRONIC KIDNEY DISEASE, WITHOUT LONG-TERM CURRENT USE OF INSULIN (H): Primary | ICD-10-CM

## 2021-03-02 DIAGNOSIS — I50.9 CHF (NYHA CLASS II, ACC/AHA STAGE C) (H): ICD-10-CM

## 2021-03-02 DIAGNOSIS — J43.9 PULMONARY EMPHYSEMA, UNSPECIFIED EMPHYSEMA TYPE (H): ICD-10-CM

## 2021-03-02 DIAGNOSIS — E78.5 HYPERLIPIDEMIA LDL GOAL <70: ICD-10-CM

## 2021-03-02 DIAGNOSIS — I48.20 CHRONIC ATRIAL FIBRILLATION (H): ICD-10-CM

## 2021-03-02 DIAGNOSIS — I12.9 RENAL HYPERTENSION: ICD-10-CM

## 2021-03-02 DIAGNOSIS — N18.2 TYPE 2 DIABETES MELLITUS WITH STAGE 2 CHRONIC KIDNEY DISEASE, WITHOUT LONG-TERM CURRENT USE OF INSULIN (H): Primary | ICD-10-CM

## 2021-03-02 PROBLEM — J10.1 INFLUENZA A: Status: RESOLVED | Noted: 2019-04-09 | Resolved: 2021-03-02

## 2021-03-02 LAB
ALBUMIN SERPL-MCNC: 3.7 G/DL (ref 3.4–5)
ALP SERPL-CCNC: 181 U/L (ref 40–150)
ALT SERPL W P-5'-P-CCNC: 24 U/L (ref 0–70)
ANION GAP SERPL CALCULATED.3IONS-SCNC: 1 MMOL/L (ref 3–14)
AST SERPL W P-5'-P-CCNC: 18 U/L (ref 0–45)
BILIRUB SERPL-MCNC: 0.6 MG/DL (ref 0.2–1.3)
BUN SERPL-MCNC: 22 MG/DL (ref 7–30)
CALCIUM SERPL-MCNC: 9.6 MG/DL (ref 8.5–10.1)
CHLORIDE SERPL-SCNC: 103 MMOL/L (ref 94–109)
CHOLEST SERPL-MCNC: 115 MG/DL
CO2 SERPL-SCNC: 33 MMOL/L (ref 20–32)
CREAT SERPL-MCNC: 0.9 MG/DL (ref 0.66–1.25)
ERYTHROCYTE [DISTWIDTH] IN BLOOD BY AUTOMATED COUNT: 14.5 % (ref 10–15)
GFR SERPL CREATININE-BSD FRML MDRD: 84 ML/MIN/{1.73_M2}
GLUCOSE SERPL-MCNC: 172 MG/DL (ref 70–99)
HBA1C MFR BLD: 6.7 % (ref 0–5.6)
HCT VFR BLD AUTO: 52.2 % (ref 40–53)
HDLC SERPL-MCNC: 31 MG/DL
HGB BLD-MCNC: 17.7 G/DL (ref 13.3–17.7)
LDLC SERPL CALC-MCNC: 30 MG/DL
MCH RBC QN AUTO: 33.7 PG (ref 26.5–33)
MCHC RBC AUTO-ENTMCNC: 33.9 G/DL (ref 31.5–36.5)
MCV RBC AUTO: 99 FL (ref 78–100)
NONHDLC SERPL-MCNC: 84 MG/DL
PLATELET # BLD AUTO: 204 10E9/L (ref 150–450)
POTASSIUM SERPL-SCNC: 5.3 MMOL/L (ref 3.4–5.3)
PROT SERPL-MCNC: 7.5 G/DL (ref 6.8–8.8)
RBC # BLD AUTO: 5.26 10E12/L (ref 4.4–5.9)
SODIUM SERPL-SCNC: 137 MMOL/L (ref 133–144)
TRIGL SERPL-MCNC: 270 MG/DL
WBC # BLD AUTO: 10.5 10E9/L (ref 4–11)

## 2021-03-02 PROCEDURE — 85027 COMPLETE CBC AUTOMATED: CPT | Performed by: FAMILY MEDICINE

## 2021-03-02 PROCEDURE — 83036 HEMOGLOBIN GLYCOSYLATED A1C: CPT | Performed by: FAMILY MEDICINE

## 2021-03-02 PROCEDURE — 80053 COMPREHEN METABOLIC PANEL: CPT | Performed by: FAMILY MEDICINE

## 2021-03-02 PROCEDURE — 36415 COLL VENOUS BLD VENIPUNCTURE: CPT | Performed by: FAMILY MEDICINE

## 2021-03-02 PROCEDURE — 80061 LIPID PANEL: CPT | Performed by: FAMILY MEDICINE

## 2021-03-02 PROCEDURE — 99214 OFFICE O/P EST MOD 30 MIN: CPT | Performed by: FAMILY MEDICINE

## 2021-03-02 RX ORDER — FUROSEMIDE 20 MG
TABLET ORAL
Qty: 90 TABLET | Refills: 1 | Status: SHIPPED | OUTPATIENT
Start: 2021-03-02 | End: 2021-09-07

## 2021-03-02 RX ORDER — METOPROLOL TARTRATE 100 MG
200 TABLET ORAL 2 TIMES DAILY
Qty: 360 TABLET | Refills: 0 | Status: SHIPPED | OUTPATIENT
Start: 2021-03-02 | End: 2021-08-18

## 2021-03-02 RX ORDER — GLIPIZIDE 5 MG/1
TABLET ORAL
Qty: 180 TABLET | Refills: 1 | Status: SHIPPED | OUTPATIENT
Start: 2021-03-02 | End: 2021-09-07

## 2021-03-02 RX ORDER — ATORVASTATIN CALCIUM 20 MG/1
20 TABLET, FILM COATED ORAL DAILY
Qty: 90 TABLET | Refills: 3 | Status: SHIPPED | OUTPATIENT
Start: 2021-03-02 | End: 2021-03-10

## 2021-03-02 RX ORDER — LISINOPRIL 40 MG/1
40 TABLET ORAL DAILY
Qty: 90 TABLET | Refills: 3 | Status: SHIPPED | OUTPATIENT
Start: 2021-03-02 | End: 2021-09-07

## 2021-03-02 ASSESSMENT — MIFFLIN-ST. JEOR: SCORE: 1712.88

## 2021-03-02 NOTE — RESULT ENCOUNTER NOTE
Ed,    Your diabetes is controlled. Your kidney and blood count tests are normal. Your liver tests are stable. Your cholesterol is controlled.     Cayla Gregorio MD

## 2021-03-02 NOTE — PATIENT INSTRUCTIONS
Please schedule your yearly eye examination.    See me September 3 or later for your yearly wellness visit and labs.     Did you know you can lower your blood pressure with your daily habits?    *Losing 20 pounds of weight lowers blood pressure 5 to 20 points.  *Eating a low-fat diet rich in fruits, vegetables and low-fat dairy lowers blood pressure 8 to 14 points.  *Eating a low-salt diet lowers blood pressure 2 to 8 points.  *Exercising regularly lowers blood pressure 4 to 9 points.  *Reducing alcohol use lowers blood pressure 2 to 4 points.

## 2021-03-02 NOTE — PROGRESS NOTES
Assessment & Plan     Type 2 diabetes mellitus with stage 2 chronic kidney disease, without long-term current use of insulin (H)  -Well controlled with medications without side effects.   - Lipid panel reflex to direct LDL Fasting  - OPTOMETRY REFERRAL; Future  - Hemoglobin A1c  - metFORMIN (GLUCOPHAGE) 1000 MG tablet; Take 1 tablet (1,000 mg) by mouth 2 times daily (with meals)  - glipiZIDE (GLUCOTROL) 5 MG tablet; TAKE 1 TABLET BY MOUTH TWICE DAILY BEFORE MEAL(S)  - empagliflozin (JARDIANCE) 10 MG TABS tablet; Take 1 tablet (10 mg) by mouth daily  - Comprehensive metabolic panel    CHF (NYHA class II, ACC/AHA stage C) (H)  -euvolemic, on beta blocker and ACE/ARB   - Lipid panel reflex to direct LDL Fasting  - CBC with Platelets    - lisinopril (ZESTRIL) 40 MG tablet; Take 1 tablet (40 mg) by mouth daily  - metoprolol tartrate (LOPRESSOR) 100 MG tablet; Take 2 tablets (200 mg) by mouth 2 times daily  - furosemide (LASIX) 20 MG tablet; Take 1 tablet (20 mg) by mouth 1 times daily  - Comprehensive metabolic panel    Chronic atrial fibrillation (HCC)  -rate controlled and anticoagulated   -Continue chronic anticoagulation under surveillance of protime clinic with goal INR 2 - 3 indefinitely    - metoprolol tartrate (LOPRESSOR) 100 MG tablet; Take 2 tablets (200 mg) by mouth 2 times daily  - Comprehensive metabolic panel    Hyperlipidemia LDL goal <70  -Well controlled with medications without side effects.   - Lipid panel reflex to direct LDL Fasting  - atorvastatin (LIPITOR) 20 MG tablet; Take 1 tablet (20 mg) by mouth daily  - Comprehensive metabolic panel    Renal hypertension  -recommended recheck prior to adding another medication per AVS   - Comprehensive metabolic panel    Pulmonary emphysema, unspecified emphysema type (H)  -Well controlled with medications without side effects.       Review of the result(s) of each unique test - A1c         BMI:   Estimated body mass index is 33.3 kg/m  as calculated from  "the following:    Height as of this encounter: 1.727 m (5' 8\").    Weight as of this encounter: 99.3 kg (219 lb).   Weight management plan: Discussed healthy diet and exercise guidelines    See Patient Instructions    Return in about 1 month (around 4/2/2021) for diabetes, free pharmacy blood pressure check (walk-in).    Cayla Gregorio MD  [facility]   Ed is a 75 year old who presents for the following health issues  accompanied by his spouse:    HPI       Diabetes Follow-up      How often are you checking your blood sugar? Not at all    What concerns do you have today about your diabetes? None     Do you have any of these symptoms? (Select all that apply)  No numbness or tingling in feet.  No redness, sores or blisters on feet.  No complaints of excessive thirst.  No reports of blurry vision.  No significant changes to weight.    Have you had a diabetic eye exam in the last 12 months? No                Hyperlipidemia Follow-Up      Are you regularly taking any medication or supplement to lower your cholesterol?   Yes- Atorvastatin    Are you having muscle aches or other side effects that you think could be caused by your cholesterol lowering medication?  No    Hypertension Follow-up      Do you check your blood pressure regularly outside of the clinic? No     Are you following a low salt diet? Yes    Are your blood pressures ever more than 140 on the top number (systolic) OR more   than 90 on the bottom number (diastolic), for example 140/90?     BP Readings from Last 2 Encounters:   03/02/21 (!) 146/86   10/16/20 (!) 138/95     Hemoglobin A1C (%)   Date Value   03/02/2021 6.7 (H)   11/17/2020 6.1 (H)     LDL Cholesterol Calculated (mg/dL)   Date Value   02/26/2020 40   03/06/2019 47         How many servings of fruits and vegetables do you eat daily?  0-1    On average, how many sweetened beverages do you drink each day (Examples: soda, juice, sweet tea, etc.  Do NOT count diet " "or artificially sweetened beverages)?   1    How many days per week do you exercise enough to make your heart beat faster?     How many minutes a day do you exercise enough to make your heart beat faster?     How many days per week do you miss taking your medication? 0    Heart Failure Follow-up    Are you experiencing any shortness of breath? No    Are you experiencing any swelling in your legs or feet?  Stable    Are you using more pillows than usual? No    Do you cough at night?  No    Have you had a weight change recently?  No    Are you having any of the following side effects from your medications? (Select all that apply)  The patient does not report symptoms of dizziness, fatigue, cough, swelling, or slow heart beat.    Since your last visit, how many times have you gone to the cardiologist, urgent care, emergency room, or hospital because of your heart failure?   None    Review of Systems   CONSTITUTIONAL: NEGATIVE for fever, chills, change in weight  INTEGUMENTARY/SKIN: NEGATIVE for worrisome rashes, moles or lesions  EYES: NEGATIVE for vision changes or irritation  RESP: NEGATIVE for significant cough or SOB  CV: as above   NEURO: NEGATIVE for weakness, dizziness or paresthesias  PSYCHIATRIC: NEGATIVE for changes in mood or affect      Objective    BP (!) 146/86   Pulse 83   Temp 97.7  F (36.5  C) (Oral)   Ht 1.727 m (5' 8\")   Wt 99.3 kg (219 lb)   SpO2 95%   BMI 33.30 kg/m    Body mass index is 33.3 kg/m .  Physical Exam   GENERAL: alert and no distress  NECK: no adenopathy, no asymmetry, masses, or scars and thyroid normal to palpation  RESP: lungs clear to auscultation - no rales, rhonchi or wheezes  CV: regular rate and rhythm, normal S1 S2, no S3 or S4, no murmur, click or rub   MS: extremities normal- no gross deformities noted  PSYCH: mentation appears normal, affect normal/bright  Diabetic foot exam: no trophic changes or ulcerative lesions, normal monofilament exam and dry cracking " heels    Orders Only on 02/23/2021   Component Date Value Ref Range Status     INR 02/23/2021 2.00* 0.86 - 1.14 Final    Comment: This test is intended for monitoring Coumadin therapy.  Results are not   accurate in patients with prolonged INR due to factor deficiency.       Capillary Blood Collection 02/23/2021 Capillary collection performed   Final     Results for orders placed or performed in visit on 03/02/21 (from the past 24 hour(s))   Hemoglobin A1c   Result Value Ref Range    Hemoglobin A1C 6.7 (H) 0 - 5.6 %

## 2021-03-04 ENCOUNTER — IMMUNIZATION (OUTPATIENT)
Dept: NURSING | Facility: CLINIC | Age: 76
End: 2021-03-04
Payer: MEDICARE

## 2021-03-04 PROCEDURE — 0001A PR COVID VAC PFIZER DIL RECON 30 MCG/0.3 ML IM: CPT

## 2021-03-04 PROCEDURE — 91300 PR COVID VAC PFIZER DIL RECON 30 MCG/0.3 ML IM: CPT

## 2021-03-08 DIAGNOSIS — E78.5 HYPERLIPIDEMIA LDL GOAL <70: ICD-10-CM

## 2021-03-10 RX ORDER — ATORVASTATIN CALCIUM 20 MG/1
TABLET, FILM COATED ORAL
Qty: 90 TABLET | Refills: 3 | Status: SHIPPED | OUTPATIENT
Start: 2021-03-10 | End: 2021-09-07

## 2021-03-25 ENCOUNTER — ALLIED HEALTH/NURSE VISIT (OUTPATIENT)
Dept: FAMILY MEDICINE | Facility: CLINIC | Age: 76
End: 2021-03-25

## 2021-03-25 ENCOUNTER — IMMUNIZATION (OUTPATIENT)
Dept: NURSING | Facility: CLINIC | Age: 76
End: 2021-03-25
Attending: FAMILY MEDICINE
Payer: MEDICARE

## 2021-03-25 VITALS — SYSTOLIC BLOOD PRESSURE: 140 MMHG | DIASTOLIC BLOOD PRESSURE: 85 MMHG

## 2021-03-25 DIAGNOSIS — I50.9 CHF (NYHA CLASS II, ACC/AHA STAGE C) (H): ICD-10-CM

## 2021-03-25 DIAGNOSIS — Z01.30 BP CHECK: Primary | ICD-10-CM

## 2021-03-25 PROCEDURE — 99207 PR NO CHARGE NURSE ONLY: CPT | Performed by: FAMILY MEDICINE

## 2021-03-25 PROCEDURE — 91300 PR COVID VAC PFIZER DIL RECON 30 MCG/0.3 ML IM: CPT

## 2021-03-25 PROCEDURE — 0002A PR COVID VAC PFIZER DIL RECON 30 MCG/0.3 ML IM: CPT

## 2021-03-25 NOTE — Clinical Note
Routing message to PCP for review because BP checked at pharmacy is above goal. Recommended patient to follow-up with PCP.  PCP please close this encounter. 140/85

## 2021-03-25 NOTE — PROGRESS NOTES
Keith Desir was evaluated at [facility] on March 25, 2021 at which time his blood pressure was:    BP Readings from Last 3 Encounters:   03/25/21 (!) 140/85   03/02/21 (!) 146/86   10/16/20 (!) 138/95     Pulse Readings from Last 3 Encounters:   03/02/21 83   10/16/20 96   10/08/20 90       Reviewed lifestyle modifications for blood pressure control and reduction: including making healthy food choices, managing weight, getting regular exercise, smoking cessation, reducing alcohol consumption, monitoring blood pressure regularly.     Symptoms: None    BP Goal:< 140/90 mmHg    BP Assessment:  BP too high    Potential Reasons for BP too high: Anxiety    BP Follow-Up Plan: Recheck BP in 30 days at pharmacy    Recommendation to Provider: none    Note completed by: Zoila Richter Rph  [facility]  [address] [place] [address]  DEEPTHI Pelayo [id number]  [phone]

## 2021-03-26 RX ORDER — LISINOPRIL 40 MG/1
TABLET ORAL
Qty: 30 TABLET | Refills: 0 | OUTPATIENT
Start: 2021-03-26

## 2021-03-30 ENCOUNTER — DOCUMENTATION ONLY (OUTPATIENT)
Dept: FAMILY MEDICINE | Facility: CLINIC | Age: 76
End: 2021-03-30

## 2021-03-30 DIAGNOSIS — Z79.01 LONG TERM CURRENT USE OF ANTICOAGULANT THERAPY: ICD-10-CM

## 2021-03-30 DIAGNOSIS — I48.20 CHRONIC ATRIAL FIBRILLATION (H): Primary | ICD-10-CM

## 2021-04-06 ENCOUNTER — ANTICOAGULATION THERAPY VISIT (OUTPATIENT)
Dept: NURSING | Facility: CLINIC | Age: 76
End: 2021-04-06

## 2021-04-06 DIAGNOSIS — Z79.01 LONG TERM CURRENT USE OF ANTICOAGULANT THERAPY: ICD-10-CM

## 2021-04-06 DIAGNOSIS — I48.20 CHRONIC ATRIAL FIBRILLATION (H): ICD-10-CM

## 2021-04-06 LAB — INR PPP: 2 (ref 0.86–1.14)

## 2021-04-06 PROCEDURE — 85610 PROTHROMBIN TIME: CPT | Performed by: FAMILY MEDICINE

## 2021-04-06 PROCEDURE — 36416 COLLJ CAPILLARY BLOOD SPEC: CPT | Performed by: FAMILY MEDICINE

## 2021-04-06 NOTE — PROGRESS NOTES
ANTICOAGULATION MANAGEMENT     Patient Name:  Keith Desir  Date:  2021    ASSESSMENT /SUBJECTIVE:    Today's INR result of 2.0 is therapeutic. Goal INR of 2.0-3.0      Warfarin dose taken: Warfarin taken as instructed    Diet: No new diet changes affecting INR    Medication changes/ interactions: No new medications/supplements affecting INR    Previous INR: Therapeutic     S/S of bleeding or thromboembolism: No    New injury or illness: No    Upcoming surgery, procedure or cardioversion: No    Additional findings: None      PLAN:    Telephone call with  Melissa regarding INR result and instructed:     Warfarin Dosing Instructions: Continue your current warfarin dose 5mg every Tue, Sat; 7.5mg all other days    Instructed patient to follow up no later than: 6 weeks  Lab visit scheduled    Education provided: Target INR goal and significance of current INR result      Melissa verbalizes understanding and agrees to warfarin dosing plan.    Instructed to call the Anticoagulation Clinic for any changes, questions or concerns. (#[phone])        Ross Smith RN      OBJECTIVE:  Recent labs: (last 7 days)     21  0931   INR 2.00*         No question data found.  Anticoagulation Summary  As of 2021    INR goal:  2.0-3.0   TTR:  86.0 % (1 y)   INR used for dosin.00 (2021)   Warfarin maintenance plan:  5 mg (2.5 mg x 2) every Tue, Sat; 7.5 mg (2.5 mg x 3) all other days   Full warfarin instructions:  5 mg every Tue, Sat; 7.5 mg all other days   Weekly warfarin total:  47.5 mg   Plan last modified:  Dot Ziegler RN (2020)   Next INR check:     Priority:  Maintenance   Target end date:  Indefinite    Indications    Long term current use of anticoagulant therapy [Z79.01]  Chronic atrial fibrillation (HCC) [I48.20]             Anticoagulation Episode Summary     INR check location:      Preferred lab:      Send INR reminders to:  AJ MCINTOSH    Comments:  don't print AVS unless  change in dosing      Anticoagulation Care Providers     Provider Role Specialty Phone number    Cayla Gregorio MD Referring Family Medicine [phone]

## 2021-05-18 ENCOUNTER — ANTICOAGULATION THERAPY VISIT (OUTPATIENT)
Dept: NURSING | Facility: CLINIC | Age: 76
End: 2021-05-18

## 2021-05-18 DIAGNOSIS — Z79.01 LONG TERM CURRENT USE OF ANTICOAGULANT THERAPY: ICD-10-CM

## 2021-05-18 DIAGNOSIS — I48.20 CHRONIC ATRIAL FIBRILLATION (H): ICD-10-CM

## 2021-05-18 LAB
CAPILLARY BLOOD COLLECTION: NORMAL
INR PPP: 2.1 (ref 0.86–1.14)

## 2021-05-18 PROCEDURE — 36416 COLLJ CAPILLARY BLOOD SPEC: CPT | Performed by: FAMILY MEDICINE

## 2021-05-18 PROCEDURE — 85610 PROTHROMBIN TIME: CPT | Performed by: FAMILY MEDICINE

## 2021-05-18 NOTE — PROGRESS NOTES
ANTICOAGULATION MANAGEMENT     Patient Name:  Keith Desir  Date:  2021    ASSESSMENT /SUBJECTIVE:    Today's INR result of 2.10 is therapeutic. Goal INR of 2.0-3.0      Warfarin dose taken: Warfarin taken as instructed    Diet: No new diet changes affecting INR    Medication changes/ interactions: No new medications/supplements affecting INR    Previous INR: Therapeutic     S/S of bleeding or thromboembolism: No    New injury or illness: No    Upcoming surgery, procedure or cardioversion: No    Additional findings: None      PLAN:    Telephone call with  Melissa (wife) regarding INR result and instructed:     Warfarin Dosing Instructions: Continue your current warfarin dose 5 mg Tue,Sat; 7.5 mg all other days    Instructed patient to follow up no later than: 6 weeks  Lab visit scheduled    Education provided: None required      Melissa verbalizes understanding and agrees to warfarin dosing plan.    Instructed to call the Anticoagulation Clinic for any changes, questions or concerns. (#[phone])        Dot Ziegler RN      OBJECTIVE:  Recent labs: (last 7 days)     21  0918   INR 2.10*         INR assessment THER    Recheck INR In: 6 WEEKS    INR Location Clinic      Anticoagulation Summary  As of 2021    INR goal:  2.0-3.0   TTR:  89.3 % (1 y)   INR used for dosin.10 (2021)   Warfarin maintenance plan:  5 mg (2.5 mg x 2) every Tue, Sat; 7.5 mg (2.5 mg x 3) all other days   Full warfarin instructions:  5 mg every Tue, Sat; 7.5 mg all other days   Weekly warfarin total:  47.5 mg   No change documented:  Dot Ziegler RN   Plan last modified:  Dot Ziegler RN (2020)   Next INR check:  2021   Priority:  Maintenance   Target end date:  Indefinite    Indications    Long term current use of anticoagulant therapy [Z79.01]  Chronic atrial fibrillation (HCC) [I48.20]             Anticoagulation Episode Summary     INR check location:      Preferred lab:      Send  INR reminders to:  AJ MCINTOSH    Comments:  don't print AVS unless change in dosing      Anticoagulation Care Providers     Provider Role Specialty Phone number    Cayla Gregorio MD Referring Family Medicine [phone]

## 2021-06-07 ENCOUNTER — OFFICE VISIT (OUTPATIENT)
Dept: URGENT CARE | Facility: URGENT CARE | Age: 76
End: 2021-06-07
Payer: MEDICARE

## 2021-06-07 ENCOUNTER — ANCILLARY PROCEDURE (OUTPATIENT)
Dept: ULTRASOUND IMAGING | Facility: CLINIC | Age: 76
End: 2021-06-07
Attending: FAMILY MEDICINE
Payer: MEDICARE

## 2021-06-07 VITALS
OXYGEN SATURATION: 100 % | DIASTOLIC BLOOD PRESSURE: 81 MMHG | WEIGHT: 213.2 LBS | TEMPERATURE: 96.3 F | HEART RATE: 104 BPM | BODY MASS INDEX: 32.42 KG/M2 | SYSTOLIC BLOOD PRESSURE: 124 MMHG

## 2021-06-07 DIAGNOSIS — M79.661 RIGHT CALF PAIN: Primary | ICD-10-CM

## 2021-06-07 PROCEDURE — 99214 OFFICE O/P EST MOD 30 MIN: CPT | Performed by: FAMILY MEDICINE

## 2021-06-07 PROCEDURE — 93971 EXTREMITY STUDY: CPT | Mod: RT | Performed by: RADIOLOGY

## 2021-06-07 ASSESSMENT — PAIN SCALES - GENERAL: PAINLEVEL: SEVERE PAIN (7)

## 2021-06-07 NOTE — PATIENT INSTRUCTIONS
Return to  [facility] for ultrasound -appointment time is 3:10 pm    Please keep legs elevated, no increase in exertion/walking until after ultrasound completed and results finalized     If shortness of breath, chest pressure occurs before your ultrasound appointment tto ER           Patient Education     Understanding Deep Vein Thrombosis  Deep vein thrombosis (DVT) is a condition in which a blood clot or thrombus forms in a deep vein. A blood clot is most common in the leg, but can develop in a large vein deep inside the leg, arm, or other part of the body. Part of the clot called an embolus can separate from the vein. It may travel to the lungs and form a pulmonary embolus (PE). This can cut off blood flow to part of the lung or to the entire lung. A PE is a medical emergency and may cause death. Healthcare providers use the term venous thromboembolism (VTE) to describe the two conditions, DVT and PE. They use the term VTE because the two conditions are very closely related. And, because their prevention and treatment are closely related.   Over time, a blood clot can also permanently damage veins. To protect your health, a blood clot must be treated right away.   How DVT develops  The deep veins of the legs are located in the muscles. These help carry blood from the legs to the heart. When leg muscles contract and relax, blood is squeezed through the veins back to the heart. One-way valves inside the veins help keep the blood moving in the right direction. When blood moves too slowly or not at all, it can pool in the veins. This makes a clot more likely to form.      When a muscle contracts, the valve opens. Blood is squeezed toward the heart. When blood moves slowly in a vein, a clot (thrombus) can form. A part of the clot can break off and travel in the bloodstream (embolus).   Risk factors  Anyone can develop a blood clot. The following risk factors make a blood clot more likely to happen:     Being inactive  for a long period, such as when you re in the hospital, or traveling by plane or car    Injury to a vein from an accident, a broken bone, or surgery    Having blood clots in the past    Personal or family history of a blood-clotting disorder    Recent surgery    Cancer and certain cancer treatments    Smoking  Other factors can also put you at higher risk for a blood clot. They include:    Age over 60 years    Pregnancy    Taking birth control or hormone replacement    Having other vein problems    Being overweight  Common symptoms  A blood clot does not always cause obvious symptoms. If you do have symptoms, they usually happen suddenly. They may include:     Pain, especially deep in the muscle    Swelling    Aching or tenderness    Red or warm skin    Fever  Call your healthcare provider if you have these symptoms.  Symptoms of pulmonary embolism may include:    Trouble breathing    Fast heartbeat    Chest pain    Sweating    Coughing (may cough up blood)    Fainting  Call 911 if you have these symptoms.    If you take medicine to help prevent blood clots, you have an increased risk of bleeding. Call 911 if you have heavy or uncontrolled bleeding. Call your healthcare provider if you have other signs or symptoms of bleeding like blood in the urine, bleeding with bowel movements, or bleeding from the nose, gums, a cut, or vagina.   Diagnosing DVT  Your healthcare provider will start with questions about your symptoms and health history along with a physical exam.   Diagnostic tests include:    An imaging test called a duplex ultrasound. This test uses sound waves to create pictures of veins and blood flow.    Blood tests to check for clotting and other problems.  If your healthcare provider thinks you may have pulmonary embolism, additional testing will be done.   Treating DVT  Treating a blood clot may include:    Medicine to thin the blood and prevent pulmonary embolism and other complications.    Staying in the  hospital. This may or may not be necessary.    Surgery for some people, like those who can't take blood-thinning medicines.  Preventing DVT  Many people who are in the hospital are at an increased risk of developing blood clots. So, preventing blood clots is an important part of in-hospital care. The care may include getting out of bed regularly, taking medicine, or using special therapies or devices. Other factors and conditions may increase the risk of blood clots. Review your risk with your healthcare provider.   [employer] last reviewed this educational content on 12/1/2019 2000-2021 [employer]. All rights reserved. This information is not intended as a substitute for professional medical care. Always follow your healthcare professional's instructions.           Patient Education     Peripheral Artery Disease (PAD)   Peripheral artery disease (PAD) occurs when the arteries that carry blood to the limbs are narrowed or blocked. This is usually due to a buildup of a fatty substance called plaque in the walls of the arteries.  PAD most often affects the arteries in the legs. When these arteries are narrowed or blocked, blood flow to the legs is reduced. This can cause leg and foot pain and other symptoms. If severe enough, reduced blood flow to the legs can lead to tissue death (gangrene) and the loss of a toe, foot, or leg. Having PAD also makes it more likely that arteries in other body areas are blocked. For instance, arteries that carry blood to the heart or brain may be affected. This raises the chances of heart attack and stroke.  Risk factors  Certain factors can make PAD more likely. They include:    Smoking    Diabetes    High blood pressure    Unhealthy cholesterol levels    Obesity    Inactive lifestyle    Older age    Family history of PAD  Symptoms  Many people with PAD have no symptoms. If symptoms do occur, they can include:    Pain in the muscles of the calves, thighs, or hips that  gets worse with activity and better with rest (intermittent claudication)    Achy, tired, or heavy feeling in the legs    Weakness, numbness, tingling, or loss of feeling in the legs    Changes in skin color of the legs    Sores on the legs and feet    Cold leg, feet, or toes    Pain the feet or toes even when lying down (rest pain)  Home care  PAD is a chronic (lifelong) condition. Treatment is focused on managing your condition and lowering your health risks. This may include doing the following:    If you smoke, quit. This helps prevent further damage to your arteries and lowers your health risks. Ask your provider about medicines or products that can help you quit smoking. Also consider joining a stop-smoking program or support group.    Be more active. This helps you lose weight and manage problems such as high blood pressure and unhealthy cholesterol levels. Start a walking program if advised to by your provider. Your provider may also help you form a safe exercise program that is right for your needs.    Make healthy eating changes. This includes eating less fat, salt, and sugar.    Take medicines for high blood pressure, unhealthy cholesterol levels, and diabetes as directed.    Have your blood pressure and cholesterol levels checked as often as directed.    If you have diabetes, try to keep your blood sugar well controlled. Test your blood sugar as directed.    If you are overweight, talk to your provider about a weight-loss plan.    Watch for cuts, scrapes, or open sores on your feet. Poor blood flow to the feet may slow healing and increase the risk of infection from these problems.   Follow-up care  Follow up with your healthcare provider, or as advised. If imaging tests such as ultrasound were done, they will be reviewed by a doctor. You will be told the results and any new findings that may affect your care.  When to seek medical advice   Call your healthcare provider right away if any of these  occur:    Sudden severe pain in the legs or feet    Sudden cold, pale or blue color in the legs or feet    Weakness or numbness in the legs or feet that worsens    Any sore or wound in the legs or feet that won t heal    Weak pulse in your legs or feet  Know the signs of heart attack and stroke  People with PAD are at high risk for heart attack and stroke. Knowing the signs of these problems can help you protect your health and get help when you need it. Call 911 right away if you have any of the following:    Chest discomfort, such as pain, aching, tightness, or pressure that lasts more than a few minutes, or that comes and goes    Pain or discomfort in the arms, back, shoulders, neck, or jaw    Shortness of breath    Sweating (often a cold, clammy sweat)    Nausea    Lightheadedness    Sudden numbness or weakness of the face, arms, or legs, especially on one side    Sudden confusion or trouble speaking or understanding    Sudden trouble seeing in one or both eyes    Sudden trouble walking, dizziness, or loss of balance    Sudden, severe headache with no known cause  [employer] last reviewed this educational content on 3/1/2018    9989-3491 [employer]. All rights reserved. This information is not intended as a substitute for professional medical care. Always follow your healthcare professional's instructions.

## 2021-06-07 NOTE — PROGRESS NOTES
Patient presents with:  right leg pain: pain and swelling in rt leg for 2 weeks starts at his ankle up to mid thigh       Subjective     Keith Desir is a 75 year old male who presents to clinic today for the following health issues:    HPI   Musculoskeletal problem/pain  Onset/Duration: 2 weeks  Description  Location: leg - right  Joint Swelling: no  Redness: mild increase in redness, neuropathy in feet, chronic rubor, venous stasis changes in lower legs   Pain: right  posterior leg starting at ankle to back of mid thigh,   Patient on warfarin -last INR at goal for a fib  Warmth: no  Intensity:  moderate  Progression of Symptoms: pain  waxing and waning  Accompanying signs and symptoms:   Fevers: no  Numbness/tingling/weakness: no  History  Trauma to the area: no  Recent illness:  no  Previous similar problem: no  Previous evaluation:  no  Precipitating or alleviating factors:  Aggravating factors include: standing, walking and climbing stairs some times pain is severe other times just mild cramping pain, in last week pain more severe  Therapies tried and outcome: deep heating rub-sometimes help    Smokes a 1 pk a day now  For over  60 years   Patient on warfarin for afib-last INR at goal  2.1 on 5/18/21     Pf[employer] 2 injections, last dose  3/2021     No history of past covid infection     Patient Active Problem List   Diagnosis     Hyperlipidemia LDL goal <70     Tobacco abuse     Chronic atrial fibrillation (HCC)     Renal hypertension     Advanced directives, counseling/discussion     Health Care Home     CHF (NYHA class II, ACC/AHA stage C) (H)     Polycythemia secondary to smoking     Elevated alkaline phosphatase level     Long term current use of anticoagulant therapy     Non morbid obesity due to excess calories     Pulmonary emphysema, unspecified emphysema type (H)     NAFLD (nonalcoholic fatty liver disease)     Varicose veins of legs     Type 2 diabetes mellitus with stage 2 chronic kidney disease,  without long-term current use of insulin (H)     Diabetic neuropathy (H)     Past Surgical History:   Procedure Laterality Date     ANGIOGRAM  8/2006    normal     TONSILLECTOMY & ADENOIDECTOMY         Social History     Tobacco Use     Smoking status: Current Every Day Smoker     Packs/day: 1.00     Years: 45.00     Pack years: 45.00     Types: Cigarettes     Smokeless tobacco: Never Used     Tobacco comment: cut down to 0.5 ppd    Substance Use Topics     Alcohol use: Yes     Alcohol/week: 0.0 - 4.2 standard drinks     Family History   Problem Relation Age of Onset     Diabetes Mother      Cerebrovascular Disease Father      Heart Disease Brother         pacer      Cancer No family hx of      Glaucoma No family hx of      Macular Degeneration No family hx of            Current Outpatient Medications   Medication Sig Dispense Refill     albuterol (2.5 MG/3ML) 0.083% neb solution Take 1 vial (2.5 mg) by nebulization every 4 hours as needed for shortness of breath / dyspnea       albuterol (PROAIR HFA/PROVENTIL HFA/VENTOLIN HFA) 108 (90 Base) MCG/ACT Inhaler Inhale 1-2 puffs into the lungs every 4 hours as needed for shortness of breath / dyspnea 1 Inhaler 11     ASPIRIN NOT PRESCRIBED, INTENTIONAL, Antiplatelet medication not prescribed intentionally due to Current anticoagulant therapy (warfarin/enoxaparin)       atorvastatin (LIPITOR) 20 MG tablet Take 1 tablet by mouth once daily 90 tablet 3     digoxin (LANOXIN) 125 MCG tablet Take 1 tablet by mouth once daily 90 tablet 1     dimenhyDRINATE (DRAMAMINE PO) Take by mouth as needed       empagliflozin (JARDIANCE) 10 MG TABS tablet Take 1 tablet (10 mg) by mouth daily 90 tablet 3     furosemide (LASIX) 20 MG tablet Take 1 tablet (20 mg) by mouth 1 times daily 90 tablet 1     glipiZIDE (GLUCOTROL) 5 MG tablet TAKE 1 TABLET BY MOUTH TWICE DAILY BEFORE MEAL(S) 180 tablet 1     lisinopril (ZESTRIL) 40 MG tablet Take 1 tablet (40 mg) by mouth daily 90 tablet 3      metFORMIN (GLUCOPHAGE) 1000 MG tablet Take 1 tablet (1,000 mg) by mouth 2 times daily (with meals) 180 tablet 3     metoprolol tartrate (LOPRESSOR) 100 MG tablet Take 2 tablets (200 mg) by mouth 2 times daily 360 tablet 0     tiotropium (SPIRIVA HANDIHALER) 18 MCG inhaled capsule INHALE 1 CAPSULE (18 MCG) INTO THE LUNGS DAILY 90 capsule 3     warfarin ANTICOAGULANT (COUMADIN) 2.5 MG tablet TAKE 2 TABLETS (5MG) BY MOUTH ON TUESDAY AND SATURDAY. TAKE 3 TABLETS (7.5MG) ALL OTHER DAYS OR AS DIRECTED BY INR CLINIC 240 tablet 1     Allergies   Allergen Reactions     Nkda [No Known Drug Allergies]              ROS are negative, except as otherwise noted HPI      Objective    /81   Pulse 104   Temp 96.3  F (35.7  C) (Tympanic)   Wt 96.7 kg (213 lb 3.2 oz)   SpO2 100%   BMI 32.42 kg/m    Body mass index is 32.42 kg/m .  Physical Exam   GENERAL: healthy, alert and no distress  MS: bilateral legs chronic venous stasis  changes, rubor, from knees to toes, all 5 toes and foot dusky bilateral, faint +1 DP pulse bilateral   R leg moderate swelling compared to left posterior calf tenderness to palpation and positive homans sign, varicosities of lower legs no increase in size, non tender    non tender medial and posterior thigh, no redness, no swelling in thigh   NEURO: Normal strength and tone, mentation intact and speech normal      Diagnostic Test Results:  Labs reviewed in Epic  Results for orders placed or performed in visit on 06/07/21    Lower Extremity Venous Duplex Right     Status: None    Narrative    VENOUS ULTRASOUND RIGHT LEG  6/7/2021 3:47 PM     HISTORY: Right calf pain    COMPARISON: None.    FINDINGS:  Examination of the deep veins with graded compression and  color flow Doppler with spectral wave form analysis was performed.   There is no evidence for DVT in the right lower extremity.      Impression    IMPRESSION: No evidence of deep venous thrombosis.    STEVEN ULLOA MD            ASSESSMENT/PLAN:      ICD-10-CM    1. Right calf pain  M79.661 US Lower Extremity Venous Duplex Right       On the day of the encounter, time spend on chart review, patient visit, review of testing, documentation and discussion with other providers was 30  minutes        Addendum  Patient completed ultrasound, negative for DVT, patient informed of negative results at time of ultrasound.  Will complete further follow up with his PCP for long term plan for ongoing lower extremity pain, ? Claudication, patient actively smoking with >60 pack year history, patient contacted pcp on 6/8/21 and advised he needed an appointment     6/14/21   Chart review-patient contacted pcp on 6/8/21 via my chart as noted requesting options for medication to treat leg pain, pcp requested an appointment for further follow up -as of today, patient has not been seen by PCP  Called and spoke to wife of patient, provider recommended another ultrasound-more extensive-but patient did not want to do this again and opted not to make an appointment. Per chart review, told to continue topicals and tylenol.  Patient have aching pain at night, no change from appointment on 6/7/21.  Patient hard of hearing, I offered option of flexeril at night and he could not hear me and handed phone to wife-she reviewed with him the option to take flexeril at night and he agreed with plan. Order placed to [facility]. Will continue tylenol and topical-icy hot and otc cream from [facility].  Advised wife to encourage patient to have follow up -ultrasound recommended by his pcp would be more extensive and possibly identify a source for the continued pain in his right leg.      Patient Instructions       Return to  [facility] for ultrasound -appointment time is 3:10 pm    Please keep legs elevated, no increase in exertion/walking until after ultrasound completed and results finalized     If shortness of breath, chest pressure occurs before your ultrasound  appointment tto ER           Patient Education     Understanding Deep Vein Thrombosis  Deep vein thrombosis (DVT) is a condition in which a blood clot or thrombus forms in a deep vein. A blood clot is most common in the leg, but can develop in a large vein deep inside the leg, arm, or other part of the body. Part of the clot called an embolus can separate from the vein. It may travel to the lungs and form a pulmonary embolus (PE). This can cut off blood flow to part of the lung or to the entire lung. A PE is a medical emergency and may cause death. Healthcare providers use the term venous thromboembolism (VTE) to describe the two conditions, DVT and PE. They use the term VTE because the two conditions are very closely related. And, because their prevention and treatment are closely related.   Over time, a blood clot can also permanently damage veins. To protect your health, a blood clot must be treated right away.   How DVT develops  The deep veins of the legs are located in the muscles. These help carry blood from the legs to the heart. When leg muscles contract and relax, blood is squeezed through the veins back to the heart. One-way valves inside the veins help keep the blood moving in the right direction. When blood moves too slowly or not at all, it can pool in the veins. This makes a clot more likely to form.      When a muscle contracts, the valve opens. Blood is squeezed toward the heart. When blood moves slowly in a vein, a clot (thrombus) can form. A part of the clot can break off and travel in the bloodstream (embolus).   Risk factors  Anyone can develop a blood clot. The following risk factors make a blood clot more likely to happen:     Being inactive for a long period, such as when you re in the hospital, or traveling by plane or car    Injury to a vein from an accident, a broken bone, or surgery    Having blood clots in the past    Personal or family history of a blood-clotting disorder    Recent  surgery    Cancer and certain cancer treatments    Smoking  Other factors can also put you at higher risk for a blood clot. They include:    Age over 60 years    Pregnancy    Taking birth control or hormone replacement    Having other vein problems    Being overweight  Common symptoms  A blood clot does not always cause obvious symptoms. If you do have symptoms, they usually happen suddenly. They may include:     Pain, especially deep in the muscle    Swelling    Aching or tenderness    Red or warm skin    Fever  Call your healthcare provider if you have these symptoms.  Symptoms of pulmonary embolism may include:    Trouble breathing    Fast heartbeat    Chest pain    Sweating    Coughing (may cough up blood)    Fainting  Call 911 if you have these symptoms.    If you take medicine to help prevent blood clots, you have an increased risk of bleeding. Call 911 if you have heavy or uncontrolled bleeding. Call your healthcare provider if you have other signs or symptoms of bleeding like blood in the urine, bleeding with bowel movements, or bleeding from the nose, gums, a cut, or vagina.   Diagnosing DVT  Your healthcare provider will start with questions about your symptoms and health history along with a physical exam.   Diagnostic tests include:    An imaging test called a duplex ultrasound. This test uses sound waves to create pictures of veins and blood flow.    Blood tests to check for clotting and other problems.  If your healthcare provider thinks you may have pulmonary embolism, additional testing will be done.   Treating DVT  Treating a blood clot may include:    Medicine to thin the blood and prevent pulmonary embolism and other complications.    Staying in the hospital. This may or may not be necessary.    Surgery for some people, like those who can't take blood-thinning medicines.  Preventing DVT  Many people who are in the hospital are at an increased risk of developing blood clots. So, preventing blood  clots is an important part of in-hospital care. The care may include getting out of bed regularly, taking medicine, or using special therapies or devices. Other factors and conditions may increase the risk of blood clots. Review your risk with your healthcare provider.   [employer] last reviewed this educational content on 12/1/2019 2000-2021 [employer]. All rights reserved. This information is not intended as a substitute for professional medical care. Always follow your healthcare professional's instructions.           Patient Education     Peripheral Artery Disease (PAD)   Peripheral artery disease (PAD) occurs when the arteries that carry blood to the limbs are narrowed or blocked. This is usually due to a buildup of a fatty substance called plaque in the walls of the arteries.  PAD most often affects the arteries in the legs. When these arteries are narrowed or blocked, blood flow to the legs is reduced. This can cause leg and foot pain and other symptoms. If severe enough, reduced blood flow to the legs can lead to tissue death (gangrene) and the loss of a toe, foot, or leg. Having PAD also makes it more likely that arteries in other body areas are blocked. For instance, arteries that carry blood to the heart or brain may be affected. This raises the chances of heart attack and stroke.  Risk factors  Certain factors can make PAD more likely. They include:    Smoking    Diabetes    High blood pressure    Unhealthy cholesterol levels    Obesity    Inactive lifestyle    Older age    Family history of PAD  Symptoms  Many people with PAD have no symptoms. If symptoms do occur, they can include:    Pain in the muscles of the calves, thighs, or hips that gets worse with activity and better with rest (intermittent claudication)    Achy, tired, or heavy feeling in the legs    Weakness, numbness, tingling, or loss of feeling in the legs    Changes in skin color of the legs    Sores on the legs and  feet    Cold leg, feet, or toes    Pain the feet or toes even when lying down (rest pain)  Home care  PAD is a chronic (lifelong) condition. Treatment is focused on managing your condition and lowering your health risks. This may include doing the following:    If you smoke, quit. This helps prevent further damage to your arteries and lowers your health risks. Ask your provider about medicines or products that can help you quit smoking. Also consider joining a stop-smoking program or support group.    Be more active. This helps you lose weight and manage problems such as high blood pressure and unhealthy cholesterol levels. Start a walking program if advised to by your provider. Your provider may also help you form a safe exercise program that is right for your needs.    Make healthy eating changes. This includes eating less fat, salt, and sugar.    Take medicines for high blood pressure, unhealthy cholesterol levels, and diabetes as directed.    Have your blood pressure and cholesterol levels checked as often as directed.    If you have diabetes, try to keep your blood sugar well controlled. Test your blood sugar as directed.    If you are overweight, talk to your provider about a weight-loss plan.    Watch for cuts, scrapes, or open sores on your feet. Poor blood flow to the feet may slow healing and increase the risk of infection from these problems.   Follow-up care  Follow up with your healthcare provider, or as advised. If imaging tests such as ultrasound were done, they will be reviewed by a doctor. You will be told the results and any new findings that may affect your care.  When to seek medical advice   Call your healthcare provider right away if any of these occur:    Sudden severe pain in the legs or feet    Sudden cold, pale or blue color in the legs or feet    Weakness or numbness in the legs or feet that worsens    Any sore or wound in the legs or feet that won t heal    Weak pulse in your legs or  feet  Know the signs of heart attack and stroke  People with PAD are at high risk for heart attack and stroke. Knowing the signs of these problems can help you protect your health and get help when you need it. Call 911 right away if you have any of the following:    Chest discomfort, such as pain, aching, tightness, or pressure that lasts more than a few minutes, or that comes and goes    Pain or discomfort in the arms, back, shoulders, neck, or jaw    Shortness of breath    Sweating (often a cold, clammy sweat)    Nausea    Lightheadedness    Sudden numbness or weakness of the face, arms, or legs, especially on one side    Sudden confusion or trouble speaking or understanding    Sudden trouble seeing in one or both eyes    Sudden trouble walking, dizziness, or loss of balance    Sudden, severe headache with no known cause  [employer] last reviewed this educational content on 3/1/2018    0698-1863 [employer]. All rights reserved. This information is not intended as a substitute for professional medical care. Always follow your healthcare professional's instructions.                      Reviewed medication instructions and side effects. Follow up if experiences side effects.     I reviewed supportive care, otc meds to use if needed, expected course, and signs of concern.  Follow up as needed or if he does not improve within 1 -2 day(s) or if worsens in any way.  Reviewed red flag symptoms and is to go to the ER if experiences any of these.     Carlee Gaston MD

## 2021-06-08 DIAGNOSIS — I48.20 CHRONIC ATRIAL FIBRILLATION (H): ICD-10-CM

## 2021-06-10 RX ORDER — WARFARIN SODIUM 2.5 MG/1
TABLET ORAL
Qty: 240 TABLET | Refills: 1 | Status: SHIPPED | OUTPATIENT
Start: 2021-06-10 | End: 2022-01-21

## 2021-06-10 NOTE — TELEPHONE ENCOUNTER
"Routing refill request to provider for review/approval because:  Labs out of range:  INR      Requested Prescriptions   Pending Prescriptions Disp Refills     warfarin ANTICOAGULANT (COUMADIN) 2.5 MG tablet [Pharmacy Med Name: Warfarin Sodium 2.5 MG Oral Tablet] 240 tablet 0     Sig: TAKE 2 TABLETS (5MG) BY MOUTH ON TUESDAY & SATURDAY. TAKE 3 TABLETS (7.5MG) ON ALL OTHER DAYS OR AS DIRECTED BY INR CLINIC.       Vitamin K Antagonists Failed - 6/8/2021 12:10 PM        Failed - INR is within goal in the past 6 weeks     Confirm INR is within goal in the past 6 weeks.     Recent Labs   Lab Test 05/18/21  0918   INR 2.10*             Passed - Recent (12 mo) or future (30 days) visit within the authorizing provider's specialty     Patient has had an office visit with the authorizing provider or a provider within the authorizing providers department within the previous 12 mos or has a future within next 30 days. See \"Patient Info\" tab in inbasket, or \"Choose Columns\" in Meds & Orders section of the refill encounter.              Passed - Medication is active on med list        Passed - Patient is 18 years of age or older             "

## 2021-06-14 RX ORDER — CYCLOBENZAPRINE HCL 5 MG
5 TABLET ORAL
Qty: 20 TABLET | Refills: 0 | Status: SHIPPED | OUTPATIENT
Start: 2021-06-14 | End: 2021-07-22

## 2021-06-29 ENCOUNTER — ANTICOAGULATION THERAPY VISIT (OUTPATIENT)
Dept: NURSING | Facility: CLINIC | Age: 76
End: 2021-06-29

## 2021-06-29 DIAGNOSIS — Z79.01 LONG TERM CURRENT USE OF ANTICOAGULANT THERAPY: ICD-10-CM

## 2021-06-29 DIAGNOSIS — I48.20 CHRONIC ATRIAL FIBRILLATION (H): ICD-10-CM

## 2021-06-29 LAB
CAPILLARY BLOOD COLLECTION: NORMAL
INR PPP: 2.6 (ref 0.86–1.14)

## 2021-06-29 PROCEDURE — 85610 PROTHROMBIN TIME: CPT | Performed by: FAMILY MEDICINE

## 2021-06-29 PROCEDURE — 36416 COLLJ CAPILLARY BLOOD SPEC: CPT | Performed by: FAMILY MEDICINE

## 2021-06-29 NOTE — PROGRESS NOTES
ANTICOAGULATION MANAGEMENT     Keith Desir 75 year old male is on warfarin with therapeutic INR result. (Goal INR 2.0-3.0)    Recent labs: (last 7 days)     06/29/21  0926   INR 2.60*       ASSESSMENT     Source(s): Patient/Caregiver Call       Warfarin doses taken: Warfarin taken as instructed    Diet: No new diet changes identified    New illness, injury, or hospitalization: No    Medication/supplement changes: None noted    Signs or symptoms of bleeding or clotting: No    Previous INR: Therapeutic last 2(+) visits    Additional findings: None     PLAN     Recommended plan for no diet, medication or health factor changes affecting INR     Dosing Instructions: Continue your current warfarin dose with next INR in 6 weeks       Summary  As of 6/29/2021    Full warfarin instructions:  5 mg every Tue, Sat; 7.5 mg all other days   Next INR check:  8/10/2021             Telephone call with Keith whom verbalizes understanding and agrees to plan    Lab visit scheduled    Education provided: None required    Plan made per [facility] anticoagulation protocol    Dot Ziegler RN  Anticoagulation Clinic  6/29/2021    _______________________________________________________________________     Anticoagulation Episode Summary     Current INR goal:  2.0-3.0   TTR:  95.4 % (1 y)   Target end date:  Indefinite   Send INR reminders to:  AJ MCINTOSH    Indications    Long term current use of anticoagulant therapy [Z79.01]  Chronic atrial fibrillation (HCC) [I48.20]           Comments:  don't print AVS unless change in dosing         Anticoagulation Care Providers     Provider Role Specialty Phone number    Cayla Gregorio MD Referring Family Medicine [phone]

## 2021-06-30 ENCOUNTER — HOSPITAL ENCOUNTER (OUTPATIENT)
Dept: ULTRASOUND IMAGING | Facility: CLINIC | Age: 76
Discharge: HOME | End: 2021-06-30
Attending: NURSE PRACTITIONER | Admitting: NURSE PRACTITIONER
Payer: MEDICARE

## 2021-06-30 ENCOUNTER — OFFICE VISIT (OUTPATIENT)
Dept: FAMILY MEDICINE | Facility: CLINIC | Age: 76
End: 2021-06-30
Payer: MEDICARE

## 2021-06-30 VITALS
RESPIRATION RATE: 18 BRPM | OXYGEN SATURATION: 97 % | DIASTOLIC BLOOD PRESSURE: 91 MMHG | WEIGHT: 214.5 LBS | HEART RATE: 83 BPM | HEIGHT: 68 IN | TEMPERATURE: 97.6 F | BODY MASS INDEX: 32.51 KG/M2 | SYSTOLIC BLOOD PRESSURE: 132 MMHG

## 2021-06-30 DIAGNOSIS — M79.604 PAIN OF RIGHT LOWER EXTREMITY: ICD-10-CM

## 2021-06-30 DIAGNOSIS — I48.20 CHRONIC ATRIAL FIBRILLATION (H): ICD-10-CM

## 2021-06-30 DIAGNOSIS — E78.5 HYPERLIPIDEMIA LDL GOAL <70: ICD-10-CM

## 2021-06-30 DIAGNOSIS — M79.604 PAIN OF RIGHT LOWER EXTREMITY: Primary | ICD-10-CM

## 2021-06-30 DIAGNOSIS — Z72.0 TOBACCO ABUSE: ICD-10-CM

## 2021-06-30 PROCEDURE — 93971 EXTREMITY STUDY: CPT | Mod: RT

## 2021-06-30 PROCEDURE — 99213 OFFICE O/P EST LOW 20 MIN: CPT | Performed by: NURSE PRACTITIONER

## 2021-06-30 ASSESSMENT — PAIN SCALES - GENERAL: PAINLEVEL: EXTREME PAIN (9)

## 2021-06-30 ASSESSMENT — MIFFLIN-ST. JEOR: SCORE: 1692.47

## 2021-06-30 NOTE — PATIENT INSTRUCTIONS
Patient Education     Ankle Brachial Index (EUGENIE) Test  The ankle brachial index (EUGENIE) is a simple test that compares the blood pressure measured at your ankle with the blood pressure measured at your arm. It's used to check for peripheral artery disease (PAD) in the legs, or to see if PAD is getting worse. It can also be used to check your risk for heart attack and stroke. A low EUGENIE number may mean that you have PAD.   What is PAD?  A fatty substance called plaque can build up in your arteries. This causes a narrowing or blockage of arteries. PAD often affects the vessels that bring blood to the legs. The reduced blood flow can cause pain and numbness. A low EUGENIE number may mean that your legs and feet aren t getting as much blood as they need. But an EUGENIE test won t show exactly which blood vessels have become narrowed or blocked.   Why is this test done?  Your healthcare provider might want you to have an EUGENIE test if you are at risk for PAD. Things that increase your risk for PAD include:     Smoking    Diabetes    Being older than age 70    High cholesterol    Coronary artery disease    Abnormal pulses in your lower legs    Being younger than age 50, with diabetes and another risk factor, such as smoking or high blood pressure  The EUGENIE test can:    Diagnose PAD and prevent it from getting worse    Find out if you have a high risk for coronary artery disease  Your healthcare provider also might recommend an EUGENIE:    If you have symptoms of PAD, such as pain in your legs with activity. But not everyone with PAD has symptoms. This makes the test even more important.    To see how serious your PAD is. Your provider might order this test every year. This is to see if your condition is getting worse.    To see how well blood is flowing into your legs, if you ve had surgery on the blood vessels of your legs. Sometimes healthcare providers use EUGENIE to check your risk for heart attack or stroke.  Before your test  There is  very little you need to do to get ready for an EUGENIE test:    You can eat as normal on the day of the test.    You should be able to take any medicines as usual before the procedure.    You may want to wear loose, comfortable clothes. This will let the technician easily place the blood pressure cuff on your arm and ankle.    You ll need to rest for at least 15 to 30 minutes before the procedure.    Ask if your healthcare provider has any special instructions.  During your test  The ankle brachial index test is very similar to a standard blood pressure test. During your EUGENIE test:     You will lie flat during the procedure.    A technician will place a blood pressure cuff just above your ankle.    The technician will put an ultrasound probe over the artery. He or she will use this to listen to the blood flow through the vessel.    The technician will inflate the blood pressure cuff. He or she will increase the pressure until the blood stops flowing through the vessel. This may be a little uncomfortable. But it won t hurt.    The technician will slowly release the pressure in the cuff. The systolic pressure is the pressure at which the blood flow is heard again. That is the part of the blood pressure measurement needed for the EUGENIE.    The technician will repeat this process on your other ankle and on both of your arms.    Next, the technician will calculate the EUGENIE. The top number (numerator) is the higher systolic blood pressure found in the ankles. The lower number (denominator) is the higher systolic blood pressure found in the arms.  Sometimes healthcare providers will combine an EUGENIE test with an exercise test. You might have an EUGENIE done before and right after exercise, to see how exercise changes this value.   After your test    You should be able to go back to your normal activities right after your EUGENIE test.    Be sure to follow up with your healthcare provider about your results. In some cases, you may need  follow-up testing to get more information about a blocked vessel. This might include an MRI or an arteriogram.    Talk with your provider about what your EUGENIE value means for you.  If you have PAD, you may need treatment. Possible treatments include:    Stopping smoking    Treating high blood pressure, high cholesterol, and diabetes, if needed    Staying physically active    Eating a healthy diet    Taking medicine to increase blood flow to your legs or to prevent blood clots    Having procedures to restore blood flow, like angioplasty    Having surgery to your leg if the blockage is severe  Possible risks and complications  For most people, there are no risks linked to having an EUGENIE test. But this test is not recommended if you have a blood clot in your leg. You may need a different type of test if you have severe pain in your legs.   [employer] last reviewed this educational content on 3/1/2020    9260-8518 [employer]. All rights reserved. This information is not intended as a substitute for professional medical care. Always follow your healthcare professional's instructions.

## 2021-06-30 NOTE — PROGRESS NOTES
Assessment & Plan     (M79.604) Pain of right lower extremity  (primary encounter diagnosis)  Comment: Venous US completed 6/07/21 in urgent care-no evidence of clot shown will repeat as he has worsening pain and significant varicose viens. EUGENIE ordered to evaluate for PAD  Plan: US EUGENIE Doppler No Exercise, US Lower Extremity         Venous Duplex Right  Continue OTC acetaminophen PRN for pain        (Z72.0) Tobacco abuse  Comment: Reports he has been smoking since he was 16 years old and does not want to quit. Declined smoking cessation help today.   Plan: Offer smoking cessation help at each visit    (I48.20) Chronic atrial fibrillation (HCC)  Comment: Taking warfarin. Recent INRs therapeutic .     (E78.5) Hyperlipidemia LDL goal <70  Comment: At goal on 3/2/21: 30 mg/dL  Plan: Continue taking Lipitor and monitor     See Patient Instructions    Return in about 2 weeks (around 7/14/2021), or if symptoms worsen or fail to improve.    Cinthia Ramos, GENTRY-S ([facility])    I very much appreciated the opportunity to see and assess this patient in the clinic with NP student.  I agree with the above note which summarizes my findings and current recommendations. I have reviewed all diagnostics noted and performed physical exam. Changes were made in the body of the note to achieve one comprehensive document.    IDANIA [facility]   Ed is a 75 year old who presents for the following health issues  accompanied by his spouse:      HPI   74 y/o male presents with RLE pain x 3-4 weeks. Right calf pain is throbbing and sometimes radiates to right gluteus muscle. Pain is constant when walking and rated 9/10. Reports fatigue and muscle weakness when walking on RLE. No pain with sitting. Reports walking is painful d/t calf tightening. Pt has tried Bengay, Icy Hot, elevation, and a heating pad without relief. Finds relief with sitting. U/S at urgent care on  "06/07/21 showed no evidence of DVT.     ED/UC Followup:    Facility:  [facility]   Date of visit: 6/07/2021   Reason for visit: Right calf pain   Current Status: Patient voices still having severe pain. Taking Cyclobenzaprine; no effect.        Review of Systems   Constitutional, HEENT, cardiovascular, pulmonary, gi and gu systems are negative, except as otherwise noted.      Objective    BP (!) 132/91 (BP Location: Right arm, Patient Position: Sitting, Cuff Size: Adult Large)   Pulse 83   Temp 97.6  F (36.4  C) (Oral)   Resp 18   Ht 1.727 m (5' 8\")   Wt 97.3 kg (214 lb 8 oz)   SpO2 97%   BMI 32.61 kg/m    Body mass index is 32.61 kg/m .     Physical Exam       GENERAL: healthy, alert and no distress  RESP: lungs clear to auscultation - no rales, rhonchi or wheezes  CV: regular rate and rhythm, normal S1 S2, no S3 or S4, no murmur, click or rub, no peripheral edema and peripheral pulses strong  MS: Positive Hannah's sign on RLE. No gross musculoskeletal defects noted, no edema  SKIN: Vericose veins in LLE and RLE. Abrasions noted on RLE and right hand. Abrasions are scabbed, not draining, do not appear infectious. No ulcerations present. No other suspicious lesions or rashes. Lower extremities are dry and warm bilaterally. Hypertrophic toe nails bilaterally. Dependent rubor bilateral feet noted, worse on right than left  PSYCH: mentation appears normal, affect normal/bright          "

## 2021-07-01 ENCOUNTER — HOSPITAL ENCOUNTER (OUTPATIENT)
Dept: ULTRASOUND IMAGING | Facility: CLINIC | Age: 76
Discharge: HOME | End: 2021-07-01
Attending: NURSE PRACTITIONER | Admitting: NURSE PRACTITIONER
Payer: MEDICARE

## 2021-07-01 DIAGNOSIS — M79.604 PAIN OF RIGHT LOWER EXTREMITY: ICD-10-CM

## 2021-07-01 PROCEDURE — 93922 UPR/L XTREMITY ART 2 LEVELS: CPT

## 2021-07-02 ENCOUNTER — MYC MEDICAL ADVICE (OUTPATIENT)
Dept: FAMILY MEDICINE | Facility: CLINIC | Age: 76
End: 2021-07-02

## 2021-07-02 ENCOUNTER — ANCILLARY PROCEDURE (OUTPATIENT)
Dept: GENERAL RADIOLOGY | Facility: CLINIC | Age: 76
End: 2021-07-02
Attending: NURSE PRACTITIONER
Payer: MEDICARE

## 2021-07-02 DIAGNOSIS — R93.89 ABNORMAL X-RAY: ICD-10-CM

## 2021-07-02 DIAGNOSIS — M79.604 PAIN OF RIGHT LOWER EXTREMITY: ICD-10-CM

## 2021-07-02 DIAGNOSIS — M79.604 PAIN OF RIGHT LOWER EXTREMITY: Primary | ICD-10-CM

## 2021-07-02 PROCEDURE — 72100 X-RAY EXAM L-S SPINE 2/3 VWS: CPT | Performed by: RADIOLOGY

## 2021-07-02 RX ORDER — GABAPENTIN 300 MG/1
300 CAPSULE ORAL 3 TIMES DAILY
Qty: 30 CAPSULE | Refills: 1 | Status: SHIPPED | OUTPATIENT
Start: 2021-07-02 | End: 2021-07-19

## 2021-07-02 RX ORDER — SENNOSIDES 8.6 MG
1300 CAPSULE ORAL EVERY 8 HOURS PRN
Qty: 60 TABLET | Refills: 3 | Status: SHIPPED | OUTPATIENT
Start: 2021-07-02 | End: 2022-12-26

## 2021-07-12 DIAGNOSIS — I48.20 CHRONIC ATRIAL FIBRILLATION (H): ICD-10-CM

## 2021-07-12 DIAGNOSIS — Z79.01 LONG TERM CURRENT USE OF ANTICOAGULANT THERAPY: Primary | ICD-10-CM

## 2021-07-12 NOTE — PROGRESS NOTES
Order updated.    Dot Ziegler RN    [facility]tico[facility]

## 2021-07-15 ENCOUNTER — OFFICE VISIT (OUTPATIENT)
Dept: NEUROSURGERY | Facility: CLINIC | Age: 76
End: 2021-07-15
Attending: NURSE PRACTITIONER
Payer: MEDICARE

## 2021-07-15 VITALS
OXYGEN SATURATION: 100 % | HEART RATE: 77 BPM | BODY MASS INDEX: 32.54 KG/M2 | DIASTOLIC BLOOD PRESSURE: 78 MMHG | SYSTOLIC BLOOD PRESSURE: 126 MMHG | WEIGHT: 214 LBS

## 2021-07-15 DIAGNOSIS — M79.604 PAIN OF RIGHT LOWER EXTREMITY: ICD-10-CM

## 2021-07-15 DIAGNOSIS — M51.9 LUMBAR DISC DISORDER: Primary | ICD-10-CM

## 2021-07-15 PROCEDURE — 99204 OFFICE O/P NEW MOD 45 MIN: CPT | Performed by: NURSE PRACTITIONER

## 2021-07-15 NOTE — PATIENT INSTRUCTIONS
Order for lumbar spine MRI. I will call with the results and next steps.     Please call our clinic if symptoms persist, change, or worsen at any time.    Perla Khoury CNP  [facility] Neurosurgery  [address] [address]  Tel [phone]  Pager [phone]

## 2021-07-15 NOTE — LETTER
7/15/2021         RE: Keith Desir  [id number] [address] [facility] [id number]        Dear Colleague,    Thank you for referring your patient, Keith Desir, [facility]. Please see a copy of my visit note below.    Dr. Dawson Deluna   [facility] Neurosurgery Clinic Visit      CC: right leg pain    Primary care Provider: Cayla Gregorio    Reason For Visit:   I was asked by Geeta Ledezma CNP to consult on the patient for: pain of right lower extremity, abnormal xray    HPI: Keith Desir is a 75 year old male who presents for evaluation of right leg pain that started 7 weeks ago. Denies any precipitating trauma or injuries. Pain is located in the posterior right leg to calf. Denies back pain or left leg pain. Denies any paresthesias or weakness. He can walk about 10 minutes but then has to stop and rest due to pain. He reports a fall x1 that occurred in May due to his leg pain. Describes the pain as throbbing. Pain is worsened with prolonged standing. He has tried Gabapentin, Flexeril, and IcyHot, but symptoms continue to worsen. No past spine surgeries, injections, or PT. Denies any bladder/bowel incontinence.     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    Past Surgical History:   Procedure Laterality Date     ANGIOGRAM  8/2006    normal     TONSILLECTOMY & ADENOIDECTOMY       Past Surgical History reviewed with patient during visit.    Current Outpatient Medications   Medication     acetaminophen (TYLENOL) 650 MG CR tablet     albuterol (2.5 MG/3ML) 0.083% neb solution     albuterol (PROAIR HFA/PROVENTIL HFA/VENTOLIN HFA) 108 (90 Base) MCG/ACT Inhaler     ASPIRIN NOT PRESCRIBED, INTENTIONAL,     atorvastatin (LIPITOR) 20 MG tablet     cyclobenzaprine (FLEXERIL) 5 MG tablet     digoxin (LANOXIN) 125 MCG tablet     dimenhyDRINATE (DRAMAMINE PO)     empagliflozin (JARDIANCE) 10 MG TABS tablet     furosemide (LASIX) 20 MG tablet     gabapentin (NEURONTIN) 300 MG capsule     glipiZIDE (GLUCOTROL) 5 MG tablet     lisinopril (ZESTRIL) 40 MG tablet     metFORMIN (GLUCOPHAGE) 1000 MG tablet     metoprolol tartrate (LOPRESSOR) 100 MG tablet     tiotropium (SPIRIVA HANDIHALER) 18 MCG inhaled capsule     warfarin ANTICOAGULANT (COUMADIN) 2.5 MG tablet     No current facility-administered medications for this visit.       Allergies   Allergen Reactions     Nkda [No Known Drug Allergies]        Social History     Socioeconomic History     Marital status:      Spouse name: Melissa     Number of children: 4     Years of education: 7     Highest education level: None   Occupational History     Occupation: foundry work     Employer: DISABLED     Employer: RETIRED   Tobacco Use     Smoking status: Current Every Day Smoker     Packs/day: 1.00     Years: 45.00     Pack years: 45.00     Types: Cigarettes     Smokeless tobacco: Never Used     Tobacco comment: cut down to 0.5 ppd    Substance and Sexual Activity     Alcohol use: Yes     Alcohol/week: 0.0 - 4.2 standard drinks     Drug use: No     Sexual activity: Not Currently     Partners: Female   Other Topics Concern     Parent/sibling w/ CABG, MI or angioplasty before 65F 55M? Not Asked   Social History  Narrative     None     Social Determinants of Health     Financial Resource Strain:      Difficulty of Paying Living Expenses:    Food Insecurity:      Worried About Running Out of Food in the Last Year:      Ran Out of Food in the Last Year:    Transportation Needs:      Lack of Transportation (Medical):      Lack of Transportation (Non-Medical):    Physical Activity:      Days of Exercise per Week:      Minutes of Exercise per Session:    Stress:      Feeling of Stress :    Social Connections:      Frequency of Communication with Friends and Family:      Frequency of Social Gatherings with Friends and Family:      Attends [employer] Services:      Active Member of Clubs or Organizations:      Attends Club or Organization Meetings:      Marital Status:    Intimate Partner Violence:      Fear of Current or Ex-Partner:      Emotionally Abused:      Physically Abused:      Sexually Abused:        Family History   Problem Relation Age of Onset     Diabetes Mother      Cerebrovascular Disease Father      Heart Disease Brother         pacer      Cancer No family hx of      Glaucoma No family hx of      Macular Degeneration No family hx of        ROS: 10 point ROS neg other than the symptoms noted above in the HPI.    Vital Signs: /78   Pulse 77   Wt 214 lb (97.1 kg)   SpO2 100%   BMI 32.54 kg/m      Examination:  Constitutional:  Alert, well nourished, NAD.  HEENT: Normocephalic, atraumatic.   Pulmonary:  Without shortness of breath, normal effort.   Lymph: No lymphadenopathy to low back or LE.   Integumentary: Skin is free of rashes or lesions.   Cardiovascular:  No pitting edema of BLE.      Neurological:  Awake  Alert  Oriented x 3  Speech clear  Cranial nerves II - XII grossly intact  PERRL  EOMI  Face symmetric  Tongue midline  Motor exam   Hip Flexor:                 Right: 5/5  Left:  5/5  Quadriceps:               Right:  5/5  Left:  5/5  Hamstrings:               Right:  5/5  Left:  5/5  Gastroc Soleus:          Right:  5/5  Left:  5/5  Tib/Ant:                      Right:  5/5  Left:  5/5  EHL:                          Right:  5/5  Left:  5/5         Sensation normal to bilateral upper and lower extremities.    Reflexes are 2+ in the patellar and Achilles. There is no clonus. Downgoing Babinski.    Musculoskeletal:  Gait: Able to stand from a seated position. Normal non-antalgic, non-myelopathic gait. Able to heel/toe walk without loss of balance.    Lumbar examination reveals no tenderness of the spine or paraspinous muscles.  Hip height is symmetrical. Negative SI joint, sciatic notch or greater trochanteric tenderness to palpation bilaterally.  Straight leg raise is negative bilaterally.      Imaging:   LUMBAR SPINE TWO TO THREE VIEWS 7/2/2021 11:30 AM                                                                    IMPRESSION: Five lumbar-type vertebrae. Normal alignment. Vertebral  body heights normal. No evidence for fracture. Moderate facet  arthropathy at L4-5 and L5-S1. Flowing syndesmophytes on the right  from L1 down to L4.    Assessment/Plan:   Right leg pain     75 year old male who presents for evaluation of right leg pain that started 7 weeks ago. Denies any precipitating trauma or injuries. Pain is located in the posterior right leg to calf. Denies back pain or left leg pain. Denies any paresthesias or weakness. He can walk about 10 minutes but then has to stop and rest due to pain. XR reviewed and we discussed options at this time. Recommend a lumbar spine MRI to further evaluate leg pain. Will call with results and next steps.     Advised patient to call our clinic with any questions or concerns. Discussed red flag symptoms and advised to seek medical attention if these develop. Patient voiced understanding and agreement.      Perla Khoury CNP  [facility] Neurosurgery  [address] [address]  Tel [phone]  Pager  [phone]          Again, thank you for allowing me to participate in the care of your patient.        Sincerely,        Perla Khoury, NP

## 2021-07-15 NOTE — PROGRESS NOTES
Dr. Dawson Deluna   [facility] Neurosurgery Clinic Visit      CC: right leg pain    Primary care Provider: Cayla Gregorio    Reason For Visit:   I was asked by Geeta Ledezma CNP to consult on the patient for: pain of right lower extremity, abnormal xray    HPI: Keith Desir is a 75 year old male who presents for evaluation of right leg pain that started 7 weeks ago. Denies any precipitating trauma or injuries. Pain is located in the posterior right leg to calf. Denies back pain or left leg pain. Denies any paresthesias or weakness. He can walk about 10 minutes but then has to stop and rest due to pain. He reports a fall x1 that occurred in May due to his leg pain. Describes the pain as throbbing. Pain is worsened with prolonged standing. He has tried Gabapentin, Flexeril, and IcyHot, but symptoms continue to worsen. No past spine surgeries, injections, or PT. Denies any bladder/bowel incontinence.     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    Past Surgical History:   Procedure Laterality Date     ANGIOGRAM  8/2006    normal     TONSILLECTOMY & ADENOIDECTOMY       Past Surgical History reviewed with patient during visit.    Current Outpatient Medications   Medication     acetaminophen  (TYLENOL) 650 MG CR tablet     albuterol (2.5 MG/3ML) 0.083% neb solution     albuterol (PROAIR HFA/PROVENTIL HFA/VENTOLIN HFA) 108 (90 Base) MCG/ACT Inhaler     ASPIRIN NOT PRESCRIBED, INTENTIONAL,     atorvastatin (LIPITOR) 20 MG tablet     cyclobenzaprine (FLEXERIL) 5 MG tablet     digoxin (LANOXIN) 125 MCG tablet     dimenhyDRINATE (DRAMAMINE PO)     empagliflozin (JARDIANCE) 10 MG TABS tablet     furosemide (LASIX) 20 MG tablet     gabapentin (NEURONTIN) 300 MG capsule     glipiZIDE (GLUCOTROL) 5 MG tablet     lisinopril (ZESTRIL) 40 MG tablet     metFORMIN (GLUCOPHAGE) 1000 MG tablet     metoprolol tartrate (LOPRESSOR) 100 MG tablet     tiotropium (SPIRIVA HANDIHALER) 18 MCG inhaled capsule     warfarin ANTICOAGULANT (COUMADIN) 2.5 MG tablet     No current facility-administered medications for this visit.       Allergies   Allergen Reactions     Nkda [No Known Drug Allergies]        Social History     Socioeconomic History     Marital status:      Spouse name: Melissa     Number of children: 4     Years of education: 7     Highest education level: None   Occupational History     Occupation: foundry work     Employer: DISABLED     Employer: RETIRED   Tobacco Use     Smoking status: Current Every Day Smoker     Packs/day: 1.00     Years: 45.00     Pack years: 45.00     Types: Cigarettes     Smokeless tobacco: Never Used     Tobacco comment: cut down to 0.5 ppd    Substance and Sexual Activity     Alcohol use: Yes     Alcohol/week: 0.0 - 4.2 standard drinks     Drug use: No     Sexual activity: Not Currently     Partners: Female   Other Topics Concern     Parent/sibling w/ CABG, MI or angioplasty before 65F 55M? Not Asked   Social History Narrative     None     Social Determinants of Health     Financial Resource Strain:      Difficulty of Paying Living Expenses:    Food Insecurity:      Worried About Running Out of Food in the Last Year:      Ran Out of Food in the Last Year:    Transportation Needs:       Lack of Transportation (Medical):      Lack of Transportation (Non-Medical):    Physical Activity:      Days of Exercise per Week:      Minutes of Exercise per Session:    Stress:      Feeling of Stress :    Social Connections:      Frequency of Communication with Friends and Family:      Frequency of Social Gatherings with Friends and Family:      Attends Jewish Services:      Active Member of Clubs or Organizations:      Attends Club or Organization Meetings:      Marital Status:    Intimate Partner Violence:      Fear of Current or Ex-Partner:      Emotionally Abused:      Physically Abused:      Sexually Abused:        Family History   Problem Relation Age of Onset     Diabetes Mother      Cerebrovascular Disease Father      Heart Disease Brother         pacer      Cancer No family hx of      Glaucoma No family hx of      Macular Degeneration No family hx of        ROS: 10 point ROS neg other than the symptoms noted above in the HPI.    Vital Signs: /78   Pulse 77   Wt 214 lb (97.1 kg)   SpO2 100%   BMI 32.54 kg/m      Examination:  Constitutional:  Alert, well nourished, NAD.  HEENT: Normocephalic, atraumatic.   Pulmonary:  Without shortness of breath, normal effort.   Lymph: No lymphadenopathy to low back or LE.   Integumentary: Skin is free of rashes or lesions.   Cardiovascular:  No pitting edema of BLE.      Neurological:  Awake  Alert  Oriented x 3  Speech clear  Cranial nerves II - XII grossly intact  PERRL  EOMI  Face symmetric  Tongue midline  Motor exam   Hip Flexor:                 Right: 5/5  Left:  5/5  Quadriceps:               Right:  5/5  Left:  5/5  Hamstrings:               Right:  5/5  Left:  5/5  Gastroc Soleus:         Right:  5/5  Left:  5/5  Tib/Ant:                      Right:  5/5  Left:  5/5  EHL:                          Right:  5/5  Left:  5/5         Sensation normal to bilateral upper and lower extremities.    Reflexes are 2+ in the patellar and Achilles. There is no  clonus. Downgoing Babinski.    Musculoskeletal:  Gait: Able to stand from a seated position. Normal non-antalgic, non-myelopathic gait. Able to heel/toe walk without loss of balance.    Lumbar examination reveals no tenderness of the spine or paraspinous muscles.  Hip height is symmetrical. Negative SI joint, sciatic notch or greater trochanteric tenderness to palpation bilaterally.  Straight leg raise is negative bilaterally.      Imaging:   LUMBAR SPINE TWO TO THREE VIEWS 7/2/2021 11:30 AM                                                                    IMPRESSION: Five lumbar-type vertebrae. Normal alignment. Vertebral  body heights normal. No evidence for fracture. Moderate facet  arthropathy at L4-5 and L5-S1. Flowing syndesmophytes on the right  from L1 down to L4.    Assessment/Plan:   Right leg pain     75 year old male who presents for evaluation of right leg pain that started 7 weeks ago. Denies any precipitating trauma or injuries. Pain is located in the posterior right leg to calf. Denies back pain or left leg pain. Denies any paresthesias or weakness. He can walk about 10 minutes but then has to stop and rest due to pain. XR reviewed and we discussed options at this time. Recommend a lumbar spine MRI to further evaluate leg pain. Will call with results and next steps.     Advised patient to call our clinic with any questions or concerns. Discussed red flag symptoms and advised to seek medical attention if these develop. Patient voiced understanding and agreement.      Perla Khoury CNP  [facility] Neurosurgery  [address] [address]  Tel [phone]  Pager [phone]

## 2021-07-19 ENCOUNTER — MYC MEDICAL ADVICE (OUTPATIENT)
Dept: FAMILY MEDICINE | Facility: CLINIC | Age: 76
End: 2021-07-19

## 2021-07-19 DIAGNOSIS — M79.604 PAIN OF RIGHT LOWER EXTREMITY: ICD-10-CM

## 2021-07-19 RX ORDER — GABAPENTIN 300 MG/1
CAPSULE ORAL
Qty: 120 CAPSULE | Refills: 4 | Status: SHIPPED | OUTPATIENT
Start: 2021-07-19 | End: 2021-12-20

## 2021-07-21 ENCOUNTER — ANCILLARY PROCEDURE (OUTPATIENT)
Dept: MRI IMAGING | Facility: CLINIC | Age: 76
End: 2021-07-21
Attending: NURSE PRACTITIONER
Payer: MEDICARE

## 2021-07-21 DIAGNOSIS — M79.604 PAIN OF RIGHT LOWER EXTREMITY: ICD-10-CM

## 2021-07-21 PROCEDURE — 72148 MRI LUMBAR SPINE W/O DYE: CPT | Mod: TC | Performed by: RADIOLOGY

## 2021-07-21 PROCEDURE — G1004 CDSM NDSC: HCPCS | Mod: TC | Performed by: RADIOLOGY

## 2021-07-22 ENCOUNTER — TELEPHONE (OUTPATIENT)
Dept: NEUROSURGERY | Facility: CLINIC | Age: 76
End: 2021-07-22

## 2021-07-22 DIAGNOSIS — M51.9 LUMBAR DISC DISORDER: ICD-10-CM

## 2021-07-22 DIAGNOSIS — M79.604 PAIN OF RIGHT LOWER EXTREMITY: Primary | ICD-10-CM

## 2021-07-22 RX ORDER — CYCLOBENZAPRINE HCL 5 MG
5 TABLET ORAL 3 TIMES DAILY PRN
Qty: 30 TABLET | Refills: 0 | Status: SHIPPED | OUTPATIENT
Start: 2021-07-22 | End: 2021-09-07

## 2021-07-22 NOTE — TELEPHONE ENCOUNTER
Reason for call: Pt's wife Melissa called to ask about MRI and what the treatment plan is. Please call her back at [phone]. Thank you

## 2021-07-22 NOTE — TELEPHONE ENCOUNTER
Reviewed recent MRI of lumbar spine. There is a large right-sided lesion projecting laterally to the L4-L5 disc deforming the right psoas muscle, possibly a large disc osteophyte complex or syndesmophyte; multilevel degenerative disc and facet disease most advanced at L4-5 and L5-S1 where there is mild-to-moderate stenosis.                                                               Discussed with Dr. Deluna. Recommend lumbar spine CT to further evaluate lesion. Discussed with patient/wife and they voiced agreement with plan. Placed CT order. Will call with results.     Also refilled flexeril to help with pain management. Patient states PCP increased Gabapentin as well.      Advised patient to call our clinic if symptoms persist, change, or worsen. Patient voiced understanding and agreement with this plan.    Perla Khoury CNP  [facility] Neurosurgery  [address] [address]  Tel [phone]  Pager [phone]

## 2021-07-23 ENCOUNTER — ANCILLARY PROCEDURE (OUTPATIENT)
Dept: CT IMAGING | Facility: CLINIC | Age: 76
End: 2021-07-23
Attending: NURSE PRACTITIONER
Payer: MEDICARE

## 2021-07-23 ENCOUNTER — MYC MEDICAL ADVICE (OUTPATIENT)
Dept: NEUROSURGERY | Facility: CLINIC | Age: 76
End: 2021-07-23

## 2021-07-23 DIAGNOSIS — M51.9 LUMBAR DISC DISORDER: ICD-10-CM

## 2021-07-23 DIAGNOSIS — M79.604 PAIN OF RIGHT LOWER EXTREMITY: ICD-10-CM

## 2021-07-23 DIAGNOSIS — M79.604 PAIN OF RIGHT LOWER EXTREMITY: Primary | ICD-10-CM

## 2021-07-23 PROCEDURE — 72131 CT LUMBAR SPINE W/O DYE: CPT | Mod: TC | Performed by: RADIOLOGY

## 2021-07-27 ENCOUNTER — TELEPHONE (OUTPATIENT)
Dept: NEUROSURGERY | Facility: CLINIC | Age: 76
End: 2021-07-27

## 2021-07-27 NOTE — TELEPHONE ENCOUNTER
Per Perla Khoury CNP :   Please call patient/wife with results.    Imaging reviewed and shows shows a large degenerative fused osteophyte laterally between L4 and L5 on the right side. Recommend KEEGAN.     Follow-up with Dr. Deluna if symptoms persist/worsen.      Called and reviewed above results and recommendations with patient's wife Melissa. She verbalized understanding and in agreement with plan. Order placed for KEEGAN.

## 2021-07-27 NOTE — TELEPHONE ENCOUNTER
Reason for call: Pt's wife Melissa called to request a call back to discuss treatment plan after CT. Please call her back at [phone]. Thank you

## 2021-07-28 ENCOUNTER — TELEPHONE (OUTPATIENT)
Dept: PALLIATIVE MEDICINE | Facility: CLINIC | Age: 76
End: 2021-07-28

## 2021-07-28 DIAGNOSIS — I48.20 CHRONIC ATRIAL FIBRILLATION (H): Primary | ICD-10-CM

## 2021-07-28 NOTE — TELEPHONE ENCOUNTER
Patient is requesting to schedule an injection with the [facility].     This would require the patient to hold:               Coumadin (Warfarin)     Hold 5 days prior to procedure.  Check INR prior to procedure.  Ok to resume night of procedure, unless directed otherwise by provider or anticoagulation clinic.    We are requesting your approval to hold the medication for this time frame.    Please keep call open and route back to the PAIN NURSE [[id number]] pool.     Routed to primary care provider    /Bianca RN-BSN  [facility]

## 2021-07-28 NOTE — TELEPHONE ENCOUNTER
Screening Questions for Radiology Injections:    Injection to be done at which interventional clinic site? [facility]    If [facility] location, tell patient that this procedure requires a COVID-19 lab test be done within 4 days of the procedure. Would you still like to move forward with scheduling the procedure?  Not Applicable   If YES, let patient know that someone will call them to schedule the COVID-19 test and that they will only receive a call back if the result is positive. Route to nursing to enter order.     Instruct patient to arrive as directed prior to the scheduled appointment time:    [place] minutes before      [place]: 30 minutes before; if IV needed 1 hour before     Procedure ordered by    Procedure ordered? LESI       Transforaminal Cervical KEEGAN - no pain provider currently performing    As a reminder, receiving steroids can decrease your body's ability to fight infection.   Would you still like to move forward with scheduling the injection?  Yes    What insurance would patient like us to bill for this procedure? Medicare & BCBS       Worker's comp or MVA (motor vehicle accident) -Any injection DO NOT SCHEDULE and route to Jelly Ash.      [employer] insurance - For SI joint injections, DO NOT SCHEDULE and route Jelly Ash.       ALL BCBS, [employer] and [employer]-Route to Jelly for review DO NOT SCHEDULE      IF SCHEDULING IN [place] AND NEEDS A PA, IT IS OKAY TO SCHEDULE. [place] HANDLES THEIR OWN PA'S AFTER THE PATIENT IS SCHEDULED. PLEASE SCHEDULE AT LEAST 1 WEEK OUT SO A PA CAN BE OBTAINED.    Any chance of pregnancy? Not Applicable   If YES, do NOT schedule and route to  pool    Is an  needed? No     Patient has a drive home? (mandatory) YES: Informed     Is patient taking any blood thinners (i.e. plavix, coumadin, jantoven, warfarin, heparin, pradaxa or dabigatran, etc)? Yes - Warfarin    If hold needed, do NOT schedule, route  to RN pool     Is patient taking any aspirin products (includes Excedrin and Fiorinal)? No     If more than 325mg/day, OK to schedule; Instruct pt to decrease to less than 325 mg for 7 days AND route to RN pool    For CERVICAL procedures, hold all aspirin products for 6 days.     Tell pt that if aspirin product is not held for 6 days, the procedure WILL BE cancelled.      Does the patient have a bleeding or clotting disorder? No     If YES, okay to schedule AND route to RN nurse pool    For any patients with platelet count <100, must be forwarded to provider    Any allergies to contrast dye, iodine, shellfish, or numbing and steroid medications? No    If YES, add allergy information to appointment notes AND route to the RN pool     If KEEGAN and Contrast Dye / Iodine Allergy? DO NOT SCHEDULE, route to RN pool    Allergies: Nkda [no known drug allergies]     Is patient diabetic?  Yes  If YES, instruct them to bring their glucometer.    Does patient have an active infection or treated for one within the past week? No     Is patient currently taking any antibiotics?  No     For patients on chronic, preventative, or prophylactic antibiotics, procedures may be scheduled.     For patients on antibiotics for active or recent infection:antibiotic course must have been completed for 4 days    Is patient currently taking any steroid medications? (i.e. Prednisone, Medrol)  No     For patients on steroid medications, course must have been completed for 4 days    Is patient actively being treated for cancer or immunocompromised? No  If YES, do NOT schedule and route to RN pool     Are you able to get on and off an exam table with minimal or no assistance? Yes  If NO, do NOT schedule and route to RN pool    Are you able to roll over and lay on your stomach with minimal or no assistance? Yes  If NO, do NOT schedule and route to RN pool     Has the patient had a flu shot or any other vaccinations within 7 days before or after the  procedure.  No     Have you recently had a COVID vaccine or have plans to get it in the near future? No    If yes, explain that for the vaccine to work best they need to:       wait 1 week before and 1 week after getting Vaccine #1    wait 1 week before and 2 weeks after getting Vaccine #2    If patient has concerns about the timing, send to RN pool     Does patient have an MRI/CT?  YES: 2021  Check Procedure Scheduling Grid to see if required.      Was the MRI done within the last 3 years?  Yes    If yes, where was the MRI done i.e.[facility], [facility], [place] [facility], [facility] etc? [facility]       If no, do not schedule and route to RN pool    If MRI was not done at [place], [facility] or [facility] do NOT schedule and route to RN pool.      If pt has an imaging disc, the injection MAY be scheduled but pt has to bring disc to appt.     If they show up without the disc the injection cannot be done    Procedure Specific Instructions:      If celiac plexus block, informed patient NPO for 6 hours and that it is okay to take medications with sips of water, especially blood pressure medications  Not Applicable         If this is for a cervical procedure, informed patient that aspirin needs to be held for 6 days.   Not Applicable      If IV needed:    Do not schedule procedures requiring IV placement in the first appointment of the day or first appointment after lunch. Do NOT schedule at 0745, 0815 or 1245.     Instructed pt to arrive 30 minutes early for IV start if required. (Check Procedure Scheduling Grid)  Not Applicable    Reminders:      If you are started on any steroids or antibiotics between now and your appointment, you must contact us because the procedure may need to be cancelled.  No      For all procedures except radiofrequency ablations (RFAs) and spinal cord stimulator (SCS) trials, informed patient:    IV sedation is not provided for this procedure.  If you feel that an oral anti-anxiety medication is  needed, you can discuss this further with your referring provider or primary care provider.  The Pain Clinic provider will discuss specifics of what the procedure includes at your appointment.  Most procedures last 10-20 minutes.  We use numbing medications to help with any discomfort during the procedure.  Not Applicable      For patients 85 or older we recommend having an adult stay w/ them for the remainder of the day.       Does the patient have any questions?  NO  Esther Pak  [facility]

## 2021-07-29 NOTE — TELEPHONE ENCOUNTER
No PA required, okay to schedule.      Per Availity:  This product does not require prior authorizations (PA) for any service.        Jelly DOVER    [facility]

## 2021-07-30 NOTE — TELEPHONE ENCOUNTER
Wife calling to schedule appointment for pt as he is in a lot of pain. Please call to advise.      Safia BACK    [facility]

## 2021-08-02 ENCOUNTER — TELEPHONE (OUTPATIENT)
Dept: FAMILY MEDICINE | Facility: CLINIC | Age: 76
End: 2021-08-02

## 2021-08-02 DIAGNOSIS — Z79.01 LONG TERM CURRENT USE OF ANTICOAGULANT THERAPY: Primary | ICD-10-CM

## 2021-08-02 DIAGNOSIS — I48.20 CHRONIC ATRIAL FIBRILLATION (H): ICD-10-CM

## 2021-08-02 NOTE — TELEPHONE ENCOUNTER
Routing chart to [facility] for bridge assessment.   Jose Daniel May Rn  [facility]

## 2021-08-02 NOTE — TELEPHONE ENCOUNTER
Our clinic manages all of the anticoagulation hold planning.  Per 7/28/21 this has been set up and arranged by John Landeros RN after hold was approved by Ace Keene.    Hermelinda Palmer MD  [facility]

## 2021-08-02 NOTE — TELEPHONE ENCOUNTER
Pt is scheduled for LESI on 8/9/21, coumadin hold will start 8/3/21.  Pt scheduled for INR prior to procedure at 1:45 on 8/9/21.  Pt verbalized understanding.    John Landeros, RN  Patient Care Supervisor   [facility]

## 2021-08-02 NOTE — TELEPHONE ENCOUNTER
Reason for Call:  Holding coumadin    Detailed comments: Pt is going to be having back injection on Monday 8/9. Needs to be off coumadin 5 days prior,  Tomorrow will be last day for him to take it. Please call her at home # or her cell #     Phone Number Patient can be reached at: Home number on file [phone] (home)    Best Time: anytime    Can we leave a detailed message on this number? YES    Call taken on 8/2/2021 at 11:34 AM by Estela Holbrook

## 2021-08-02 NOTE — TELEPHONE ENCOUNTER
"[facility]-PROCEDURAL ANTICOAGULATION  MANAGEMENT    Assessment     Warfarin interruption plan for KEEGAN on 2021.    A. Fib      Risk stratification for thromboembolism: low (2017 ACC periprocedure pathway for NVAF Expert Consensus)      [id number] = 3 (CHF, Age, HTN)    NVAF: 2017 ACC periprocedure pathway for NVAF advises NO bridge for low risk stratification ([id number] score <=4 or and no prior hx of stroke, TIA or systemic embolism)    Plan       Pre-Procedure:    Hold warfarin until after procedure startin2021          Post-Procedure:    Resume home warfarin dose if okay with provider doing procedure on night of procedure, 2021 PM: 10mg    Recheck INR 7-10 days after resuming warfarin   ?   Maria Isabel Mccoy [facility]    Subjective/Objective:      Keith Desir, a 75 year old male    Reason for Anticoagulation: A. Fib    Goal INR Range: 2.0-3.0     Patient bridged in past: No    Pertinent History: N/A    Wt Readings from Last 3 Encounters:   07/15/21 97.1 kg (214 lb)   21 97.3 kg (214 lb 8 oz)   21 96.7 kg (213 lb 3.2 oz)      Ideal body weight: 68.4 kg (150 lb 12.7 oz)  Adjusted ideal body weight: 79.9 kg (176 lb 1.2 oz)     Estimated body mass index is 32.54 kg/m  as calculated from the following:    Height as of 21: 1.727 m (5' 8\").    Weight as of 7/15/21: 97.1 kg (214 lb).    Lab Results   Component Value Date    INR 2.60 (H) 2021    INR 2.10 (H) 2021    INR 2.00 (H) 2021     Lab Results   Component Value Date    HGB 17.7 2021    HCT 52.2 2021     2021     Lab Results   Component Value Date    CR 0.90 2021    CR 0.78 2020    CR Test canceled - Lab  error 2020     CrCl cannot be calculated (Patient's most recent lab result is older than the maximum 30 days allowed.).    "

## 2021-08-03 NOTE — TELEPHONE ENCOUNTER
Calendar updated.    Dot Ziegler RN    [facility]ticoagulation [facility]

## 2021-08-04 ENCOUNTER — TELEPHONE (OUTPATIENT)
Dept: FAMILY MEDICINE | Facility: CLINIC | Age: 76
End: 2021-08-04

## 2021-08-04 NOTE — TELEPHONE ENCOUNTER
Reason for Call:  Question    Detailed comments: Patient's wife is calling in today in regards to patient being off his Warfarin for 5 days for a steroid shot on Monday 08/09/21. She is wondering if anything needs to be changed on patient's diet well he is off the warfarin and also wondering if the lab should be rescheduled from the 10 th to a different day. Can someone give her a call and go over everything with her. Thank you    Phone Number Patient can be reached at: Home number on file [phone] (home)    Best Time: anytime    Can we leave a detailed message on this number? YES    Call taken on 8/4/2021 at 10:33 AM by Claire Mccoy

## 2021-08-05 NOTE — TELEPHONE ENCOUNTER
Sending to monitoring ACC. This was sent directly to RN and I was not in clinic yesterday.    Dot Ziegler RN    [facility]

## 2021-08-05 NOTE — TELEPHONE ENCOUNTER
Spoke with Wife and answered questions. INR lab apt rescheduled for 1   Week post procedure. Karla Hutton RN

## 2021-08-09 ENCOUNTER — ANTICOAGULATION THERAPY VISIT (OUTPATIENT)
Dept: ANTICOAGULATION | Facility: CLINIC | Age: 76
End: 2021-08-09

## 2021-08-09 ENCOUNTER — LAB (OUTPATIENT)
Dept: LAB | Facility: CLINIC | Age: 76
End: 2021-08-09
Payer: MEDICARE

## 2021-08-09 ENCOUNTER — RADIOLOGY INJECTION OFFICE VISIT (OUTPATIENT)
Dept: PALLIATIVE MEDICINE | Facility: CLINIC | Age: 76
End: 2021-08-09
Payer: MEDICARE

## 2021-08-09 VITALS
RESPIRATION RATE: 16 BRPM | SYSTOLIC BLOOD PRESSURE: 148 MMHG | OXYGEN SATURATION: 94 % | HEART RATE: 90 BPM | DIASTOLIC BLOOD PRESSURE: 103 MMHG

## 2021-08-09 DIAGNOSIS — I48.20 CHRONIC ATRIAL FIBRILLATION (H): ICD-10-CM

## 2021-08-09 DIAGNOSIS — M51.9 LUMBAR DISC DISORDER: ICD-10-CM

## 2021-08-09 DIAGNOSIS — M79.604 PAIN OF RIGHT LOWER EXTREMITY: ICD-10-CM

## 2021-08-09 DIAGNOSIS — Z79.01 LONG TERM CURRENT USE OF ANTICOAGULANT THERAPY: ICD-10-CM

## 2021-08-09 DIAGNOSIS — Z79.01 LONG TERM CURRENT USE OF ANTICOAGULANT THERAPY: Primary | ICD-10-CM

## 2021-08-09 DIAGNOSIS — M54.16 LUMBAR RADICULOPATHY: Primary | ICD-10-CM

## 2021-08-09 LAB — INR BLD: 1.2 (ref 0.9–1.1)

## 2021-08-09 PROCEDURE — 85610 PROTHROMBIN TIME: CPT

## 2021-08-09 PROCEDURE — 62323 NJX INTERLAMINAR LMBR/SAC: CPT | Performed by: PSYCHIATRY & NEUROLOGY

## 2021-08-09 RX ORDER — DEXAMETHASONE SODIUM PHOSPHATE 10 MG/ML
10 INJECTION INTRAMUSCULAR; INTRAVENOUS ONCE
Status: COMPLETED | OUTPATIENT
Start: 2021-08-09 | End: 2021-08-09

## 2021-08-09 RX ADMIN — DEXAMETHASONE SODIUM PHOSPHATE 10 MG: 10 INJECTION INTRAMUSCULAR; INTRAVENOUS at 14:57

## 2021-08-09 ASSESSMENT — PAIN SCALES - GENERAL: PAINLEVEL: EXTREME PAIN (9)

## 2021-08-09 NOTE — NURSING NOTE
Discharge Information    IV Discontiued Time:  NA    Amount of Fluid Infused:  NA    Discharge Criteria = When patient returns to baseline or as per MD order    Consciousness:  Pt is fully awake    Circulation:  BP +/- 20% of pre-procedure level    Respiration:  Patient is able to breathe deeply    O2 Sat:  Patient is able to maintain O2 Sat >92% on room air    Activity:  Moves 4 extremities on command    Ambulation:  Patient is able to stand and walk or stand and pivot into wheelchair    Dressing:  Clean/dry or No Dressing    Notes:   Discharge instructions and AVS given to patient    Patient meets criteria for discharge?  YES    Admitted to PCU?  No    Responsible adult present to accompany patient home?  Yes    Signature/Title:    John Landeros RN  RN Care Coordinator  [facility] [place]

## 2021-08-09 NOTE — PROGRESS NOTES
Pre procedure Diagnosis: lumbar radiculopathy    Post procedure Diagnosis: Same  Procedure performed: L5-S1 interlaminar epidural steroid injection   Anesthesia: none  Complications: none  Operators: Hermelinda Palmer MD     Indications:   Keith Desir is a 75 year old male was sent by Perla Khoury NP for epidural steroid injection.  They have a history of low back and right leg pain, which is variable in location, but more lateral than medial to the foot.  Exam shows atypical gait, hard of hearing, normal sensation to light touch  and they have tried conservative treatment including medication.    MRI was done on 7/21/21 which showed   1. Large right-sided T1 and T2 relatively hypointense well-marginated  lesion projecting laterally to the L4-L5 disc deforming the right  psoas muscle. This is probably a large disc osteophyte complex or syndesmophyte. Less likely, a bony lesion such as osteochondroma or other bony lesion could give a somewhat similar appearance. If clinically indicated, plain radiograph or CT might be helpful in better determining the matrix of this lesion.  2. Multilevel degenerative disc and facet disease most advanced at L5-S1 where there is mild-to-moderate central canal stenosis, moderate bilateral neural foraminal stenosis and some mild-to-moderate bilateral subarticular lateral recess stenosis.   3. L4-L5 degenerative disc and facet disease with mild central and mild to moderate bilateral neural foraminal stenosis.    CT 7/23/21  1. No acute fracture or subluxation in the lumbar spine.  2. Previously questioned abnormality from the right aspect of the L4-L5 vertebral bodies likely represents a degenerative fused osteophyte. Multiple additional fused osteophytes throughout the lumbar spine as detailed above. No destructive bony lesions appreciated.  3. Degenerative changes in the lumbar spine, most pronounced at L5-S1.  These are similar to previous MR 7/21/2021.    Options/alternatives,  benefits and risks were discussed with the patient including bleeding, infection, no pain relief, tissue trauma, exposure to radiation, reaction to medications, spinal cord injury, dural puncture, weakness, numbness and headache.  Questions were answered to his satisfaction and he agrees to proceed. Voluntary informed consent was obtained and signed.     Vitals were reviewed: Yes  Allergies were reviewed:  Yes   Medications were reviewed:  Yes   Pre-procedure pain score: 9/10    Procedure:  After getting informed consent, patient was brought into the procedure suite and was placed in a prone position on the procedure table.   A Pause for the Cause was performed.  Patient was prepped and draped in sterile fashion.     The L5-S1 interspace was identified with AP fluoroscopy.  A total of 3ml of 1% lidocaine was used to anesthetize the skin for a right paramedian approach.    A 20gauge 3.5inch Touhy needle was advanced under intermittent fluoroscopy.  A CEDRIC syringe was used to advance the needle towards the epidural space.  Vascularity was encountered posterior to the epidural steroid space. Needle was adjusted.  On lateral, it was noted that the location was lower than anticipated.  Upon review at AP, it was felt that he had lumbar features and uneven sacral ala, so the needle was moved up a level.  A right approach was not possible on this side, therefore needle was brought in from left to right. After loss of resistance, there was no evidence of blood or CSF on aspiration. Location was verified with both AP and lateral fluoroscopic imaging.    A total of 5ml of Omnipaque 300 was injected demonstrating appropriate epidural spread and was checked in both the AP and lateral views. 5ml was wasted. There was no evidence of intravascular or intrathecal spread.    1ml of 0.5% bupivacaine with 10mg of dexamethasone and 2ml of preservative free saline was injected.  The needle was removed from the epidural space, flushed with  1% lidocaine and removed.     Hemostasis was achieved, the area was cleaned, and bandaids were placed when appropriate.  The patient tolerated the procedure well, and was taken to the recovery room.    Images were saved to PACS.    Post-procedure pain score: 7/10  Follow-up includes:   -f/u with referring provider    Hermelinda Palmer MD  [facility]

## 2021-08-09 NOTE — PATIENT INSTRUCTIONS
[facility]   Procedure Discharge Instructions    Today you saw:  Dr. Melina Palmer     You had an:  Epidural steroid injection       Medications used:  Lidocaine   Bupivacaine   Dexamethasone Omnipaque            If you were holding your blood thinning medication, please restart taking it: restart your coumadin tonight and work closely with your INR clinic for dosing and managing     Be cautious when walking. Numbness and/or weakness in the lower extremities may occur for up to 6-8 hours after the procedure due to effect of the local anesthetic    Do not drive for 6 hours. The effect of the local anesthetic could slow your reflexes.     You may resume your regular activities after 24 hours    Avoid strenuous activity for the first 24 hours    You may shower, however avoid swimming, tub baths or hot tubs for 24 hours following your procedure    You may have a mild to moderate increase in pain for several days following the injection.    It may take up to 14 days for the steroid medication to start working although you may feel the effect as early as a few days after the procedure.       You may use ice packs for 10-15 minutes, 3 to 4 times a day at the injection site for comfort    Do not use heat to painful areas for 6 to 8 hours. This will give the local anesthetic time to wear off and prevent you from accidentally burning your skin.     Unless you have been directed to avoid the use of anti-inflammatory medications (NSAIDS), you may use medications such as ibuprofen, Aleve or Tylenol for pain control if needed.     If you have diabetes, check your blood sugar more frequently than usual as your blood sugar may be higher than normal for 10-14 days following a steroid injection. Contact your doctor who manages your diabetes if your blood sugar is higher than usual    Possible side effects of steroids that you may experience include flushing, elevated blood pressure, increased appetite,  mild headaches and restlessness.  All of these symptoms will get better with time.    If you experience any of the following, call the Pain Clinic during work hours (Mon-Friday 8-4:30 pm) at [phone] or the Provider Line after hours at [phone]:  -Fever over 100 degree F  -Swelling, bleeding, redness, drainage, warmth at the injection site  -Progressive weakness or numbness in your legs   -Loss of bowel or bladder function  -Unusual new onset of pain that is not improving

## 2021-08-09 NOTE — NURSING NOTE
Pre-procedure Intake    Have you been fasting? NA    If yes, for how long?     Are you taking a prescribed blood thinner such as coumadin, Plavix, Xarelto?    Yes -   Coumadin    If yes, when did you take your last dose? 8/3/2021    Do you take aspirin?  No    If cervical procedure, have you held aspirin for 6 days?       Do you have any allergies to contrast dye, iodine, steroid and/or numbing medications?  NO    Are you currently taking antibiotics or have an active infection?  NO    Have you had a fever/elevated temperature within the past week? NO    Are you currently taking oral steroids? NO    Do you have a ? Yes       Are you pregnant or breastfeeding?  Not Applicable    Have you received the COVID-19 vaccine? Yes       If yes, was it your 1st, 2nd or only dose needed? 2nd dose     Date of most recent vaccine: 3/25/2021    Notify provider and RNs if systolic BP >170, diastolic BP >100, P >100 or O2 sats < 90%    Avel Kim MA

## 2021-08-09 NOTE — PROGRESS NOTES
INR done today prior to procedure. Patient had instructions for hold and dosing post procedure and INR scheduled in 1 wk. Karla Hutton RN

## 2021-08-14 ENCOUNTER — MYC MEDICAL ADVICE (OUTPATIENT)
Dept: FAMILY MEDICINE | Facility: CLINIC | Age: 76
End: 2021-08-14

## 2021-08-14 DIAGNOSIS — J43.9 PULMONARY EMPHYSEMA, UNSPECIFIED EMPHYSEMA TYPE (H): Chronic | ICD-10-CM

## 2021-08-15 DIAGNOSIS — I50.9 CHF (NYHA CLASS II, ACC/AHA STAGE C) (H): ICD-10-CM

## 2021-08-15 DIAGNOSIS — I48.20 CHRONIC ATRIAL FIBRILLATION (H): ICD-10-CM

## 2021-08-17 ENCOUNTER — ANTICOAGULATION THERAPY VISIT (OUTPATIENT)
Dept: ANTICOAGULATION | Facility: CLINIC | Age: 76
End: 2021-08-17

## 2021-08-17 ENCOUNTER — LAB (OUTPATIENT)
Dept: LAB | Facility: CLINIC | Age: 76
End: 2021-08-17
Payer: MEDICARE

## 2021-08-17 DIAGNOSIS — I48.20 CHRONIC ATRIAL FIBRILLATION (H): ICD-10-CM

## 2021-08-17 DIAGNOSIS — Z79.01 LONG TERM CURRENT USE OF ANTICOAGULANT THERAPY: ICD-10-CM

## 2021-08-17 DIAGNOSIS — Z79.01 LONG TERM CURRENT USE OF ANTICOAGULANT THERAPY: Primary | ICD-10-CM

## 2021-08-17 LAB — INR BLD: 2.7 (ref 0.9–1.1)

## 2021-08-17 PROCEDURE — 85610 PROTHROMBIN TIME: CPT

## 2021-08-17 PROCEDURE — 36416 COLLJ CAPILLARY BLOOD SPEC: CPT

## 2021-08-17 RX ORDER — ALBUTEROL SULFATE 90 UG/1
1-2 AEROSOL, METERED RESPIRATORY (INHALATION) EVERY 4 HOURS PRN
Qty: 18 G | Refills: 2 | Status: SHIPPED | OUTPATIENT
Start: 2021-08-17

## 2021-08-17 NOTE — PROGRESS NOTES
ANTICOAGULATION MANAGEMENT     Keith Desir 75 year old male is on warfarin with therapeutic INR result. (Goal INR 2.0-3.0)    Recent labs: (last 7 days)     08/17/21  0947   INR 2.7*       ASSESSMENT     Source(s): Chart Review and Patient/Caregiver Call       Warfarin doses taken: Warfarin taken as instructed    Diet: No new diet changes identified    New illness, injury, or hospitalization: No    Medication/supplement changes: None noted    Signs or symptoms of bleeding or clotting: No    Previous INR: Subtherapeutic 1.2 prior to procedure    Additional findings: None resumed post procedure and no new concerns or changes reported by spouse. She requests next INR in 6 wks.      PLAN     Recommended plan for no diet, medication or health factor changes affecting INR     Dosing Instructions: Continue your current warfarin dose with next INR in 2 weeks       Summary  As of 8/17/2021    Full warfarin instructions:  5 mg every Tue, Sat; 7.5 mg all other days   Next INR check:  9/28/2021             Telephone call with  Melissa who verbalizes understanding and agrees to plan and requests next INR in 6 wks    Lab visit scheduled    Education provided: Please call back if any changes to your diet, medications or how you've been taking warfarin    Plan made per [facility] anticoagulation protocol    Karla Hutton RN  Anticoagulation Clinic  8/17/2021    _______________________________________________________________________     Anticoagulation Episode Summary     Current INR goal:  2.0-3.0   TTR:  87.8 % (1 y)   Target end date:  Indefinite   Send INR reminders to:  AJ MCINTOSH    Indications    Long term current use of anticoagulant therapy [Z79.01]  Chronic atrial fibrillation (HCC) [I48.20]           Comments:  don't print AVS unless change in dosing         Anticoagulation Care Providers     Provider Role Specialty Phone number    Cayla Gregorio MD Referring Family Medicine [phone]

## 2021-08-18 ENCOUNTER — MYC MEDICAL ADVICE (OUTPATIENT)
Dept: NEUROSURGERY | Facility: CLINIC | Age: 76
End: 2021-08-18

## 2021-08-18 RX ORDER — METOPROLOL TARTRATE 100 MG
TABLET ORAL
Qty: 120 TABLET | Refills: 0 | Status: SHIPPED | OUTPATIENT
Start: 2021-08-18 | End: 2021-09-07

## 2021-08-18 NOTE — TELEPHONE ENCOUNTER
Routing refill request to provider for review/approval because:  BP Readings from Last 3 Encounters:   08/09/21 (!) 148/103   07/15/21 126/78   06/30/21 (!) 132/91

## 2021-08-19 ENCOUNTER — OFFICE VISIT (OUTPATIENT)
Dept: NEUROSURGERY | Facility: CLINIC | Age: 76
End: 2021-08-19
Payer: MEDICARE

## 2021-08-19 VITALS
OXYGEN SATURATION: 94 % | WEIGHT: 207 LBS | HEART RATE: 110 BPM | SYSTOLIC BLOOD PRESSURE: 136 MMHG | DIASTOLIC BLOOD PRESSURE: 92 MMHG | BODY MASS INDEX: 31.47 KG/M2

## 2021-08-19 DIAGNOSIS — M79.604 PAIN OF RIGHT LOWER EXTREMITY: ICD-10-CM

## 2021-08-19 DIAGNOSIS — M51.9 LUMBAR DISC DISORDER: Primary | ICD-10-CM

## 2021-08-19 PROCEDURE — 99213 OFFICE O/P EST LOW 20 MIN: CPT | Performed by: NEUROLOGICAL SURGERY

## 2021-08-19 ASSESSMENT — PAIN SCALES - GENERAL: PAINLEVEL: EXTREME PAIN (9)

## 2021-08-19 NOTE — PROGRESS NOTES
Keith Desir is a 75 year old male who presents for evaluation of right leg pain that started 7 weeks ago. Denies any precipitating trauma or injuries. Pain is located in the posterior right leg to calf. Denies back pain or left leg pain. Denies any paresthesias or weakness. He can walk about 10 minutes but then has to stop and rest due to pain. He reports a fall x1 that occurred in May due to his leg pain. Describes the pain as throbbing. Pain is worsened with prolonged standing. He has tried Gabapentin, Flexeril, and IcyHot, but symptoms continue to worsen. No past spine surgeries, injections, or PT. Denies any bladder/bowel incontinence.     Returns for follow up.  Continued right leg pain, worse with standing.  CT obtained which confirms large right L4-5 paraspinal osteophyte, as well as L5-S1 foraminal stenosis.      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    Past Surgical History:   Procedure Laterality Date     ANGIOGRAM  8/2006    normal     TONSILLECTOMY & ADENOIDECTOMY       Past Surgical History reviewed with patient during visit.    Current Outpatient Medications   Medication     acetaminophen (TYLENOL) 650 MG CR tablet     albuterol (2.5 MG/3ML) 0.083% neb solution     albuterol (PROAIR HFA/PROVENTIL HFA/VENTOLIN HFA) 108 (90  Base) MCG/ACT inhaler     atorvastatin (LIPITOR) 20 MG tablet     digoxin (LANOXIN) 125 MCG tablet     dimenhyDRINATE (DRAMAMINE PO)     empagliflozin (JARDIANCE) 10 MG TABS tablet     furosemide (LASIX) 20 MG tablet     gabapentin (NEURONTIN) 300 MG capsule     glipiZIDE (GLUCOTROL) 5 MG tablet     lisinopril (ZESTRIL) 40 MG tablet     metFORMIN (GLUCOPHAGE) 1000 MG tablet     metoprolol tartrate (LOPRESSOR) 100 MG tablet     tiotropium (SPIRIVA HANDIHALER) 18 MCG inhaled capsule     warfarin ANTICOAGULANT (COUMADIN) 2.5 MG tablet     cyclobenzaprine (FLEXERIL) 5 MG tablet     No current facility-administered medications for this visit.       Allergies   Allergen Reactions     Nkda [No Known Drug Allergies]        Social History     Socioeconomic History     Marital status:      Spouse name: Melissa     Number of children: 4     Years of education: 7     Highest education level: None   Occupational History     Occupation: foundry work     Employer: DISABLED     Employer: RETIRED   Tobacco Use     Smoking status: Current Every Day Smoker     Packs/day: 1.00     Years: 45.00     Pack years: 45.00     Types: Cigarettes     Smokeless tobacco: Never Used     Tobacco comment: cut down to 0.5 ppd    Substance and Sexual Activity     Alcohol use: Yes     Alcohol/week: 0.0 - 4.2 standard drinks     Drug use: No     Sexual activity: Not Currently     Partners: Female   Other Topics Concern     Parent/sibling w/ CABG, MI or angioplasty before 65F 55M? Not Asked   Social History Narrative     None     Social Determinants of Health     Financial Resource Strain:      Difficulty of Paying Living Expenses:    Food Insecurity:      Worried About Running Out of Food in the Last Year:      Ran Out of Food in the Last Year:    Transportation Needs:      Lack of Transportation (Medical):      Lack of Transportation (Non-Medical):    Physical Activity:      Days of Exercise per Week:      Minutes of Exercise per Session:     Stress:      Feeling of Stress :    Social Connections:      Frequency of Communication with Friends and Family:      Frequency of Social Gatherings with Friends and Family:      Attends [employer] Services:      Active Member of Clubs or Organizations:      Attends Club or Organization Meetings:      Marital Status:    Intimate Partner Violence:      Fear of Current or Ex-Partner:      Emotionally Abused:      Physically Abused:      Sexually Abused:        Family History   Problem Relation Age of Onset     Diabetes Mother      Cerebrovascular Disease Father      Heart Disease Brother         pacer      Cancer No family hx of      Glaucoma No family hx of      Macular Degeneration No family hx of        ROS: 10 point ROS neg other than the symptoms noted above in the HPI.    Vital Signs: BP (!) 136/92   Pulse 110   Wt 93.9 kg (207 lb)   SpO2 94%   BMI 31.47 kg/m      Examination:  Constitutional:  Alert, well nourished, NAD.  HEENT: Normocephalic, atraumatic.   Pulmonary:  Without shortness of breath, normal effort.   Lymph: No lymphadenopathy to low back or LE.   Integumentary: Skin is free of rashes or lesions.   Cardiovascular:  No pitting edema of BLE.      Neurological:  Awake  Alert  Oriented x 3  Speech clear  Cranial nerves II - XII grossly intact  PERRL  EOMI  Face symmetric  Tongue midline  Motor exam   Hip Flexor:                 Right: 5/5  Left:  5/5  Quadriceps:               Right:  5/5  Left:  5/5  Hamstrings:               Right:  5/5  Left:  5/5  Gastroc Soleus:         Right:  5/5  Left:  5/5  Tib/Ant:                      Right:  5/5  Left:  5/5  EHL:                          Right:  5/5  Left:  5/5         Sensation normal to bilateral upper and lower extremities.    Reflexes are 2+ in the patellar and Achilles. There is no clonus. Downgoing Babinski.    Musculoskeletal:  Gait: Able to stand from a seated position. Normal non-antalgic, non-myelopathic gait. Able to heel/toe walk without  loss of balance.    Lumbar examination reveals no tenderness of the spine or paraspinous muscles.  Hip height is symmetrical. Negative SI joint, sciatic notch or greater trochanteric tenderness to palpation bilaterally.  Straight leg raise is negative bilaterally.      Imaging:   LUMBAR SPINE TWO TO THREE VIEWS 7/2/2021 11:30 AM                                                                    IMPRESSION: Five lumbar-type vertebrae. Normal alignment. Vertebral  body heights normal. No evidence for fracture. Moderate facet  arthropathy at L4-5 and L5-S1. Flowing syndesmophytes on the right  from L1 down to L4.    Assessment/Plan:   Right leg pain     75 year old male who presents for evaluation of right leg pain that started 7 weeks ago. Denies any precipitating trauma or injuries. Pain is located in the posterior right leg to calf    Patient is adamantly opposed to surgery  Will plan for right L5-S1 transforaminal KEEGAN and course of PT

## 2021-08-19 NOTE — NURSING NOTE
"Keith Desir is a 75 year old male who presents for:  Chief Complaint   Patient presents with     Neurologic Problem     R leg pain; numb L foot        Initial Vitals:  BP (!) 136/92   Pulse 110   Wt 207 lb (93.9 kg)   SpO2 94%   BMI 31.47 kg/m   Estimated body mass index is 31.47 kg/m  as calculated from the following:    Height as of 6/30/21: 5' 8\" (1.727 m).    Weight as of this encounter: 207 lb (93.9 kg).. Body surface area is 2.12 meters squared. BP completed using cuff size: regular  Extreme Pain (9)      Darryn Lyons MA    "

## 2021-08-19 NOTE — PATIENT INSTRUCTIONS
Patient Next Steps:      Order placed for epidural steroid injection  o The steroid can take 10-14 days to reach max effect  o Please call our clinic if symptoms persist after this timeframe.  o You can call [facility] to schedule your injection: [phone]    Please call us if you have any further questions or concerns.    [facility]   Phone: [phone]  Fax: [phone]

## 2021-08-19 NOTE — LETTER
8/19/2021         RE: Keith Desir  [id number] [facility] [id number]        Dear Colleague,    Thank you for referring your patient, Keith Desir, to the [facility]. Please see a copy of my visit note below.    Keith Desir is a 75 year old male who presents for evaluation of right leg pain that started 7 weeks ago. Denies any precipitating trauma or injuries. Pain is located in the posterior right leg to calf. Denies back pain or left leg pain. Denies any paresthesias or weakness. He can walk about 10 minutes but then has to stop and rest due to pain. He reports a fall x1 that occurred in May due to his leg pain. Describes the pain as throbbing. Pain is worsened with prolonged standing. He has tried Gabapentin, Flexeril, and IcyHot, but symptoms continue to worsen. No past spine surgeries, injections, or PT. Denies any bladder/bowel incontinence.     Returns for follow up.  Continued right leg pain, worse with standing.  CT obtained which confirms large right L4-5 paraspinal osteophyte, as well as L5-S1 foraminal stenosis.      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    Past Surgical History:   Procedure Laterality Date     ANGIOGRAM  8/2006    normal     TONSILLECTOMY &  ADENOIDECTOMY       Past Surgical History reviewed with patient during visit.    Current Outpatient Medications   Medication     acetaminophen (TYLENOL) 650 MG CR tablet     albuterol (2.5 MG/3ML) 0.083% neb solution     albuterol (PROAIR HFA/PROVENTIL HFA/VENTOLIN HFA) 108 (90 Base) MCG/ACT inhaler     atorvastatin (LIPITOR) 20 MG tablet     digoxin (LANOXIN) 125 MCG tablet     dimenhyDRINATE (DRAMAMINE PO)     empagliflozin (JARDIANCE) 10 MG TABS tablet     furosemide (LASIX) 20 MG tablet     gabapentin (NEURONTIN) 300 MG capsule     glipiZIDE (GLUCOTROL) 5 MG tablet     lisinopril (ZESTRIL) 40 MG tablet     metFORMIN (GLUCOPHAGE) 1000 MG tablet     metoprolol tartrate (LOPRESSOR) 100 MG tablet     tiotropium (SPIRIVA HANDIHALER) 18 MCG inhaled capsule     warfarin ANTICOAGULANT (COUMADIN) 2.5 MG tablet     cyclobenzaprine (FLEXERIL) 5 MG tablet     No current facility-administered medications for this visit.       Allergies   Allergen Reactions     Nkda [No Known Drug Allergies]        Social History     Socioeconomic History     Marital status:      Spouse name: Melissa     Number of children: 4     Years of education: 7     Highest education level: None   Occupational History     Occupation: foundry work     Employer: DISABLED     Employer: RETIRED   Tobacco Use     Smoking status: Current Every Day Smoker     Packs/day: 1.00     Years: 45.00     Pack years: 45.00     Types: Cigarettes     Smokeless tobacco: Never Used     Tobacco comment: cut down to 0.5 ppd    Substance and Sexual Activity     Alcohol use: Yes     Alcohol/week: 0.0 - 4.2 standard drinks     Drug use: No     Sexual activity: Not Currently     Partners: Female   Other Topics Concern     Parent/sibling w/ CABG, MI or angioplasty before 65F 55M? Not Asked   Social History Narrative     None     Social Determinants of Health     Financial Resource Strain:      Difficulty of Paying Living Expenses:    Food Insecurity:      Worried About  Running Out of Food in the Last Year:      Ran Out of Food in the Last Year:    Transportation Needs:      Lack of Transportation (Medical):      Lack of Transportation (Non-Medical):    Physical Activity:      Days of Exercise per Week:      Minutes of Exercise per Session:    Stress:      Feeling of Stress :    Social Connections:      Frequency of Communication with Friends and Family:      Frequency of Social Gatherings with Friends and Family:      Attends [employer] Services:      Active Member of Clubs or Organizations:      Attends Club or Organization Meetings:      Marital Status:    Intimate Partner Violence:      Fear of Current or Ex-Partner:      Emotionally Abused:      Physically Abused:      Sexually Abused:        Family History   Problem Relation Age of Onset     Diabetes Mother      Cerebrovascular Disease Father      Heart Disease Brother         pacer      Cancer No family hx of      Glaucoma No family hx of      Macular Degeneration No family hx of        ROS: 10 point ROS neg other than the symptoms noted above in the HPI.    Vital Signs: BP (!) 136/92   Pulse 110   Wt 93.9 kg (207 lb)   SpO2 94%   BMI 31.47 kg/m      Examination:  Constitutional:  Alert, well nourished, NAD.  HEENT: Normocephalic, atraumatic.   Pulmonary:  Without shortness of breath, normal effort.   Lymph: No lymphadenopathy to low back or LE.   Integumentary: Skin is free of rashes or lesions.   Cardiovascular:  No pitting edema of BLE.      Neurological:  Awake  Alert  Oriented x 3  Speech clear  Cranial nerves II - XII grossly intact  PERRL  EOMI  Face symmetric  Tongue midline  Motor exam   Hip Flexor:                 Right: 5/5  Left:  5/5  Quadriceps:               Right:  5/5  Left:  5/5  Hamstrings:               Right:  5/5  Left:  5/5  Gastroc Soleus:         Right:  5/5  Left:  5/5  Tib/Ant:                      Right:  5/5  Left:  5/5  EHL:                          Right:  5/5  Left:  5/5         Sensation  normal to bilateral upper and lower extremities.    Reflexes are 2+ in the patellar and Achilles. There is no clonus. Downgoing Babinski.    Musculoskeletal:  Gait: Able to stand from a seated position. Normal non-antalgic, non-myelopathic gait. Able to heel/toe walk without loss of balance.    Lumbar examination reveals no tenderness of the spine or paraspinous muscles.  Hip height is symmetrical. Negative SI joint, sciatic notch or greater trochanteric tenderness to palpation bilaterally.  Straight leg raise is negative bilaterally.      Imaging:   LUMBAR SPINE TWO TO THREE VIEWS 7/2/2021 11:30 AM                                                                    IMPRESSION: Five lumbar-type vertebrae. Normal alignment. Vertebral  body heights normal. No evidence for fracture. Moderate facet  arthropathy at L4-5 and L5-S1. Flowing syndesmophytes on the right  from L1 down to L4.    Assessment/Plan:   Right leg pain     75 year old male who presents for evaluation of right leg pain that started 7 weeks ago. Denies any precipitating trauma or injuries. Pain is located in the posterior right leg to calf    Patient is adamantly opposed to surgery  Will plan for right L5-S1 transforaminal KEEGAN and course of PT        Again, thank you for allowing me to participate in the care of your patient.        Sincerely,        Dawson Deluna MD

## 2021-08-20 ENCOUNTER — TELEPHONE (OUTPATIENT)
Dept: PALLIATIVE MEDICINE | Facility: CLINIC | Age: 76
End: 2021-08-20

## 2021-08-20 DIAGNOSIS — I48.20 CHRONIC ATRIAL FIBRILLATION (H): Primary | ICD-10-CM

## 2021-08-20 NOTE — TELEPHONE ENCOUNTER
Screening Questions for Radiology Injections:    Injection to be done at which interventional clinic site? [facility]    If [facility] location, tell patient that this procedure requires a COVID-19 lab test be done within 4 days of the procedure. Would you still like to move forward with scheduling the procedure?  Not Applicable   If YES, let patient know that someone will call them to schedule the COVID-19 test and that they will only receive a call back if the result is positive. Route to nursing to enter order.     Instruct patient to arrive as directed prior to the scheduled appointment time:    [place] minutes before      [place]: 30 minutes before; if IV needed 1 hour before     Procedure ordered by Mikie    Procedure ordered? Left L5-S1 transforaminal KEEGAN      Transforaminal Cervical KEEGAN -  [facility] does NOT have providers that do these- [facility] and [facility] do have providers that do    As a reminder, receiving steroids can decrease your body's ability to fight infection.   Would you still like to move forward with scheduling the injection?  Yes    What insurance would patient like us to bill for this procedure? Medicare/ BCBS      Worker's comp or MVA (motor vehicle accident) -Any injection DO NOT SCHEDULE and route to Jelly Ash.      [employer] insurance - For SI joint injections, DO NOT SCHEDULE and route Jelly Ash.       ALL BCBS, Hum[employer] and [employer] CIGNA-Route to Jelly for review DO NOT SCHEDULE      IF SCHEDULING IN [place] AND NEEDS A PA, IT IS OKAY TO SCHEDULE. [place] HANDLES THEIR OWN PA'S AFTER THE PATIENT IS SCHEDULED. PLEASE SCHEDULE AT LEAST 1 WEEK OUT SO A PA CAN BE OBTAINED.    Any chance of pregnancy? Not Applicable   If YES, do NOT schedule and route to RN pool    Is an  needed? No     Patient has a drive home? (mandatory) YES: INFORMED    Is patient taking any blood thinners (i.e. plavix, coumadin, jantoven, warfarin, heparin,  pradaxa or dabigatran, etc)? Yes - Warfarin   If hold needed, do NOT schedule, route to RN pool     Is patient taking any aspirin products (includes Excedrin and Fiorinal)? No     If more than 325mg/day, OK to schedule; Instruct pt to decrease to less than 325 mg for 7 days AND route to RN pool    For CERVICAL procedures, hold all aspirin products for 6 days.     Tell pt that if aspirin product is not held for 6 days, the procedure WILL BE cancelled.      Does the patient have a bleeding or clotting disorder? No     If YES, okay to schedule AND route to RN nurse pool    For any patients with platelet count <100, must be forwarded to provider    Any allergies to contrast dye, iodine, shellfish, or numbing and steroid medications? No    If YES, add allergy information to appointment notes AND route to the RN pool     If KEEGAN and Contrast Dye / Iodine Allergy? DO NOT SCHEDULE, route to RN pool    Allergies: Nkda [no known drug allergies]     Is patient diabetic?  Yes  If YES, instruct them to bring their glucometer.    Does patient have an active infection or treated for one within the past week? No     Is patient currently taking any antibiotics?  No     For patients on chronic, preventative, or prophylactic antibiotics, procedures may be scheduled.     For patients on antibiotics for active or recent infection:antibiotic course must have been completed for 4 days    Is patient currently taking any steroid medications? (i.e. Prednisone, Medrol)  No     For patients on steroid medications, course must have been completed for 4 days    Is patient actively being treated for cancer or immunocompromised? No  If YES, do NOT schedule and route to RN pool     Are you able to get on and off an exam table with minimal or no assistance? Yes  If NO, do NOT schedule and route to RN pool    Are you able to roll over and lay on your stomach with minimal or no assistance? Yes  If NO, do NOT schedule and route to RN pool     Has the  patient had a flu shot or any other vaccinations within 7 days before or after the procedure.  No     Have you recently had a COVID vaccine or have plans to get it in the near future? No    If yes, explain that for the vaccine to work best they need to:       wait 1 week before and 1 week after getting Vaccine #1    wait 1 week before and 2 weeks after getting Vaccine #2    If patient has concerns about the timing, send to RN pool     Does patient have an MRI/CT?  YES: 2021  Check Procedure Scheduling Grid to see if required.      Was the MRI done within the last 3 years?  Yes    If yes, where was the MRI done i.e.[facility], [facility], [facility], [facility] etc? [facility]      If no, do not schedule and route to RN pool    If MRI was not done at [facility], [facility] or [facility] do NOT schedule and route to RN pool.      If pt has an imaging disc, the injection MAY be scheduled but pt has to bring disc to appt.     If they show up without the disc the injection cannot be done    Procedure Specific Instructions:      If celiac plexus block, informed patient NPO for 6 hours and that it is okay to take medications with sips of water, especially blood pressure medications  Not Applicable         If this is for a cervical procedure, informed patient that aspirin needs to be held for 6 days.   Not Applicable      If IV needed:    Do not schedule procedures requiring IV placement in the first appointment of the day or first appointment after lunch. Do NOT schedule at 0745, 0815 or 1245.     Instructed pt to arrive 30 minutes early for IV start if required. (Check Procedure Scheduling Grid)  Not Applicable    Reminders:      If you are started on any steroids or antibiotics between now and your appointment, you must contact us because the procedure may need to be cancelled.  Yes      For all procedures except radiofrequency ablations (RFAs) and spinal cord stimulator (SCS) trials, informed patient:    IV sedation is not  provided for this procedure.  If you feel that an oral anti-anxiety medication is needed, you can discuss this further with your referring provider or primary care provider.  The Pain Clinic provider will discuss specifics of what the procedure includes at your appointment.  Most procedures last 10-20 minutes.  We use numbing medications to help with any discomfort during the procedure.  Not Applicable      For patients 85 or older we recommend having an adult stay w/ them for the remainder of the day.       Does the patient have any questions?  NO  Shayla Pardo  [facility]

## 2021-08-23 NOTE — TELEPHONE ENCOUNTER
Noted message below of ok for warfarin hold. Will await scheduling of procedure. Karla Hutton RN

## 2021-08-23 NOTE — TELEPHONE ENCOUNTER
No PA required, okay to schedule.        Per Availity:  This product does not require prior authorizations (PA) for any service.           Jelly DOVER    [facility]

## 2021-08-25 NOTE — TELEPHONE ENCOUNTER
Wife calling again to schedule, per wife pt is in chronic pain.      Safia BACK    [facility]

## 2021-08-26 NOTE — TELEPHONE ENCOUNTER
Called pt.     Injection scheduled for:  9/13/21. This is the next available w/ Dr. Palmer.  Offered 4 days sooner for Dr. Stewart but she declined.  Coumadin hold starts on:  9/8/21  Was pt informed to contact his INR clinic once scheduled: NO  Is lovenox bridging needed?  NO  INR order in?: YES  Lab appointment done?  YES  Injection intake questions reviewed?  YES  Infection/antibiotic information reviewed?  YES  Location confirmed: :Yes [facility]  Is pt arriving early?:Yes 30 minutes at lab  COVID test order in? N/A  Has pt had COVID vaccine?  :Yes > 2 weeks ago.    Routed to the [facility] pool as an fyi    Bianca Galaviz, RN-BSN  [facility]

## 2021-09-02 ENCOUNTER — THERAPY VISIT (OUTPATIENT)
Dept: PHYSICAL THERAPY | Facility: CLINIC | Age: 76
End: 2021-09-02
Payer: MEDICARE

## 2021-09-02 DIAGNOSIS — M51.9 LUMBAR DISC DISORDER: ICD-10-CM

## 2021-09-02 DIAGNOSIS — M79.604 PAIN OF RIGHT LOWER EXTREMITY: ICD-10-CM

## 2021-09-02 PROCEDURE — 97530 THERAPEUTIC ACTIVITIES: CPT | Mod: GP | Performed by: PHYSICAL THERAPIST

## 2021-09-02 PROCEDURE — 97163 PT EVAL HIGH COMPLEX 45 MIN: CPT | Mod: GP | Performed by: PHYSICAL THERAPIST

## 2021-09-02 PROCEDURE — 97110 THERAPEUTIC EXERCISES: CPT | Mod: GP | Performed by: PHYSICAL THERAPIST

## 2021-09-02 NOTE — PROGRESS NOTES
Physical Therapy Initial Evaluation  Subjective:    Patient Health History  Keith Desir being seen for Therapy for degenerate disc.     Problem began: 6/14/2021.   Problem occurred: It just started to hurt when walking   Pain is reported as 8/10 on pain scale.  General health as reported by patient is fair.  Pertinent medical history includes: none.     Medical allergies: none.   Surgeries include:  None.    Current medications:  Cardiac medication, heparin/coumadin and high blood pressure medication.    Current occupation is Retired.                   Physical Therapy Initial Examination/Evaluation  September 2, 2021    Keith Desir  is a 75 year old  male referred to physical therapy by Dr. Deluna, Dawson Ayon MD for treatment of Lumbar disc disease, leg pain.      DOI/onset 6/14/2021  Mechanism of injury the pain just started one day.  No specific injury.  No worsening of sx, but no improvement.    Prior treatment nothing, MD evaluation.   Effect of prior treatment heat creates itching and ice no change.      Chief Complaint:   Standing I can only manage for 10 min then it really hurts.   Pain location: from the foot all of the way into the leg.  Pain starts in the foot and travels up, does not travel down from the back.      Quality: throbbing  Constant/Intermittent: constant, varies intensity  Time of day: morning  Symptoms have stayed the same since onset.    Current pain 8/10.  Pain at best 5/10.  Pain at worst 10/10.    Symptoms aggravated by showering.    Symptoms improved with rest, sitting.     He reports a fall x1 that occurred in May due to his leg pain. Describes the pain as throbbing. Pain is worsened with prolonged standing. He has tried Gabapentin, Flexeril, and IcyHot, but symptoms continue to worsen. No past spine surgeries, injections, or PT. Denies any bladder/bowel incontinence.      Returns for follow up.  Continued right leg pain, worse with standing.  CT obtained which confirms large  right L4-5 paraspinal osteophyte, as well as L5-S1 foraminal stenosis.    Social history:  Pt lives with his wife in a town home.  3 stories.  I shower in the basement  3 sons ([place], [place], [place]), 1 step-daughter, 3 granddaughters  Retired/disabled, does not drive, uses walker    Occupation: retired.    Patient having difficulty with ADLs: standing, dishes, showering.    Patient's goals are stand, walk and avoid surgery.    Patient reports general health as fair.  Related medical history reported none- per chart review: diabetes, chronic kidney disease, neuropathy, non fatty liver disease, hyperlipedema, chronic a-fib, congestive heart failure.    Surgical History:  See chart.    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  Venous return (-) blood clots    Medications:  See charg- pain, cholestrol, cardiac.       Outcome measure:   Oswestry  Return to MD:  As needed.      Clinical Impression: Ed presents to [facility] with primary complaint of R LE pain upon upright ambulatory activity.  Suspect pain is secondary to osteophyte R L4-5 creating neural irritation.   Discussed the pathology of spinal stenosis today as well as the rationale for exercise management.  Pt demonstrates tolerance for basic exercise today, including 4 min on the bike.  Pt will benefit from skilled physical therapy to improve pain and overall tolerance.  To further consider use of assistive device next for safety.     Oswestry Score: (P) 62.5 %                 Objective:  Standing: moderate incr in thoracic kyphosis with noted forward head and shoulder    Gait: unsteady, without use of assistive device.  Forward trunk lean.     Sit to stand: x7 reps use of hands on R leg for first rep, able to complete without once cued.  Post 7 reps pain into leg.     System         Lumbar/SI Evaluation  ROM:    AROM Lumbar:   Flexion:            75% knee flexion due to hamstring tightness    Ext:                    To neutral no incr pain    Side Bend:        Left:  10%     Right:  Pain into leg with progressive incr in pain with standing post 10% motion   Rotation:           Left:  25% pain into R foot     Right:  25% pain into R foot   Side Glide:        Left:     Right:           Lumbar Myotomes:    T12-L3 (Hip Flex):  Left: 5    Right: 4-  L2-4 (Quads):  Left:  5    Right:  5  L4 (Ankle DF):  Left:  5    Right:  5  L5 (Great Toe Ext): Left: 5    Right: 4   S1 (Toe Raise):  Left: 5    Right: 5      Lumbar Dermtomes:                S1 Right:  Hypo-light touch  Neural Tension/Mobility:      Left side:Slump  negative.     Right side:   Slump  negative.                                                        General     ROS    Assessment/Plan:    Patient is a 75 year old male with diabetes, chronic kidney disease, neuropathy, non fatty liver disease, hyperlipedema, chronic a-fib, congestive heart failure.     complaints.    Patient has the following significant findings with corresponding treatment plan.                Diagnosis 1:  LBP, R lumbar radiculopathy/pain    Pain -  hot/cold therapy, US, manual therapy, self management, education and home program  Decreased ROM/flexibility - manual therapy and therapeutic exercise  Decreased strength - therapeutic exercise and therapeutic activities  Impaired balance - neuro re-education and therapeutic activities  Impaired gait - gait training  Impaired muscle performance - neuro re-education  Decreased function - therapeutic activities  Impaired posture - neuro re-education      Therapy Evaluation Codes:   1) History comprised of:   Personal factors that impact the plan of care:      Age.    Comorbidity factors that impact the plan of care are:      diabetes, chronic kidney disease, neuropathy, non fatty liver disease, hyperlipedema, chronic a-fib, congestive heart failure.  .     Medications impacting care: Cardiac, High blood pressure and  Pain.  2) Examination of Body Systems comprised of:   Body structures and functions that impact the plan of care:      Lumbar spine and Pelvis.   Activity limitations that impact the plan of care are:      Lifting, Squatting/kneeling, Stairs and Standing.  3) Clinical presentation characteristics are:   Stable/Uncomplicated.  4) Decision-Making    High complexity using standardized patient assessment instrument and/or measureable assessment of functional outcome.  Cumulative Therapy Evaluation is: High complexity.    Previous and current functional limitations:  (See Goal Flow Sheet for this information)    Short term and Long term goals: (See Goal Flow Sheet for this information)     Communication ability:  Patient appears to be able to clearly communicate and understand verbal and written communication and follow directions correctly.  Treatment Explanation - The following has been discussed with the patient:   RX ordered/plan of care  Anticipated outcomes  Possible risks and side effects  This patient would benefit from PT intervention to resume normal activities.   Rehab potential is fair.    Frequency:  1 X week, once daily  Duration:  for 2 months tapering to 2 X a month over 1 months  Discharge Plan:  Achieve all LTG.  Independent in home treatment program.  Reach maximal therapeutic benefit.    Please refer to the daily flowsheet for treatment today, total treatment time and time spent performing 1:1 timed codes.

## 2021-09-02 NOTE — LETTER
DEPARTMENT OF HEALTH AND HUMAN SERVICES  CENTERS FOR MEDICARE & MEDICAID SERVICES    PLAN/UPDATED PLAN OF PROGRESS FOR OUTPATIENT REHABILITATION          PATIENTS NAME:  Keith Desir   : 1945    PROVIDER NUMBER:    [id number]  HICN:  [id number]  PROVIDER NAME: [facility]  MEDICAL RECORD NUMBER: [id number]   START OF CARE DATE:  SOC Date: 21   TYPE:  PT  PRIMARY/TREATMENT DIAGNOSIS: (Pertinent Medical Diagnosis)     Lumbar disc disorder  Pain of right lower extremity  VISITS FROM START OF CARE:  Rxs Used: 1     Physical Therapy Initial Evaluation  Subjective:  Patient Health History  Keith Desir being seen for Therapy for degenerate disc.   Problem began: 2021.   Problem occurred: It just started to hurt when walking   Pain is reported as 8/10 on pain scale.  General health as reported by patient is fair.  Pertinent medical history includes: none.   Medical allergies: none.   Surgeries include:  None.    Current medications:  Cardiac medication, heparin/coumadin and high blood pressure medication.    Current occupation is Retired.                Physical Therapy Initial Examination/Evaluation  2021  Keith Desir  is a 75 year old  male referred to physical therapy by Dr. Deluna, Dawson Ayon MD for treatment of Lumbar disc disease, leg pain.    DOI/onset 2021  Mechanism of injury the pain just started one day.  No specific injury.  No worsening of sx, but no improvement.    Prior treatment nothing, MD evaluation.   Effect of prior treatment heat creates itching and ice no change.    Chief Complaint:   Standing I can only manage for 10 min then it really hurts.   Pain location: from the foot all of the way into the leg.  Pain starts in the foot and travels up, does not travel down from the back.      Quality: throbbing  Constant/Intermittent: constant, varies intensity  Time of day: morning  Symptoms have stayed the same since onset.     Current pain 8/10.  Pain at best 5/10.  Pain at worst 10/10.    Symptoms aggravated by showering.    Symptoms improved with rest, sitting.   He reports a fall x1 that occurred in May due to his leg pain. Describes the pain as  PATIENTS NAME:  Keith Desir   : 1945     throbbing. Pain is worsened with prolonged standing. He has tried Gabapentin, Flexeril, and IcyHot, but symptoms continue to worsen. No past spine surgeries, injections, or PT. Denies any bladder/bowel incontinence.   Returns for follow up.  Continued right leg pain, worse with standing.  CT obtained which confirms large right L4-5 paraspinal osteophyte, as well as L5-S1 foraminal stenosis.  Social history:  Pt lives with his wife in a town home.  3 stories.  I shower in the basement  3 sons ([place], [place], [place]), 1 step-daughter, 3 granddaughters  Retired/disabled, does not drive, uses walker  Occupation: retired.    Patient having difficulty with ADLs: standing, dishes, showering.    Patient's goals are stand, walk and avoid surgery.  Patient reports general health as fair.  Related medical history reported none- per chart review: diabetes, chronic kidney disease, neuropathy, non fatty liver disease, hyperlipedema, chronic a-fib, congestive heart failure.    Surgical History:  See chart.    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  Venous return (-) blood clots    Medications:  See charg- pain, cholestrol, cardiac.    Outcome measure:   Oswestry  Return to MD:  As needed.    Clinical Impression: Ed presents to [facility] with primary complaint of R LE pain upon upright ambulatory activity.  Suspect pain is secondary to osteophyte R L4-5 creating neural irritation.   Discussed the pathology of spinal stenosis today as well as the rationale for exercise management.  Pt demonstrates tolerance for basic exercise today, including 4 min on the bike.  Pt will benefit from  skilled physical therapy to improve pain and overall tolerance.  To further consider use of assistive device next for safety.   Oswestry Score: (P) 62.5 %                 Objective:  Standing: moderate incr in thoracic kyphosis with noted forward head and shoulder  Gait: unsteady, without use of assistive device.  Forward trunk lean.   Sit to stand: x7 reps use of hands on R leg for first rep, able to complete without once cued.  Post 7 reps pain into leg.   Lumbar/SI Evaluation  ROM:    AROM Lumbar:   Flexion:            75% knee flexion due to hamstring tightness   Ext:                    To neutral no incr pain    Side Bend:        Left:  10%     Right:  Pain into leg with progressive incr in pain with standing post 10% motion   Rotation:           Left:  25% pain into R foot     Right:  25% pain into R foot   Side Glide:        Left:     Right:   Lumbar Myotomes:    T12-L3 (Hip Flex):  Left: 5    Right: 4-  L2-4 (Quads):  Left:  5    Right:  5  L4 (Ankle DF):  Left:  5    Right:  5  L5 (Great Toe Ext): Left: 5    Right: 4   S1 (Toe Raise):  Left: 5    Right: 5  PATIENTS NAME:  Keith Desir   : 1945    Lumbar Dermtomes:    S1 Right:  Hypo-light touch  Neural Tension/Mobility:    Left side:Slump  negative.   Right side:   Slump  negative.     Assessment/Plan:    Patient is a 75 year old male with diabetes, chronic kidney disease, neuropathy, non fatty liver disease, hyperlipedema, chronic a-fib, congestive heart failure.     complaints.    Patient has the following significant findings with corresponding treatment plan.                Diagnosis 1:  LBP, R lumbar radiculopathy/pain    Pain -  hot/cold therapy, US, manual therapy, self management, education and home program  Decreased ROM/flexibility - manual therapy and therapeutic exercise  Decreased strength - therapeutic exercise and therapeutic activities  Impaired balance - neuro re-education and therapeutic activities  Impaired gait - gait  training  Impaired muscle performance - neuro re-education  Decreased function - therapeutic activities  Impaired posture - neuro re-education  Therapy Evaluation Codes:   1) History comprised of:   Personal factors that impact the plan of care:      Age.    Comorbidity factors that impact the plan of care are:      diabetes, chronic kidney disease, neuropathy, non fatty liver disease,  hyperlipedema, chronic a-fib, congestive heart failure.  .     Medications impacting care: Cardiac, High blood pressure and Pain.  2) Examination of Body Systems comprised of:   Body structures and functions that impact the plan of care:      Lumbar spine and Pelvis.   Activity limitations that impact the plan of care are:      Lifting, Squatting/kneeling, Stairs and Standing.  3) Clinical presentation characteristics are:   Stable/Uncomplicated.  4) Decision-Making    High complexity using standardized patient assessment instrument and/or measureable assessment of functional outcome.  Cumulative Therapy Evaluation is: High complexity.  Previous and current functional limitations:  (See Goal Flow Sheet for this information)    Short term and Long term goals: (See Goal Flow Sheet for this information)   Communication ability:  Patient appears to be able to clearly communicate and understand verbal and written communication and follow directions correctly.  Treatment Explanation - The following has been discussed with the patient:   RX ordered/plan of care  Anticipated outcomes  Possible risks and side effects  This patient would benefit from PT intervention to resume normal activities.   Rehab potential is fair.  Frequency:  1 X week, once daily  PATIENTS NAME:  Keith Desir   : 1945    Duration:  for 2 months tapering to 2 X a month over 1 months  Discharge Plan:  Achieve all LTG.  Independent in home treatment program.  Reach maximal therapeutic benefit.          Caregiver Signature/Credentials  "_____________________________ Date __________        Claire Mcduffie DPT    I have reviewed and certified the need for these services and plan of treatment while under my care.      PHYSICIAN'S SIGNATURE:   _____________________________________  Date___________     Dawson Deluna MD    Certification period:  Beginning of Cert date period: 09/02/21 to  End of Cert period date: 11/30/21   Functional Level Progress Report: Please see attached \"Goal Flow sheet for Functional level.\"  ____X____ Continue Services or       ________ DC Services                Service dates: From  SOC Date: 09/02/21 date to present                         "

## 2021-09-07 ENCOUNTER — OFFICE VISIT (OUTPATIENT)
Dept: FAMILY MEDICINE | Facility: CLINIC | Age: 76
End: 2021-09-07
Payer: MEDICARE

## 2021-09-07 VITALS
WEIGHT: 211 LBS | BODY MASS INDEX: 31.98 KG/M2 | HEIGHT: 68 IN | TEMPERATURE: 97.5 F | HEART RATE: 81 BPM | SYSTOLIC BLOOD PRESSURE: 104 MMHG | OXYGEN SATURATION: 98 % | DIASTOLIC BLOOD PRESSURE: 70 MMHG

## 2021-09-07 DIAGNOSIS — N18.2 TYPE 2 DIABETES MELLITUS WITH STAGE 2 CHRONIC KIDNEY DISEASE, WITHOUT LONG-TERM CURRENT USE OF INSULIN (H): Primary | ICD-10-CM

## 2021-09-07 DIAGNOSIS — E78.5 HYPERLIPIDEMIA LDL GOAL <70: ICD-10-CM

## 2021-09-07 DIAGNOSIS — M51.9 LUMBAR DISC DISORDER: ICD-10-CM

## 2021-09-07 DIAGNOSIS — I12.9 RENAL HYPERTENSION: ICD-10-CM

## 2021-09-07 DIAGNOSIS — E11.42 DIABETIC POLYNEUROPATHY ASSOCIATED WITH TYPE 2 DIABETES MELLITUS (H): ICD-10-CM

## 2021-09-07 DIAGNOSIS — I48.20 CHRONIC ATRIAL FIBRILLATION (H): ICD-10-CM

## 2021-09-07 DIAGNOSIS — E11.22 TYPE 2 DIABETES MELLITUS WITH STAGE 2 CHRONIC KIDNEY DISEASE, WITHOUT LONG-TERM CURRENT USE OF INSULIN (H): Primary | ICD-10-CM

## 2021-09-07 DIAGNOSIS — I50.9 CHF (NYHA CLASS II, ACC/AHA STAGE C) (H): ICD-10-CM

## 2021-09-07 PROBLEM — M79.604 PAIN OF RIGHT LOWER EXTREMITY: Status: RESOLVED | Noted: 2021-09-02 | Resolved: 2021-09-07

## 2021-09-07 LAB
CREAT UR-MCNC: 28 MG/DL
HBA1C MFR BLD: 7 % (ref 0–5.6)
MICROALBUMIN UR-MCNC: 6 MG/L
MICROALBUMIN/CREAT UR: 21.43 MG/G CR (ref 0–17)

## 2021-09-07 PROCEDURE — 82043 UR ALBUMIN QUANTITATIVE: CPT | Performed by: FAMILY MEDICINE

## 2021-09-07 PROCEDURE — 36415 COLL VENOUS BLD VENIPUNCTURE: CPT | Performed by: FAMILY MEDICINE

## 2021-09-07 PROCEDURE — 99214 OFFICE O/P EST MOD 30 MIN: CPT | Performed by: FAMILY MEDICINE

## 2021-09-07 PROCEDURE — 83036 HEMOGLOBIN GLYCOSYLATED A1C: CPT | Performed by: FAMILY MEDICINE

## 2021-09-07 RX ORDER — DIGOXIN 125 MCG
125 TABLET ORAL DAILY
Qty: 90 TABLET | Refills: 3 | Status: SHIPPED | OUTPATIENT
Start: 2021-09-07 | End: 2022-09-07

## 2021-09-07 RX ORDER — LISINOPRIL 40 MG/1
40 TABLET ORAL DAILY
Qty: 90 TABLET | Refills: 3 | Status: SHIPPED | OUTPATIENT
Start: 2021-09-07 | End: 2024-03-26

## 2021-09-07 RX ORDER — FUROSEMIDE 20 MG
TABLET ORAL
Qty: 90 TABLET | Refills: 1 | Status: SHIPPED | OUTPATIENT
Start: 2021-09-07 | End: 2022-03-09

## 2021-09-07 RX ORDER — ATORVASTATIN CALCIUM 20 MG/1
20 TABLET, FILM COATED ORAL DAILY
Qty: 90 TABLET | Refills: 3 | Status: SHIPPED | OUTPATIENT
Start: 2021-09-07 | End: 2023-03-07

## 2021-09-07 RX ORDER — GLIPIZIDE 5 MG/1
TABLET ORAL
Qty: 180 TABLET | Refills: 1 | Status: SHIPPED | OUTPATIENT
Start: 2021-09-07 | End: 2022-02-22

## 2021-09-07 RX ORDER — METOPROLOL TARTRATE 100 MG
TABLET ORAL
Qty: 360 TABLET | Refills: 3 | Status: SHIPPED | OUTPATIENT
Start: 2021-09-07 | End: 2022-09-07

## 2021-09-07 ASSESSMENT — MIFFLIN-ST. JEOR: SCORE: 1676.59

## 2021-09-07 NOTE — PATIENT INSTRUCTIONS
Get the shingles vaccine, called Shingrix (given as 2 shots, 2 to 6 months apart), even if you have already had the Zostavax vaccine. Discuss getting the Shingix vaccine from your pharmacist, or schedule an ancillary shot visit here. Some insurances do not cover the cost of these vaccines.      Discuss getting a whooping cough (Tdap) vaccine from your pharmacist, or schedule an ancillary shot visit here.  Some insurances do not cover the cost of these vaccines.

## 2021-09-07 NOTE — PROGRESS NOTES
Assessment & Plan     Type 2 diabetes mellitus with stage 2 chronic kidney disease, without long-term current use of insulin (H)  Diabetic polyneuropathy associated with type 2 diabetes mellitus (H)  -Well controlled with medications without side effects.   - HEMOGLOBIN A1C; Future  - BASIC METABOLIC PANEL; Future  - OPTOMETRY REFERRAL; Future  - empagliflozin (JARDIANCE) 10 MG TABS tablet; Take 1 tablet (10 mg) by mouth daily  - glipiZIDE (GLUCOTROL) 5 MG tablet; TAKE 1 TABLET BY MOUTH TWICE DAILY BEFORE MEAL(S)  - metFORMIN (GLUCOPHAGE) 1000 MG tablet; Take 1 tablet (1,000 mg) by mouth 2 times daily (with meals)  - HEMOGLOBIN A1C    Chronic atrial fibrillation (H)  -rate controlled and anticoagulated   - BASIC METABOLIC PANEL; Future  - Digoxin level; Future    CHF (NYHA class II, ACC/AHA stage C) (H)  -euvolemic, on beta blocker and ACE/ARB   - BASIC METABOLIC PANEL; Future  - Digoxin level; Future  - digoxin (LANOXIN) 125 MCG tablet; Take 1 tablet (125 mcg) by mouth daily  - furosemide (LASIX) 20 MG tablet; Take 1 tablet (20 mg) by mouth 1 times daily  - lisinopril (ZESTRIL) 40 MG tablet; Take 1 tablet (40 mg) by mouth daily  - metoprolol tartrate (LOPRESSOR) 100 MG tablet; Take 2 tablets by mouth twice daily    Renal hypertension  -Well controlled with medications without side effects.   - Albumin Random Urine Quantitative with Creat Ratio; Future  - BASIC METABOLIC PANEL; Future  - Albumin Random Urine Quantitative with Creat Ratio    Hyperlipidemia LDL goal <70  -Well controlled with medications without side effects.   - atorvastatin (LIPITOR) 20 MG tablet; Take 1 tablet (20 mg) by mouth daily    Lumbar disc disorder  -reviewed neurosurgery consult with patient  recommended physical therapy, injection as scheduled and follow-up with neurosurgery  -continue gabapentin and tylenol as needed       Review of external notes as documented elsewhere in note  Ordering of each unique test  Prescription drug  "management         BMI:   Estimated body mass index is 32.08 kg/m  as calculated from the following:    Height as of this encounter: 1.727 m (5' 8\").    Weight as of this encounter: 95.7 kg (211 lb).   Weight management plan: Discussed healthy diet and exercise guidelines    See Patient Instructions    Return in about 6 months (around 3/7/2022) for Wellness visit, diabetes.    Cayla Gregorio MD  [facility]   Ed is a 75 year old who presents for the following health issues  accompanied by his spouse:    HPI     Diabetes Follow-up      How often are you checking your blood sugar? Not at all    What concerns do you have today about your diabetes? None     Do you have any of these symptoms? (Select all that apply)  Numbness in feet and Redness, sores, or blisters on feet    Have you had a diabetic eye exam in the last 12 months? No                Hyperlipidemia Follow-Up      Are you regularly taking any medication or supplement to lower your cholesterol?   Yes- Atrovastatin    Are you having muscle aches or other side effects that you think could be caused by your cholesterol lowering medication?  No    Hypertension Follow-up      Do you check your blood pressure regularly outside of the clinic? No     Are you following a low salt diet? Yes    Are your blood pressures ever more than 140 on the top number (systolic) OR more   than 90 on the bottom number (diastolic), for example 140/90?     BP Readings from Last 2 Encounters:   09/07/21 104/70   08/19/21 (!) 136/92     Hemoglobin A1C (%)   Date Value   09/07/2021 7.0 (H)   03/02/2021 6.7 (H)   11/17/2020 6.1 (H)     LDL Cholesterol Calculated (mg/dL)   Date Value   03/02/2021 30   02/26/2020 40         How many servings of fruits and vegetables do you eat daily?  0-1    On average, how many sweetened beverages do you drink each day (Examples: soda, juice, sweet tea, etc.  Do NOT count diet or artificially sweetened beverages)?   " "0    How many days per week do you exercise enough to make your heart beat faster?     How many minutes a day do you exercise enough to make your heart beat faster?     How many days per week do you miss taking your medication? 0        Review of Systems   CONSTITUTIONAL: NEGATIVE for fever, chills, change in weight  INTEGUMENTARY/SKIN: NEGATIVE for worrisome rashes, moles or lesions  EYES: NEGATIVE for vision changes or irritation  ENT/MOUTH: NEGATIVE for ear, mouth and throat problems  RESP:Hx COPD  CV: lower extremity edema  MUSCULOSKELETAL:radicular pain    NEURO: dysesthesias  ENDOCRINE: Hx diabetes      Objective    /70   Pulse 81   Temp 97.5  F (36.4  C) (Oral)   Ht 1.727 m (5' 8\")   Wt 95.7 kg (211 lb)   SpO2 98%   BMI 32.08 kg/m    Body mass index is 32.08 kg/m .  Physical Exam   GENERAL: alert, no distress and obese  EYES: Eyes grossly normal to inspection, PERRL and conjunctivae and sclerae normal  NECK: no adenopathy, no asymmetry, masses, or scars and thyroid normal to palpation  RESP: decreased breath sounds throughout  CV: irregularly irregular rhythm, normal S1 S2, no S3 or S4, no murmur, click or rub and trace bipedal edema   MS: extremities normal- no gross deformities noted  SKIN: varicosities - lower legs  PSYCH: mentation appears normal, affect normal/bright  Diabetic foot exam: no trophic changes or ulcerative lesions and reduced sensation at all monofilament points bilaterally     Results for orders placed or performed in visit on 09/07/21 (from the past 24 hour(s))   HEMOGLOBIN A1C   Result Value Ref Range    Hemoglobin A1C 7.0 (H) 0.0 - 5.6 %   Extra Tube    Narrative    The following orders were created for panel order Extra Tube.  Procedure                               Abnormality         Status                     ---------                               -----------         ------                     Extra Serum Separator Tu...[[id number]]                                        "          Extra Green Top (Lithium...[[id number]]                                                   Please view results for these tests on the individual orders.

## 2021-09-08 ENCOUNTER — THERAPY VISIT (OUTPATIENT)
Dept: PHYSICAL THERAPY | Facility: CLINIC | Age: 76
End: 2021-09-08
Payer: MEDICARE

## 2021-09-08 DIAGNOSIS — M51.9 LUMBAR DISC DISORDER: Primary | ICD-10-CM

## 2021-09-08 DIAGNOSIS — M79.604 PAIN OF RIGHT LOWER EXTREMITY: ICD-10-CM

## 2021-09-08 PROCEDURE — 97140 MANUAL THERAPY 1/> REGIONS: CPT | Mod: GP | Performed by: PHYSICAL THERAPIST

## 2021-09-08 PROCEDURE — 97110 THERAPEUTIC EXERCISES: CPT | Mod: GP | Performed by: PHYSICAL THERAPIST

## 2021-09-09 ENCOUNTER — TRANSFERRED RECORDS (OUTPATIENT)
Dept: HEALTH INFORMATION MANAGEMENT | Facility: CLINIC | Age: 76
End: 2021-09-09

## 2021-09-09 ENCOUNTER — DOCUMENTATION ONLY (OUTPATIENT)
Dept: FAMILY MEDICINE | Facility: CLINIC | Age: 76
End: 2021-09-09

## 2021-09-09 NOTE — PROGRESS NOTES
Melissa called requesting a callback, left message on home care VM at 1013am.  Please return call at [phone].    Lara Schaefer RN

## 2021-09-09 NOTE — PROGRESS NOTES
Return call to Melissa-no answer. Left VM to call back [facility] at [phone].    WILLY SANZ RN-BC, [facility]  Anticoagulation Clinic  [phone]

## 2021-09-13 ENCOUNTER — RADIOLOGY INJECTION OFFICE VISIT (OUTPATIENT)
Dept: PALLIATIVE MEDICINE | Facility: CLINIC | Age: 76
End: 2021-09-13
Payer: MEDICARE

## 2021-09-13 ENCOUNTER — TELEPHONE (OUTPATIENT)
Dept: FAMILY MEDICINE | Facility: CLINIC | Age: 76
End: 2021-09-13

## 2021-09-13 ENCOUNTER — LAB (OUTPATIENT)
Dept: LAB | Facility: CLINIC | Age: 76
End: 2021-09-13
Payer: MEDICARE

## 2021-09-13 VITALS
HEART RATE: 90 BPM | SYSTOLIC BLOOD PRESSURE: 131 MMHG | RESPIRATION RATE: 16 BRPM | OXYGEN SATURATION: 98 % | DIASTOLIC BLOOD PRESSURE: 86 MMHG

## 2021-09-13 DIAGNOSIS — M79.604 PAIN OF RIGHT LOWER EXTREMITY: ICD-10-CM

## 2021-09-13 DIAGNOSIS — I48.20 CHRONIC ATRIAL FIBRILLATION (H): ICD-10-CM

## 2021-09-13 DIAGNOSIS — M51.9 LUMBAR DISC DISORDER: ICD-10-CM

## 2021-09-13 DIAGNOSIS — M54.16 LUMBAR RADICULOPATHY: Primary | ICD-10-CM

## 2021-09-13 LAB — INR BLD: 1.1 (ref 0.9–1.1)

## 2021-09-13 PROCEDURE — 99207 PR DROP WITH A PROCEDURE: CPT | Performed by: PSYCHIATRY & NEUROLOGY

## 2021-09-13 PROCEDURE — 64483 NJX AA&/STRD TFRM EPI L/S 1: CPT | Mod: RT | Performed by: PSYCHIATRY & NEUROLOGY

## 2021-09-13 PROCEDURE — 36416 COLLJ CAPILLARY BLOOD SPEC: CPT

## 2021-09-13 PROCEDURE — 85610 PROTHROMBIN TIME: CPT

## 2021-09-13 RX ORDER — TRIAMCINOLONE ACETONIDE 40 MG/ML
40 INJECTION, SUSPENSION INTRA-ARTICULAR; INTRAMUSCULAR ONCE
Status: DISCONTINUED | OUTPATIENT
Start: 2021-09-13 | End: 2021-09-13 | Stop reason: CLARIF

## 2021-09-13 RX ORDER — DEXAMETHASONE SODIUM PHOSPHATE 10 MG/ML
10 INJECTION INTRAMUSCULAR; INTRAVENOUS ONCE
Status: COMPLETED | OUTPATIENT
Start: 2021-09-13 | End: 2021-09-13

## 2021-09-13 RX ADMIN — DEXAMETHASONE SODIUM PHOSPHATE 10 MG: 10 INJECTION INTRAMUSCULAR; INTRAVENOUS at 09:20

## 2021-09-13 ASSESSMENT — PAIN SCALES - GENERAL: PAINLEVEL: EXTREME PAIN (9)

## 2021-09-13 NOTE — PROGRESS NOTES
Pre procedure Diagnosis: lumbar radiculopathy   Post procedure Diagnosis: Same  Procedure performed: right L5 transforaminal epidural steroid injection   Anesthesia: none  Complications: none  Operators: Hermelinda Palmer MD     Indications:   Keith Desir is a 75 year old male was sent by Perla Khoury NP for epidural steroid injection.  They have a history of low back and right lateral leg pain to the foot.  Exam shows pain with flexion, extension, slow sit to stand, antalgic gait and they have tried conservative treatment including medication.    MRI was done on 7/21/21 which showed   1. Large right-sided T1 and T2 relatively hypointense well-marginated  lesion projecting laterally to the L4-L5 disc deforming the right  psoas muscle. This is probably a large disc osteophyte complex or  syndesmophyte. Less likely, a bony lesion such as osteochondroma or  other bony lesion could give a somewhat similar appearance. If  clinically indicated, plain radiograph or CT might be helpful in  better determining the matrix of this lesion.  2. Multilevel degenerative disc and facet disease most advanced at  L5-S1 where there is mild-to-moderate central canal stenosis, moderate  bilateral neural foraminal stenosis and some mild-to-moderate  bilateral subarticular lateral recess stenosis.  3. L4-L5 degenerative disc and facet disease with mild central and  mild to moderate bilateral neural foraminal stenosis.       CT lumbar 7/23/21  1. No acute fracture or subluxation in the lumbar spine.  2. Previously questioned abnormality from the right aspect of the  L4-L5 vertebral bodies likely represents a degenerative fused  osteophyte. Multiple additional fused osteophytes throughout the  lumbar spine as detailed above. No destructive bony lesions  appreciated.  3. Degenerative changes in the lumbar spine, most pronounced at L5-S1.  These are similar to previous MR 7/21/2021.      Options/alternatives, benefits and risks were  discussed with the patient including bleeding, infection, tissue trauma, numbness, weakness, paralysis, spinal cord injury, radiation exposure, headache and reaction to medications. Questions were answered to his satisfaction and he agrees to proceed. Voluntary informed consent was obtained and signed.     Vitals were reviewed: Yes  Allergies were reviewed:  Yes   Medications were reviewed:  Yes   Pre-procedure pain score: 9/10  INR 1.1    Procedure:  After getting informed consent, patient was brought into the procedure suite and was placed in a prone position on the procedure table.   A Pause for the Cause wasr performed.  Patient was prepped and draped in sterile fashion.     After identifying the right L5 neuroforamen, the C-arm was rotated to a right lateral oblique angle.  A total of 4ml of Lidocaine 1% was used to anesthetize the skin and the needle track at a skin entry site coaxial with the fluoroscopy beam, and overriding the superior aspect of the neuroforamen.  A 22 gauge 5 inch spinal needle was advanced under intermittent fluoroscopy until it entered the foramen superiorly.    The position was then inspected from anteroposterior and lateral views, and the needle adjusted appropriately.  A total of 3ml of Omnipaque-300 was injected, confirming appropriate position, with spread into the nerve root sheath and the epidural space, with no intravascular uptake. 7ml was wasted    1ml of 0.5% bupivacaine with 10mg of dexamethasone was injected.  The needle was flushed with lidocaine and removed.    During the procedure, there was not a paresthesia.   Hemostasis was achieved, the area was cleaned, and bandaids were placed when appropriate.  The patient tolerated the procedure well, and was taken to the recovery room.    Images were saved to PACS.    Post-procedure pain score: 5/10  Follow-up includes:   -f/u with referring provider  Restart coumadin tonight.    Hermelinda Palmer MD  [facility]

## 2021-09-13 NOTE — TELEPHONE ENCOUNTER
Reason for Call:  Other call back    Detailed comments: Patient injection today 9/13 after INR lad. Please call to advise on dosing after 5 day hold.    Phone Number Patient can be reached at: Home number on file [phone] (home)    Best Time: Today 9//13 as soon as possible    Can we leave a detailed message on this number? YES    Call taken on 9/13/2021 at 1:02 PM by Melissa Jarrell

## 2021-09-13 NOTE — TELEPHONE ENCOUNTER
Spoke to his wife he had 5 day hold of warfarin no bridging steroid injection today . He will do 10 mg tonight and 7.5 mg tomorrow and regular dosing after that. Apt for inr check next Tuesday.  Jose Daniel May Rn  [facility]

## 2021-09-13 NOTE — NURSING NOTE
Pre-procedure Intake    Have you been fasting? NA    If yes, for how long?     Are you taking a prescribed blood thinner such as coumadin, Plavix, Xarelto?    Yes -   Coumadin    If yes, when did you take your last dose? 9/8/2021    Do you take aspirin?  No    If cervical procedure, have you held aspirin for 6 days?   No     Do you have any allergies to contrast dye, iodine, steroid and/or numbing medications?  NO    Are you currently taking antibiotics or have an active infection?  NO    Have you had a fever/elevated temperature within the past week? NO    Are you currently taking oral steroids? NO    Do you have a ? Yes       Are you pregnant or breastfeeding?  Not Applicable    Have you received the COVID-19 vaccine? Yes       If yes, was it your 1st, 2nd or only dose needed? 2nd dose     Date of most recent vaccine: 3/25/2021    Notify provider and RNs if systolic BP >170, diastolic BP >100, P >100 or O2 sats < 90%    Avel Kim MA

## 2021-09-13 NOTE — NURSING NOTE
Discharge Information    IV Discontiued Time:  NA    Amount of Fluid Infused:  NA    Discharge Criteria = When patient returns to baseline or as per MD order    Consciousness:  Pt is fully awake    Circulation:  BP +/- 20% of pre-procedure level    Respiration:  Patient is able to breathe deeply    O2 Sat:  Patient is able to maintain O2 Sat >92% on room air    Activity:  Moves 4 extremities on command    Ambulation:  Patient is able to stand and walk or stand and pivot into wheelchair    Dressing:  Clean/dry or No Dressing    Notes:   Discharge instructions and AVS given to patient    Patient meets criteria for discharge?  YES    Admitted to PCU?  No    Responsible adult present to accompany patient home?  Yes    Signature/Title:    John Landeros RN  RN Care Coordinator  [facility] [place]

## 2021-09-13 NOTE — PATIENT INSTRUCTIONS
[facility]   Procedure Discharge Instructions    Today you saw:  Dr. Melina Palmer    You had an:  Lumbar Epidural steroid injection       Medications used:  Lidocaine   Bupivacaine   Dexamethasone Omnipaque        If you were holding your blood thinning medication, please restart taking it:  Restart your coumadin/warfarin tonight and work closely with your INR clinic for dosing and monitoring    Be cautious when walking. Numbness and/or weakness in the lower extremities may occur for up to 6-8 hours after the procedure due to effect of the local anesthetic    Do not drive for 6 hours. The effect of the local anesthetic could slow your reflexes.     You may resume your regular activities after 24 hours    Avoid strenuous activity for the first 24 hours    You may shower, however avoid swimming, tub baths or hot tubs for 24 hours following your procedure    You may have a mild to moderate increase in pain for several days following the injection.    It may take up to 14 days for the steroid medication to start working although you may feel the effect as early as a few days after the procedure.       You may use ice packs for 10-15 minutes, 3 to 4 times a day at the injection site for comfort    Do not use heat to painful areas for 6 to 8 hours. This will give the local anesthetic time to wear off and prevent you from accidentally burning your skin.     Unless you have been directed to avoid the use of anti-inflammatory medications (NSAIDS), you may use medications such as ibuprofen, Aleve or Tylenol for pain control if needed.     If you have diabetes, check your blood sugar more frequently than usual as your blood sugar may be higher than normal for 10-14 days following a steroid injection. Contact your doctor who manages your diabetes if your blood sugar is higher than usual    Possible side effects of steroids that you may experience include flushing, elevated blood pressure,  increased appetite, mild headaches and restlessness.  All of these symptoms will get better with time.    If you experience any of the following, call the Pain Clinic during work hours (Mon-Friday 8-4:30 pm) at [phone] or the Provider Line after hours at [phone]:  -Fever over 100 degree F  -Swelling, bleeding, redness, drainage, warmth at the injection site  -Progressive weakness or numbness in your legs   -Loss of bowel or bladder function  -Unusual new onset of pain that is not improving

## 2021-09-15 ENCOUNTER — THERAPY VISIT (OUTPATIENT)
Dept: PHYSICAL THERAPY | Facility: CLINIC | Age: 76
End: 2021-09-15
Payer: MEDICARE

## 2021-09-15 DIAGNOSIS — M51.9 LUMBAR DISC DISORDER: Primary | ICD-10-CM

## 2021-09-15 DIAGNOSIS — I50.9 CHF (NYHA CLASS II, ACC/AHA STAGE C) (H): ICD-10-CM

## 2021-09-15 DIAGNOSIS — M79.604 PAIN OF RIGHT LOWER EXTREMITY: ICD-10-CM

## 2021-09-15 PROCEDURE — 97110 THERAPEUTIC EXERCISES: CPT | Mod: GP | Performed by: PHYSICAL THERAPIST

## 2021-09-15 PROCEDURE — 97530 THERAPEUTIC ACTIVITIES: CPT | Mod: GP | Performed by: PHYSICAL THERAPIST

## 2021-09-17 RX ORDER — METOPROLOL TARTRATE 100 MG
TABLET ORAL
Qty: 120 TABLET | Refills: 0 | OUTPATIENT
Start: 2021-09-17

## 2021-09-21 ENCOUNTER — ANTICOAGULATION THERAPY VISIT (OUTPATIENT)
Dept: ANTICOAGULATION | Facility: CLINIC | Age: 76
End: 2021-09-21

## 2021-09-21 ENCOUNTER — LAB (OUTPATIENT)
Dept: LAB | Facility: CLINIC | Age: 76
End: 2021-09-21
Payer: MEDICARE

## 2021-09-21 ENCOUNTER — TELEPHONE (OUTPATIENT)
Dept: FAMILY MEDICINE | Facility: CLINIC | Age: 76
End: 2021-09-21

## 2021-09-21 DIAGNOSIS — Z79.01 LONG TERM CURRENT USE OF ANTICOAGULANT THERAPY: ICD-10-CM

## 2021-09-21 DIAGNOSIS — I48.20 CHRONIC ATRIAL FIBRILLATION (H): Primary | ICD-10-CM

## 2021-09-21 DIAGNOSIS — I48.20 CHRONIC ATRIAL FIBRILLATION (H): ICD-10-CM

## 2021-09-21 DIAGNOSIS — Z79.01 LONG TERM CURRENT USE OF ANTICOAGULANT THERAPY: Primary | ICD-10-CM

## 2021-09-21 LAB — INR BLD: 2.5 (ref 0.9–1.1)

## 2021-09-21 PROCEDURE — 85610 PROTHROMBIN TIME: CPT

## 2021-09-21 PROCEDURE — 36415 COLL VENOUS BLD VENIPUNCTURE: CPT

## 2021-09-21 NOTE — TELEPHONE ENCOUNTER
ANTICOAGULATION MANAGEMENT      Keith Desir due for annual renewal of referral to anticoagulation monitoring. Order pended for your review and signature.      ANTICOAGULATION SUMMARY      Warfarin indication(s)     Atrial fibrillation    Heart valve present?  NO       Current goal range   INR: 2.0-3.0     Goal appropriate for indication? Yes, INR 2-3 appropriate for hx of DVT, PE, hypercoagulable state, Afib, LVAD, or bileaflet AVR without risk factors     Current duration of therapy Indefinite/long term therapy   Time in Therapeutic Range (TTR)  (Goal > 60%) 82.3%       Office visit with referring provider's group within last year yes on 09/07/2021       Dot Ziegler, RN

## 2021-09-21 NOTE — PROGRESS NOTES
ANTICOAGULATION MANAGEMENT     Keith Desir 75 year old male is on warfarin with therapeutic INR result. (Goal INR 2.0-3.0)    Recent labs: (last 7 days)     09/21/21  0938   INR 2.5*       ASSESSMENT     Source(s): Chart Review and Patient/Caregiver Call       Warfarin doses taken: Warfarin taken as instructed    Diet: No new diet changes identified    New illness, injury, or hospitalization: No    Medication/supplement changes: None noted    Signs or symptoms of bleeding or clotting: No    Previous INR: Subtherapeutic    Additional findings: None     PLAN     Recommended plan for no diet, medication or health factor changes affecting INR     Dosing Instructions: Continue your current warfarin dose with next INR in 6 weeks       Summary  As of 9/21/2021    Full warfarin instructions:  5 mg every Tue, Sat; 7.5 mg all other days   Next INR check:               Telephone call with Keith who verbalizes understanding and agrees to plan    Lab visit scheduled, patient refused to come into the clinic sooner than 6 weeks. Stating he has a lot going on and will not come in.    Education provided: Importance of notifying clinic of upcoming surgeries and procedures 2 weeks in advance    Plan made per [facility] anticoagulation protocol    Dot Ziegler RN  Anticoagulation Clinic  9/21/2021    _______________________________________________________________________     Anticoagulation Episode Summary     Current INR goal:  2.0-3.0   TTR:  82.3 % (1 y)   Target end date:  Indefinite   Send INR reminders to:  AJ MCINTOSH    Indications    Long term current use of anticoagulant therapy [Z79.01]  Chronic atrial fibrillation (HCC) [I48.20]           Comments:  don't print AVS unless change in dosing         Anticoagulation Care Providers     Provider Role Specialty Phone number    Cayla Gregorio MD Referring Family Medicine [phone]

## 2021-09-23 ENCOUNTER — TRANSFERRED RECORDS (OUTPATIENT)
Dept: HEALTH INFORMATION MANAGEMENT | Facility: CLINIC | Age: 76
End: 2021-09-23

## 2021-10-24 ENCOUNTER — HEALTH MAINTENANCE LETTER (OUTPATIENT)
Age: 76
End: 2021-10-24

## 2021-10-25 ENCOUNTER — OFFICE VISIT (OUTPATIENT)
Dept: FAMILY MEDICINE | Facility: CLINIC | Age: 76
End: 2021-10-25
Payer: MEDICARE

## 2021-10-25 VITALS
BODY MASS INDEX: 32.66 KG/M2 | RESPIRATION RATE: 18 BRPM | DIASTOLIC BLOOD PRESSURE: 74 MMHG | TEMPERATURE: 97.5 F | WEIGHT: 215.5 LBS | HEIGHT: 68 IN | SYSTOLIC BLOOD PRESSURE: 110 MMHG | OXYGEN SATURATION: 96 % | HEART RATE: 91 BPM

## 2021-10-25 DIAGNOSIS — E78.5 HYPERLIPIDEMIA LDL GOAL <70: ICD-10-CM

## 2021-10-25 DIAGNOSIS — E11.22 TYPE 2 DIABETES MELLITUS WITH STAGE 2 CHRONIC KIDNEY DISEASE, WITHOUT LONG-TERM CURRENT USE OF INSULIN (H): ICD-10-CM

## 2021-10-25 DIAGNOSIS — I48.20 CHRONIC ATRIAL FIBRILLATION (H): ICD-10-CM

## 2021-10-25 DIAGNOSIS — I12.9 RENAL HYPERTENSION: ICD-10-CM

## 2021-10-25 DIAGNOSIS — B37.0 CANDIDIASIS OF MOUTH: ICD-10-CM

## 2021-10-25 DIAGNOSIS — Z72.0 TOBACCO ABUSE: ICD-10-CM

## 2021-10-25 DIAGNOSIS — Z01.818 PREOP GENERAL PHYSICAL EXAM: Primary | ICD-10-CM

## 2021-10-25 DIAGNOSIS — N18.2 TYPE 2 DIABETES MELLITUS WITH STAGE 2 CHRONIC KIDNEY DISEASE, WITHOUT LONG-TERM CURRENT USE OF INSULIN (H): ICD-10-CM

## 2021-10-25 DIAGNOSIS — J43.9 PULMONARY EMPHYSEMA, UNSPECIFIED EMPHYSEMA TYPE (H): ICD-10-CM

## 2021-10-25 DIAGNOSIS — I50.9 CHF (NYHA CLASS II, ACC/AHA STAGE C) (H): ICD-10-CM

## 2021-10-25 DIAGNOSIS — M51.9 LUMBAR DISC DISORDER: ICD-10-CM

## 2021-10-25 LAB
ALBUMIN SERPL-MCNC: 3.7 G/DL (ref 3.4–5)
ALP SERPL-CCNC: 101 U/L (ref 40–150)
ALT SERPL W P-5'-P-CCNC: 21 U/L (ref 0–70)
ANION GAP SERPL CALCULATED.3IONS-SCNC: 3 MMOL/L (ref 3–14)
AST SERPL W P-5'-P-CCNC: 14 U/L (ref 0–45)
BASOPHILS # BLD AUTO: 0 10E3/UL (ref 0–0.2)
BASOPHILS NFR BLD AUTO: 0 %
BILIRUB SERPL-MCNC: 1 MG/DL (ref 0.2–1.3)
BUN SERPL-MCNC: 16 MG/DL (ref 7–30)
CALCIUM SERPL-MCNC: 9.3 MG/DL (ref 8.5–10.1)
CHLORIDE BLD-SCNC: 103 MMOL/L (ref 94–109)
CO2 SERPL-SCNC: 30 MMOL/L (ref 20–32)
CREAT SERPL-MCNC: 0.96 MG/DL (ref 0.66–1.25)
DIGOXIN SERPL-MCNC: 0.8 UG/L
EOSINOPHIL # BLD AUTO: 0.2 10E3/UL (ref 0–0.7)
EOSINOPHIL NFR BLD AUTO: 2 %
ERYTHROCYTE [DISTWIDTH] IN BLOOD BY AUTOMATED COUNT: 14.6 % (ref 10–15)
GFR SERPL CREATININE-BSD FRML MDRD: 78 ML/MIN/1.73M2
GLUCOSE BLD-MCNC: 105 MG/DL (ref 70–99)
HCT VFR BLD AUTO: 51.4 % (ref 40–53)
HGB BLD-MCNC: 17.1 G/DL (ref 13.3–17.7)
LYMPHOCYTES # BLD AUTO: 2.9 10E3/UL (ref 0.8–5.3)
LYMPHOCYTES NFR BLD AUTO: 31 %
MCH RBC QN AUTO: 33.8 PG (ref 26.5–33)
MCHC RBC AUTO-ENTMCNC: 33.3 G/DL (ref 31.5–36.5)
MCV RBC AUTO: 102 FL (ref 78–100)
MONOCYTES # BLD AUTO: 1 10E3/UL (ref 0–1.3)
MONOCYTES NFR BLD AUTO: 11 %
NEUTROPHILS # BLD AUTO: 5.3 10E3/UL (ref 1.6–8.3)
NEUTROPHILS NFR BLD AUTO: 56 %
PLATELET # BLD AUTO: 167 10E3/UL (ref 150–450)
POTASSIUM BLD-SCNC: 4.7 MMOL/L (ref 3.4–5.3)
PROT SERPL-MCNC: 7.2 G/DL (ref 6.8–8.8)
RBC # BLD AUTO: 5.06 10E6/UL (ref 4.4–5.9)
SODIUM SERPL-SCNC: 136 MMOL/L (ref 133–144)
WBC # BLD AUTO: 9.5 10E3/UL (ref 4–11)

## 2021-10-25 PROCEDURE — 85025 COMPLETE CBC W/AUTO DIFF WBC: CPT | Performed by: NURSE PRACTITIONER

## 2021-10-25 PROCEDURE — 90471 IMMUNIZATION ADMIN: CPT | Performed by: NURSE PRACTITIONER

## 2021-10-25 PROCEDURE — 90662 IIV NO PRSV INCREASED AG IM: CPT | Performed by: NURSE PRACTITIONER

## 2021-10-25 PROCEDURE — 93000 ELECTROCARDIOGRAM COMPLETE: CPT | Performed by: NURSE PRACTITIONER

## 2021-10-25 PROCEDURE — 36415 COLL VENOUS BLD VENIPUNCTURE: CPT | Performed by: NURSE PRACTITIONER

## 2021-10-25 PROCEDURE — 80162 ASSAY OF DIGOXIN TOTAL: CPT | Performed by: NURSE PRACTITIONER

## 2021-10-25 PROCEDURE — 80053 COMPREHEN METABOLIC PANEL: CPT | Performed by: NURSE PRACTITIONER

## 2021-10-25 PROCEDURE — 90715 TDAP VACCINE 7 YRS/> IM: CPT | Performed by: NURSE PRACTITIONER

## 2021-10-25 PROCEDURE — 99214 OFFICE O/P EST MOD 30 MIN: CPT | Mod: 25 | Performed by: NURSE PRACTITIONER

## 2021-10-25 PROCEDURE — G0008 ADMIN INFLUENZA VIRUS VAC: HCPCS | Mod: 59 | Performed by: NURSE PRACTITIONER

## 2021-10-25 RX ORDER — NYSTATIN 100000/ML
SUSPENSION, ORAL (FINAL DOSE FORM) ORAL
Qty: 280 ML | Refills: 0 | Status: SHIPPED | OUTPATIENT
Start: 2021-10-25 | End: 2022-01-10

## 2021-10-25 ASSESSMENT — PAIN SCALES - GENERAL: PAINLEVEL: EXTREME PAIN (9)

## 2021-10-25 ASSESSMENT — MIFFLIN-ST. JEOR: SCORE: 1697

## 2021-10-25 NOTE — PROGRESS NOTES
[facility]  [address]  [place] [address]  Phone: [phone]  Primary Provider: Cayla Gregorio  Pre-op Performing Provider: CARON CABALLERO  PREOPERATIVE EVALUATION:  Today's date: 10/25/2021    Keith Desir is a 75 year old male who presents for a preoperative evaluation.    Surgical Information:  Surgery/Procedure: RIGHT LUMBAR  4 - SACRAL 1 DECOMPRESSION  Surgery Location: [facility]   Surgeon: Arian Coffey MD  Surgery Date: 11/11/2021  Time of Surgery: 10AM  Where patient plans to recover: At home with family  Fax number for surgical facility: [phone]    Type of Anesthesia Anticipated: to be determined    Assessment & Plan     The proposed surgical procedure is considered INTERMEDIATE risk.    1. Preop general physical exam  - EKG 12-lead complete w/read - Clinics  - Comprehensive metabolic panel (BMP + Alb, Alk Phos, ALT, AST, Total. Bili, TP)  - CBC with platelets and differential  - Digoxin level    2. Lumbar disc disorder    3. Chronic atrial fibrillation (HCC)  Asymptomatic. Rates well controlled on digoxin and BB therapy. On warfarin for stroke prophylaxis without bleeding problems. [id number]= 4 (HF, HTN, DM, age).  - Digoxin level    4. CHF (NYHA class II, ACC/AHA stage C) (H)  Asymptomatic. Euvolemic on exam. Last EF 60%. Well controlled with medications without side effects.    5. Type 2 diabetes mellitus with stage 2 chronic kidney disease, without long-term current use of insulin (H)  Well controlled with medications without side effects.    6. Renal hypertension  Well controlled with medications without side effects.  - Comprehensive metabolic panel (BMP + Alb, Alk Phos, ALT, AST, Total. Bili, TP)    7. Hyperlipidemia LDL goal <70  Well controlled with medications without side effects.    8. Tobacco abuse  Encourage patient to cut back on his smoking prior to surgery and try to avoid smoking the 24 hours prior to  surgery. Offered nicotine patches, patient declines at this time.     9. Pulmonary emphysema, unspecified emphysema type (H)  Well controlled with medications without side effects.    10. Candidiasis of mouth  Found incidentally on exam, will treat to optimize health for surgery.   - nystatin (MYCOSTATIN) 442031 UNIT/ML suspension; Swish and swallow 5 mL inside of the mouth four times a day for 7 to 14 days. Hold in mouth for as long as possible (several minutes) before swallowing.  Dispense: 280 mL; Refill: 0      Risks and Recommendations:  The patient has the following additional risks and recommendations for perioperative complications:   - No identified additional risk factors other than previously addressed    Medication Instructions:   - warfarin: Thromboembolic risk is low (<4%/year). HOLD warfarin for 5 days without bridging before procedure.    - ACE/ARB: May be continued on the day of surgery.    - Beta Blockers: Continue taking on the day of surgery.   - Diuretics: May continue due to heart failure.   - Statins: Continue taking on the day of surgery.    - metformin: HOLD day of surgery.   - sulfonylurea (e.g. glyburide, glipizide): HOLD day of surgery   - SGLT2 Inhibitor (canagliflozin, dapagliflozin, or empagliflozin): HOLD 3 days before surgery.    - Opioids: Continue without modification.   - pregabalin, gabapentin: Continue without modification.   - LABA, inhaled corticosteroid, long-acting anticholinergics: Continue without modification.   - rescue Inhaler: Continue PRN. Bring to hospital on the day of surgery.    RECOMMENDATION:  APPROVAL GIVEN to proceed with proposed procedure pending review of diagnostic evaluation.    42 minutes spent on the date of the encounter doing chart review, history and exam, documentation and further activities per the note       Subjective     HPI related to upcoming procedure: Patient with lumber disc disease and severe right lower extremity pain that limits his  ability to be active.    Preop Questions 10/22/2021   1. Have you ever had a heart attack or stroke? No   2. Have you ever had surgery on your heart or blood vessels, such as a stent placement, a coronary artery bypass, or surgery on an artery in your head, neck, heart, or legs? No   3. Do you have chest pain with activity? No   4. Do you have a history of  heart failure? YES, last EF 60%    5. Do you currently have a cold, bronchitis or symptoms of other infection? No   6. Do you have a cough, shortness of breath, or wheezing? No   7. Do you or anyone in your family have previous history of blood clots? No   8. Do you or does anyone in your family have a serious bleeding problem such as prolonged bleeding following surgeries or cuts? UNKNOWN    9. Have you ever had problems with anemia or been told to take iron pills? No   10. Have you had any abnormal blood loss such as black, tarry or bloody stools? No   11. Have you ever had a blood transfusion? No   12. Are you willing to have a blood transfusion if it is medically needed before, during, or after your surgery? Yes   13. Have you or any of your relatives ever had problems with anesthesia? No   14. Do you have sleep apnea, excessive snoring or daytime drowsiness? No   15. Do you have any artifical heart valves or other implanted medical devices like a pacemaker, defibrillator, or continuous glucose monitor? No   16. Do you have artificial joints? No   17. Are you allergic to latex? No       Health Care Directive:  Patient does not have a Health Care Directive or Living Will: Discussed advance care planning with patient; however, patient declined at this time.    Preoperative Review of :   reviewed - controlled substances reflected in medication list.   PDMP Review       Value Time User    State PDMP site checked  Yes 10/25/2021  9:22 AM Geeta Ledezma APRN CNP        Status of Chronic Conditions:  A-FIB - Patient has a longstanding history of  chronic A-fib currently on rate control. Current treatment regimen includes Warfarin for stroke prevention and denies significant symptoms of lightheadedness, palpitations or dyspnea.     CHF - Patient has a longstanding history of moderate-severe CHF. Exacerbating conditions include atrial fibrilation. Currently the patient's condition is improving. Current treatment regimen includes ACE inhibitor, beta blocker, digoxin and diuretic. The patient denies chest pain, edema, orthopnea, SOB or recent weight gain. Last Echocardiogram 2018 EF 60%, EKG today shows Afib.     DIABETES - Patient has a longstanding history of DiabetesType Type II . Patient is being treated with oral agents and denies significant side effects. Control has been good. Complicating factors include but are not limited to: hypertension, hyperlipidemia and tobacco use.     HYPERLIPIDEMIA - Patient has a long history of significant Hyperlipidemia requiring medication for treatment with recent good control. Patient reports no problems or side effects with the medication.     HYPERTENSION - Patient has longstanding history of HTN , currently denies any symptoms referable to elevated blood pressure. Specifically denies chest pain, palpitations, dyspnea, orthopnea, PND or peripheral edema. Blood pressure readings have been in normal range. Current medication regimen is as listed below. Patient denies any side effects of medication.     RENAL INSUFFICIENCY - Patient has a longstanding history of moderate-severe chronic renal insufficiency. Last Cr 0.9.     Review of Systems  Constitutional, neuro, ENT, endocrine, pulmonary, cardiac, gastrointestinal, genitourinary, musculoskeletal, integument and psychiatric systems are negative, except as otherwise noted.    Patient Active Problem List    Diagnosis Date Noted     CHF (NYHA class II, ACC/AHA stage C) (H) 02/07/2012     Priority: High     dilated cardiomyopathy EF 60%       Renal hypertension 11/19/2010      Priority: High     Hyperlipidemia LDL goal <70      Priority: High             Chronic atrial fibrillation (HCC)      Priority: High     [facility]       Lumbar disc disorder 09/02/2021     Priority: Medium     Type 2 diabetes mellitus with stage 2 chronic kidney disease, without long-term current use of insulin (H) 02/26/2020     Priority: Medium     Diabetic neuropathy (H)      Priority: Medium     Varicose veins of legs 08/08/2018     Priority: Medium     NAFLD (nonalcoholic fatty liver disease)      Priority: Medium     Pulmonary emphysema, unspecified emphysema type (H) 02/15/2017     Priority: Medium     Non morbid obesity due to excess calories 07/20/2016     Priority: Medium     Long term current use of anticoagulant therapy 03/24/2016     Priority: Medium     Elevated alkaline phosphatase level      Priority: Medium     Polycythemia secondary to smoking      Priority: Medium     Tobacco abuse      Priority: Medium     Health Care Home 08/05/2011     Priority: Low     X  EMERGENCY CARE PLAN  Presenting Problem Signs and Symptoms Treatment Plan    Questions or conerns during clinic hours    I will call the clinic directly     Questions or conerns outside clinic hours    I will call the 24 hour nurse line at [phone]    Patient needs to schedule an appointment    I will call the 24 hour scheduling team at [phone] or clinic directly    Same day treatment     I will call the clinic first, nurse line if after hours, urgent care and express care if needed                          DX V65.8 REPLACED WITH [id number] HEALTH CARE HOME (04/08/2013)       Advanced directives, counseling/discussion 07/12/2011     Priority: Low     Advance Directive Problem List Overview:   Name Relationship Phone    Primary Health Care Agent            Alternative Health Care Agent          Discussed advance care planning with patient; information given to patient to review. 7/12/2011            Past Medical History:   Diagnosis Date     AF (atrial fibrillation) (H)      CHF (congestive heart failure), NYHA class II (H)      CKD (chronic kidney disease) stage 2, GFR 60-89 ml/min 2018     COPD (chronic obstructive pulmonary disease) (H)      Diabetes mellitus, type 2 (H) 04/22/2015     Diabetic neuropathy (H)      Elevated alkaline phosphatase level      Elevated alkaline phosphatase level      Fracture, scapula      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/2013     Hepatitis C      HTN (hypertension)      Hyperlipidemia      Morbid obesity (H)      NAFLD (nonalcoholic fatty liver disease)      Polycythemia secondary to smoking      Varicose veins of legs 08/08/2018     Past Surgical History:   Procedure Laterality Date     ANGIOGRAM  8/2006    normal     TONSILLECTOMY & ADENOIDECTOMY       Current Outpatient Medications   Medication Sig Dispense Refill     acetaminophen (TYLENOL) 650 MG CR tablet Take 2 tablets (1,300 mg) by mouth every 8 hours as needed for pain 60 tablet 3     acetaminophen-codeine (TYLENOL #3) 300-30 MG tablet        albuterol (2.5 MG/3ML) 0.083% neb solution Take 1 vial (2.5 mg) by nebulization every 4 hours as needed for shortness of breath / dyspnea       albuterol (PROAIR HFA/PROVENTIL HFA/VENTOLIN HFA) 108 (90 Base) MCG/ACT inhaler Inhale 1-2 puffs into the lungs every 4 hours as needed for shortness of breath / dyspnea 18 g 2     atorvastatin (LIPITOR) 20 MG tablet Take 1 tablet (20 mg) by mouth daily 90 tablet 3     digoxin (LANOXIN) 125 MCG tablet Take 1 tablet (125 mcg) by mouth daily 90 tablet 3     dimenhyDRINATE (DRAMAMINE PO) Take by mouth as needed       empagliflozin (JARDIANCE) 10 MG TABS tablet Take 1 tablet (10 mg) by mouth daily 90 tablet 3     furosemide (LASIX) 20 MG tablet Take 1 tablet (20 mg) by mouth 1 times daily 90 tablet 1     gabapentin (NEURONTIN) 300 MG capsule Take 1 cap by mouth every morning, 1 cap every midday and 2 caps every evening 120 capsule 4     glipiZIDE  "(GLUCOTROL) 5 MG tablet TAKE 1 TABLET BY MOUTH TWICE DAILY BEFORE MEAL(S) 180 tablet 1     lisinopril (ZESTRIL) 40 MG tablet Take 1 tablet (40 mg) by mouth daily 90 tablet 3     metFORMIN (GLUCOPHAGE) 1000 MG tablet Take 1 tablet (1,000 mg) by mouth 2 times daily (with meals) 180 tablet 3     metoprolol tartrate (LOPRESSOR) 100 MG tablet Take 2 tablets by mouth twice daily 360 tablet 3     tiotropium (SPIRIVA HANDIHALER) 18 MCG inhaled capsule INHALE 1 CAPSULE (18 MCG) INTO THE LUNGS DAILY 90 capsule 3     warfarin ANTICOAGULANT (COUMADIN) 2.5 MG tablet TAKE 2 TABLETS (5MG) BY MOUTH ON TUESDAY & SATURDAY. TAKE 3 TABLETS (7.5MG) ON ALL OTHER DAYS OR AS DIRECTED BY INR CLINIC. 240 tablet 1       Allergies   Allergen Reactions     Nkda [No Known Drug Allergies]         Social History     Tobacco Use     Smoking status: Current Every Day Smoker     Packs/day: 1.00     Years: 45.00     Pack years: 45.00     Types: Cigarettes     Smokeless tobacco: Never Used     Tobacco comment: cut down to 0.5 ppd    Substance Use Topics     Alcohol use: Yes     Alcohol/week: 0.0 - 4.2 standard drinks     History   Drug Use No         Objective     /74   Pulse 91   Temp 97.5  F (36.4  C) (Oral)   Resp 18   Ht 1.727 m (5' 8\")   Wt 97.8 kg (215 lb 8 oz)   SpO2 96%   BMI 32.77 kg/m      Physical Exam    GENERAL APPEARANCE: healthy, alert and no distress     EYES: EOMI,  PERRL     HENT: ear canals and TM's normal, nose and mouth without ulcers or lesions and yellowish coating on tongue     NECK: no adenopathy, no asymmetry, masses, or scars and thyroid normal to palpation     RESP: lungs clear to auscultation - no rales, rhonchi or wheezes     CV: no murmur, click or rub, peripheral pulses strong and irregularly irregular rhythm, no peripheral edema     ABDOMEN:  soft, nontender, no HSM or masses and bowel sounds normal     MS: abnormal gait due to pain     SKIN: no suspicious lesions or rashes     NEURO: Normal strength and " tone, sensory exam grossly normal, mentation intact and speech normal     PSYCH: mentation appears normal. and affect normal/bright     LYMPHATICS: No cervical adenopathy    Recent Labs   Lab Test 09/21/21  0938 09/13/21  0832 09/07/21  0839 08/17/21  0947 04/06/21  0931 03/02/21  0710 10/06/20  0922 09/03/20  0707 04/08/20  1300 02/26/20  0700   HGB  --   --   --   --   --  17.7  --   --   --  16.9   PLT  --   --   --   --   --  204  --   --   --  230   INR 2.5* 1.1  --    < >   < >  --    < >  --    < >  --    NA  --   --   --   --   --  137  --  137   < > 140   POTASSIUM  --   --   --   --   --  5.3  --  4.6   < > 5.1   CR  --   --   --   --   --  0.90  --  0.78   < > 0.94   A1C  --   --  7.0*  --   --  6.7*   < >  --    < > 6.9*    < > = values in this interval not displayed.      Diagnostics:  Labs pending at this time.  Results will be reviewed when available.   EKG: atrial fibrillation, rate 87 bpm, normal axis, normal intervals, no acute ST/T changes c/w ischemia, no LVH by voltage criteria, unchanged from previous tracings    Revised Cardiac Risk Index (RCRI):  The patient has the following serious cardiovascular risks for perioperative complications:   - Congestive Heart Failure (pulmonary edema, PND, s3 jenae, CXR with pulmonary congestion, basilar rales) = 1 point   - Diabetes Mellitus (on Insulin) = 1 point     RCRI Interpretation: 2 points: Class III (moderate risk - 6.6% complication rate)     Estimated Functional Capacity: Performs 4 METS exercise without symptoms (e.g., light housework, stairs, 4 mph walk, 7 mph bike, slow step dance)         Signed Electronically by: IDANIA Valdivia CNP  Copy of this evaluation report is provided to requesting physician.

## 2021-10-25 NOTE — PATIENT INSTRUCTIONS
Preparing for Your Surgery  Getting started  A nurse will call you to review your health history and instructions. They will give you an arrival time based on your scheduled surgery time.  Please be ready to share the following:    Your doctor's clinic name and phone number    Your medical, surgical and anesthesia history    A list of allergies and sensitivities    A list of medicines, including herbal treatments and over-the-counter drugs    Whether the patient has a legal guardian (ask how to send us the papers in advance)  If you have a child who's having surgery, please ask for a copy of Preparing for Your Child's Surgery.    Preparing for surgery    Within 30 days of surgery: Have a pre-op exam (sometimes called an H&P, or History and Physical). This can be done at a clinic or pre-operative center.  ? If you're having a , you may not need this exam. Talk to your care team    At your pre-op exam, talk to your care team about all medicines you take. If you need to stop any medicines before surgery, ask when to start taking them again.  ? We do this for your safety. Many medicines can make you bleed too much during surgery. Some change how well surgery (anesthesia) drugs work.    Call your insurance company to let them know you're having surgery. (If you don't have insurance, call [phone].)    Call your clinic if there's any change in your health. This includes signs of a cold or flu (sore throat, runny nose, cough, rash, fever). It also includes a scrape or scratch near the surgery site.    If you have questions on the day of surgery, call your hospital or surgery center.  Eating and drinking guidelines  For your safety: Unless your surgeon tells you otherwise, follow the guidelines below.    Eat and drink as usual until 8 hours before surgery. After that, no food or milk.    Drink clear liquids until 2 hours before surgery. These are liquids you can see through, like water, Gatorade and Propel  Water. You may also have black coffee and tea (no cream or milk).    Nothing by mouth within 2 hours of surgery. This includes gum, candy and breath mints.    If you drink, stop drinking alcohol the night before surgery.    If your care team tells you to take medicine on the morning of surgery, it's okay to take it with a sip of water.  Preventing infection    Shower or bathe the night before and morning of your surgery. Follow the instructions your clinic gave you. (If no instructions, use regular soap.)    Don't shave or clip hair near your surgery site. We'll remove the hair if needed.    Don't smoke or vape the morning of surgery. You may chew nicotine gum up to 2 hours before surgery. A nicotine patch is okay.  ? Note: Some surgeries require you to completely quit smoking and nicotine. Check with your surgeon.    Your care team will make every effort to keep you safe from infection. We will:  ? Clean our hands often with soap and water (or an alcohol-based hand rub).  ? Clean the skin at your surgery site with a special soap that kills germs.  ? Give you a special gown to keep you warm. (Cold raises the risk of infection.)  ? Wear special hair covers, masks, gowns and gloves during surgery.  ? Give antibiotic medicine, if prescribed. Not all surgeries need antibiotics.  What to bring on the day of surgery    Photo ID and insurance card    Copy of your health care directive, if you have one    Glasses and hearing aides (bring cases)  ? You can't wear contacts during surgery    Inhaler and eye drops, if you use them (tell us about these when you arrive)    CPAP machine or breathing device, if you use them    A few personal items, if spending the night    If you have . . .  ? A pacemaker or ICD (cardiac defibrillator): Bring the ID card.  ? An implanted stimulator: Bring the remote control.  ? A legal guardian: Bring a copy of the certified (court-stamped) guardianship papers.  Please remove any jewelry, including  body piercings. Leave jewelry and other valuables at home.  If you're going home the day of surgery  Important: If you don't follow the rules below, we must cancel your surgery.     Arrange for someone to drive you home after surgery. You may not drive, take a taxi or take public transportation by yourself (unless you'll have local anesthesia only).    Arrange for a responsible adult to stay with you overnight. If you don't, we may keep you in the hospital overnight, and you may need to pay the costs yourself.  Questions?   If you have any questions for your care team, list them here: _________________________________________________________________________________________________________________________________________________________________________________________________________________________________________________________________________________________________________________________  For informational purposes only. Not to replace the advice of your health care provider. Copyright   2003, 2019 [facility]. All rights reserved. Clinically reviewed by Beatriz New MD. SMART[employer] 915823 - REV 4/20.      How to Take Your Medication Before Surgery  - STOP taking all vitamins and herbal supplements 14 days before surgery.   - Hold warfarin for 7 days prior to surgery   - Hold empagliflozin(Jardiance) 3 days before surgery  - Hold metformin day of surgery  - Hold glipizide day of surgery        How to Manage Your Diabetes Before Surgery  If you use insulin for your diabetes, follow these steps to keep your blood sugar in a safe range before surgery, when you ve been told not to eat or drink:     Check your blood sugar every 4 hours     If your blood sugar is high, take a corrective dose (not a meal dose) of sliding-scale insulin, if that is what you re used to doing    If your blood sugar is below 100, or you have symptoms of hypoglycemia, follow these steps:   1) Have 1 item from this list:  - 4 oz  (1/2 cup) of fruit juice without pulp    - 4 oz (1/2 cup) of regular soda (not diet soda)    - 3 glucose gels    - 5 sugar cubes or sugar packets   2) Check your blood sugar again after 15 minutes  3) Repeat steps 1 and 2 again until your blood sugar is greater than 100

## 2021-11-02 ENCOUNTER — LAB (OUTPATIENT)
Dept: LAB | Facility: CLINIC | Age: 76
End: 2021-11-02
Payer: MEDICARE

## 2021-11-02 ENCOUNTER — ANTICOAGULATION THERAPY VISIT (OUTPATIENT)
Dept: ANTICOAGULATION | Facility: CLINIC | Age: 76
End: 2021-11-02

## 2021-11-02 DIAGNOSIS — I48.20 CHRONIC ATRIAL FIBRILLATION (H): ICD-10-CM

## 2021-11-02 DIAGNOSIS — Z79.01 LONG TERM CURRENT USE OF ANTICOAGULANT THERAPY: Primary | ICD-10-CM

## 2021-11-02 DIAGNOSIS — Z79.01 LONG TERM CURRENT USE OF ANTICOAGULANT THERAPY: ICD-10-CM

## 2021-11-02 LAB — INR BLD: 3.8 (ref 0.9–1.1)

## 2021-11-02 PROCEDURE — 85610 PROTHROMBIN TIME: CPT

## 2021-11-02 PROCEDURE — 36415 COLL VENOUS BLD VENIPUNCTURE: CPT

## 2021-11-02 NOTE — PROGRESS NOTES
ANTICOAGULATION MANAGEMENT     Keith Desir 75 year old male is on warfarin with supratherapeutic INR result. (Goal INR 2.0-3.0)    Recent labs: (last 7 days)     11/02/21  0940   INR 3.8*       ASSESSMENT     Source(s): Chart Review and Patient/Caregiver Call       Warfarin doses taken: Warfarin taken as instructed    Diet: Change in alcohol intake may be affecting INR. 5 beers on Sunday     New illness, injury, or hospitalization: No    Medication/supplement changes: Tylenol with codiene, nystatin for thrush.    Signs or symptoms of bleeding or clotting: No    Previous INR: Therapeutic last visit; previously outside of goal range    Additional findings: Upcoming surgery/procedure 11/11/2021 spine surgery     PLAN     Recommended plan for temporary change(s) affecting INR     Dosing Instructions: hold today and then the patient will start hold 11/4 fir upcoming surgery. with next INR in 2 weeks       Summary  As of 11/2/2021    Full warfarin instructions:  11/2: Hold; 11/4: Hold; 11/5: Hold; 11/6: Hold; 11/7: Hold; 11/8: Hold; 11/9: Hold; 11/10: Hold; Otherwise 5 mg every Tue, Sat; 7.5 mg all other days   Next INR check:  11/15/2021             Telephone call with  Melissa who verbalizes understanding and agrees to plan    will need to review surgery and when recheck is needed.    Education provided: None required    Plan made with [facility] Pharmacist Maria Isabel Ziegler RN  Anticoagulation Clinic  11/2/2021    _______________________________________________________________________     Anticoagulation Episode Summary     Current INR goal:  2.0-3.0   TTR:  75.2 % (1 y)   Target end date:  Indefinite   Send INR reminders to:  AJ MCINTOSH    Indications    Long term current use of anticoagulant therapy [Z79.01]  Chronic atrial fibrillation (HCC) [I48.20]           Comments:  don't print AVS unless change in dosing         Anticoagulation Care Providers     Provider Role Specialty Phone  number    Cayla Gregorio MD [facility] [phone]

## 2021-11-02 NOTE — PROGRESS NOTES
MASHA-PROCEDURAL ANTICOAGULATION  MANAGEMENT    ASSESSMENT     Warfarin interruption plan for surgery on 11/11/2021.    Indication for Anticoagulation: Atrial Fibrillation      Risk stratification for thromboembolism: low (2017 ACC periprocedure pathway for NVAF Expert Consensus)    o [id number] = 4 (CHF, Hypertension, Age 65-74 and Diabetes)    NVAF: 2017 ACC periprocedure pathway for NVAF advises NO bridge for low risk stratification ([id number] score <=4 and no prior hx of stroke, TIA or systemic embolism)    Guidelines endorse hold, no bridge pre-procedure.  Inpatient providers to manage VTE prophylaxis inpatient per their protocols post operatively.    Maria Isabel Mccoy, PharmD BCACP  Anticoagulation Clinical Pharmacist

## 2021-11-02 NOTE — PROGRESS NOTES
Patient had pre op for a right lumbar 4 sacral 1 decompression on 10/25/2021. The following is the warfarin instructions per NOEMÍ Ledezma CNP    Medication Instructions:   - warfarin: Thromboembolic risk is low (<4%/year). HOLD warfarin for 5 days without bridging before procedure.

## 2021-11-08 ENCOUNTER — MYC MEDICAL ADVICE (OUTPATIENT)
Dept: FAMILY MEDICINE | Facility: CLINIC | Age: 76
End: 2021-11-08
Payer: MEDICARE

## 2021-11-08 NOTE — TELEPHONE ENCOUNTER
Routing MyChart to Geeta Ledezma, APRN, CNP.  Family member is asking if Ed should take Lisinopril today (surgery is on 11/11).  Or should patient check with surgeon's office?    Migel Diaz RN

## 2021-11-16 ENCOUNTER — TELEPHONE (OUTPATIENT)
Dept: FAMILY MEDICINE | Facility: CLINIC | Age: 76
End: 2021-11-16
Payer: MEDICARE

## 2021-11-16 NOTE — TELEPHONE ENCOUNTER
Wife called about when to recheck. Suggested this Thursday or Friday and she declines stating they want to come in after post op appointment.    Patient is scheduled for next Wednesday.    Dot Ziegler RN    [facility]tico[facility]

## 2021-11-16 NOTE — TELEPHONE ENCOUNTER
Patient's wife, Melissa wanting to know when Ed should have his INR done after surgery.  Please call her at [phone].  Ok to leave on message.

## 2021-11-22 ENCOUNTER — TRANSFERRED RECORDS (OUTPATIENT)
Dept: HEALTH INFORMATION MANAGEMENT | Facility: CLINIC | Age: 76
End: 2021-11-22
Payer: MEDICARE

## 2021-11-24 ENCOUNTER — ANTICOAGULATION THERAPY VISIT (OUTPATIENT)
Dept: ANTICOAGULATION | Facility: CLINIC | Age: 76
End: 2021-11-24

## 2021-11-24 ENCOUNTER — APPOINTMENT (OUTPATIENT)
Dept: LAB | Facility: CLINIC | Age: 76
End: 2021-11-24
Payer: MEDICARE

## 2021-11-24 DIAGNOSIS — Z79.01 LONG TERM CURRENT USE OF ANTICOAGULANT THERAPY: Primary | ICD-10-CM

## 2021-11-24 DIAGNOSIS — I48.20 CHRONIC ATRIAL FIBRILLATION (H): ICD-10-CM

## 2021-11-24 LAB — INR BLD: 2.8 (ref 0.9–1.1)

## 2021-11-24 PROCEDURE — 85610 PROTHROMBIN TIME: CPT

## 2021-11-24 PROCEDURE — 36416 COLLJ CAPILLARY BLOOD SPEC: CPT

## 2021-11-24 NOTE — PROGRESS NOTES
ANTICOAGULATION MANAGEMENT     Keith Desir 75 year old male is on warfarin with therapeutic INR result. (Goal INR 2.0-3.0)    Recent labs: (last 7 days)     11/24/21  1118   INR 2.8*       ASSESSMENT     Source(s): Chart Review and Patient/Caregiver Call       Warfarin doses taken: Warfarin taken as instructed    Diet: No new diet changes identified    New illness, injury, or hospitalization: No    Medication/supplement changes: steriod pack for 6 days and weening down started on 11/23/2021    Signs or symptoms of bleeding or clotting: No    Previous INR: Supratherapeutic    Additional findings: None     PLAN     Recommended plan for temporary change(s) affecting INR     Dosing Instructions: Continue your current warfarin dose with next INR in 4 weeks       Summary  As of 11/24/2021    Full warfarin instructions:  5 mg every Tue, Sat; 7.5 mg all other days   Next INR check:  12/22/2021             Telephone call with  Melissa and Ed who verbalizes understanding and agrees to plan    Lab visit scheduled, refuses anything but 6 week recheck. Was able to schedule in 4 weeks.     Education provided: Potential interaction between warfarin and steriod and discussed the need for an earlier check.    Plan made per [facility] anticoagulation protocol    Dot Ziegler RN  Anticoagulation Clinic  11/24/2021    _______________________________________________________________________     Anticoagulation Episode Summary     Current INR goal:  2.0-3.0   TTR:  70.4 % (1 y)   Target end date:  Indefinite   Send INR reminders to:  AJ MCINTOSH    Indications    Long term current use of anticoagulant therapy [Z79.01]  Chronic atrial fibrillation (HCC) [I48.20]           Comments:  don't print AVS unless change in dosing         Anticoagulation Care Providers     Provider Role Specialty Phone number    Cayla Gregorio MD Referring Family Medicine [phone]

## 2021-12-20 DIAGNOSIS — M79.604 PAIN OF RIGHT LOWER EXTREMITY: ICD-10-CM

## 2021-12-20 RX ORDER — GABAPENTIN 300 MG/1
CAPSULE ORAL
Qty: 360 CAPSULE | Refills: 1 | Status: SHIPPED | OUTPATIENT
Start: 2021-12-20 | End: 2022-06-23

## 2021-12-22 ENCOUNTER — LAB (OUTPATIENT)
Dept: LAB | Facility: CLINIC | Age: 76
End: 2021-12-22
Payer: MEDICARE

## 2021-12-22 ENCOUNTER — ANTICOAGULATION THERAPY VISIT (OUTPATIENT)
Dept: ANTICOAGULATION | Facility: CLINIC | Age: 76
End: 2021-12-22

## 2021-12-22 DIAGNOSIS — Z79.01 LONG TERM CURRENT USE OF ANTICOAGULANT THERAPY: ICD-10-CM

## 2021-12-22 DIAGNOSIS — Z79.01 LONG TERM CURRENT USE OF ANTICOAGULANT THERAPY: Primary | ICD-10-CM

## 2021-12-22 DIAGNOSIS — I48.20 CHRONIC ATRIAL FIBRILLATION (H): ICD-10-CM

## 2021-12-22 LAB — INR BLD: 2.3 (ref 0.9–1.1)

## 2021-12-22 PROCEDURE — 36416 COLLJ CAPILLARY BLOOD SPEC: CPT

## 2021-12-22 PROCEDURE — 85610 PROTHROMBIN TIME: CPT

## 2021-12-22 NOTE — PROGRESS NOTES
ANTICOAGULATION MANAGEMENT     Keith Desir 76 year old male is on warfarin with therapeutic INR result. (Goal INR 2.0-3.0)    Recent labs: (last 7 days)     12/22/21  0922   INR 2.3*       ASSESSMENT     Source(s): Chart Review and Patient/Caregiver Call       Warfarin doses taken: Warfarin taken as instructed    Diet: No new diet changes identified    New illness, injury, or hospitalization: No    Medication/supplement changes: None noted    Signs or symptoms of bleeding or clotting: No    Previous INR: Therapeutic last visit; previously outside of goal range    Additional findings: None     PLAN     Recommended plan for no diet, medication or health factor changes affecting INR     Dosing Instructions: Continue your current warfarin dose with next INR in 6 weeks       Summary  As of 12/22/2021    Full warfarin instructions:  5 mg every Tue, Sat; 7.5 mg all other days   Next INR check:  2/2/2022             Telephone call with Keith who verbalizes understanding and agrees to plan    Lab visit scheduled    Education provided: None required    Plan made per [facility] anticoagulation protocol    Dot Ziegler RN  Anticoagulation Clinic  12/22/2021    _______________________________________________________________________     Anticoagulation Episode Summary     Current INR goal:  2.0-3.0   TTR:  71.5 % (1 y)   Target end date:  Indefinite   Send INR reminders to:  AJ MCINTOSH    Indications    Long term current use of anticoagulant therapy [Z79.01]  Chronic atrial fibrillation (HCC) [I48.20]           Comments:  don't print AVS unless change in dosing         Anticoagulation Care Providers     Provider Role Specialty Phone number    Cayla Gregorio MD Referring Family Medicine [phone]

## 2022-01-10 ENCOUNTER — MYC MEDICAL ADVICE (OUTPATIENT)
Dept: FAMILY MEDICINE | Facility: CLINIC | Age: 77
End: 2022-01-10
Payer: MEDICARE

## 2022-01-10 DIAGNOSIS — R26.9 GAIT DISTURBANCE: Primary | ICD-10-CM

## 2022-01-17 ENCOUNTER — THERAPY VISIT (OUTPATIENT)
Dept: PHYSICAL THERAPY | Facility: CLINIC | Age: 77
End: 2022-01-17
Attending: FAMILY MEDICINE
Payer: MEDICARE

## 2022-01-17 DIAGNOSIS — R26.9 GAIT DISTURBANCE: ICD-10-CM

## 2022-01-17 PROCEDURE — 97112 NEUROMUSCULAR REEDUCATION: CPT | Mod: GP | Performed by: PHYSICAL THERAPIST

## 2022-01-17 PROCEDURE — 97163 PT EVAL HIGH COMPLEX 45 MIN: CPT | Mod: GP | Performed by: PHYSICAL THERAPIST

## 2022-01-17 NOTE — PROGRESS NOTES
Subjective:    Patient Health History  Keith Desir being seen for Strengthen.     Problem began: 6/7/2021.   Problem occurred: Leg sore and painful walking. Back surgery 11/11/21.   Pain is reported as 0/10 on pain scale.  General health as reported by patient is fair.  Pertinent medical history includes: diabetes, emphysema, heart problems, high blood pressure, smoking and weakness.     Medical allergies: none.   Surgeries include:  Other. Other surgery history details: Back surgery for degenerate discs..    Current medications:  Heparin/coumadin and high blood pressure medication.    Current occupation is Retired.   Primary job tasks include:  Prolonged sitting.                  Physical Therapy Initial Examination/Evaluation    January 17, 2022    Keith Desir  is a 76 year old  male referred to physical therapy by Cayla Gregorio MD for treatment of gait.      DOI/onset 6/7/2021  Mechanism of injury progressive degenerative changes.  Surgical intervention.  3 weeks ago I fell on the snow and needed help to get up  DOS 11/11/2021   Prior treatment surgery, physical therapy. Effect of prior treatment excellent.  R LE pain improved    Chief Complaint:   Walking and balance.  Lifting is very challenging.   Pain location: reports no pain just weakness   Quality: weakness  Constant/Intermittent: intermitten  Time of day: non-dependent.  Slow in the morning and then goes in the shower and improves.    Symptoms have improved since onset.    Current pain 0/10.    Symptoms aggravated by difficult with lifting, walking.  10 min walking  Symptoms improved with rest.     Social history:  Pt lives with his wife in a town home.  3 stories.  I shower in the basement  3 sons,1 step-daughter, 3 granddaughters    Retired/disabled, does not drive, uses walker     Occupation: retired.    Patient having difficulty with ADLs: standing, dishes, showering.    Patient's goals are stand, walk and avoid  surgery.     Patient reports general health as fair.  Related medical history reported none- per chart review: diabetes, chronic kidney disease, neuropathy, non fatty liver disease, hyperlipedema, chronic a-fib, congestive heart failure.    Surgical History:  See chart.    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  Venous return (-) blood clots    Medications:  See charg- pain, cholestrol, cardiac.       Outcome measure:   Oswestry  Return to MD:  As needed.       Clinical Impression: Ed presents to [facility] with primary complaint of balance and gait disturbance.  Per clinical examination, pt continues to demonstrate R LE weakness with associated difficulty with single leg movements and transitions.  Pt will benefit from skilled physical therapy to improve pain and overall function.                      Objective:  Standing: moderate incr in thoracic kyphosis with noted forward head and shoulder     Gait: unsteady, without use of assistive device.  Forward trunk lean.     Time Get up and go 19 sec     Diehl Balance: 46/56 Significant difficulty with the leaning forward, single leg and tandem stance         System    Physical Exam        General Evaluation:      Gross Strength:              Lower Extremity:   Significant findings:  MMT: hip flexion R 4+/5, L 5/5; knee ext 5/5 B; knee flexion 5/5 B; hip ER R 4/5; L 5/5.                                                                 ROS    Assessment/Plan:    Patient is a 76 year old male with gait disturbance complaints.    Patient has the following significant findings with corresponding treatment plan.                Diagnosis 1:  Gait and balance deficitis    Decreased strength - therapeutic exercise and therapeutic activities  Impaired balance - neuro re-education and therapeutic activities  Decreased proprioception - neuro re-education and therapeutic activities  Impaired gait - gait  training  Impaired muscle performance - neuro re-education  Decreased function - therapeutic activities  Impaired posture - neuro re-education    Therapy Evaluation Codes:   1) History comprised of:   Personal factors that impact the plan of care:      Age, Past/current experiences and Time since onset of symptoms.    Comorbidity factors that impact the plan of care are:       diabetes, emphysema, heart problems, high blood pressure, smoking and weakness. .     Medications impacting care: Back surgery for degenerate discs..  .  2) Examination of Body Systems comprised of:   Body structures and functions that impact the plan of care:      gait and balance .   Activity limitations that impact the plan of care are:      Dressing, Lifting, Squatting/kneeling, Stairs, Standing and Walking.  3) Clinical presentation characteristics are:   Unstable/Unpredictable.  4) Decision-Making    High complexity using standardized patient assessment instrument and/or measureable assessment of functional outcome.  Cumulative Therapy Evaluation is: High complexity.    Previous and current functional limitations:  (See Goal Flow Sheet for this information)    Short term and Long term goals: (See Goal Flow Sheet for this information)     Communication ability:  Patient appears to be able to clearly communicate and understand verbal and written communication and follow directions correctly.  Treatment Explanation - The following has been discussed with the patient:   RX ordered/plan of care  Anticipated outcomes  Possible risks and side effects  This patient would benefit from PT intervention to resume normal activities.   Rehab potential is good.    Frequency:  1 X week, once daily  Duration:  for 2 months  Discharge Plan:  Achieve all LTG.  Independent in home treatment program.  Reach maximal therapeutic benefit.    Please refer to the daily flowsheet for treatment today, total treatment time and time spent performing 1:1 timed codes.

## 2022-01-17 NOTE — PROGRESS NOTES
[facility]    OUTPATIENT Physical Therapy ORTHOPEDIC EVALUATION  PLAN OF TREATMENT FOR OUTPATIENT REHABILITATION  (COMPLETE FOR INITIAL CLAIMS ONLY)  Patient's Last Name, First Name, M.I.  YOB: 1945  Keith Desir    Provider s Name:  [facility]   Medical Record No.  [id number]   Start of Care Date:  01/17/22   Onset Date:  11/11/21    Type:     _X__PT   ___OT Medical Diagnosis:    Encounter Diagnosis   Name Primary?     Gait disturbance         Treatment Diagnosis:  Gait disturbance         Goals:     01/17/22 0500   Body Part   Goals listed below are for Gait/balance disturbance        Therapy Frequency:  1x/week   Predicted Duration of Therapy Intervention:  8 weeks    Claire Mcduffie, PT                 I CERTIFY THE NEED FOR THESE SERVICES FURNISHED UNDER        THIS PLAN OF TREATMENT AND WHILE UNDER MY CARE     (Physician attestation of this document indicates review and certification of the therapy plan).                       Certification Date From:  01/17/22   Certification Date To:  02/16/22    Referring Provider:  Cayla Gregorio    Initial Assessment        See Epic Evaluation SOC Date: 01/17/22

## 2022-01-20 DIAGNOSIS — I48.20 CHRONIC ATRIAL FIBRILLATION (H): ICD-10-CM

## 2022-01-21 RX ORDER — WARFARIN SODIUM 2.5 MG/1
TABLET ORAL
Qty: 240 TABLET | Refills: 1 | Status: SHIPPED | OUTPATIENT
Start: 2022-01-21 | End: 2022-07-16

## 2022-01-21 NOTE — TELEPHONE ENCOUNTER
[facility] pharmacy called the clinic requesting refills for the warfarin ANTICOAGULANT (COUMADIN) 2.5 MG tablet sent to the pharmacy as soon as possible.

## 2022-01-21 NOTE — TELEPHONE ENCOUNTER
Prescription approved per [facility] Refill Protocol.  Jose Daniel May Rn  [facility]

## 2022-01-24 ENCOUNTER — THERAPY VISIT (OUTPATIENT)
Dept: PHYSICAL THERAPY | Facility: CLINIC | Age: 77
End: 2022-01-24
Payer: MEDICARE

## 2022-01-24 DIAGNOSIS — R26.9 GAIT DISTURBANCE: Primary | ICD-10-CM

## 2022-01-24 DIAGNOSIS — M51.9 LUMBAR DISC DISORDER: ICD-10-CM

## 2022-01-24 PROCEDURE — 97110 THERAPEUTIC EXERCISES: CPT | Mod: GP | Performed by: PHYSICAL THERAPIST

## 2022-01-24 PROCEDURE — 97112 NEUROMUSCULAR REEDUCATION: CPT | Mod: GP | Performed by: PHYSICAL THERAPIST

## 2022-01-31 ENCOUNTER — THERAPY VISIT (OUTPATIENT)
Dept: PHYSICAL THERAPY | Facility: CLINIC | Age: 77
End: 2022-01-31
Payer: MEDICARE

## 2022-01-31 DIAGNOSIS — R26.9 GAIT DISTURBANCE: Primary | ICD-10-CM

## 2022-01-31 PROCEDURE — 97112 NEUROMUSCULAR REEDUCATION: CPT | Mod: GP

## 2022-01-31 PROCEDURE — 97110 THERAPEUTIC EXERCISES: CPT | Mod: GP

## 2022-02-02 ENCOUNTER — LAB (OUTPATIENT)
Dept: LAB | Facility: CLINIC | Age: 77
End: 2022-02-02
Payer: MEDICARE

## 2022-02-02 ENCOUNTER — ANTICOAGULATION THERAPY VISIT (OUTPATIENT)
Dept: ANTICOAGULATION | Facility: CLINIC | Age: 77
End: 2022-02-02

## 2022-02-02 DIAGNOSIS — Z79.01 LONG TERM CURRENT USE OF ANTICOAGULANT THERAPY: ICD-10-CM

## 2022-02-02 DIAGNOSIS — Z79.01 LONG TERM CURRENT USE OF ANTICOAGULANT THERAPY: Primary | ICD-10-CM

## 2022-02-02 DIAGNOSIS — I48.20 CHRONIC ATRIAL FIBRILLATION (H): ICD-10-CM

## 2022-02-02 LAB — INR BLD: 2.5 (ref 0.9–1.1)

## 2022-02-02 PROCEDURE — 36416 COLLJ CAPILLARY BLOOD SPEC: CPT

## 2022-02-02 PROCEDURE — 85610 PROTHROMBIN TIME: CPT

## 2022-02-02 NOTE — PROGRESS NOTES
ANTICOAGULATION MANAGEMENT     Keith Desir 76 year old male is on warfarin with therapeutic INR result. (Goal INR 2.0-3.0)    Recent labs: (last 7 days)     02/02/22  0920   INR 2.5*       ASSESSMENT     Source(s): Chart Review and Patient/Caregiver Call       Warfarin doses taken: Warfarin taken as instructed    Diet: No new diet changes identified    New illness, injury, or hospitalization: No    Medication/supplement changes: None noted    Signs or symptoms of bleeding or clotting: No    Previous INR: Therapeutic last 2(+) visits    Additional findings: None     PLAN     Recommended plan for no diet, medication or health factor changes affecting INR     Dosing Instructions: Continue your current warfarin dose with next INR in 6 weeks       Summary  As of 2/2/2022    Full warfarin instructions:  5 mg every Tue, Sat; 7.5 mg all other days   Next INR check:               Telephone call with  Melissa who verbalizes understanding and agrees to plan    Lab visit scheduled    Education provided: Monitoring for bleeding signs and symptoms, Monitoring for clotting signs and symptoms and Contact [phone]  with any changes, questions or concerns.     Plan made per [facility] anticoagulation protocol    Debora Ramos, RN  Anticoagulation Clinic  2/2/2022    _______________________________________________________________________     Anticoagulation Episode Summary     Current INR goal:  2.0-3.0   TTR:  74.9 % (1 y)   Target end date:  Indefinite   Send INR reminders to:  AJ MCINTOSH    Indications    Long term current use of anticoagulant therapy [Z79.01]  Chronic atrial fibrillation (HCC) [I48.20]           Comments:  don't print AVS unless change in dosing         Anticoagulation Care Providers     Provider Role Specialty Phone number    Cayla Gregorio MD Referring Family Medicine [phone]

## 2022-02-07 ENCOUNTER — THERAPY VISIT (OUTPATIENT)
Dept: PHYSICAL THERAPY | Facility: CLINIC | Age: 77
End: 2022-02-07
Payer: MEDICARE

## 2022-02-07 DIAGNOSIS — R26.9 GAIT DISTURBANCE: Primary | ICD-10-CM

## 2022-02-07 PROCEDURE — 97110 THERAPEUTIC EXERCISES: CPT | Mod: GP

## 2022-02-14 ENCOUNTER — THERAPY VISIT (OUTPATIENT)
Dept: PHYSICAL THERAPY | Facility: CLINIC | Age: 77
End: 2022-02-14
Payer: MEDICARE

## 2022-02-14 DIAGNOSIS — M79.604 PAIN OF RIGHT LOWER EXTREMITY: ICD-10-CM

## 2022-02-14 DIAGNOSIS — R26.9 GAIT DISTURBANCE: Primary | ICD-10-CM

## 2022-02-14 PROCEDURE — 97110 THERAPEUTIC EXERCISES: CPT | Mod: GP

## 2022-02-21 ENCOUNTER — MYC MEDICAL ADVICE (OUTPATIENT)
Dept: FAMILY MEDICINE | Facility: CLINIC | Age: 77
End: 2022-02-21

## 2022-02-21 ENCOUNTER — THERAPY VISIT (OUTPATIENT)
Dept: PHYSICAL THERAPY | Facility: CLINIC | Age: 77
End: 2022-02-21
Payer: MEDICARE

## 2022-02-21 DIAGNOSIS — M79.604 PAIN OF RIGHT LOWER EXTREMITY: ICD-10-CM

## 2022-02-21 DIAGNOSIS — E11.22 TYPE 2 DIABETES MELLITUS WITH STAGE 2 CHRONIC KIDNEY DISEASE, WITHOUT LONG-TERM CURRENT USE OF INSULIN (H): ICD-10-CM

## 2022-02-21 DIAGNOSIS — R26.9 GAIT DISTURBANCE: Primary | ICD-10-CM

## 2022-02-21 DIAGNOSIS — N18.2 TYPE 2 DIABETES MELLITUS WITH STAGE 2 CHRONIC KIDNEY DISEASE, WITHOUT LONG-TERM CURRENT USE OF INSULIN (H): ICD-10-CM

## 2022-02-21 PROCEDURE — 97110 THERAPEUTIC EXERCISES: CPT | Mod: GP | Performed by: PHYSICAL THERAPIST

## 2022-02-21 PROCEDURE — 97112 NEUROMUSCULAR REEDUCATION: CPT | Mod: GP | Performed by: PHYSICAL THERAPIST

## 2022-02-21 NOTE — PROGRESS NOTES
Subjective:  HPI  Physical Exam                    Objective:  System    Physical Exam    General     ROS    Assessment/Plan:    PROGRESS  REPORT    Progress reporting period is from 1/17/2022 to 2/21/2022.       SUBJECTIVE  Subjective changes noted by patient:  The leg is good, no pain any longer.  I am super careful but I have not fallen.  I am not using a cane but I don't think I have it set up correctly.  I continue to have difficulty putting one foot in front of the other.        Current pain level is 0/10      Changes in function:  Yes (See Goal flowsheet attached for changes in current functional level)  Adverse reaction to treatment or activity: None    OBJECTIVE  Changes noted in objective findings:  The objective findings below are from DOS 2/21/2022.  MMT: hip flexion R 5-/5, L 5/5; knee ext 5/5 B; knee flexion 5/5 B; hip ER R 4/5.  Diehl balance- low risk.  See flowsheet      Reviewed SEC and placement for proper stability and safety.   Discussed shoveling mechanics and safety for home.      ASSESSMENT/PLAN  Updated problem list and treatment plan: Diagnosis 1:  Balance, LE weakness    Decreased strength - therapeutic exercise, therapeutic activities and home program  Impaired balance - neuro re-education and therapeutic activities  Decreased function - therapeutic activities  Impaired posture - neuro re-education  STG/LTGs have been met or progress has been made towards goals:  Yes (See Goal flow sheet completed today.)  Assessment of Progress: The patient's condition is improving.  Self Management Plans:  Patient is independent in a home treatment program.  Patient is independent in self management of symptoms.  I have re-evaluated this patient and find that the nature, scope, duration and intensity of the therapy is appropriate for the medical condition of the patient.  Keith continues to require the following intervention to meet STG and LTG's:  PT intervention is no longer required to meet  STG/LTG.    Recommendations:  This patient is ready to be discharged from therapy and continue their home treatment program.    Please refer to the daily flowsheet for treatment today, total treatment time and time spent performing 1:1 timed codes.

## 2022-02-21 NOTE — PROGRESS NOTES
Balance Routine: 1-2x/day    10-20  ; holding the wall or countertop for stability.  Can progress to less of a hold or without support as time progresses  1. Heel to toe walking   2. Side to side walking: go both directions  3. Backwards walking   4. Flamingo or high knee march walking   5. Stretch the leg (step behind and hold)    Note: Cane for poor weather or unstable conditions outside.  Use it in your left hand and place it at a level that hits above the wrist.

## 2022-02-22 RX ORDER — GLIPIZIDE 5 MG/1
TABLET ORAL
Qty: 180 TABLET | Refills: 0 | Status: SHIPPED | OUTPATIENT
Start: 2022-02-22 | End: 2022-03-09

## 2022-03-01 ENCOUNTER — OFFICE VISIT (OUTPATIENT)
Dept: OPTOMETRY | Facility: CLINIC | Age: 77
End: 2022-03-01
Payer: MEDICARE

## 2022-03-01 DIAGNOSIS — H25.13 NUCLEAR SCLEROTIC CATARACT OF BOTH EYES: ICD-10-CM

## 2022-03-01 DIAGNOSIS — H52.4 PRESBYOPIA: ICD-10-CM

## 2022-03-01 DIAGNOSIS — N18.2 TYPE 2 DIABETES MELLITUS WITH STAGE 2 CHRONIC KIDNEY DISEASE, WITHOUT LONG-TERM CURRENT USE OF INSULIN (H): Primary | ICD-10-CM

## 2022-03-01 DIAGNOSIS — H52.03 HYPEROPIA, BILATERAL: ICD-10-CM

## 2022-03-01 DIAGNOSIS — E11.22 TYPE 2 DIABETES MELLITUS WITH STAGE 2 CHRONIC KIDNEY DISEASE, WITHOUT LONG-TERM CURRENT USE OF INSULIN (H): Primary | ICD-10-CM

## 2022-03-01 DIAGNOSIS — H52.222 REGULAR ASTIGMATISM OF LEFT EYE: ICD-10-CM

## 2022-03-01 PROCEDURE — 92014 COMPRE OPH EXAM EST PT 1/>: CPT | Performed by: OPTOMETRIST

## 2022-03-01 PROCEDURE — 92015 DETERMINE REFRACTIVE STATE: CPT | Mod: GY | Performed by: OPTOMETRIST

## 2022-03-01 ASSESSMENT — VISUAL ACUITY
CORRECTION_TYPE: GLASSES
OS_CC: 20/30
OD_PH_CC+: -3
OS_CC: 20/25
METHOD: SNELLEN - LINEAR
OD_CC+: -3
OD_CC: 20/40
OS_CC+: -2
OD_CC: 20/30
OD_PH_CC: 20/40

## 2022-03-01 ASSESSMENT — CONF VISUAL FIELD
OD_NORMAL: 1
OS_NORMAL: 1
METHOD: COUNTING FINGERS

## 2022-03-01 ASSESSMENT — REFRACTION_WEARINGRX
OS_CYLINDER: +0.75
SPECS_TYPE: BIFOCAL
OS_AXIS: 102
OD_SPHERE: +1.75
OD_CYLINDER: SPHERE
OS_SPHERE: +1.25
OS_ADD: +2.50
OD_ADD: +2.50

## 2022-03-01 ASSESSMENT — REFRACTION_MANIFEST
OD_SPHERE: +1.00
OS_CYLINDER: +3.00
OS_SPHERE: -1.00
OD_CYLINDER: SPHERE
OS_AXIS: 088
OS_SPHERE: +1.00
METHOD_AUTOREFRACTION: 1
OS_ADD: +2.50
OD_SPHERE: +1.00
OS_AXIS: 105
OS_CYLINDER: +0.75
OD_ADD: +2.50

## 2022-03-01 ASSESSMENT — KERATOMETRY
OD_AXISANGLE2_DEGREES: 6
OS_K2POWER_DIOPTERS: 48.25
OD_K2POWER_DIOPTERS: 47.00
OS_AXISANGLE2_DEGREES: 178
OD_K1POWER_DIOPTERS: 46.25
OS_K1POWER_DIOPTERS: 46.25

## 2022-03-01 ASSESSMENT — CUP TO DISC RATIO
OD_RATIO: 0.5
OS_RATIO: 0.5

## 2022-03-01 ASSESSMENT — TONOMETRY
OD_IOP_MMHG: 21
OS_IOP_MMHG: 18
IOP_METHOD: APPLANATION

## 2022-03-01 ASSESSMENT — EXTERNAL EXAM - RIGHT EYE: OD_EXAM: NORMAL

## 2022-03-01 ASSESSMENT — SLIT LAMP EXAM - LIDS
COMMENTS: NORMAL
COMMENTS: NORMAL

## 2022-03-01 ASSESSMENT — EXTERNAL EXAM - LEFT EYE: OS_EXAM: NORMAL

## 2022-03-01 NOTE — PATIENT INSTRUCTIONS
Optional to fill new glasses prescription, minimal change  Monitor mild cataracts  for change   Keep blood sugar under good control  Return in 1 year for diabetic eye exam      Blood sugar and blood pressure control are very important in the prevention of ocular complications from diabetes.       Kristy Rosales, OD  [phone]

## 2022-03-01 NOTE — PROGRESS NOTES
Chief Complaint   Patient presents with     Diabetic Eye Exam     Diabetic Eye Exam      Accompanied by wife, out in the waiting area      next appointment soon with PCP    Chief Complaint(s) and History of Present Illness(es)     Diabetic Eye Exam     Vision: is stable    Diabetes Type: Type 2 and taking oral medications    Blood Sugars: is controlled    Comments: Diabetic Eye Exam                Hemoglobin A1C POCT   Date Value Ref Range Status   03/02/2021 6.7 (H) 0 - 5.6 % Final     Comment:     Normal <5.7% Prediabetes 5.7-6.4%  Diabetes 6.5% or higher - adopted from ADA   consensus guidelines.     11/17/2020 6.1 (H) 0 - 5.6 % Final     Comment:     Normal <5.7% Prediabetes 5.7-6.4%  Diabetes 6.5% or higher - adopted from ADA   consensus guidelines.  Reviewed: OK with previous     09/02/2020 7.5 (H) 0 - 5.6 % Final     Comment:     Normal <5.7% Prediabetes 5.7-6.4%  Diabetes 6.5% or higher - adopted from ADA   consensus guidelines.       Hemoglobin A1C   Date Value Ref Range Status   09/07/2021 7.0 (H) 0.0 - 5.6 % Final     Comment:     Normal <5.7%   Prediabetes 5.7-6.4%    Diabetes 6.5% or higher     Note: Adopted from ADA consensus guidelines.            Last Eye Exam: 7/2019  Dilated Previously: Yes    What are you currently using to see?  glasses    Distance Vision Acuity: Satisfied with vision, no changes that he is aware of     Near Vision Acuity: Satisfied with vision while reading and using computer with glasses    Eye Comfort: good  Do you use eye drops? 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       OBJECTIVE: See Ophthalmology exam    ASSESSMENT:    ICD-10-CM    1. Type 2 diabetes mellitus with stage 2 chronic kidney disease, without long-term current use of insulin (H)  E11.22     N18.2     No diabetic retinopathy at 3/1/22 eye exam    2. Nuclear sclerotic cataract of both eyes  H25.13    3.  Hyperopia, bilateral  H52.03    4. Regular astigmatism of left eye  H52.222    5. Presbyopia  H52.4       PLAN:    Keith Desir aware  eye exam results will be sent to Cayla Gregorio.  Patient Instructions   Optional to fill new glasses prescription, minimal change  Monitor mild cataracts  for change   Keep blood sugar under good control  Return in 1 year for diabetic eye exam      Blood sugar and blood pressure control are very important in the prevention of ocular complications from diabetes.       Kristy Rosales, OD  [phone]

## 2022-03-01 NOTE — LETTER
3/1/2022         RE: Keith Desir  [id number]  [facility] [address]        Dear Colleague,    Thank you for referring your patient, Keith Desir, to the [facility]. No diabetic retinopathy was found at eye exam. Please see a copy of my visit note below.    Chief Complaint   Patient presents with     Diabetic Eye Exam     Diabetic Eye Exam      Accompanied by wife, out in the waiting area      next appointment soon with PCP    Chief Complaint(s) and History of Present Illness(es)     Diabetic Eye Exam     Vision: is stable    Diabetes Type: Type 2 and taking oral medications    Blood Sugars: is controlled    Comments: Diabetic Eye Exam                Hemoglobin A1C POCT   Date Value Ref Range Status   03/02/2021 6.7 (H) 0 - 5.6 % Final     Comment:     Normal <5.7% Prediabetes 5.7-6.4%  Diabetes 6.5% or higher - adopted from ADA   consensus guidelines.     11/17/2020 6.1 (H) 0 - 5.6 % Final     Comment:     Normal <5.7% Prediabetes 5.7-6.4%  Diabetes 6.5% or higher - adopted from ADA   consensus guidelines.  Reviewed: OK with previous     09/02/2020 7.5 (H) 0 - 5.6 % Final     Comment:     Normal <5.7% Prediabetes 5.7-6.4%  Diabetes 6.5% or higher - adopted from ADA   consensus guidelines.       Hemoglobin A1C   Date Value Ref Range Status   09/07/2021 7.0 (H) 0.0 - 5.6 % Final     Comment:     Normal <5.7%   Prediabetes 5.7-6.4%    Diabetes 6.5% or higher     Note: Adopted from ADA consensus guidelines.            Last Eye Exam: 7/2019  Dilated Previously: Yes    What are you currently using to see?  glasses    Distance Vision Acuity: Satisfied with vision, no changes that he is aware of     Near Vision Acuity: Satisfied with vision while reading and using computer with glasses    Eye Comfort: good  Do you use eye drops? : No  Occupation or Hobbies: Retired     [employer] Assistant      Medical, surgical and family histories reviewed and updated 3/1/2022.        OBJECTIVE: See Ophthalmology exam    ASSESSMENT:    ICD-10-CM    1. Type 2 diabetes mellitus with stage 2 chronic kidney disease, without long-term current use of insulin (H)  E11.22     N18.2     No diabetic retinopathy at 3/1/22 eye exam    2. Nuclear sclerotic cataract of both eyes  H25.13    3. Hyperopia, bilateral  H52.03    4. Regular astigmatism of left eye  H52.222    5. Presbyopia  H52.4       PLAN:    Keith Desir aware  eye exam results will be sent to Cayla Gregorio.  Patient Instructions   Optional to fill new glasses prescription, minimal change  Monitor mild cataracts  for change   Keep blood sugar under good control  Return in 1 year for diabetic eye exam      Blood sugar and blood pressure control are very important in the prevention of ocular complications from diabetes.       Kristy Rosales, OD  [phone]                         Again, thank you for allowing me to participate in the care of your patient.        Sincerely,        Kristy Rosales, OD

## 2022-03-09 ENCOUNTER — ANTICOAGULATION THERAPY VISIT (OUTPATIENT)
Dept: ANTICOAGULATION | Facility: CLINIC | Age: 77
End: 2022-03-09

## 2022-03-09 ENCOUNTER — OFFICE VISIT (OUTPATIENT)
Dept: FAMILY MEDICINE | Facility: CLINIC | Age: 77
End: 2022-03-09
Payer: MEDICARE

## 2022-03-09 VITALS
TEMPERATURE: 97.9 F | OXYGEN SATURATION: 93 % | HEIGHT: 68 IN | BODY MASS INDEX: 32.89 KG/M2 | SYSTOLIC BLOOD PRESSURE: 134 MMHG | WEIGHT: 217 LBS | HEART RATE: 88 BPM | DIASTOLIC BLOOD PRESSURE: 87 MMHG

## 2022-03-09 DIAGNOSIS — Z79.01 LONG TERM CURRENT USE OF ANTICOAGULANT THERAPY: ICD-10-CM

## 2022-03-09 DIAGNOSIS — J43.9 PULMONARY EMPHYSEMA, UNSPECIFIED EMPHYSEMA TYPE (H): ICD-10-CM

## 2022-03-09 DIAGNOSIS — I50.9 CHF (NYHA CLASS II, ACC/AHA STAGE C) (H): ICD-10-CM

## 2022-03-09 DIAGNOSIS — N18.2 TYPE 2 DIABETES MELLITUS WITH STAGE 2 CHRONIC KIDNEY DISEASE, WITHOUT LONG-TERM CURRENT USE OF INSULIN (H): Primary | ICD-10-CM

## 2022-03-09 DIAGNOSIS — E78.5 HYPERLIPIDEMIA LDL GOAL <70: ICD-10-CM

## 2022-03-09 DIAGNOSIS — I48.20 CHRONIC ATRIAL FIBRILLATION (H): ICD-10-CM

## 2022-03-09 DIAGNOSIS — J30.9 ALLERGIC RHINITIS, UNSPECIFIED SEASONALITY, UNSPECIFIED TRIGGER: ICD-10-CM

## 2022-03-09 DIAGNOSIS — E11.22 TYPE 2 DIABETES MELLITUS WITH STAGE 2 CHRONIC KIDNEY DISEASE, WITHOUT LONG-TERM CURRENT USE OF INSULIN (H): Primary | ICD-10-CM

## 2022-03-09 DIAGNOSIS — Z72.0 TOBACCO ABUSE: ICD-10-CM

## 2022-03-09 DIAGNOSIS — Z79.01 LONG TERM CURRENT USE OF ANTICOAGULANT THERAPY: Primary | ICD-10-CM

## 2022-03-09 PROBLEM — M51.9 LUMBAR DISC DISORDER: Status: RESOLVED | Noted: 2021-09-02 | Resolved: 2022-03-09

## 2022-03-09 LAB
HBA1C MFR BLD: 7.4 % (ref 0–5.6)
HOLD SPECIMEN: NORMAL
HOLD SPECIMEN: NORMAL
INR BLD: 2 (ref 0.9–1.1)

## 2022-03-09 PROCEDURE — 83036 HEMOGLOBIN GLYCOSYLATED A1C: CPT | Performed by: FAMILY MEDICINE

## 2022-03-09 PROCEDURE — 99214 OFFICE O/P EST MOD 30 MIN: CPT | Performed by: FAMILY MEDICINE

## 2022-03-09 PROCEDURE — 85610 PROTHROMBIN TIME: CPT | Performed by: FAMILY MEDICINE

## 2022-03-09 PROCEDURE — 36415 COLL VENOUS BLD VENIPUNCTURE: CPT | Performed by: FAMILY MEDICINE

## 2022-03-09 RX ORDER — FUROSEMIDE 20 MG
TABLET ORAL
Qty: 90 TABLET | Refills: 1 | Status: SHIPPED | OUTPATIENT
Start: 2022-03-09 | End: 2022-09-06

## 2022-03-09 RX ORDER — FLUTICASONE PROPIONATE 50 MCG
1 SPRAY, SUSPENSION (ML) NASAL DAILY
COMMUNITY
Start: 2022-03-09 | End: 2022-09-06

## 2022-03-09 RX ORDER — GLIPIZIDE 5 MG/1
TABLET ORAL
Qty: 180 TABLET | Refills: 3 | Status: SHIPPED | OUTPATIENT
Start: 2022-03-09 | End: 2023-03-07

## 2022-03-09 NOTE — PROGRESS NOTES
ANTICOAGULATION MANAGEMENT     Keith Desir 76 year old male is on warfarin with therapeutic INR result. (Goal INR 2.0-3.0)    Recent labs: (last 7 days)     03/09/22  0723   INR 2.0*       ASSESSMENT       Source(s): Chart Review and Patient/Caregiver Call       Warfarin doses taken: Warfarin taken as instructed    Diet: No new diet changes identified    New illness, injury, or hospitalization: No    Medication/supplement changes: None noted    Signs or symptoms of bleeding or clotting: No    Previous INR: Therapeutic last 2(+) visits    Additional findings: None       PLAN     Recommended plan for no diet, medication or health factor changes affecting INR     Dosing Instructions: Continue your current warfarin dose with next INR in 6 weeks       Summary  As of 3/9/2022    Full warfarin instructions:  5 mg every Tue, Sat; 7.5 mg all other days   Next INR check:  4/20/2022             Telephone call with  Florencia who verbalizes understanding and agrees to plan    Lab visit scheduled    Education provided: Contact [phone]  with any changes, questions or concerns.     Plan made per [facility] anticoagulation protocol    Debora Ramos RN  Anticoagulation Clinic  3/9/2022    _______________________________________________________________________     Anticoagulation Episode Summary     Current INR goal:  2.0-3.0   TTR:  74.9 % (1 y)   Target end date:  Indefinite   Send INR reminders to:  AJ MCINTOSH    Indications    Long term current use of anticoagulant therapy [Z79.01]  Chronic atrial fibrillation (HCC) [I48.20]           Comments:  don't print AVS unless change in dosing         Anticoagulation Care Providers     Provider Role Specialty Phone number    Cayla Gregorio MD Referring Family Medicine [phone]

## 2022-03-09 NOTE — PROGRESS NOTES
"  Assessment & Plan     (E11.22,  N18.2) Type 2 diabetes mellitus with stage 2 chronic kidney disease, without long-term current use of insulin (H)  (primary encounter diagnosis)  Plan: Lipid panel reflex to direct LDL Fasting,         HEMOGLOBIN A1C, glipiZIDE (GLUCOTROL) 5 MG         tablet, UA Macro with Reflex to Micro and         Culture - lab collect        Continue medications. Counseled to make better food choices, exercise as tolerated, and lose weight. Follow-up 6 months     (E78.5) Hyperlipidemia LDL goal <70  Comment: Well controlled with medications without side effects.   Plan: Lipid panel reflex to direct LDL Fasting          (I48.20) Chronic atrial fibrillation (H)  Comment: rate controlled and anticoagulated     (I50.9) CHF (NYHA class II, ACC/AHA stage C) (H)  Comment: euvolemic, on beta blocker and ACE/ARB   Plan: furosemide (LASIX) 20 MG tablet          (J43.9) Pulmonary emphysema, unspecified emphysema type (H)  Comment: Well controlled with medications without side effects.     (Z72.0) Tobacco abuse  Plan: Urged cessation and offered my support.     (J30.9) Allergic rhinitis, unspecified seasonality, unspecified trigger  Plan: use flonase; follow-up as needed               Tobacco Cessation:   reports that he has been smoking cigarettes. He has a 45.00 pack-year smoking history. He has never used smokeless tobacco.  Tobacco Cessation Action Plan: Information offered: Patient not interested at this time    BMI:   Estimated body mass index is 33.49 kg/m  as calculated from the following:    Height as of this encounter: 1.715 m (5' 7.5\").    Weight as of this encounter: 98.4 kg (217 lb).   Weight management plan: Discussed healthy diet and exercise guidelines    See Patient Instructions    Return in about 6 months (around 9/9/2022) for Wellness visit, diabetes.    Cayla Gregorio MD  [facility]   Ed is a 76 year old who presents for the following health issues  " accompanied by his spouse.    History of Present Illness       COPD:  He presents for follow up of COPD.  Overall, COPD symptoms are stable since last visit. He has same as usual fatigue or shortness of breath with exertion and no shortness of breath at rest.He sometimes coughs and does not have change in sputum. No recent fever. He can walk less than 1 block without stopping to rest. He can walk 2 flights of stairs without resting.The patient has had no ED, urgent care, or hospital admissions because of COPD since the last visit.     He eats 0-1 servings of fruits and vegetables daily.He consumes 1 sweetened beverage(s) daily.He exercises with enough effort to increase his heart rate 9 or less minutes per day.  He exercises with enough effort to increase his heart rate 3 or less days per week.   He is taking medications regularly.       Diabetes Follow-up      How often are you checking your blood sugar? Not at all    What concerns do you have today about your diabetes? None     Do you have any of these symptoms? (Select all that apply)  Numbness in feet              Hyperlipidemia Follow-Up      Are you regularly taking any medication or supplement to lower your cholesterol?   Yes- atorvastatin    Are you having muscle aches or other side effects that you think could be caused by your cholesterol lowering medication?  No    Hypertension Follow-up      Do you check your blood pressure regularly outside of the clinic? No     Are you following a low salt diet? Yes    Are your blood pressures ever more than 140 on the top number (systolic) OR more   than 90 on the bottom number (diastolic), for example 140/90?     BP Readings from Last 2 Encounters:   03/09/22 134/87   10/25/21 110/74     Hemoglobin A1C POCT (%)   Date Value   03/02/2021 6.7 (H)   11/17/2020 6.1 (H)     Hemoglobin A1C (%)   Date Value   09/07/2021 7.0 (H)     LDL Cholesterol Calculated (mg/dL)   Date Value   03/02/2021 30   02/26/2020 40  "          Review of Systems   CONSTITUTIONAL: NEGATIVE for fever, chills, change in weight  INTEGUMENTARY/SKIN: NEGATIVE for worrisome rashes, moles or lesions  EYES: NEGATIVE for vision changes or irritation  ENT/MOUTH: NEGATIVE for ear, mouth and throat problems  RESP: NEGATIVE for significant cough or SOB  CV: NEGATIVE for chest pain, palpitations or peripheral edema  MUSCULOSKELETAL: NEGATIVE for significant arthralgias or myalgia  NEURO: NEGATIVE for weakness, dizziness or paresthesias  ENDOCRINE: Hx diabetes      Objective    /87 (BP Location: Left arm, Patient Position: Sitting, Cuff Size: Adult Large)   Pulse 88   Temp 97.9  F (36.6  C) (Oral)   Ht 1.715 m (5' 7.5\")   Wt 98.4 kg (217 lb)   SpO2 93%   BMI 33.49 kg/m    Body mass index is 33.49 kg/m .  Physical Exam   GENERAL: alert, no distress and obese  EYES: Eyes grossly normal to inspection, PERRL and conjunctivae and sclerae normal  NECK: no adenopathy, no asymmetry, masses, or scars and thyroid normal to palpation  RESP: lungs clear to auscultation - no rales, rhonchi or wheezes  CV: irregularly irregular rhythm, normal S1 S2, no S3 or S4 and no murmur, click or rub  PSYCH: mentation appears normal, affect normal/bright    Results for orders placed or performed in visit on 03/09/22 (from the past 24 hour(s))   HEMOGLOBIN A1C   Result Value Ref Range    Hemoglobin A1C 7.4 (H) 0.0 - 5.6 %               "

## 2022-03-09 NOTE — RESULT ENCOUNTER NOTE
Ed,    Your blood glucose is a bit higher, but I recommend continuing your current medications. I recommend increasing exercise and eating at least 4 to 5 servings of fruits and vegetables daily.     See you in 6 months.     Best,  Cayla Gregroio MD

## 2022-04-20 ENCOUNTER — LAB (OUTPATIENT)
Dept: LAB | Facility: CLINIC | Age: 77
End: 2022-04-20
Payer: MEDICARE

## 2022-04-20 ENCOUNTER — TELEPHONE (OUTPATIENT)
Dept: FAMILY MEDICINE | Facility: CLINIC | Age: 77
End: 2022-04-20

## 2022-04-20 ENCOUNTER — ANTICOAGULATION THERAPY VISIT (OUTPATIENT)
Dept: ANTICOAGULATION | Facility: CLINIC | Age: 77
End: 2022-04-20

## 2022-04-20 DIAGNOSIS — Z79.01 LONG TERM CURRENT USE OF ANTICOAGULANT THERAPY: ICD-10-CM

## 2022-04-20 DIAGNOSIS — Z79.01 LONG TERM CURRENT USE OF ANTICOAGULANT THERAPY: Primary | ICD-10-CM

## 2022-04-20 DIAGNOSIS — I48.20 CHRONIC ATRIAL FIBRILLATION (H): ICD-10-CM

## 2022-04-20 LAB — INR BLD: 2.4 (ref 0.9–1.1)

## 2022-04-20 PROCEDURE — 36416 COLLJ CAPILLARY BLOOD SPEC: CPT

## 2022-04-20 PROCEDURE — 85610 PROTHROMBIN TIME: CPT

## 2022-04-20 NOTE — TELEPHONE ENCOUNTER
approved to have extended interval rechecks every 8-12 weeks, extending + 2 weeks after each additional therapeutic result to max of 12 weeks while on stable maintenance therapy

## 2022-04-20 NOTE — PROGRESS NOTES
ANTICOAGULATION MANAGEMENT     Keith Desir 76 year old male is on warfarin with therapeutic INR result. (Goal INR 2.0-3.0)    Recent labs: (last 7 days)     04/20/22  0920   INR 2.4*       ASSESSMENT       Source(s): Chart Review and Patient/Caregiver Call       Warfarin doses taken: Warfarin taken as instructed    Diet: No new diet changes identified    New illness, injury, or hospitalization: No    Medication/supplement changes: None noted    Signs or symptoms of bleeding or clotting: No    Previous INR: Therapeutic last 2(+) visits    Additional findings: None       PLAN     Recommended plan for no diet, medication or health factor changes affecting INR     Dosing Instructions: continue your current warfarin dose with next INR in 7 weeks   Patient is requesting to extend recheck intervals, message sent to his PCP and advised that if not okay with provider will need to recheck INR in 6 weeks.      Summary  As of 4/20/2022    Full warfarin instructions:  5 mg every Tue, Sat; 7.5 mg all other days   Next INR check:  6/1/2022             Telephone call with  Melissa who verbalizes understanding and agrees to plan    Lab visit scheduled    Education provided: Monitoring for bleeding signs and symptoms and Monitoring for clotting signs and symptoms    Plan made per [facility] anticoagulation protocol    Debora Ramos RN  Anticoagulation Clinic  4/20/2022    _______________________________________________________________________     Anticoagulation Episode Summary     Current INR goal:  2.0-3.0   TTR:  74.9 % (1 y)   Target end date:  Indefinite   Send INR reminders to:  AJ MCINTOSH    Indications    Long term current use of anticoagulant therapy [Z79.01]  Chronic atrial fibrillation (HCC) [I48.20]           Comments:  don't print AVS unless change in dosing         Anticoagulation Care Providers     Provider Role Specialty Phone number    Cayla Gregorio MD Referring Family Medicine [phone]

## 2022-04-20 NOTE — TELEPHONE ENCOUNTER
Anticoagulation-Extended Recheck Request    Under [facility] Anticoagulation protocol, Anticoagulation team may extend INR recheck interval + 1 week for each stable in range INR to max of 6 weeks. Extended interval rechecks between 8-12 weeks for patients on stable maintenance therapy require an approval (one time) from referring provider.    Anticoagulation clinic suggests consideration of extended intervals in medically stable patients, with standard INR goals, and no change in warfarin dose for last 4-6 months    Keith Desir, 76 year old, male has been on:    Current recheck interval: 6 week  Compliant: Yes  Stable warfarin dose since: 11/24/21  INR Goal: 2.0-3.0  Time in Therapeutic range: 74.9%    Lab Results   Component Value Date    INR 2.4 04/20/2022    INR 2.0 03/09/2022    INR 2.5 02/02/2022    INR 2.3 12/22/2021    INR 2.8 11/24/2021    INR 3.8 11/02/2021    INR 2.5 09/21/2021    INR 1.1 09/13/2021    INR 2.60 06/29/2021    INR 2.10 05/18/2021    INR 2.00 04/06/2021    INR 2.00 02/23/2021    INR 2.50 01/12/2021    INR 3.30 12/29/2020    INR 2.20 11/17/2020    INR 2.80 10/06/2020         Please advise if Keith is approved to have extended interval rechecks every 8-12 weeks, extending + 2 weeks after each additional therapeutic result to max of 12 weeks while on stable maintenance therapy    Thank you,    Debora Ramos RN

## 2022-05-03 NOTE — PATIENT INSTRUCTIONS
Some signs and symptoms of bleeding include: Nose bleed or cut that does not stop bleeding in 10 minutes, bleeding of the gums, vomiting (will look like coffee grounds) or coughing up blood, unusual, easy or large areas of bruising, increased or unexpected vaginal bleeding or increased menstrual flow, red or black stools, red or orange urine, prolonged or severe headache, pale skin, unusual or constant tiredness.  If you have these please call 911 or seek medical care immediately.      Some signs and symptoms of clots include: pain or tenderness in arm or leg, swelling in arm or leg, changes in skin color, or area is warm to touch, shortness or breath, trouble breathing.  Numbness or weakness especially on 1 side of the body, sudden trouble speaking or swallowing, sudden trouble seeing, sudden confusion, dizzy spells or headache.  If you have these please call 911 or seek medical care immediately.     [Time Spent: ___ minutes] : I have spent [unfilled] minutes of time on the encounter.

## 2022-05-19 DIAGNOSIS — N18.2 TYPE 2 DIABETES MELLITUS WITH STAGE 2 CHRONIC KIDNEY DISEASE, WITHOUT LONG-TERM CURRENT USE OF INSULIN (H): ICD-10-CM

## 2022-05-19 DIAGNOSIS — E11.22 TYPE 2 DIABETES MELLITUS WITH STAGE 2 CHRONIC KIDNEY DISEASE, WITHOUT LONG-TERM CURRENT USE OF INSULIN (H): ICD-10-CM

## 2022-05-20 RX ORDER — EMPAGLIFLOZIN 10 MG/1
TABLET, FILM COATED ORAL
Qty: 90 TABLET | Refills: 0 | Status: SHIPPED | OUTPATIENT
Start: 2022-05-20 | End: 2022-08-16

## 2022-05-20 NOTE — TELEPHONE ENCOUNTER
"Requested Prescriptions   Signed Prescriptions Disp Refills    JARDIANCE 10 MG TABS tablet 90 tablet 0     Sig: TAKE 1 TABLET BY MOUTH ONCE DAILY       Sodium Glucose Co-Transport Inhibitor Agents Passed - 5/19/2022  1:24 PM        Passed - Patient has documented A1c within the specified period of time.     If HgbA1C is 8 or greater, it needs to be on file within the past 3 months.  If less than 8, must be on file within the past 6 months.     Recent Labs   Lab Test 03/09/22  0723   A1C 7.4*             Passed - No creatinine >1.4 or GFR <45 within the past 12 mos     Recent Labs   Lab Test 10/25/21  1002 03/02/21  0710   GFRESTIMATED 78 84   GFRESTBLACK  --  >90       Recent Labs   Lab Test 10/25/21  1002   CR 0.96             Passed - Medication is active on med list        Passed - Patient is age 18 or older        Passed - Patient has documented normal Potassium within the last 12 mos.     Recent Labs   Lab Test 10/25/21  1002   POTASSIUM 4.7             Passed - Recent (6 mo) or future (30 days) visit within the authorizing provider's specialty     Patient had office visit in the last 6 months or has a visit in the next 30 days with authorizing provider or within the authorizing provider's specialty.  See \"Patient Info\" tab in inbasket, or \"Choose Columns\" in Meds & Orders section of the refill encounter.               Thanks,  DIANN Masters  [facility]     "

## 2022-06-08 ENCOUNTER — LAB (OUTPATIENT)
Dept: LAB | Facility: CLINIC | Age: 77
End: 2022-06-08
Payer: MEDICARE

## 2022-06-08 ENCOUNTER — ANTICOAGULATION THERAPY VISIT (OUTPATIENT)
Dept: ANTICOAGULATION | Facility: CLINIC | Age: 77
End: 2022-06-08

## 2022-06-08 DIAGNOSIS — Z79.01 LONG TERM CURRENT USE OF ANTICOAGULANT THERAPY: Primary | ICD-10-CM

## 2022-06-08 DIAGNOSIS — I48.20 CHRONIC ATRIAL FIBRILLATION (H): ICD-10-CM

## 2022-06-08 DIAGNOSIS — Z79.01 LONG TERM CURRENT USE OF ANTICOAGULANT THERAPY: ICD-10-CM

## 2022-06-08 LAB — INR BLD: 2 (ref 0.9–1.1)

## 2022-06-08 PROCEDURE — 85610 PROTHROMBIN TIME: CPT

## 2022-06-08 PROCEDURE — 36416 COLLJ CAPILLARY BLOOD SPEC: CPT

## 2022-06-08 NOTE — PROGRESS NOTES
ANTICOAGULATION MANAGEMENT     Keith Desir 76 year old male is on warfarin with therapeutic INR result. (Goal INR 2.0-3.0)    Recent labs: (last 7 days)     06/08/22  0923   INR 2.0*       ASSESSMENT       Source(s): Chart Review and Patient/Caregiver Call       Warfarin doses taken: Warfarin taken as instructed    Diet: No new diet changes identified    New illness, injury, or hospitalization: No    Medication/supplement changes: None noted    Signs or symptoms of bleeding or clotting: No    Previous INR: Therapeutic last 2(+) visits    Additional findings: None       PLAN     Recommended plan for no diet, medication or health factor changes affecting INR     Dosing Instructions: continue your current warfarin dose with next INR in 8 weeks       Summary  As of 6/8/2022    Full warfarin instructions:  5 mg every Tue, Sat; 7.5 mg all other days   Next INR check:  8/3/2022             Telephone call with  Melissa who verbalizes understanding and agrees to plan    Lab visit scheduled    Education provided: Monitoring for bleeding signs and symptoms and Monitoring for clotting signs and symptoms    Plan made per [facility] anticoagulation protocol    Debora Ramos RN  Anticoagulation Clinic  6/8/2022    _______________________________________________________________________     Anticoagulation Episode Summary     Current INR goal:  2.0-3.0   TTR:  74.9 % (1 y)   Target end date:  Indefinite   Send INR reminders to:  AJ PALUMBO    Indications    Long term current use of anticoagulant therapy [Z79.01]  Chronic atrial fibrillation (HCC) [I48.20]           Comments:  don't print AVS unless change in dosing         Anticoagulation Care Providers     Provider Role Specialty Phone number    Cayla Gregorio MD Referring Family Medicine [phone]

## 2022-06-12 ENCOUNTER — OFFICE VISIT (OUTPATIENT)
Dept: URGENT CARE | Facility: URGENT CARE | Age: 77
End: 2022-06-12
Payer: MEDICARE

## 2022-06-12 VITALS
DIASTOLIC BLOOD PRESSURE: 74 MMHG | OXYGEN SATURATION: 97 % | HEART RATE: 65 BPM | WEIGHT: 216.4 LBS | BODY MASS INDEX: 33.39 KG/M2 | TEMPERATURE: 96.9 F | SYSTOLIC BLOOD PRESSURE: 112 MMHG

## 2022-06-12 DIAGNOSIS — K12.2 ORAL INFECTION: Primary | ICD-10-CM

## 2022-06-12 DIAGNOSIS — Z79.01 CHRONIC ANTICOAGULATION: ICD-10-CM

## 2022-06-12 PROCEDURE — 99214 OFFICE O/P EST MOD 30 MIN: CPT | Performed by: FAMILY MEDICINE

## 2022-06-12 NOTE — PROGRESS NOTES
Chief complaint: swelling and pain    Woke up and the hurts and swollen   Right cheek/upper lip  Came on very suddenly  Tried hot pack and it seemed to help  Patient is completely edentulous     accompanied by wife   Has tried supportive treatment no relief  Because of persistent symptoms patient was brought in to be seen    No fevers or chills chest pain or shortness of breath     Allergies   Allergen Reactions     Nkda [No Known Drug Allergies]        Past Medical History:   Diagnosis Date     AF (atrial fibrillation) (H)      CHF (congestive heart failure), NYHA class II (H)      CKD (chronic kidney disease) stage 2, GFR 60-89 ml/min 2018     COPD (chronic obstructive pulmonary disease) (H)      Diabetes mellitus, type 2 (H) 04/22/2015     Diabetic neuropathy (H)      Elevated alkaline phosphatase level      Fracture, scapula      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/2013     Hepatitis C      HTN (hypertension)      Hyperlipidemia      Morbid obesity (H)      NAFLD (nonalcoholic fatty liver disease)      Polycythemia secondary to smoking      Varicose veins of legs 08/08/2018       acetaminophen (TYLENOL) 650 MG CR tablet, Take 2 tablets (1,300 mg) by mouth every 8 hours as needed for pain  albuterol (PROAIR HFA/PROVENTIL HFA/VENTOLIN HFA) 108 (90 Base) MCG/ACT inhaler, Inhale 1-2 puffs into the lungs every 4 hours as needed for shortness of breath / dyspnea  atorvastatin (LIPITOR) 20 MG tablet, Take 1 tablet (20 mg) by mouth daily  digoxin (LANOXIN) 125 MCG tablet, Take 1 tablet (125 mcg) by mouth daily  dimenhyDRINATE (DRAMAMINE PO), Take by mouth as needed  fluticasone (FLONASE) 50 MCG/ACT nasal spray, Spray 1 spray into both nostrils daily  furosemide (LASIX) 20 MG tablet, Take 1 tablet (20 mg) by mouth 1 times daily  gabapentin (NEURONTIN) 300 MG capsule, TAKE 1 CAPSULE BY MOUTH EVERY MORNING, 1 CAP EVERY MIDDAY AND 2 CAPS EVERY EVENING.  glipiZIDE (GLUCOTROL) 5 MG tablet, TAKE 1 TABLET BY MOUTH TWICE DAILY BEFORE  MEAL(S)  JARDIANCE 10 MG TABS tablet, TAKE 1 TABLET BY MOUTH ONCE DAILY  lisinopril (ZESTRIL) 40 MG tablet, Take 1 tablet (40 mg) by mouth daily  metFORMIN (GLUCOPHAGE) 1000 MG tablet, Take 1 tablet (1,000 mg) by mouth 2 times daily (with meals)  metoprolol tartrate (LOPRESSOR) 100 MG tablet, Take 2 tablets by mouth twice daily  tiotropium (SPIRIVA HANDIHALER) 18 MCG inhaled capsule, INHALE 1 CAPSULE (18 MCG) INTO THE LUNGS DAILY  warfarin ANTICOAGULANT (COUMADIN) 2.5 MG tablet, TAKE 2 TABLETS (5MG) BY MOUTH ON TUESDAY & SATURDAY. TAKE 3 TABLETS (7.5MG) ON ALL OTHER DAYS OR AS DIRECTED BY INR CLINIC.    No current facility-administered medications on file prior to visit.      Social History     Tobacco Use     Smoking status: Current Every Day Smoker     Packs/day: 1.00     Years: 45.00     Pack years: 45.00     Types: Cigarettes     Smokeless tobacco: Never Used   Substance Use Topics     Alcohol use: Yes     Alcohol/week: 0.0 - 4.2 standard drinks     Drug use: No       ROS:  review of systems negative except for noted above.       OBJECTIVE:  /74   Pulse 65   Temp 96.9  F (36.1  C) (Tympanic)   Wt 98.2 kg (216 lb 6.4 oz)   SpO2 97%   BMI 33.39 kg/m     General:   awake, alert, and cooperative.  NAD.   Head: Normocephalic, atraumatic.  Eyes: Conjunctiva clear,   Neuro: Alert and oriented - normal speech.  CN intact no facial weakness or numbness   MS: Using extremities freely  PSYCH:  Normal affect, normal speech  ENT:    midline nasal septum no congestion. Bilateral TYMPANINC MEMBRANE normal   Mouth: moist buccal mucosa nonhyperemic posterior pharyngeal wall tonsils not enlarged  Neck: supple no cervical lymphadenopathy   erythema warmth swelling over right upper lip cystic seen in the inner lip area but causing swelling visible outside causing swelling of the right upper lip area with some asymmetry  measuring approximately 1-1.5 cm, No purulent discharge, No fluctuation.     ASSESSMENT:    ICD-10-CM     1. Oral infection  K12.2 amoxicillin-clavulanate (AUGMENTIN) 875-125 MG tablet   2. Chronic anticoagulation  Z79.01        PLAN:   Prescribed with augmentin  Side effects discussed warned about GI side effects and risk of cdiff.  Aware can cause interaction with his coumadin - he will notify INR team    Go to ER if with any worsening pain or swelling especially if with fever or chills.     Watch for any worsening redness warmth swelling tenderness or inflammation or purulent discharge. If with any of these please come in immediately to be re-evaluated  Please come in immediately also if with any fever or chills  Adverse reactions of medications discussed.  Over the counter medications discussed.   Aware to come back in if with worsening symptoms or if no relief despite treatment plan  Patient voiced understanding and had no further questions.     Follow up:  Primary care provider in 2-3 days  Advised about symptoms which might herald more serious problems.        Vilma Coombs MD

## 2022-06-12 NOTE — Clinical Note
Hello - will you please route to INR team that follows patient. Patient was placed on augmentin. Thank you

## 2022-06-13 ENCOUNTER — TELEPHONE (OUTPATIENT)
Dept: ANTICOAGULATION | Facility: CLINIC | Age: 77
End: 2022-06-13
Payer: MEDICARE

## 2022-06-13 DIAGNOSIS — Z79.01 LONG TERM CURRENT USE OF ANTICOAGULANT THERAPY: Primary | ICD-10-CM

## 2022-06-13 DIAGNOSIS — I48.20 CHRONIC ATRIAL FIBRILLATION (H): ICD-10-CM

## 2022-06-13 NOTE — TELEPHONE ENCOUNTER
ANTICOAGULATION  MANAGEMENT     Interacting Medication Review    Interacting medication(s): augmentin with warfarin.    Duration: 10 days  (6/12/2022 to 6/22/2022)    Indication: oral infection    New medication?: Yes, interaction may increase INR and risk of bleeding       PLAN     Continue current warfarin dose. Recommend to check INR on 6/17/2022    Spoke with Melissa    Anticoagulation Calendar updated    Hortencia Carlson RN

## 2022-06-17 ENCOUNTER — ANTICOAGULATION THERAPY VISIT (OUTPATIENT)
Dept: ANTICOAGULATION | Facility: CLINIC | Age: 77
End: 2022-06-17

## 2022-06-17 ENCOUNTER — LAB (OUTPATIENT)
Dept: LAB | Facility: CLINIC | Age: 77
End: 2022-06-17
Payer: MEDICARE

## 2022-06-17 DIAGNOSIS — I48.20 CHRONIC ATRIAL FIBRILLATION (H): ICD-10-CM

## 2022-06-17 DIAGNOSIS — Z79.01 LONG TERM CURRENT USE OF ANTICOAGULANT THERAPY: Primary | ICD-10-CM

## 2022-06-17 DIAGNOSIS — Z79.01 LONG TERM CURRENT USE OF ANTICOAGULANT THERAPY: ICD-10-CM

## 2022-06-17 LAB — INR BLD: 2.3 (ref 0.9–1.1)

## 2022-06-17 PROCEDURE — 36416 COLLJ CAPILLARY BLOOD SPEC: CPT

## 2022-06-17 PROCEDURE — 85610 PROTHROMBIN TIME: CPT

## 2022-06-17 NOTE — PROGRESS NOTES
ANTICOAGULATION MANAGEMENT     Keith Desir 76 year old male is on warfarin with therapeutic INR result. (Goal INR 2.0-3.0)    Recent labs: (last 7 days)     06/17/22  1210   INR 2.3*       ASSESSMENT       Source(s): Chart Review       Warfarin doses taken: Warfarin taken as instructed    Diet: No new diet changes identified    New illness, injury, or hospitalization: Yes: has infection orally    Medication/supplement changes: 10 day course of Agumentin    Signs or symptoms of bleeding or clotting: No    Previous INR: Therapeutic last 2(+) visits    Additional findings: comes every 8-12 weeks when stable. placed on antibiotic so came in early.       PLAN     Recommended plan for temporary change(s) affecting INR     Dosing Instructions: continue your current warfarin dose with next INR in 8 weeks   recommend sooner since still on abx but since therapeutic will only come in 8 weeks    Summary  As of 6/17/2022    Full warfarin instructions:  5 mg every Tue, Sat; 7.5 mg all other days   Next INR check:  8/12/2022             Telephone call with Ed who verbalizes understanding and agrees to plan    Lab visit scheduled    Education provided: Please call back if any changes to your diet, medications or how you've been taking warfarin    Plan made per [facility] anticoagulation protocol    Dior Olvera RN  Anticoagulation Clinic  6/17/2022    _______________________________________________________________________     Anticoagulation Episode Summary     Current INR goal:  2.0-3.0   TTR:  75.0 % (1 y)   Target end date:  Indefinite   Send INR reminders to:  AJ PALUMBO    Indications    Long term current use of anticoagulant therapy [Z79.01]  Chronic atrial fibrillation (HCC) [I48.20]           Comments:  don't print AVS unless change in dosing         Anticoagulation Care Providers     Provider Role Specialty Phone number    Cayla Gregorio MD Referring Family Medicine [phone]

## 2022-06-22 DIAGNOSIS — M79.604 PAIN OF RIGHT LOWER EXTREMITY: ICD-10-CM

## 2022-06-23 RX ORDER — GABAPENTIN 300 MG/1
CAPSULE ORAL
Qty: 360 CAPSULE | Refills: 0 | Status: SHIPPED | OUTPATIENT
Start: 2022-06-23 | End: 2022-09-06

## 2022-06-23 NOTE — TELEPHONE ENCOUNTER
Routing refill request to provider for review/approval because:  Drug not on the [facility] refill protocol           Pending Prescriptions:                       Disp   Refills    gabapentin (NEURONTIN) 300 MG capsule [Pha*360 ca*0        Sig: TAKE 1 CAPSULE BY MOUTH IN THE MORNING. THEN TAKE 1           CAPSULE EVERY MIDDAY. THEN TAKE 2 CAPSULES EVERY           EVENING        Migel Diaz RN

## 2022-06-28 ENCOUNTER — TELEPHONE (OUTPATIENT)
Dept: FAMILY MEDICINE | Facility: CLINIC | Age: 77
End: 2022-06-28

## 2022-06-28 NOTE — TELEPHONE ENCOUNTER
Patient Quality Outreach    Patient is due for the following:   Physical  - Due after 9/2/21    NEXT STEPS:   Schedule a yearly physical    Type of outreach:    Sent theAudience message.      Questions for provider review:    None     Nafisa Saeed CMA  Chart routed to Care Team.

## 2022-07-12 NOTE — TELEPHONE ENCOUNTER
Patient Quality Outreach    Patient is due for the following:   Physical  - Due after 9/2/22    NEXT STEPS:   Patient was scheduled for an appointment. Patient was scheduled for a diabetic Follow-up. Spoke to patient and changed to wellness visit.    Type of outreach:    Chart review performed, no outreach needed.    Next Steps:  Reach out within 90 days via [facility].    Max number of attempts reached: Yes. Will try again in 90 days if patient still on fail list.    Questions for provider review:    None     Nafisa Saeed CMA  Chart routed to Care Team.

## 2022-07-13 DIAGNOSIS — I48.20 CHRONIC ATRIAL FIBRILLATION (H): Primary | ICD-10-CM

## 2022-07-13 DIAGNOSIS — Z79.01 LONG TERM CURRENT USE OF ANTICOAGULANT THERAPY: ICD-10-CM

## 2022-07-15 DIAGNOSIS — I48.20 CHRONIC ATRIAL FIBRILLATION (H): ICD-10-CM

## 2022-07-15 NOTE — TELEPHONE ENCOUNTER
"Requested Prescriptions   Pending Prescriptions Disp Refills     warfarin ANTICOAGULANT (COUMADIN) 2.5 MG tablet [Pharmacy Med Name: Warfarin Sodium Oral Tablet 2.5 MG] 240 tablet 0     Sig: TAKE 2 TABLETS (5MG) BY MOUTH ON TUESDAY & SATURDAY. TAKE 3 TABLETS (7.5MG) ON ALL OTHER DAYS OR AS DIRECTED BY INR CLINIC.       Vitamin K Antagonists Failed - 7/15/2022  2:00 AM        Failed - INR is within goal in the past 6 weeks     Confirm INR is within goal in the past 6 weeks.     Recent Labs   Lab Test 06/17/22  1210   INR 2.3*                       Passed - Recent (12 mo) or future (30 days) visit within the authorizing provider's specialty     Patient has had an office visit with the authorizing provider or a provider within the authorizing providers department within the previous 12 mos or has a future within next 30 days. See \"Patient Info\" tab in inbasket, or \"Choose Columns\" in Meds & Orders section of the refill encounter.              Passed - Medication is active on med list        Passed - Patient is 18 years of age or older           ThanksSonam RN  [facility]     "

## 2022-07-16 RX ORDER — WARFARIN SODIUM 2.5 MG/1
TABLET ORAL
Qty: 240 TABLET | Refills: 0 | Status: SHIPPED | OUTPATIENT
Start: 2022-07-16 | End: 2022-09-06

## 2022-08-03 ENCOUNTER — ANTICOAGULATION THERAPY VISIT (OUTPATIENT)
Dept: ANTICOAGULATION | Facility: CLINIC | Age: 77
End: 2022-08-03

## 2022-08-03 ENCOUNTER — LAB (OUTPATIENT)
Dept: LAB | Facility: CLINIC | Age: 77
End: 2022-08-03
Payer: MEDICARE

## 2022-08-03 DIAGNOSIS — Z79.01 LONG TERM CURRENT USE OF ANTICOAGULANT THERAPY: Primary | ICD-10-CM

## 2022-08-03 DIAGNOSIS — I48.20 CHRONIC ATRIAL FIBRILLATION (H): ICD-10-CM

## 2022-08-03 DIAGNOSIS — Z79.01 LONG TERM CURRENT USE OF ANTICOAGULANT THERAPY: ICD-10-CM

## 2022-08-03 LAB — INR BLD: 1.6 (ref 0.9–1.1)

## 2022-08-03 PROCEDURE — 36416 COLLJ CAPILLARY BLOOD SPEC: CPT

## 2022-08-03 PROCEDURE — 85610 PROTHROMBIN TIME: CPT

## 2022-08-03 NOTE — PROGRESS NOTES
ANTICOAGULATION MANAGEMENT     Keith Desir 76 year old male is on warfarin with subtherapeutic INR result. (Goal INR 2.0-3.0)    Recent labs: (last 7 days)     08/03/22  0919   INR 1.6*       ASSESSMENT       Source(s): Chart Review and Patient/Caregiver Call       Warfarin doses taken: Warfarin taken as instructed    Diet: No new diet changes identified    New illness, injury, or hospitalization: No    Medication/supplement changes: None noted    Signs or symptoms of bleeding or clotting: No    Previous INR: Therapeutic last 2(+) visits    Additional findings: None       PLAN     Recommended plan for no diet, medication or health factor changes affecting INR     Dosing Instructions: booster dose then continue your current warfarin dose with next INR in 2 weeks       Summary  As of 8/3/2022    Full warfarin instructions:  8/3: 10 mg; Otherwise 5 mg every Tue, Sat; 7.5 mg all other days   Next INR check:  8/17/2022             Telephone call with Ed who verbalizes understanding and agrees to plan    Lab visit scheduled    Education provided: Goal range and significance of current result    Plan made per [facility] anticoagulation protocol    Ban Matamoros RN  Anticoagulation Clinic  8/3/2022    _______________________________________________________________________     Anticoagulation Episode Summary     Current INR goal:  2.0-3.0   TTR:  72.3 % (1 y)   Target end date:  Indefinite   Send INR reminders to:  AJ PALUMBO    Indications    Long term current use of anticoagulant therapy [Z79.01]  Chronic atrial fibrillation (HCC) [I48.20]           Comments:           Anticoagulation Care Providers     Provider Role Specialty Phone number    Cayla Gregorio MD Referring Family Medicine [phone]

## 2022-08-15 DIAGNOSIS — E11.22 TYPE 2 DIABETES MELLITUS WITH STAGE 2 CHRONIC KIDNEY DISEASE, WITHOUT LONG-TERM CURRENT USE OF INSULIN (H): ICD-10-CM

## 2022-08-15 DIAGNOSIS — N18.2 TYPE 2 DIABETES MELLITUS WITH STAGE 2 CHRONIC KIDNEY DISEASE, WITHOUT LONG-TERM CURRENT USE OF INSULIN (H): ICD-10-CM

## 2022-08-16 RX ORDER — EMPAGLIFLOZIN 10 MG/1
TABLET, FILM COATED ORAL
Qty: 90 TABLET | Refills: 0 | Status: SHIPPED | OUTPATIENT
Start: 2022-08-16 | End: 2022-09-06

## 2022-08-16 NOTE — TELEPHONE ENCOUNTER
Prescription approved per [facility] Refill Protocol.    Vanessa Landaverde, RN, BSN

## 2022-08-17 ENCOUNTER — LAB (OUTPATIENT)
Dept: LAB | Facility: CLINIC | Age: 77
End: 2022-08-17
Payer: MEDICARE

## 2022-08-17 ENCOUNTER — ANTICOAGULATION THERAPY VISIT (OUTPATIENT)
Dept: ANTICOAGULATION | Facility: CLINIC | Age: 77
End: 2022-08-17

## 2022-08-17 DIAGNOSIS — I48.20 CHRONIC ATRIAL FIBRILLATION (H): ICD-10-CM

## 2022-08-17 DIAGNOSIS — Z79.01 LONG TERM CURRENT USE OF ANTICOAGULANT THERAPY: ICD-10-CM

## 2022-08-17 DIAGNOSIS — Z79.01 LONG TERM CURRENT USE OF ANTICOAGULANT THERAPY: Primary | ICD-10-CM

## 2022-08-17 LAB — INR BLD: 1.8 (ref 0.9–1.1)

## 2022-08-17 PROCEDURE — 85610 PROTHROMBIN TIME: CPT

## 2022-08-17 PROCEDURE — 36416 COLLJ CAPILLARY BLOOD SPEC: CPT

## 2022-08-17 NOTE — PROGRESS NOTES
ANTICOAGULATION MANAGEMENT     Keith Desir 76 year old male is on warfarin with subtherapeutic INR result. (Goal INR 2.0-3.0)    Recent labs: (last 7 days)     08/17/22  0928   INR 1.8*       ASSESSMENT       Source(s): Chart Review and Patient/Caregiver Call       Warfarin doses taken: Warfarin taken as instructed    Diet: No new diet changes identified    New illness, injury, or hospitalization: No    Medication/supplement changes: None noted    Signs or symptoms of bleeding or clotting: No    Previous INR: Subtherapeutic    Additional findings: None       PLAN     Recommended plan for no diet, medication or health factor changes affecting INR     Dosing Instructions: Increase your warfarin dose (5.3% change) with next INR in 2 weeks       Summary  As of 8/17/2022    Full warfarin instructions:  5 mg every Tue; 7.5 mg all other days   Next INR check:               Telephone call with  Melissa who verbalizes understanding and agrees to plan    Lab visit scheduled    Education provided: Goal range and significance of current result    Plan made per [facility] anticoagulation protocol    Ban Matamoros RN  Anticoagulation Clinic  8/17/2022    _______________________________________________________________________     Anticoagulation Episode Summary     Current INR goal:  2.0-3.0   TTR:  71.1 % (1 y)   Target end date:  Indefinite   Send INR reminders to:  ANTICOSTEPH[facility]IE [facility]UM[facility]    Indications    Long term current use of anticoagulant therapy [Z79.01]  Chronic atrial fibrillation (HCC) [I48.20]           Comments:           Anticoagulation Care Providers     Provider Role Specialty Phone number    Cayla Gregorio MD Referring Family Medicine [phone]

## 2022-08-17 NOTE — PROGRESS NOTES
ANTICOAGULATION MANAGEMENT     Keith Desir 76 year old male is on warfarin with subtherapeutic INR result. (Goal INR 2.0-3.0)    Recent labs: (last 7 days)     08/17/22  0928   INR 1.8*       ASSESSMENT       Source(s): Chart Review    Previous INR was Subtherapeutic    Medication, diet, health changes since last INR chart reviewed; none identified           PLAN     Unable to reach Ed today.    LMTCB    Follow up required to confirm warfarin dose taken and assess for changes    Ban Matamoros RN  Anticoagulation Clinic  8/17/2022

## 2022-08-31 ENCOUNTER — ANTICOAGULATION THERAPY VISIT (OUTPATIENT)
Dept: ANTICOAGULATION | Facility: CLINIC | Age: 77
End: 2022-08-31

## 2022-08-31 ENCOUNTER — LAB (OUTPATIENT)
Dept: LAB | Facility: CLINIC | Age: 77
End: 2022-08-31
Payer: MEDICARE

## 2022-08-31 DIAGNOSIS — Z79.01 LONG TERM CURRENT USE OF ANTICOAGULANT THERAPY: ICD-10-CM

## 2022-08-31 DIAGNOSIS — I48.20 CHRONIC ATRIAL FIBRILLATION (H): ICD-10-CM

## 2022-08-31 DIAGNOSIS — Z79.01 LONG TERM CURRENT USE OF ANTICOAGULANT THERAPY: Primary | ICD-10-CM

## 2022-08-31 LAB — INR BLD: 2.3 (ref 0.9–1.1)

## 2022-08-31 PROCEDURE — 36416 COLLJ CAPILLARY BLOOD SPEC: CPT

## 2022-08-31 PROCEDURE — 85610 PROTHROMBIN TIME: CPT

## 2022-08-31 NOTE — PROGRESS NOTES
ANTICOAGULATION MANAGEMENT     Keith Desir 76 year old male is on warfarin with therapeutic INR result. (Goal INR 2.0-3.0)    Recent labs: (last 7 days)     08/31/22  1044   INR 2.3*       ASSESSMENT       Source(s): Chart Review and Patient/Caregiver Call       Warfarin doses taken: Warfarin taken as instructed    Diet: No new diet changes identified    New illness, injury, or hospitalization: No    Medication/supplement changes: None noted    Signs or symptoms of bleeding or clotting: No    Previous INR: Subtherapeutic    Additional findings: None       PLAN     Recommended plan for no diet, medication or health factor changes affecting INR     Dosing Instructions: Continue your current warfarin dose with next INR in 4 weeks       Summary  As of 8/31/2022    Full warfarin instructions:  5 mg every Tue; 7.5 mg all other days   Next INR check:  9/21/2022             Telephone call with Ed who verbalizes understanding and agrees to plan    Lab visit scheduled    Education provided: Goal range and significance of current result    Plan made per [facility] anticoagulation protocol       Ban Matamoros RN  Anticoagulation Clinic  8/31/2022    _______________________________________________________________________     Anticoagulation Episode Summary     Current INR goal:  2.0-3.0   TTR:  70.3 % (1 y)   Target end date:  Indefinite   Send INR reminders to:  AJ PALUMBO    Indications    Long term current use of anticoagulant therapy [Z79.01]  Chronic atrial fibrillation (HCC) [I48.20]           Comments:           Anticoagulation Care Providers     Provider Role Specialty Phone number    Cayla Gregorio MD Referring Family Medicine [phone]

## 2022-09-01 ASSESSMENT — ENCOUNTER SYMPTOMS
ABDOMINAL PAIN: 0
CONSTIPATION: 0
NAUSEA: 0
FREQUENCY: 0
HEMATURIA: 0
SHORTNESS OF BREATH: 0
PARESTHESIAS: 0
NERVOUS/ANXIOUS: 0
HEARTBURN: 0
DIZZINESS: 0
CHILLS: 0
WEAKNESS: 0
EYE PAIN: 0
JOINT SWELLING: 0
HEMATOCHEZIA: 0
PALPITATIONS: 0
MYALGIAS: 0
COUGH: 0
SORE THROAT: 0
DIARRHEA: 0
DYSURIA: 0
FEVER: 0
ARTHRALGIAS: 0
HEADACHES: 0

## 2022-09-01 ASSESSMENT — ACTIVITIES OF DAILY LIVING (ADL): CURRENT_FUNCTION: NO ASSISTANCE NEEDED

## 2022-09-06 ENCOUNTER — MYC MEDICAL ADVICE (OUTPATIENT)
Dept: FAMILY MEDICINE | Facility: CLINIC | Age: 77
End: 2022-09-06

## 2022-09-06 ENCOUNTER — OFFICE VISIT (OUTPATIENT)
Dept: FAMILY MEDICINE | Facility: CLINIC | Age: 77
End: 2022-09-06
Payer: MEDICARE

## 2022-09-06 VITALS
HEIGHT: 67 IN | DIASTOLIC BLOOD PRESSURE: 70 MMHG | WEIGHT: 211 LBS | OXYGEN SATURATION: 95 % | HEART RATE: 58 BPM | SYSTOLIC BLOOD PRESSURE: 128 MMHG | TEMPERATURE: 97.4 F | BODY MASS INDEX: 33.12 KG/M2

## 2022-09-06 DIAGNOSIS — Z72.0 TOBACCO ABUSE: ICD-10-CM

## 2022-09-06 DIAGNOSIS — J43.9 PULMONARY EMPHYSEMA, UNSPECIFIED EMPHYSEMA TYPE (H): Chronic | ICD-10-CM

## 2022-09-06 DIAGNOSIS — I48.20 CHRONIC ATRIAL FIBRILLATION (H): ICD-10-CM

## 2022-09-06 DIAGNOSIS — J30.9 ALLERGIC RHINITIS, UNSPECIFIED SEASONALITY, UNSPECIFIED TRIGGER: ICD-10-CM

## 2022-09-06 DIAGNOSIS — L57.0 AK (ACTINIC KERATOSIS): ICD-10-CM

## 2022-09-06 DIAGNOSIS — I50.32 CHRONIC HEART FAILURE WITH PRESERVED EJECTION FRACTION (H): ICD-10-CM

## 2022-09-06 DIAGNOSIS — E78.5 HYPERLIPIDEMIA LDL GOAL <70: ICD-10-CM

## 2022-09-06 DIAGNOSIS — E11.22 TYPE 2 DIABETES MELLITUS WITH STAGE 2 CHRONIC KIDNEY DISEASE, WITHOUT LONG-TERM CURRENT USE OF INSULIN (H): ICD-10-CM

## 2022-09-06 DIAGNOSIS — E11.42 DIABETIC POLYNEUROPATHY ASSOCIATED WITH TYPE 2 DIABETES MELLITUS (H): ICD-10-CM

## 2022-09-06 DIAGNOSIS — Z00.00 ENCOUNTER FOR MEDICARE ANNUAL WELLNESS EXAM: Primary | ICD-10-CM

## 2022-09-06 DIAGNOSIS — I12.9 RENAL HYPERTENSION: ICD-10-CM

## 2022-09-06 DIAGNOSIS — N18.2 TYPE 2 DIABETES MELLITUS WITH STAGE 2 CHRONIC KIDNEY DISEASE, WITHOUT LONG-TERM CURRENT USE OF INSULIN (H): ICD-10-CM

## 2022-09-06 LAB
ALBUMIN SERPL-MCNC: 3.6 G/DL (ref 3.4–5)
ALBUMIN UR-MCNC: NEGATIVE MG/DL
ALP SERPL-CCNC: 144 U/L (ref 40–150)
ALT SERPL W P-5'-P-CCNC: 27 U/L (ref 0–70)
ANION GAP SERPL CALCULATED.3IONS-SCNC: 3 MMOL/L (ref 3–14)
APPEARANCE UR: CLEAR
AST SERPL W P-5'-P-CCNC: 20 U/L (ref 0–45)
BASOPHILS # BLD AUTO: 0 10E3/UL (ref 0–0.2)
BASOPHILS NFR BLD AUTO: 0 %
BILIRUB SERPL-MCNC: 1.2 MG/DL (ref 0.2–1.3)
BILIRUB UR QL STRIP: NEGATIVE
BUN SERPL-MCNC: 15 MG/DL (ref 7–30)
CALCIUM SERPL-MCNC: 9 MG/DL (ref 8.5–10.1)
CHLORIDE BLD-SCNC: 102 MMOL/L (ref 94–109)
CHOLEST SERPL-MCNC: 104 MG/DL
CO2 SERPL-SCNC: 31 MMOL/L (ref 20–32)
COLOR UR AUTO: YELLOW
CREAT SERPL-MCNC: 0.84 MG/DL (ref 0.66–1.25)
CREAT UR-MCNC: 45 MG/DL
DIGOXIN SERPL-MCNC: 0.7 NG/ML (ref 0.6–2)
EOSINOPHIL # BLD AUTO: 0.3 10E3/UL (ref 0–0.7)
EOSINOPHIL NFR BLD AUTO: 3 %
ERYTHROCYTE [DISTWIDTH] IN BLOOD BY AUTOMATED COUNT: 14.7 % (ref 10–15)
FASTING STATUS PATIENT QL REPORTED: NO
GFR SERPL CREATININE-BSD FRML MDRD: >90 ML/MIN/1.73M2
GLUCOSE BLD-MCNC: 151 MG/DL (ref 70–99)
GLUCOSE UR STRIP-MCNC: >=1000 MG/DL
HBA1C MFR BLD: 6.9 % (ref 0–5.6)
HCT VFR BLD AUTO: 52.1 % (ref 40–53)
HDLC SERPL-MCNC: 31 MG/DL
HGB BLD-MCNC: 17.7 G/DL (ref 13.3–17.7)
HGB UR QL STRIP: NEGATIVE
KETONES UR STRIP-MCNC: NEGATIVE MG/DL
LDLC SERPL CALC-MCNC: 47 MG/DL
LEUKOCYTE ESTERASE UR QL STRIP: NEGATIVE
LYMPHOCYTES # BLD AUTO: 2.1 10E3/UL (ref 0.8–5.3)
LYMPHOCYTES NFR BLD AUTO: 20 %
MCH RBC QN AUTO: 33.8 PG (ref 26.5–33)
MCHC RBC AUTO-ENTMCNC: 34 G/DL (ref 31.5–36.5)
MCV RBC AUTO: 99 FL (ref 78–100)
MICROALBUMIN UR-MCNC: 14 MG/L
MICROALBUMIN/CREAT UR: 31.11 MG/G CR (ref 0–17)
MONOCYTES # BLD AUTO: 0.9 10E3/UL (ref 0–1.3)
MONOCYTES NFR BLD AUTO: 8 %
NEUTROPHILS # BLD AUTO: 7.4 10E3/UL (ref 1.6–8.3)
NEUTROPHILS NFR BLD AUTO: 69 %
NITRATE UR QL: NEGATIVE
NONHDLC SERPL-MCNC: 73 MG/DL
PH UR STRIP: 6 [PH] (ref 5–7)
PLATELET # BLD AUTO: 173 10E3/UL (ref 150–450)
POTASSIUM BLD-SCNC: 4.9 MMOL/L (ref 3.4–5.3)
PROT SERPL-MCNC: 7.1 G/DL (ref 6.8–8.8)
RBC # BLD AUTO: 5.24 10E6/UL (ref 4.4–5.9)
SODIUM SERPL-SCNC: 136 MMOL/L (ref 133–144)
SP GR UR STRIP: 1.01 (ref 1–1.03)
TRIGL SERPL-MCNC: 128 MG/DL
UROBILINOGEN UR STRIP-ACNC: 1 E.U./DL
WBC # BLD AUTO: 10.7 10E3/UL (ref 4–11)

## 2022-09-06 PROCEDURE — 81003 URINALYSIS AUTO W/O SCOPE: CPT | Performed by: FAMILY MEDICINE

## 2022-09-06 PROCEDURE — 80162 ASSAY OF DIGOXIN TOTAL: CPT | Performed by: FAMILY MEDICINE

## 2022-09-06 PROCEDURE — 83036 HEMOGLOBIN GLYCOSYLATED A1C: CPT | Performed by: FAMILY MEDICINE

## 2022-09-06 PROCEDURE — G0439 PPPS, SUBSEQ VISIT: HCPCS | Performed by: FAMILY MEDICINE

## 2022-09-06 PROCEDURE — 80061 LIPID PANEL: CPT | Performed by: FAMILY MEDICINE

## 2022-09-06 PROCEDURE — 82043 UR ALBUMIN QUANTITATIVE: CPT | Performed by: FAMILY MEDICINE

## 2022-09-06 PROCEDURE — 85025 COMPLETE CBC W/AUTO DIFF WBC: CPT | Performed by: FAMILY MEDICINE

## 2022-09-06 PROCEDURE — 80053 COMPREHEN METABOLIC PANEL: CPT | Performed by: FAMILY MEDICINE

## 2022-09-06 PROCEDURE — 36415 COLL VENOUS BLD VENIPUNCTURE: CPT | Performed by: FAMILY MEDICINE

## 2022-09-06 PROCEDURE — 99214 OFFICE O/P EST MOD 30 MIN: CPT | Mod: 25 | Performed by: FAMILY MEDICINE

## 2022-09-06 RX ORDER — FLUTICASONE PROPIONATE 50 MCG
1 SPRAY, SUSPENSION (ML) NASAL DAILY
Qty: 16 G | Refills: 11 | Status: SHIPPED | OUTPATIENT
Start: 2022-09-06

## 2022-09-06 RX ORDER — GABAPENTIN 300 MG/1
CAPSULE ORAL
Qty: 360 CAPSULE | Refills: 3 | Status: SHIPPED | OUTPATIENT
Start: 2022-09-06 | End: 2022-12-26

## 2022-09-06 RX ORDER — FUROSEMIDE 20 MG
TABLET ORAL
Qty: 90 TABLET | Refills: 1 | Status: SHIPPED | OUTPATIENT
Start: 2022-09-06 | End: 2023-03-02

## 2022-09-06 RX ORDER — WARFARIN SODIUM 2.5 MG/1
TABLET ORAL
Qty: 240 TABLET | Refills: 1 | Status: SHIPPED | OUTPATIENT
Start: 2022-09-06 | End: 2023-04-04

## 2022-09-06 ASSESSMENT — ENCOUNTER SYMPTOMS
ARTHRALGIAS: 0
PALPITATIONS: 0
JOINT SWELLING: 0
MYALGIAS: 0
HEADACHES: 0
DIZZINESS: 0
FEVER: 0
SORE THROAT: 0
PARESTHESIAS: 0
ABDOMINAL PAIN: 0
EYE PAIN: 0
CHILLS: 0
DYSURIA: 0
SHORTNESS OF BREATH: 0
HEARTBURN: 0
FREQUENCY: 0
HEMATURIA: 0
DIARRHEA: 0
WEAKNESS: 0
CONSTIPATION: 0
NAUSEA: 0
NERVOUS/ANXIOUS: 0
HEMATOCHEZIA: 0
COUGH: 0

## 2022-09-06 ASSESSMENT — ACTIVITIES OF DAILY LIVING (ADL): CURRENT_FUNCTION: NO ASSISTANCE NEEDED

## 2022-09-06 NOTE — RESULT ENCOUNTER NOTE
Ed,    Your diabetes and cholesterol are well controlled. Your kidney tests are stable. Your digoxin level, blood counts, urine, and liver tests are normal.     Cayla Gregorio MD

## 2022-09-06 NOTE — PROGRESS NOTES
"SUBJECTIVE:   Keith Desir is a 76 year old male who presents for Preventive Visit.    Patient has been advised of split billing requirements and indicates understanding: Yes  Are you in the first 12 months of your Medicare coverage?  No    Patient also presents to follow-up diabetes. Diabetic Review of Systems - Medication compliance:  compliant all of the time, Diabetic diet compliance:  compliant most of the time, Home glucose monitoring:  blood glucose record WAS NOT brought in today, Diabetic ROS: no polyuria or polydipsia, no chest pain, dyspnea or TIA's, no numbness, tingling or pain in extremities, no unusual visual symptoms, no hypoglycemia, no medication side effects noted. He also has HFpEF and atrial fibrillation, without edema or new symptoms.     He denies shortness of breath, cough or desire to quit smoking. Hypertension well controlled on current medications without side effects, chest pain, or dyspnea. Hypercholesterolemia well controlled with current treatment plan without side effects. Patient  has allergy symptoms such as runny nose and congestion.  His wife mentions skin lesions on head and scalp since taking medication for hepatitis C.     Healthy Habits:     In general, how would you rate your overall health?  Good    Frequency of exercise:  None    Do you usually eat at least 4 servings of fruit and vegetables a day, include whole grains    & fiber and avoid regularly eating high fat or \"junk\" foods?  No    Taking medications regularly:  Yes    Medication side effects:  None    Ability to successfully perform activities of daily living:  No assistance needed    Home Safety:  No safety concerns identified    Hearing Impairment:  Difficulty understanding soft or whispered speech    In the past 6 months, have you been bothered by leaking of urine?  No    In general, how would you rate your overall mental or emotional health?  Good      PHQ-2 Total Score: 0    Do you feel safe in your " environment? Yes    Have you ever done Advance Care Planning? (For example, a Health Directive, POLST, or a discussion with a medical provider or your loved ones about your wishes): No, advance care planning information given to patient to review.  Patient plans to discuss their wishes with loved ones or provider.         Fall risk  Fallen 2 or more times in the past year?: Yes  Any fall with injury in the past year?: No     Cognitive Screening   1) Repeat 3 items (Leader, Season, Table)     2) Clock draw:   NORMAL  3) 3 item recall:   Recalls NO objects   Results: NORMAL clock, 1-2 items recalled: COGNITIVE IMPAIRMENT LESS LIKELY    Mini-CogTM Copyright S Brigida. Licensed by the author for use in [facility]; reprinted with permission (magaly@[facility]). All rights reserved.      Do you have sleep apnea, excessive snoring or daytime drowsiness?: yes    Reviewed and updated as needed this visit by clinical staff   Tobacco  Allergies  Meds  Problems  Med Hx  Surg Hx  Fam Hx            Reviewed and updated as needed this visit by Provider   Tobacco  Allergies  Meds  Problems  Med Hx  Surg Hx  Fam Hx           Social History     Tobacco Use     Smoking status: Current Every Day Smoker     Packs/day: 1.00     Years: 45.00     Pack years: 45.00     Types: Cigarettes     Smokeless tobacco: Never Used     Tobacco comment: cut down to 0.5 ppd   Substance Use Topics     Alcohol use: Yes     Alcohol/week: 0.0 - 4.2 standard drinks     If you drink alcohol do you typically have >3 drinks per day or >7 drinks per week? Not applicable    Alcohol Use 9/1/2022   Prescreen: >3 drinks/day or >7 drinks/week? No   Prescreen: >3 drinks/day or >7 drinks/week? -       Current providers sharing in care for this patient include:   Patient Care Team:  Cayla Gregorio MD as PCP - General  Cayla Gregorio MD as Assigned PCP  Dawson Deluna MD as Assigned Neuroscience Provider  Kristy Rosales OD  as Assigned Surgical Provider    The following health maintenance items are reviewed in Epic and correct as of today:  Health Maintenance Due   Topic Date Due     HF ACTION PLAN  08/27/2022     INFLUENZA VACCINE (1) 09/01/2022     COLORECTAL CANCER SCREENING  08/01/2022     MICROALBUMIN  09/07/2022     DIABETIC FOOT EXAM  09/07/2022     Patient Active Problem List   Diagnosis     Hyperlipidemia LDL goal <70     Tobacco abuse     Chronic atrial fibrillation (HCC)     Renal hypertension     Advanced directives, counseling/discussion     Health Care Home     Polycythemia secondary to smoking     Elevated alkaline phosphatase level     Long term current use of anticoagulant therapy     Non morbid obesity due to excess calories     Pulmonary emphysema, unspecified emphysema type (H)     NAFLD (nonalcoholic fatty liver disease)     Varicose veins of legs     Type 2 diabetes mellitus with stage 2 chronic kidney disease, without long-term current use of insulin (H)     Diabetic neuropathy (H)     Gait disturbance     Allergic rhinitis, unspecified seasonality, unspecified trigger     Chronic heart failure with preserved ejection fraction (H)     Past Surgical History:   Procedure Laterality Date     ANGIOGRAM  08/2006    normal     LUMBAR DISC SURGERY  11/11/2021     TONSILLECTOMY & ADENOIDECTOMY         Social History     Tobacco Use     Smoking status: Current Every Day Smoker     Packs/day: 1.00     Years: 45.00     Pack years: 45.00     Types: Cigarettes     Smokeless tobacco: Never Used     Tobacco comment: cut down to 0.5 ppd   Substance Use Topics     Alcohol use: Yes     Alcohol/week: 0.0 - 4.2 standard drinks     Family History   Problem Relation Age of Onset     Diabetes Mother      Cerebrovascular Disease Father      Heart Disease Brother         pacer      Cancer No family hx of      Glaucoma No family hx of      Macular Degeneration No family hx of          Current Outpatient Medications   Medication Sig  Dispense Refill     acetaminophen (TYLENOL) 650 MG CR tablet Take 2 tablets (1,300 mg) by mouth every 8 hours as needed for pain 60 tablet 3     albuterol (PROAIR HFA/PROVENTIL HFA/VENTOLIN HFA) 108 (90 Base) MCG/ACT inhaler Inhale 1-2 puffs into the lungs every 4 hours as needed for shortness of breath / dyspnea 18 g 2     atorvastatin (LIPITOR) 20 MG tablet Take 1 tablet (20 mg) by mouth daily 90 tablet 3     digoxin (LANOXIN) 125 MCG tablet Take 1 tablet (125 mcg) by mouth daily 90 tablet 3     dimenhyDRINATE (DRAMAMINE PO) Take by mouth as needed       empagliflozin (JARDIANCE) 10 MG TABS tablet Take 1 tablet (10 mg) by mouth daily 90 tablet 3     fluticasone (FLONASE) 50 MCG/ACT nasal spray Spray 1 spray into both nostrils daily 16 g 11     furosemide (LASIX) 20 MG tablet Take 1 tablet (20 mg) by mouth 1 times daily 90 tablet 1     gabapentin (NEURONTIN) 300 MG capsule TAKE 1 CAPSULE BY MOUTH IN THE MORNING. THEN TAKE 1 CAPSULE EVERY MIDDAY. THEN TAKE 2 CAPSULES EVERY EVENING 360 capsule 3     glipiZIDE (GLUCOTROL) 5 MG tablet TAKE 1 TABLET BY MOUTH TWICE DAILY BEFORE MEAL(S) 180 tablet 3     lisinopril (ZESTRIL) 40 MG tablet Take 1 tablet (40 mg) by mouth daily 90 tablet 3     metFORMIN (GLUCOPHAGE) 1000 MG tablet TAKE 1 TABLET BY MOUTH 2 TIMES DAILY WITH MEALS 180 tablet 3     metoprolol tartrate (LOPRESSOR) 100 MG tablet Take 2 tablets by mouth twice daily 360 tablet 3     tiotropium (SPIRIVA HANDIHALER) 18 MCG inhaled capsule INHALE 1 CAPSULE (18 MCG) INTO THE LUNGS DAILY 90 capsule 3     warfarin ANTICOAGULANT (COUMADIN) 2.5 MG tablet TAKE 2 TABLETS (5MG) BY MOUTH ON TUESDAY & SATURDAY. TAKE 3 TABLETS (7.5MG) ON ALL OTHER DAYS OR AS DIRECTED BY INR CLINIC. 240 tablet 1     Allergies   Allergen Reactions     Nkda [No Known Drug Allergies]              Review of Systems   Constitutional: Negative for chills and fever.   HENT: Positive for hearing loss. Negative for congestion, ear pain and sore throat.   "  Eyes: Negative for pain and visual disturbance.   Respiratory: Negative for cough and shortness of breath.    Cardiovascular: Negative for chest pain, palpitations and peripheral edema.   Gastrointestinal: Negative for abdominal pain, constipation, diarrhea, heartburn, hematochezia and nausea.   Genitourinary: Negative for dysuria, frequency, genital sores, hematuria, impotence, penile discharge and urgency.   Musculoskeletal: Negative for arthralgias, joint swelling and myalgias.   Skin: Negative for rash.   Neurological: Negative for dizziness, weakness, headaches and paresthesias.   Psychiatric/Behavioral: Negative for mood changes. The patient is not nervous/anxious.          OBJECTIVE:   /70 (BP Location: Right arm, Patient Position: Chair, Cuff Size: Adult Regular)   Pulse 58   Temp 97.4  F (36.3  C) (Oral)   Ht 1.71 m (5' 7.32\")   Wt 95.7 kg (211 lb)   SpO2 95%   BMI 32.73 kg/m   Estimated body mass index is 32.73 kg/m  as calculated from the following:    Height as of this encounter: 1.71 m (5' 7.32\").    Weight as of this encounter: 95.7 kg (211 lb).  Physical Exam  GENERAL: alert, no distress and obese  EYES: Eyes grossly normal to inspection, PERRL and conjunctivae and sclerae normal  NECK: no adenopathy, no asymmetry, masses, or scars and thyroid normal to palpation  RESP: lungs clear to auscultation - no rales, rhonchi or wheezes  CV: irregularly irregular rhythm, normal S1 S2, no S3 or S4, no murmur, click or rub and trace bipedal edema bilaterally   MS: extremities normal- no gross deformities noted  NEURO: Normal strength and tone, mentation intact and speech normal  PSYCH: mentation appears normal, affect normal/bright    Diagnostic Test Results:  Labs reviewed in Epic    ASSESSMENT / PLAN:   (Z00.00) Encounter for Medicare annual wellness exam  (primary encounter diagnosis)    (J43.9) Pulmonary emphysema, unspecified emphysema type (H)  (Z72.0) Tobacco abuse  Comment: Well controlled " with medications without side effects.   Plan: OFFICE/OUTPT VISIT,EST,LEVL IV          (E11.22,  N18.2) Type 2 diabetes mellitus with stage 2 chronic kidney disease, without long-term current use of insulin (H)  (E11.42) Diabetic polyneuropathy associated with type 2 diabetes mellitus (H)  Comment: Well controlled with medications without side effects.   Plan: Albumin Random Urine Quantitative with Creat         Ratio, gabapentin (NEURONTIN) 300 MG capsule,         OFFICE/OUTPT VISIT,EST,LEVL IV, Comprehensive         metabolic panel (BMP + Alb, Alk Phos, ALT, AST,        Total. Bili, TP)          (I50.32) Chronic heart failure with preserved ejection fraction (H)  Comment: euvolemic   Plan: furosemide (LASIX) 20 MG tablet, OFFICE/OUTPT         VISIT,EST,LEVL IV, Comprehensive metabolic         panel (BMP + Alb, Alk Phos, ALT, AST, Total.         Bili, TP), Digoxin level, CBC with platelets         and differential          (I48.20) Chronic atrial fibrillation (HCC)  Comment: rate controlled and anticoagulated   Plan: warfarin ANTICOAGULANT (COUMADIN) 2.5 MG         tablet, OFFICE/OUTPT VISIT,EST,LEVL IV,         Comprehensive metabolic panel (BMP + Alb, Alk         Phos, ALT, AST, Total. Bili, TP)        Continue chronic anticoagulation under surveillance of protime clinic with goal INR 2 - 3 indefinitely      (I12.9) Renal hypertension  Comment: Well controlled with medications without side effects.   Plan: Albumin Random Urine Quantitative with Creat         Ratio, OFFICE/OUTPT VISIT,EST,LEVL IV, UA Macro        with Reflex to Micro and Culture - lab collect,        Comprehensive metabolic panel (BMP + Alb, Alk         Phos, ALT, AST, Total. Bili, TP)          (E78.5) Hyperlipidemia LDL goal <70  Comment: Well controlled with medications without side effects.   Plan: Lipid panel reflex to direct LDL Non-fasting,         OFFICE/OUTPT VISIT,EST,LEVL IV, Comprehensive         metabolic panel (BMP + Alb, Alk Phos,  "ALT, AST,        Total. Bili, TP)          (J30.9) Allergic rhinitis, unspecified seasonality, unspecified trigger  Comment: Well controlled with medications without side effects.   Plan: fluticasone (FLONASE) 50 MCG/ACT nasal spray,         OFFICE/OUTPT VISIT,EST,LEVL IV          (L57.0) AK (actinic keratosis)  Plan: Adult Dermatology Referral           Patient has been advised of split billing requirements and indicates understanding: Yes    COUNSELING:  Reviewed preventive health counseling, as reflected in patient instructions  Special attention given to:       Regular exercise       Healthy diet/nutrition       Hepatitis C screening       Consider lung cancer screening for ages 55-80 years (77 for Medicare) and 20 pack-year smoking history         Colon cancer screening       The ASCVD Risk score (Carlosaniceto BEY Jr., et al., 2013) failed to calculate for the following reasons:    The valid total cholesterol range is 130 to 320 mg/dL    Estimated body mass index is 32.73 kg/m  as calculated from the following:    Height as of this encounter: 1.71 m (5' 7.32\").    Weight as of this encounter: 95.7 kg (211 lb).    Weight management plan: Discussed healthy diet and exercise guidelines    He reports that he has been smoking cigarettes. He has a 45.00 pack-year smoking history. He has never used smokeless tobacco.  Nicotine/Tobacco Cessation Plan:   Information offered: Patient not interested at this time      Appropriate preventive services were discussed with this patient, including applicable screening as appropriate for cardiovascular disease, diabetes, osteopenia/osteoporosis, and glaucoma.  As appropriate for age/gender, discussed screening for colorectal cancer, prostate cancer, breast cancer, and cervical cancer. Checklist reviewing preventive services available has been given to the patient.    Reviewed patients plan of care and provided an AVS. The Intermediate Care Plan ( asthma action plan, low back pain action " plan, and migraine action plan) for Keith meets the Care Plan requirement. This Care Plan has been established and reviewed with the Patient and spouse.    Counseling Resources:  ATP IV Guidelines  Pooled Cohorts Equation Calculator  Breast Cancer Risk Calculator  Breast Cancer: Medication to Reduce Risk  FRAX Risk Assessment  ICSI Preventive Guidelines  Dietary Guidelines for Americans, 2010  [employer]'s MyPlate  ASA Prophylaxis  Lung CA Screening    Cayla Gregorio MD  [facility]    Identified Health Risks:    He is at risk for lack of exercise and has been provided with information to increase physical activity for the benefit of his well-being.  The patient was counseled and encouraged to consider modifying their diet and eating habits. He was provided with information on recommended healthy diet options.  The patient was provided with written information regarding signs of hearing loss.

## 2022-09-06 NOTE — LETTER
My Heart Failure Action Plan   Name: Keith Desir    YOB: 1945   Date: 9/6/2022    My doctor: Cayla Gregorio [address]  [place] [id number] [phone]  My Diagnosis: Diastolic Heart Failure   My Ejection Fraction: No results found for: LVEF   Over 50%   My Exercise Goal: 30 minutes daily     My Weight Plan:   Wt Readings from Last 2 Encounters:   09/06/22 95.7 kg (211 lb)   06/12/22 98.2 kg (216 lb 6.4 oz)     Weigh yourself daily using the same scale. If you gain more than 2 pounds in 24 hours or 5 pounds in a week call the clinic    My Diet Goal: No added salt    Emergency Room Visits:    Our goal is to improve your quality of life and help you avoid a visit to the emergency room or hospital.  If we work together, we can achieve this goal. But, if you feel you need to call 911 or go to the emergency room, please do so.  If you go to the emergency room, please bring your list of medicines and your daily weight chart with you.       GREEN ZONE     Doing well today    Weight gained is no more than 2 pounds a day or 5 pounds a week.    No swelling in feet, ankles, legs or stomach.    No more swelling than usual.    No more trouble breathing than usual.    No change in my sleep.    No other problems. Actions:    I am doing fine.  I will take my medicine, follow my diet, see my doctor, exercise, and watch for symptoms.           YELLOW ZONE         Having a bad day or flare up    Weight gain of more than 2 pounds in one day or 5 pounds in one week.    New swelling in ankle, leg, knee or thigh.    Bloating in belly, pants feel tighter.    Swelling in hands or face.    Coughing or trouble breathing while walking or talking.    Harder to breathe last night.    Have trouble sleeping, wake up short of breath.    Much more tired than usual.    Not eating.    Pain in my chest or bad leg cramps.    Feel weak or dizzy. Actions:    I need to  take action and call my doctor or nurse today.                 RED ZONE         Need medical care now    Weight gain of 5 pounds overnight.    Chest pain or pressure that does not go away.    Feel less alert.    Wheezing or have trouble breathing when at rest.    Cannot sleep lying down.    Cannot take my water pill.    Pass out or faint. Actions:    I need to call my doctor or nurse now!    Call 911 if I have chest pain or cannot breathe.

## 2022-09-06 NOTE — RESULT ENCOUNTER NOTE
Your results are normal.  Your final test results are pending.  Please check your chart again within 2 - 3 days. You will receive further instruction when your full test result panel is complete.     Cayla Gregorio MD

## 2022-09-06 NOTE — PATIENT INSTRUCTIONS
Patient Education   Personalized Prevention Plan  You are due for the preventive services outlined below.  Your care team is available to assist you in scheduling these services.  If you have already completed any of these items, please share that information with your care team to update in your medical record.  Health Maintenance Due   Topic Date Due     Urine Test  Never done     Cholesterol Lab  03/02/2022     Basic Metabolic Panel  04/25/2022     A1C Lab  06/09/2022     Heart Failure Action Plan  08/27/2022     Flu Vaccine (1) 09/01/2022     Colorectal Cancer Screening  08/01/2022     Kidney Microalbumin Urine Test  09/07/2022     Diabetic Foot Exam  09/07/2022       Exercise for a Healthier Heart  You may wonder how you can improve the health of your heart. If you re thinking about exercise, you re on the right track. You don t need to become an athlete. But you do need a certain amount of brisk exercise to help strengthen your heart. If you have been diagnosed with a heart condition, your healthcare provider may advise exercise to help stabilize your condition. To help make exercise a habit, choose safe, fun activities.      Exercise with a friend. When activity is fun, you're more likely to stick with it.   Before you start  Check with your healthcare provider before starting an exercise program. This is especially important if you have not been active for a while. It's also important if you have a long-term (chronic) health problem such as heart disease, diabetes, or obesity. Or if you are at high risk for having these problems.   Why exercise?  Exercising regularly offers many healthy rewards. It can help you do all of the following:     Improve your blood cholesterol level to help prevent further heart trouble    Lower your blood pressure to help prevent a stroke or heart attack    Control diabetes, or reduce your risk of getting this disease    Improve your heart and lung function    Reach and stay at  a healthy weight    Make your muscles stronger so you can stay active    Prevent falls and fractures by slowing the loss of bone mass (osteoporosis)    Manage stress better    Reduce your blood pressure    Improve your sense of self and your body image  Exercise tips      Ease into your routine. Set small goals. Then build on them. If you are not sure what your activity level should be, talk with your healthcare provider first before starting an exercise routine.    Exercise on most days. Aim for a total of 150 minutes (2 hours and 30 minutes) or more of moderate-intensity aerobic activity each week. Or 75 minutes (1 hour and 15 minutes) or more of vigorous-intensity aerobic activity each week. Or try for a combination of both. Moderate activity means that you breathe heavier and your heart rate increases but you can still talk. Think about doing 40 minutes of moderate exercise, 3 to 4 times a week. For best results, activity should last for about 40 minutes to lower blood pressure and cholesterol. It's OK to work up to the 40-minute period over time. Examples of moderate-intensity activity are walking 1 mile in 15 minutes. Or doing 30 to 45 minutes of yard work.    Step up your daily activity level.  Along with your exercise program, try being more active the whole day. Walk instead of drive. Or park further away so that you take more steps each day. Do more household tasks or yard work. You may not be able to meet the advised mount of physical activity. But doing some moderate- or vigorous-intensity aerobic activity can help reduce your risk for heart disease. Your healthcare provider can help you figure out what is best for you.    Choose 1 or more activities you enjoy.  Walking is one of the easiest things you can do. You can also try swimming, riding a bike, dancing, or taking an exercise class.    When to call your healthcare provider  Call your healthcare provider if you have any of these:     Chest pain or  feel dizzy or lightheaded    Burning, tightness, pressure, or heaviness in your chest, neck, shoulders, back, or arms    Abnormal shortness of breath    More joint or muscle pain    A very fast or irregular heartbeat (palpitations)  [employer] last reviewed this educational content on 7/1/2019 2000-2021 [employer]. All rights reserved. This information is not intended as a substitute for professional medical care. Always follow your healthcare professional's instructions.          Understanding USDA MyPlate  The USDA has guidelines to help you make healthy food choices. These are called MyPlate. MyPlate shows the food groups that make up healthy meals using the image of a place setting. Before you eat, think about the healthiest choices for what to put on your plate or in your cup or bowl. To learn more about building a healthy plate, visit www.choosemyplate.gov.    The food groups    Fruits. Any fruit or 100% fruit juice counts as part of the Fruit Group. Fruits may be fresh, canned, frozen, or dried, and may be whole, cut-up, or pureed. Make 1/2 of your plate fruits and vegetables.    Vegetables. Any vegetable or 100% vegetable juice counts as a member of the Vegetable Group. Vegetables may be fresh, frozen, canned, or dried. They can be served raw or cooked and may be whole, cut-up, or mashed. Make 1/2 of your plate fruits and vegetables.    Grains. All foods made from grains are part of the Grains Group. These include wheat, rice, oats, cornmeal, and barley. Grains are often used to make foods such as bread, pasta, oatmeal, cereal, tortillas, and grits. Grains should be no more than 1/4 of your plate. At least half of your grains should be whole grains.    Protein. This group includes meat, poultry, seafood, beans and peas, eggs, processed soy products (such as tofu), nuts (including nut butters), and seeds. Make protein choices no more than 1/4 of your plate. Meat and poultry choices should be  lean or low fat.    Dairy. The Dairy Group includes all fluid milk products and foods made from milk that contain calcium, such as yogurt and cheese. (Foods that have little calcium, such as cream, butter, and cream cheese, are not part of this group.) Most dairy choices should be low-fat or fat-free.    Oils. Oils aren't a food group, but they do contain essential nutrients. However it's important to watch your intake of oils. These are fats that are liquid at room temperature. They include canola, corn, olive, soybean, vegetable, and sunflower oil. Foods that are mainly oil include mayonnaise, certain salad dressings, and soft margarines. You likely already get your daily oil allowance from the foods you eat.  Things to limit  Eating healthy also means limiting these things in your diet:       Salt (sodium). Many processed foods have a lot of sodium. To keep sodium intake down, eat fresh vegetables, meats, poultry, and seafood when possible. Purchase low-sodium, reduced-sodium, or no-salt-added food products at the store. And don't add salt to your meals at home. Instead, season them with herbs and spices such as dill, oregano, cumin, and paprika. Or try adding flavor with lemon or lime zest and juice.    Saturated fat. Saturated fats are most often found in animal products such as beef, pork, and chicken. They are often solid at room temperature, such as butter. To reduce your saturated fat intake, choose leaner cuts of meat and poultry. And try healthier cooking methods such as grilling, broiling, roasting, or baking. For a simple lower-fat swap, use plain nonfat yogurt instead of mayonnaise when making potato salad or macaroni salad.    Added sugars. These are sugars added to foods. They are in foods such as ice cream, candy, soda, fruit drinks, sports drinks, energy drinks, cookies, pastries, jams, and syrups. Cut down on added sugars by sharing sweet treats with a family member or friend. You can also choose  fruit for dessert, and drink water or other unsweetened beverages.     StayWell last reviewed this educational content on 6/1/2020 2000-2021 [employer]. All rights reserved. This information is not intended as a substitute for professional medical care. Always follow your healthcare professional's instructions.          Signs of Hearing Loss      Hearing much better with one ear can be a sign of hearing loss.   Hearing loss is a problem shared by many people. In fact, it is one of the most common health problems, particularly as people age. Most people age 65 and older have some hearing loss. By age 80, almost everyone does. Hearing loss often occurs slowly over the years. So you may not realize your hearing has gotten worse.  Have your hearing checked  Call your healthcare provider if you:    Have to strain to hear normal conversation    Have to watch other people s faces very carefully to follow what they re saying    Need to ask people to repeat what they ve said    Often misunderstand what people are saying    Turn the volume of the television or radio up so high that others complain    Feel that people are mumbling when they re talking to you    Find that the effort to hear leaves you feeling tired and irritated    Notice, when using the phone, that you hear better with one ear than the other  StayWell last reviewed this educational content on 1/1/2020 2000-2021 [employer]. All rights reserved. This information is not intended as a substitute for professional medical care. Always follow your healthcare professional's instructions.

## 2022-09-19 ENCOUNTER — TELEPHONE (OUTPATIENT)
Dept: FAMILY MEDICINE | Facility: CLINIC | Age: 77
End: 2022-09-19

## 2022-09-19 NOTE — TELEPHONE ENCOUNTER
PROCEDURE PLAN REQUEST: Hold & Bridge    Procedure date: 10/18/202  Procedure: Colonoscopy  Where: [facility]  Request:  4 day hold and bridging if needed

## 2022-09-20 ENCOUNTER — OFFICE VISIT (OUTPATIENT)
Dept: URGENT CARE | Facility: URGENT CARE | Age: 77
End: 2022-09-20
Payer: MEDICARE

## 2022-09-20 VITALS
DIASTOLIC BLOOD PRESSURE: 74 MMHG | HEART RATE: 72 BPM | OXYGEN SATURATION: 98 % | TEMPERATURE: 97.3 F | SYSTOLIC BLOOD PRESSURE: 118 MMHG | RESPIRATION RATE: 16 BRPM

## 2022-09-20 DIAGNOSIS — H92.02 LEFT EAR PAIN: ICD-10-CM

## 2022-09-20 DIAGNOSIS — R07.0 THROAT PAIN: Primary | ICD-10-CM

## 2022-09-20 DIAGNOSIS — Z72.0 TOBACCO ABUSE: ICD-10-CM

## 2022-09-20 LAB
DEPRECATED S PYO AG THROAT QL EIA: NEGATIVE
GROUP A STREP BY PCR: NOT DETECTED

## 2022-09-20 PROCEDURE — U0003 INFECTIOUS AGENT DETECTION BY NUCLEIC ACID (DNA OR RNA); SEVERE ACUTE RESPIRATORY SYNDROME CORONAVIRUS 2 (SARS-COV-2) (CORONAVIRUS DISEASE [COVID-19]), AMPLIFIED PROBE TECHNIQUE, MAKING USE OF HIGH THROUGHPUT TECHNOLOGIES AS DESCRIBED BY CMS-2020-01-R: HCPCS | Performed by: NURSE PRACTITIONER

## 2022-09-20 PROCEDURE — 87651 STREP A DNA AMP PROBE: CPT | Performed by: NURSE PRACTITIONER

## 2022-09-20 PROCEDURE — U0005 INFEC AGEN DETEC AMPLI PROBE: HCPCS | Performed by: NURSE PRACTITIONER

## 2022-09-20 PROCEDURE — 99213 OFFICE O/P EST LOW 20 MIN: CPT | Mod: CS | Performed by: NURSE PRACTITIONER

## 2022-09-20 NOTE — PROGRESS NOTES
Assessment & Plan     Throat pain    - Streptococcus A Rapid Screen w/Reflex to PCR - Clinic Collect  - Symptomatic; Unknown COVID-19 Virus (Coronavirus) by PCR Nose  - Group A Streptococcus PCR Throat Swab    Left ear pain    - Adult ENT  Referral    Tobacco abuse     No ear infection currently. Reviewed negative rapid strep results with patient's wife as patient left, PCR testing in process and COVID test in process, will notify if positive. Discussed symptoms likely viral vs allergic in nature and antibiotic not indicated at this time. Recommended rest, fluids, tylenol as needed, gargle warm salt water, throat lozenges, warm compress. Due to duration of symptoms referral placed for ENT evaluation.     Follow-up with PCP if symptoms persist for 7 days, and sooner if symptoms worsen or new symptoms develop.     Discussed red flag symptoms which warrant immediate visit in emergency room    All questions were answered and patient's wife verbalized understanding. AVS reviewed with patient's wife.     Rebeca Singh, DNP, APRN, CNP 9/20/2022 2:05 PM  [facility]     Ed is a 76 year old male who presents to clinic today for the following health issues:  Chief Complaint   Patient presents with     Urgent Care     Ear Problem     Per pt having left ear pain on and off for a couple of months      Patient presents for evaluation of left ear pain which has been intermittent for the past couple of months and has been worsening. Associated symptoms: raspy voice, sore throat for a couple days, runny nose. Denies fever, chills, cough, ear drainage. He was swimming a couple weeks ago. Pain is moderate and tylenol hasn't helped. He has a history of HTN, diabetes, COPD, CKD.     Problem list, Medication list, Allergies, and Medical history reviewed in EPIC.    ROS:  Review of systems negative except for noted above        Objective    /74   Pulse 72   Temp 97.3  F  (36.3  C) (Tympanic)   Resp 16   SpO2 98%   Physical Exam  Constitutional:       General: He is not in acute distress.     Appearance: He is not toxic-appearing or diaphoretic.   HENT:      Head: Normocephalic and atraumatic.      Right Ear: Tympanic membrane, ear canal and external ear normal.      Left Ear: Tympanic membrane, ear canal and external ear normal.      Nose:      Right Sinus: No maxillary sinus tenderness or frontal sinus tenderness.      Left Sinus: No maxillary sinus tenderness or frontal sinus tenderness.      Comments: Mild nasal congestion with clear rhinorrhea     Mouth/Throat:      Mouth: Mucous membranes are moist.      Pharynx: Oropharynx is clear. Posterior oropharyngeal erythema present. No oropharyngeal exudate.      Comments: Mild oropharyngeal erythema  Cardiovascular:      Rate and Rhythm: Normal rate and regular rhythm.      Heart sounds: Normal heart sounds.   Pulmonary:      Effort: Pulmonary effort is normal. No respiratory distress.      Breath sounds: Normal breath sounds. No wheezing, rhonchi or rales.   Lymphadenopathy:      Cervical: No cervical adenopathy.   Skin:     General: Skin is warm and dry.   Neurological:      Mental Status: He is alert.          Labs:  Results for orders placed or performed in visit on 09/20/22   Streptococcus A Rapid Screen w/Reflex to PCR - Clinic Collect     Status: Normal    Specimen: Throat; Swab   Result Value Ref Range    Group A Strep antigen Negative Negative

## 2022-09-21 LAB — SARS-COV-2 RNA RESP QL NAA+PROBE: NEGATIVE

## 2022-09-27 ENCOUNTER — TELEPHONE (OUTPATIENT)
Dept: ANTICOAGULATION | Facility: CLINIC | Age: 77
End: 2022-09-27

## 2022-09-27 DIAGNOSIS — I48.20 CHRONIC ATRIAL FIBRILLATION (H): Primary | ICD-10-CM

## 2022-09-27 NOTE — TELEPHONE ENCOUNTER
ANTICOAGULATION CLINIC REFERRAL RENEWAL REQUEST       An annual renewal order is required for all patients referred to [facility].?  Please review and sign the pended referral order for Keith Desir.       ANTICOAGULATION SUMMARY      Warfarin indication(s)   Atrial Fibrillation    Mechanical heart valve present?  NO       Current goal range   INR: 2.0-3.0     Goal appropriate for indication? Goal INR 2-3, standard for indication(s) above     Time in Therapeutic Range (TTR)  (Goal > 60%) 70.3%       Office visit with referring provider's group within last year yes on 9/6/22       Ban Matamoros RN  [facility]

## 2022-09-28 ENCOUNTER — LAB (OUTPATIENT)
Dept: LAB | Facility: CLINIC | Age: 77
End: 2022-09-28
Payer: MEDICARE

## 2022-09-28 ENCOUNTER — ANTICOAGULATION THERAPY VISIT (OUTPATIENT)
Dept: ANTICOAGULATION | Facility: CLINIC | Age: 77
End: 2022-09-28

## 2022-09-28 DIAGNOSIS — Z79.01 LONG TERM CURRENT USE OF ANTICOAGULANT THERAPY: Primary | ICD-10-CM

## 2022-09-28 DIAGNOSIS — I48.20 CHRONIC ATRIAL FIBRILLATION (H): ICD-10-CM

## 2022-09-28 DIAGNOSIS — Z79.01 LONG TERM CURRENT USE OF ANTICOAGULANT THERAPY: ICD-10-CM

## 2022-09-28 LAB — INR BLD: 2 (ref 0.9–1.1)

## 2022-09-28 PROCEDURE — 85610 PROTHROMBIN TIME: CPT

## 2022-09-28 PROCEDURE — 36416 COLLJ CAPILLARY BLOOD SPEC: CPT

## 2022-09-28 NOTE — PROGRESS NOTES
ANTICOAGULATION MANAGEMENT     Keith Desir 76 year old male is on warfarin with therapeutic INR result. (Goal INR 2.0-3.0)    Recent labs: (last 7 days)     09/28/22  0953   INR 2.0*       ASSESSMENT       Source(s): Chart Review    Previous INR was Therapeutic last 2(+) visits    Medication, diet, health changes since last INR chart reviewed; none identified           PLAN     Recommended plan for no diet, medication or health factor changes affecting INR     Dosing Instructions: Continue your current warfarin dose with next INR in 4 weeks       Summary  As of 9/28/2022    Full warfarin instructions:  10/13: Hold; 10/14: Hold; 10/15: Hold; 10/16: Hold; 10/17: Hold; Otherwise 5 mg every Tue; 7.5 mg all other days   Next INR check:  10/24/2022             Detailed voice message left for Ed with dosing instructions and follow up date.     Contact [phone]  to schedule and with any changes, questions or concerns.     Education provided: Please call back if any changes to your diet, medications or how you've been taking warfarin and Goal range and significance of current result    Plan made per [facility] anticoagulation protocol    Ban Matamoros RN  Anticoagulation Clinic  9/28/2022    _______________________________________________________________________     Anticoagulation Episode Summary     Current INR goal:  2.0-3.0   TTR:  75.4 % (1 y)   Target end date:  Indefinite   Send INR reminders to:  AJ PALUMBO    Indications    Long term current use of anticoagulant therapy [Z79.01]  Chronic atrial fibrillation (HCC) [I48.20]           Comments:           Anticoagulation Care Providers     Provider Role Specialty Phone number    Cayla Gregorio MD Referring Family Medicine [phone]

## 2022-09-29 NOTE — TELEPHONE ENCOUNTER
"MASHA-PROCEDURAL ANTICOAGULATION  MANAGEMENT    ASSESSMENT     Warfarin interruption plan for colonoscopy on 10/18/2022.    Indication for Anticoagulation: Atrial Fibrillation      [id number] = 3 (Hypertension, Age 65-74 and Diabetes) *CHF not counted due to preserved LVEF      Masha-Procedure Risk stratification for thromboembolism: low (2017 [facility] periprocedure pathway for NVAF Expert Consensus)    NVAF: 2017 [facility] periprocedure pathway for NVAF advises NO bridge for low risk stratification ([id number] score <=4 and no prior hx of stroke, TIA or systemic embolism)     RECOMMENDATION       Pre-Procedure:  o Hold warfarin for 4 days, until after procedure starting: 10/14/2022   o No Bridge      Post-Procedure:  o Resume warfarin dose if okay with provider doing procedure on night of procedure, 10/18/2022 PM: 15mg  o Recheck INR ~ 7 days after resuming warfarin       Plan routed to referring provider for approval  ?   Maria Isabel Mccoy [facility]    SUBJECTIVE/OBJECTIVE     Keith Desir, a 76 year old male    Goal INR Range: 2.0-3.0     Patient bridged in past: No      Wt Readings from Last 3 Encounters:   09/06/22 95.7 kg (211 lb)   06/12/22 98.2 kg (216 lb 6.4 oz)   03/09/22 98.4 kg (217 lb)      Ideal body weight: 66.8 kg (147 lb 5.8 oz)  Adjusted ideal body weight: 78.4 kg (172 lb 13.1 oz)     Estimated body mass index is 32.73 kg/m  as calculated from the following:    Height as of 9/6/22: 1.71 m (5' 7.32\").    Weight as of 9/6/22: 95.7 kg (211 lb).    Lab Results   Component Value Date    INR 2.0 (H) 09/28/2022    INR 2.3 (H) 08/31/2022    INR 1.8 (H) 08/17/2022     Lab Results   Component Value Date    HGB 17.7 09/06/2022    HGB 17.7 03/02/2021    HCT 52.1 09/06/2022    HCT 52.2 03/02/2021     09/06/2022     03/02/2021     Lab Results   Component Value Date    CR 0.84 09/06/2022    CR 0.96 10/25/2021    CR 0.90 03/02/2021     Estimated Creatinine Clearance: 85.6 mL/min (based on SCr of 0.84 mg/dL).  "

## 2022-09-30 ENCOUNTER — TELEPHONE (OUTPATIENT)
Dept: ANTICOAGULATION | Facility: CLINIC | Age: 77
End: 2022-09-30

## 2022-09-30 DIAGNOSIS — I48.20 CHRONIC ATRIAL FIBRILLATION (H): ICD-10-CM

## 2022-09-30 DIAGNOSIS — Z79.01 LONG TERM CURRENT USE OF ANTICOAGULANT THERAPY: Primary | ICD-10-CM

## 2022-10-04 ENCOUNTER — TELEPHONE (OUTPATIENT)
Dept: FAMILY MEDICINE | Facility: CLINIC | Age: 77
End: 2022-10-04

## 2022-10-04 NOTE — TELEPHONE ENCOUNTER
See 09/19/2022 TE for plan, message left with approved plan.    Maria Isabel Mccoy, PharmD BCACP  Anticoagulation Clinical Pharmacist

## 2022-10-04 NOTE — TELEPHONE ENCOUNTER
Patient is having an upcoming procedure. Will route to [facility] to advise on warfarin hold and if bridging is needed.    Date of procedure: 10/18    Type of procedure: colonoscopy    Procedure location: [facility]     Physician completing procedure:      Patient diagnosis for anticoagulation:  Atrial Fib    [facility] is requesting a 4 day hold and need to know if needs bridging.        Debora Ramos RN  [facility]

## 2022-10-24 NOTE — TELEPHONE ENCOUNTER
15 day supply of Atorvastatin was sent on 2/5/19 to [facility] pharmacy.  Called pharmacy and verified patient did not pick this up, prescription canceled.  Prescription resent to preferred pharmacy ([facility]).  Patient notified.   Vanessa Gregory RN         What Is The Condition That You Are Returning For Follow-Up?: acne Additional History: Patient states he ran out of doxycycline, currently only washing his face with sulfa cleanser. When Was Your Last Appointment?: 4/18/22

## 2022-10-25 ENCOUNTER — ANTICOAGULATION THERAPY VISIT (OUTPATIENT)
Dept: ANTICOAGULATION | Facility: CLINIC | Age: 77
End: 2022-10-25

## 2022-10-25 ENCOUNTER — LAB (OUTPATIENT)
Dept: LAB | Facility: CLINIC | Age: 77
End: 2022-10-25
Payer: MEDICARE

## 2022-10-25 DIAGNOSIS — Z79.01 LONG TERM CURRENT USE OF ANTICOAGULANT THERAPY: ICD-10-CM

## 2022-10-25 DIAGNOSIS — I48.20 CHRONIC ATRIAL FIBRILLATION (H): ICD-10-CM

## 2022-10-25 DIAGNOSIS — Z79.01 LONG TERM CURRENT USE OF ANTICOAGULANT THERAPY: Primary | ICD-10-CM

## 2022-10-25 LAB — INR BLD: 2.3 (ref 0.9–1.1)

## 2022-10-25 PROCEDURE — 85610 PROTHROMBIN TIME: CPT

## 2022-10-25 PROCEDURE — 36416 COLLJ CAPILLARY BLOOD SPEC: CPT

## 2022-10-25 NOTE — PROGRESS NOTES
ANTICOAGULATION MANAGEMENT     Keith Desir 76 year old male is on warfarin with therapeutic INR result. (Goal INR 2.0-3.0)    Recent labs: (last 7 days)     10/25/22  0921   INR 2.3*       ASSESSMENT       Source(s): Chart Review and Patient/Caregiver Call       Warfarin doses taken: Warfarin taken as instructed    Diet: No new diet changes identified    New illness, injury, or hospitalization: No    Medication/supplement changes: None noted    Signs or symptoms of bleeding or clotting: No    Previous INR: Therapeutic last 2(+) visits    Additional findings: canceled colonoscopy, scheduled now for January       PLAN     Recommended plan for no diet, medication or health factor changes affecting INR     Dosing Instructions: Continue your current warfarin dose with next INR in 6 weeks       Summary  As of 10/25/2022    Full warfarin instructions:  5 mg every Tue; 7.5 mg all other days; Starting 10/25/2022   Next INR check:  12/6/2022             Telephone call with  Florencia who verbalizes understanding and agrees to plan    Lab visit scheduled    Education provided:     Goal range and lab monitoring: goal range and significance of current result    Plan made per [facility] anticoagulation protocol    Ban Matamoros RN  Anticoagulation Clinic  10/25/2022    _______________________________________________________________________     Anticoagulation Episode Summary     Current INR goal:  2.0-3.0   TTR:  80.2 % (1 y)   Target end date:  Indefinite   Send INR reminders to:  AJ PALUMBO    Indications    Long term current use of anticoagulant therapy [Z79.01]  Chronic atrial fibrillation (HCC) [I48.20]           Comments:           Anticoagulation Care Providers     Provider Role Specialty Phone number    Cayla Gregorio MD Referring Family Medicine [phone]

## 2022-12-06 ENCOUNTER — LAB (OUTPATIENT)
Dept: LAB | Facility: CLINIC | Age: 77
End: 2022-12-06
Payer: MEDICARE

## 2022-12-06 ENCOUNTER — ANTICOAGULATION THERAPY VISIT (OUTPATIENT)
Dept: ANTICOAGULATION | Facility: CLINIC | Age: 77
End: 2022-12-06

## 2022-12-06 DIAGNOSIS — I48.20 CHRONIC ATRIAL FIBRILLATION (H): ICD-10-CM

## 2022-12-06 DIAGNOSIS — Z79.01 LONG TERM CURRENT USE OF ANTICOAGULANT THERAPY: ICD-10-CM

## 2022-12-06 DIAGNOSIS — Z79.01 LONG TERM CURRENT USE OF ANTICOAGULANT THERAPY: Primary | ICD-10-CM

## 2022-12-06 LAB — INR BLD: 2.7 (ref 0.9–1.1)

## 2022-12-06 PROCEDURE — 85610 PROTHROMBIN TIME: CPT

## 2022-12-06 PROCEDURE — 36416 COLLJ CAPILLARY BLOOD SPEC: CPT

## 2022-12-06 NOTE — PROGRESS NOTES
ANTICOAGULATION MANAGEMENT     Keith Desir 76 year old male is on warfarin with therapeutic INR result. (Goal INR 2.0-3.0)    Recent labs: (last 7 days)     12/06/22  0923   INR 2.7*       ASSESSMENT       Source(s): Chart Review and Patient/Caregiver Call       Warfarin doses taken: Warfarin taken as instructed    Diet: No new diet changes identified    New illness, injury, or hospitalization: No    Medication/supplement changes: None noted    Signs or symptoms of bleeding or clotting: No    Previous INR: Therapeutic last 2(+) visits    Additional findings: None       PLAN     Recommended plan for no diet, medication or health factor changes affecting INR     Dosing Instructions: Continue your current warfarin dose with next INR in 8 weeks       Summary  As of 12/6/2022    Full warfarin instructions:  5 mg every Tue; 7.5 mg all other days; Starting 12/6/2022   Next INR check:  1/31/2023             Telephone call with  Melissa who verbalizes understanding and agrees to plan    Lab visit scheduled    Education provided:     Contact [phone]  with any changes, questions or concerns.     Plan made per [facility] anticoagulation protocol    Debora Ramos RN  Anticoagulation Clinic  12/6/2022    _______________________________________________________________________     Anticoagulation Episode Summary     Current INR goal:  2.0-3.0   TTR:  87.3 % (1 y)   Target end date:  Indefinite   Send INR reminders to:  AJ PALUMBO    Indications    Long term current use of anticoagulant therapy [Z79.01]  Chronic atrial fibrillation (HCC) [I48.20]           Comments:           Anticoagulation Care Providers     Provider Role Specialty Phone number    Cayla Gregorio MD Referring Family Medicine [phone]

## 2022-12-26 ENCOUNTER — OFFICE VISIT (OUTPATIENT)
Dept: URGENT CARE | Facility: URGENT CARE | Age: 77
End: 2022-12-26
Payer: MEDICARE

## 2022-12-26 VITALS
TEMPERATURE: 96.8 F | RESPIRATION RATE: 14 BRPM | WEIGHT: 204 LBS | HEART RATE: 75 BPM | BODY MASS INDEX: 31.65 KG/M2 | SYSTOLIC BLOOD PRESSURE: 124 MMHG | OXYGEN SATURATION: 96 % | DIASTOLIC BLOOD PRESSURE: 76 MMHG

## 2022-12-26 DIAGNOSIS — E11.22 TYPE 2 DIABETES MELLITUS WITH STAGE 2 CHRONIC KIDNEY DISEASE, WITHOUT LONG-TERM CURRENT USE OF INSULIN (H): ICD-10-CM

## 2022-12-26 DIAGNOSIS — N18.2 TYPE 2 DIABETES MELLITUS WITH STAGE 2 CHRONIC KIDNEY DISEASE, WITHOUT LONG-TERM CURRENT USE OF INSULIN (H): ICD-10-CM

## 2022-12-26 DIAGNOSIS — L08.9 INFECTION OF TOE: Primary | ICD-10-CM

## 2022-12-26 PROCEDURE — 99214 OFFICE O/P EST MOD 30 MIN: CPT | Performed by: FAMILY MEDICINE

## 2022-12-26 NOTE — PATIENT INSTRUCTIONS
Take prescribed medication as directed.    Soak twice a day as discussed.    Return if not improving or to E R if worsening.

## 2022-12-26 NOTE — PROGRESS NOTES
(L08.9) Infection of toe  (primary encounter diagnosis)  Comment:   Plan: amoxicillin-clavulanate (AUGMENTIN) 875-125 MG         tablet            (E11.22,  N18.2) Type 2 diabetes mellitus with stage 2 chronic kidney disease, without long-term current use of insulin (H)  Comment:   Plan:       This patient has a small area of infection on the dorsum of the great left great toe.  He has diabetes.  He clearly has diminished circulation in this foot.  Antibiotics and soaks begun.  Patient to return if this area is worsening.      CHIEF COMPLAINT    1 day ago redness on dorsum of left great toe as noted.      HISTORY    Patient is a 77-year-old man with diabetes.  Someone noted redness on his toe.  He comes in with redness on dorsum of left great toe.  Has not had previous foot infections.    He does have diabetes.  He is on multiple medications including blood thinner.  Has not been bruising or bleeding.  Renal function shows minimal impairment.  He has been a smoker.      REVIEW OF SYSTEMS    No fevers or chills.  No shortness of breath.  No chest pains.  No edema.  No abdominal pain.  No unusual weakness.      EXAM  /76   Pulse 75   Temp 96.8  F (36  C) (Tympanic)   Resp 14   Wt 92.5 kg (204 lb)   SpO2 96%   BMI 31.65 kg/m      Patient is alert.  Speech clear.  Nonlabored breathing.  Abdomen nondistended.  Extremities without edema.  Left lower extremity:  An area of redness 2 cm diameter dorsum left great toe is noted.  A central small 10 x 2 or 3 mm crusted area is present without purulent drainage.  The foot is cool.  Absent pedal pulses noted.

## 2023-01-03 ENCOUNTER — TELEPHONE (OUTPATIENT)
Dept: FAMILY MEDICINE | Facility: CLINIC | Age: 78
End: 2023-01-03

## 2023-01-03 DIAGNOSIS — I48.20 CHRONIC ATRIAL FIBRILLATION (H): ICD-10-CM

## 2023-01-03 DIAGNOSIS — Z79.01 LONG TERM CURRENT USE OF ANTICOAGULANT THERAPY: Primary | ICD-10-CM

## 2023-01-03 NOTE — TELEPHONE ENCOUNTER
General Call    Contacts       Type Contact Phone/Fax    01/03/2023 10:35 AM CST Phone (Incoming) Melissa Desir (Emergency Contact) [phone]        Reason for Call:  Patient's spouse is calling to get dosing instructions since patient has been off his medication to do a procedure he had done    Could we send this information to you in [facility] or would you prefer to receive a phone call?:   Patient would prefer a phone call   Okay to leave a detailed message?: Yes at Home number on file [phone] (home)    Olesya Choudhury   [facility]  Central Scheduler

## 2023-01-03 NOTE — TELEPHONE ENCOUNTER
Patient has been holding his warfarin since Sunday due to a Colonoscopy on 1/5. Updated anticoag calendar and advised on start up and scheduled a follow up INR for 7 days after restarting warfarin    Ban Matamoros RN - [facility]

## 2023-01-05 ENCOUNTER — TRANSFERRED RECORDS (OUTPATIENT)
Dept: HEALTH INFORMATION MANAGEMENT | Facility: CLINIC | Age: 78
End: 2023-01-05
Payer: MEDICARE

## 2023-01-12 ENCOUNTER — LAB (OUTPATIENT)
Dept: LAB | Facility: CLINIC | Age: 78
End: 2023-01-12
Payer: MEDICARE

## 2023-01-12 ENCOUNTER — ANTICOAGULATION THERAPY VISIT (OUTPATIENT)
Dept: ANTICOAGULATION | Facility: CLINIC | Age: 78
End: 2023-01-12

## 2023-01-12 DIAGNOSIS — Z79.01 LONG TERM CURRENT USE OF ANTICOAGULANT THERAPY: Primary | ICD-10-CM

## 2023-01-12 DIAGNOSIS — Z79.01 LONG TERM CURRENT USE OF ANTICOAGULANT THERAPY: ICD-10-CM

## 2023-01-12 DIAGNOSIS — I48.20 CHRONIC ATRIAL FIBRILLATION (H): ICD-10-CM

## 2023-01-12 LAB — INR BLD: 1.5 (ref 0.9–1.1)

## 2023-01-12 PROCEDURE — 36416 COLLJ CAPILLARY BLOOD SPEC: CPT

## 2023-01-12 PROCEDURE — 85610 PROTHROMBIN TIME: CPT

## 2023-01-12 NOTE — PROGRESS NOTES
ANTICOAGULATION MANAGEMENT     Keith Desir 77 year old male is on warfarin with subtherapeutic INR result. (Goal INR 2.0-3.0)    Recent labs: (last 7 days)     01/12/23  0919   INR 1.5*       ASSESSMENT       Source(s): Chart Review and Patient/Caregiver Call       Warfarin doses taken: Held for colonoscopy  recently which may be affecting INR    Diet: No new diet changes identified    New illness, injury, or hospitalization: No    Medication/supplement changes: None noted    Signs or symptoms of bleeding or clotting: No    Previous INR: Therapeutic last 2(+) visits    Additional findings: None       PLAN     Recommended plan for temporary change(s) affecting INR     Dosing Instructions: booster dose then continue your current warfarin dose with next INR in 2 weeks       Summary  As of 1/12/2023    Full warfarin instructions:  1/12: 10 mg; Otherwise 5 mg every Tue; 7.5 mg all other days   Next INR check:  1/31/2023             Telephone call with Ed who verbalizes understanding and agrees to plan    Patient elected to schedule next visit has lab appt already scheduled for 1/31/23.  advised to follow up in one week, he chooses to keep the 1/31 appt.     Education provided:     Goal range and lab monitoring: goal range and significance of current result    Contact [phone]  with any changes, questions or concerns.     Plan made per [facility] anticoagulation protocol    Debora Ramos RN  Anticoagulation Clinic  1/12/2023    _______________________________________________________________________     Anticoagulation Episode Summary     Current INR goal:  2.0-3.0   TTR:  83.1 % (1 y)   Target end date:  Indefinite   Send INR reminders to:  AJ PALUMBO    Indications    Long term current use of anticoagulant therapy [Z79.01]  Chronic atrial fibrillation (HCC) [I48.20]           Comments:           Anticoagulation Care Providers     Provider Role Specialty Phone number    Cayla Gregorio MD Referring  Family Medicine [phone]

## 2023-01-13 ENCOUNTER — TRANSFERRED RECORDS (OUTPATIENT)
Dept: HEALTH INFORMATION MANAGEMENT | Facility: CLINIC | Age: 78
End: 2023-01-13
Payer: MEDICARE

## 2023-01-27 PROBLEM — K42.9 UMBILICAL HERNIA WITHOUT OBSTRUCTION AND WITHOUT GANGRENE: Status: ACTIVE | Noted: 2023-01-27

## 2023-01-27 RX ORDER — GABAPENTIN 300 MG/1
600 CAPSULE ORAL 2 TIMES DAILY
COMMUNITY
End: 2023-03-07

## 2023-01-31 ENCOUNTER — LAB (OUTPATIENT)
Dept: LAB | Facility: CLINIC | Age: 78
End: 2023-01-31
Payer: MEDICARE

## 2023-01-31 ENCOUNTER — ANTICOAGULATION THERAPY VISIT (OUTPATIENT)
Dept: ANTICOAGULATION | Facility: CLINIC | Age: 78
End: 2023-01-31

## 2023-01-31 DIAGNOSIS — Z79.01 LONG TERM CURRENT USE OF ANTICOAGULANT THERAPY: ICD-10-CM

## 2023-01-31 DIAGNOSIS — Z79.01 LONG TERM CURRENT USE OF ANTICOAGULANT THERAPY: Primary | ICD-10-CM

## 2023-01-31 DIAGNOSIS — I48.20 CHRONIC ATRIAL FIBRILLATION (H): ICD-10-CM

## 2023-01-31 DIAGNOSIS — N18.2 TYPE 2 DIABETES MELLITUS WITH STAGE 2 CHRONIC KIDNEY DISEASE, WITHOUT LONG-TERM CURRENT USE OF INSULIN (H): Primary | ICD-10-CM

## 2023-01-31 DIAGNOSIS — E11.22 TYPE 2 DIABETES MELLITUS WITH STAGE 2 CHRONIC KIDNEY DISEASE, WITHOUT LONG-TERM CURRENT USE OF INSULIN (H): Primary | ICD-10-CM

## 2023-01-31 LAB — INR BLD: 2 (ref 0.9–1.1)

## 2023-01-31 PROCEDURE — 36416 COLLJ CAPILLARY BLOOD SPEC: CPT

## 2023-01-31 PROCEDURE — 85610 PROTHROMBIN TIME: CPT

## 2023-01-31 NOTE — PROGRESS NOTES
ANTICOAGULATION MANAGEMENT     Keith Desir 77 year old male is on warfarin with therapeutic INR result. (Goal INR 2.0-3.0)    Recent labs: (last 7 days)     01/31/23  0940   INR 2.0*       ASSESSMENT       Source(s): Chart Review and Patient/Caregiver Call       Warfarin doses taken: Warfarin taken as instructed    Diet: No new diet changes identified    New illness, injury, or hospitalization: No    Medication/supplement changes: None noted    Signs or symptoms of bleeding or clotting: No    Previous INR: Subtherapeutic    Additional findings: None       PLAN     Recommended plan for no diet, medication or health factor changes affecting INR     Dosing Instructions: Continue your current warfarin dose with next INR in 4 weeks       Summary  As of 1/31/2023    Full warfarin instructions:  5 mg every Tue; 7.5 mg all other days   Next INR check:  2/28/2023             Telephone call with  Melissa who verbalizes understanding and agrees to plan    Lab visit scheduled    Education provided:     Goal range and lab monitoring: goal range and significance of current result    Plan made per [facility] anticoagulation protocol    Ban Matamoros RN  Anticoagulation Clinic  1/31/2023    _______________________________________________________________________     Anticoagulation Episode Summary     Current INR goal:  2.0-3.0   TTR:  77.9 % (1 y)   Target end date:  Indefinite   Send INR reminders to:  AJ PALUMBO    Indications    Long term current use of anticoagulant therapy [Z79.01]  Chronic atrial fibrillation (HCC) [I48.20]           Comments:           Anticoagulation Care Providers     Provider Role Specialty Phone number    Cayla Gregorio MD Referring Family Medicine [phone]

## 2023-02-02 ENCOUNTER — OFFICE VISIT (OUTPATIENT)
Dept: DERMATOLOGY | Facility: CLINIC | Age: 78
End: 2023-02-02
Attending: FAMILY MEDICINE
Payer: MEDICARE

## 2023-02-02 DIAGNOSIS — L82.1 SEBORRHEIC KERATOSES: Primary | ICD-10-CM

## 2023-02-02 DIAGNOSIS — D48.9 NEOPLASM OF UNCERTAIN BEHAVIOR: ICD-10-CM

## 2023-02-02 DIAGNOSIS — L57.0 AK (ACTINIC KERATOSIS): ICD-10-CM

## 2023-02-02 DIAGNOSIS — L21.9 SEBORRHEIC DERMATITIS: ICD-10-CM

## 2023-02-02 PROCEDURE — 88305 TISSUE EXAM BY PATHOLOGIST: CPT | Performed by: DERMATOLOGY

## 2023-02-02 PROCEDURE — 99214 OFFICE O/P EST MOD 30 MIN: CPT | Mod: 25 | Performed by: NURSE PRACTITIONER

## 2023-02-02 PROCEDURE — 11102 TANGNTL BX SKIN SINGLE LES: CPT | Performed by: NURSE PRACTITIONER

## 2023-02-02 PROCEDURE — 17000 DESTRUCT PREMALG LESION: CPT | Mod: XS | Performed by: NURSE PRACTITIONER

## 2023-02-02 RX ORDER — KETOCONAZOLE 20 MG/ML
SHAMPOO TOPICAL
Qty: 120 ML | Refills: 11 | Status: SHIPPED | OUTPATIENT
Start: 2023-02-02 | End: 2024-03-26

## 2023-02-02 NOTE — LETTER
2/2/2023         RE: Keith Desir  [id number] [facility] [address]        Dear Colleague,    Thank you for referring your patient, Keith Desir, to the [facility]. Please see a copy of my visit note below.    [facility] Dermatology Note  Encounter Date: Feb 2, 2023  Office Visit     Reviewed patients past medical history and pertinent chart review prior to patients visit today.     Dermatology Problem List:  NUB left posterior ear, shave biopsy 02/02/23   AK, left nose cryotherapy 02/02/23   Seborrheic dermatitis, ketoconazole 2% shampoo    ____________________________________________    Assessment & Plan:     # Neoplasm of uncertain behavior:  Left posterior ear  DDx includes SCC vs ISK. Shave biopsy today.    Procedure Note: Biopsy by shave technique  The risks and benefits of the procedure were described to the patient. These include but are not limited to bleeding, infection, scar, incomplete removal, and non-diagnostic biopsy. Verbal informed consent was obtained. The above site(s) was cleansed with an alcohol pad and injected with 1% lidocaine with epinephrine. Once anesthesia was obtained, a biopsy(ies) was performed with Gilette blade. The tissue(s) was placed in a labeled container(s) with formalin and sent to pathology. Hemostasis was achieved with aluminum chloride. Vaseline and a bandage were applied to the wound(s). The patient tolerated the procedure well and was given post biopsy care instructions.     # Seborrheic dermatitis, scalp, face and axilla. Discussed that this is a recurring rash for most people and will need some maintenance even after rash has resolved. Given prescription for ketoconazole 2% shampoo to use every other daily alternating with head and shoulders on other days.     # Actinic keratosis. Premalignant nature discussed with patient. Treatment options discussed with patient today including no treatment, topical  treatment, and cryotherapy. Patient elects to treat visible lesions today with cryotherapy. After verbal consent and discussion of risks and benefits including but no limited to dyspigmentation/scar, blister, and pain. A total of 1 actinic keratoses were treated with 1-2mm freeze border for 2 cycle with liquid nitrogen. Post cryotherapy instructions were provided.       Cheryl Huang, APRN CNP on 2/2/2023 at 9:43 AM   _______________________________________    CC: Derm Problem (Lesion on back of ear and head )    HPI:  Mr. Keith Desir is a(n) 77 year old male who presents today as a new patient for spots of concern on the back of his ear and head. He has noticed these for several months and they are not resolving.     Patient is otherwise feeling well, without additional skin concerns.      Physical Exam:  SKIN: Focused examination of face, scalp, neck was performed.  - left nasal sidewall, pink scaly well defined macule  -left posterior ear, hyperkeratosis with pink/purple base  -generalized flaking of face, scalp and ears  -erythematous patch in bilateral axilla    - No other lesions of concern on areas examined.         Medications:  Current Outpatient Medications   Medication     albuterol (PROAIR HFA/PROVENTIL HFA/VENTOLIN HFA) 108 (90 Base) MCG/ACT inhaler     amoxicillin-clavulanate (AUGMENTIN) 875-125 MG tablet     atorvastatin (LIPITOR) 20 MG tablet     digoxin (LANOXIN) 125 MCG tablet     dimenhyDRINATE (DRAMAMINE PO)     empagliflozin (JARDIANCE) 10 MG TABS tablet     fluticasone (FLONASE) 50 MCG/ACT nasal spray     furosemide (LASIX) 20 MG tablet     gabapentin (NEURONTIN) 300 MG capsule     glipiZIDE (GLUCOTROL) 5 MG tablet     lisinopril (ZESTRIL) 40 MG tablet     metFORMIN (GLUCOPHAGE) 1000 MG tablet     metoprolol tartrate (LOPRESSOR) 100 MG tablet     tiotropium (SPIRIVA HANDIHALER) 18 MCG inhaled capsule     warfarin ANTICOAGULANT (COUMADIN) 2.5 MG tablet     No current  facility-administered medications for this visit.      Past Medical History:   Patient Active Problem List   Diagnosis     Hyperlipidemia LDL goal <70     Tobacco abuse     Chronic atrial fibrillation (HCC)     Renal hypertension     Advanced directives, counseling/discussion     Health Care Home     Polycythemia secondary to smoking     Elevated alkaline phosphatase level     Long term current use of anticoagulant therapy     Non morbid obesity due to excess calories     Pulmonary emphysema, unspecified emphysema type (H)     NAFLD (nonalcoholic fatty liver disease)     Varicose veins of legs     Type 2 diabetes mellitus with stage 2 chronic kidney disease, without long-term current use of insulin (H)     Diabetic neuropathy (H)     Gait disturbance     Allergic rhinitis, unspecified seasonality, unspecified trigger     Chronic heart failure with preserved ejection fraction (H)     Umbilical hernia without obstruction and without gangrene     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 [place] [id number] on close of this encounter.       Again, thank you for allowing me to participate in the care of your patient.        Sincerely,        IDANIA Colón  CNP

## 2023-02-02 NOTE — PROGRESS NOTES
[facility] Dermatology Note  Encounter Date: Feb 2, 2023  Office Visit     Reviewed patients past medical history and pertinent chart review prior to patients visit today.     Dermatology Problem List:  NUB left posterior ear, shave biopsy 02/02/23   AK, left nose cryotherapy 02/02/23   Seborrheic dermatitis, ketoconazole 2% shampoo    ____________________________________________    Assessment & Plan:     # Neoplasm of uncertain behavior:  Left posterior ear  DDx includes SCC vs ISK. Shave biopsy today.    Procedure Note: Biopsy by shave technique  The risks and benefits of the procedure were described to the patient. These include but are not limited to bleeding, infection, scar, incomplete removal, and non-diagnostic biopsy. Verbal informed consent was obtained. The above site(s) was cleansed with an alcohol pad and injected with 1% lidocaine with epinephrine. Once anesthesia was obtained, a biopsy(ies) was performed with Gilette blade. The tissue(s) was placed in a labeled container(s) with formalin and sent to pathology. Hemostasis was achieved with aluminum chloride. Vaseline and a bandage were applied to the wound(s). The patient tolerated the procedure well and was given post biopsy care instructions.     # Seborrheic dermatitis, scalp, face and axilla. Discussed that this is a recurring rash for most people and will need some maintenance even after rash has resolved. Given prescription for ketoconazole 2% shampoo to use every other daily alternating with head and shoulders on other days.     # Actinic keratosis. Premalignant nature discussed with patient. Treatment options discussed with patient today including no treatment, topical treatment, and cryotherapy. Patient elects to treat visible lesions today with cryotherapy. After verbal consent and discussion of risks and benefits including but no limited to dyspigmentation/scar, blister, and pain. A total of 1 actinic keratoses were  Attempted to call patient and the number rang without going to a voicemail. treated with 1-2mm freeze border for 2 cycle with liquid nitrogen. Post cryotherapy instructions were provided.       Cheryl Huang, APRN CNP on 2/2/2023 at 9:43 AM   _______________________________________    CC: Derm Problem (Lesion on back of ear and head )    HPI:  Mr. Keith Desir is a(n) 77 year old male who presents today as a new patient for spots of concern on the back of his ear and head. He has noticed these for several months and they are not resolving.     Patient is otherwise feeling well, without additional skin concerns.      Physical Exam:  SKIN: Focused examination of face, scalp, neck was performed.  - left nasal sidewall, pink scaly well defined macule  -left posterior ear, hyperkeratosis with pink/purple base  -generalized flaking of face, scalp and ears  -erythematous patch in bilateral axilla    - No other lesions of concern on areas examined.         Medications:  Current Outpatient Medications   Medication     albuterol (PROAIR HFA/PROVENTIL HFA/VENTOLIN HFA) 108 (90 Base) MCG/ACT inhaler     amoxicillin-clavulanate (AUGMENTIN) 875-125 MG tablet     atorvastatin (LIPITOR) 20 MG tablet     digoxin (LANOXIN) 125 MCG tablet     dimenhyDRINATE (DRAMAMINE PO)     empagliflozin (JARDIANCE) 10 MG TABS tablet     fluticasone (FLONASE) 50 MCG/ACT nasal spray     furosemide (LASIX) 20 MG tablet     gabapentin (NEURONTIN) 300 MG capsule     glipiZIDE (GLUCOTROL) 5 MG tablet     lisinopril (ZESTRIL) 40 MG tablet     metFORMIN (GLUCOPHAGE) 1000 MG tablet     metoprolol tartrate (LOPRESSOR) 100 MG tablet     tiotropium (SPIRIVA HANDIHALER) 18 MCG inhaled capsule     warfarin ANTICOAGULANT (COUMADIN) 2.5 MG tablet     No current facility-administered medications for this visit.      Past Medical History:   Patient Active Problem List   Diagnosis     Hyperlipidemia LDL goal <70     Tobacco abuse     Chronic atrial fibrillation (HCC)     Renal hypertension     Advanced directives,  counseling/discussion     Health Care Home     Polycythemia secondary to smoking     Elevated alkaline phosphatase level     Long term current use of anticoagulant therapy     Non morbid obesity due to excess calories     Pulmonary emphysema, unspecified emphysema type (H)     NAFLD (nonalcoholic fatty liver disease)     Varicose veins of legs     Type 2 diabetes mellitus with stage 2 chronic kidney disease, without long-term current use of insulin (H)     Diabetic neuropathy (H)     Gait disturbance     Allergic rhinitis, unspecified seasonality, unspecified trigger     Chronic heart failure with preserved ejection fraction (H)     Umbilical hernia without obstruction and without gangrene     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 [place] [id number] on close of this encounter.

## 2023-02-02 NOTE — PATIENT INSTRUCTIONS
Wound Care Instructions     FOR SUPERFICIAL WOUNDS     [facility], [facility] or    [facility] [phone]          AFTER 24 HOURS YOU SHOULD REMOVE THE BANDAGE AND BEGIN DAILY DRESSING CHANGES AS FOLLOWS:     1) Remove Dressing.     2) Clean and dry the area with tap water using a Q-tip or sterile gauze pad.     3) Apply Vaseline, Aquaphor, Polysporin ointment or Bacitracin ointment over entire wound.  Do NOT use Neosporin ointment.     4) Cover the wound with a band-aid, or a sterile non-stick gauze pad and micropore paper tape      REPEAT THESE INSTRUCTIONS AT LEAST ONCE A DAY UNTIL THE WOUND HAS COMPLETELY HEALED.    It is an old wives tale that a wound heals better when it is exposed to air and allowed to dry out. The wound will heal faster with a better cosmetic result if it is kept moist with ointment and covered with a bandage.    **Do not let the wound dry out.**      Supplies Needed:      *Cotton tipped applicators (Q-tips)    *Polysporin Ointment or Bacitracin Ointment (NOT NEOSPORIN)    *Band-aids or non-stick gauze pads and micropore paper tape.      PATIENT INFORMATION:    During the healing process you will notice a number of changes. All wounds develop a small halo of redness surrounding the wound.  This means healing is occurring. Severe itching with extensive redness usually indicates sensitivity to the ointment or bandage tape used to dress the wound.  You should call our office if this develops.      Swelling  and/or discoloration around your surgical site is common, particularly when performed around the eye.    All wounds normally drain.  The larger the wound the more drainage there will be.  After 7-10 days, you will notice the wound beginning to shrink and new skin will begin to grow.  The wound is healed when you can see skin has formed over the entire area.  A healed wound has a healthy, shiny look to the surface and is red to dark pink in color to normalize.   Wounds may take approximately 4-6 weeks to heal.  Larger wounds may take 6-8 weeks.  After the wound is healed you may discontinue dressing changes.    You may experience a sensation of tightness as your wound heals. This is normal and will gradually subside.    Your healed wound may be sensitive to temperature changes. This sensitivity improves with time, but if you re having a lot of discomfort, try to avoid temperature extremes.    Patients frequently experience itching after their wound appears to have healed because of the continue healing under the skin.  Plain Vaseline will help relieve the itching.        POSSIBLE COMPLICATIONS    BLEEDING:    Leave the bandage in place.  Use tightly rolled up gauze or a cloth to apply direct pressure over the bandage for 30  minutes.  Reapply pressure for an additional 30 minutes if necessary  Use additional gauze and tape to maintain pressure once the bleeding has stopped.

## 2023-02-03 ENCOUNTER — TELEPHONE (OUTPATIENT)
Dept: DERMATOLOGY | Facility: CLINIC | Age: 78
End: 2023-02-03
Payer: MEDICARE

## 2023-02-03 LAB
PATH REPORT.COMMENTS IMP SPEC: ABNORMAL
PATH REPORT.COMMENTS IMP SPEC: ABNORMAL
PATH REPORT.COMMENTS IMP SPEC: YES
PATH REPORT.FINAL DX SPEC: ABNORMAL
PATH REPORT.GROSS SPEC: ABNORMAL
PATH REPORT.MICROSCOPIC SPEC OTHER STN: ABNORMAL
PATH REPORT.RELEVANT HX SPEC: ABNORMAL

## 2023-02-03 NOTE — TELEPHONE ENCOUNTER
----- Message from IDANIA Kelly CNP sent at 2/3/2023  2:16 PM CST -----      Please schedule patient for Mohs or place a dermatology surgery referral      Hi Ed,     We got your biopsy results back regarding the Left ear. 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 Squamous Cell skin cancer is usually caused by years of sun exposure. This skin cancer typically is well contained within the skin and rarely spreads elsewhere in the body. If left untreated it will continue to grow over time and may cause issues with deformity or spreading, therefore complete removal is recommended. I will refer you to our surgeons for a procedure called Mohs surgery. One of their nurses will reach out to you to schedule this shortly and review the process of Mohs surgery. Let me know if you have any other questions.      Jazz Huang CNP   Written by IDANIA Kelly CNP on 2/3/2023  2:16 PM CST  Seen by patient Juan Desir on 2/3/2023  2:20 PM

## 2023-02-03 NOTE — LETTER
[facility]  [address] [place] [address]  [phone]      February 3, 2023    Keith Desir  [address]  [facility] [address]      Dear Keith      You are scheduled for Mohs Surgery on: Monday March 6th, 2023 at 8 AM     Please check in at 2nd floor Dermatology Clinic.     You don't need to arrive more than 5-10 minutes prior to your appointment time.     Be sure to eat a good breakfast and bathe and wash your hair prior to Surgery. Please bring  with you if this is above your neck    If you are taking any anti-coagulants that are prescribed by your Doctor (such as Coumadin/warfarin, Plavix, Aspirin, Ibuprofen), please continue taking them.     However, If you are taking anti-coagulants over the counter without  a Doctor's order for a Medical condition, please discontinue them 10 days prior to Surgery.      Please wear loose comfortable clothing as it could possibly be 4-6 hours until your surgery is completed depending upon how many layers of tissue need to be removed.     Thank you,    Heriberto Preciado MD

## 2023-02-03 NOTE — TELEPHONE ENCOUNTER
Spoke with patient's wife Melissa (Consent to communicate in file)    Notified and educated on test results.   Mohs procedure explained and all questions answered.      Appointment scheduled on Monday March 6th at 8 am    MOHS information mailed.      Melissa expressed understanding.     Reina BERTRAND RN  [facility]  [phone]

## 2023-02-06 ENCOUNTER — TELEPHONE (OUTPATIENT)
Dept: DERMATOLOGY | Facility: CLINIC | Age: 78
End: 2023-02-06
Payer: MEDICARE

## 2023-02-06 NOTE — TELEPHONE ENCOUNTER
[facility] Call Center    Phone Message    May a detailed message be left on voicemail: yes     Reason for Call: Other: Per wife she would like to change MOHS procedure back to 2/28 if it is still available. Please call to reschedule. Okay to leave message on wife's mobile.     Action Taken: Other: [facility] [facility]    Travel Screening: Not Applicable

## 2023-02-06 NOTE — TELEPHONE ENCOUNTER
Called patient's wife back. No opens are left on the 28th of February. Christina just thought she would ask. She stated that they will keep his appointment on March 6th.   Demi Mckenna LPN

## 2023-02-28 ENCOUNTER — MYC MEDICAL ADVICE (OUTPATIENT)
Dept: FAMILY MEDICINE | Facility: CLINIC | Age: 78
End: 2023-02-28

## 2023-03-02 DIAGNOSIS — I50.32 CHRONIC HEART FAILURE WITH PRESERVED EJECTION FRACTION (H): ICD-10-CM

## 2023-03-02 DIAGNOSIS — I50.9 CHF (NYHA CLASS II, ACC/AHA STAGE C) (H): ICD-10-CM

## 2023-03-02 RX ORDER — METOPROLOL TARTRATE 100 MG
TABLET ORAL
Qty: 360 TABLET | Refills: 0 | Status: SHIPPED | OUTPATIENT
Start: 2023-03-02 | End: 2023-03-07

## 2023-03-02 RX ORDER — FUROSEMIDE 20 MG
TABLET ORAL
Qty: 90 TABLET | Refills: 0 | Status: SHIPPED | OUTPATIENT
Start: 2023-03-02 | End: 2023-03-07

## 2023-03-02 RX ORDER — DIGOXIN 125 MCG
TABLET ORAL
Qty: 90 TABLET | Refills: 0 | Status: SHIPPED | OUTPATIENT
Start: 2023-03-02 | End: 2023-03-07

## 2023-03-06 ENCOUNTER — OFFICE VISIT (OUTPATIENT)
Dept: DERMATOLOGY | Facility: CLINIC | Age: 78
End: 2023-03-06
Payer: MEDICARE

## 2023-03-06 DIAGNOSIS — L82.1 SEBORRHEIC KERATOSIS: ICD-10-CM

## 2023-03-06 DIAGNOSIS — D23.9 DERMAL NEVUS: ICD-10-CM

## 2023-03-06 DIAGNOSIS — D18.01 ANGIOMA OF SKIN: ICD-10-CM

## 2023-03-06 DIAGNOSIS — C44.229 SQUAMOUS CELL CANCER OF SKIN OF HELIX OF LEFT EAR: Primary | ICD-10-CM

## 2023-03-06 DIAGNOSIS — L81.4 LENTIGO: ICD-10-CM

## 2023-03-06 PROBLEM — Z86.0100 HISTORY OF COLONIC POLYPS: Status: ACTIVE | Noted: 2023-01-09

## 2023-03-06 PROCEDURE — 99213 OFFICE O/P EST LOW 20 MIN: CPT | Mod: 25 | Performed by: DERMATOLOGY

## 2023-03-06 PROCEDURE — 17311 MOHS 1 STAGE H/N/HF/G: CPT | Performed by: DERMATOLOGY

## 2023-03-06 RX ORDER — DIGOXIN 125 MCG
1 TABLET ORAL DAILY
COMMUNITY
Start: 2023-01-05 | End: 2023-03-07

## 2023-03-06 RX ORDER — FLUTICASONE PROPIONATE 50 MCG
1 BLISTER, WITH INHALATION DEVICE INHALATION EVERY 12 HOURS
COMMUNITY
Start: 2023-01-05

## 2023-03-06 ASSESSMENT — PAIN SCALES - GENERAL: PAINLEVEL: NO PAIN (0)

## 2023-03-06 NOTE — PROGRESS NOTES
Surgical Office Location :   [facility]  [address] [place] [address]

## 2023-03-06 NOTE — LETTER
3/6/2023         RE: Keith Desir  [id number] [facility] [address]        Dear Colleague,    Thank you for referring your patient, Keith Desir, to the [facility]. Please see a copy of my visit note below.    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  Patient states this has been present for a while.  Patient reports the following symptoms:  tender .  Patient reports the following previous treatments none.  Patient reports the following modifying factors none.  Associated symptoms: none.  Patient has no other skin complaints today.  Remainder of the HPI, Meds, PMH, Allergies, FH, and SH was reviewed in chart.      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     Hepatitis C      HTN (hypertension)      Hyperlipidemia      Morbid obesity (H)      NAFLD (nonalcoholic fatty liver disease)      Polycythemia secondary to smoking      Squamous cell carcinoma of skin, unspecified      Umbilical hernia without obstruction and without gangrene      Varicose veins of legs 08/08/2018       Past Surgical History:   Procedure Laterality Date     ANGIOGRAM  08/2006    normal     LUMBAR DISC SURGERY  11/11/2021     TONSILLECTOMY & ADENOIDECTOMY          Family History   Problem Relation Age of Onset     Diabetes Mother      Cerebrovascular Disease  Father      Heart Disease Brother         pacer      Cancer No family hx of      Glaucoma No family hx of      Macular Degeneration No family hx of        Social History     Socioeconomic History     Marital status:      Spouse name: Melissa     Number of children: 4     Years of education: 7     Highest education level: Not on file   Occupational History     Occupation: foundry work     Employer: DISABLED     Employer: RETIRED   Tobacco Use     Smoking status: Every Day     Packs/day: 1.00     Years: 45.00     Pack years: 45.00     Types: Cigarettes     Smokeless tobacco: Never     Tobacco comments:     cut down to 0.5 ppd   Substance and Sexual Activity     Alcohol use: Yes     Alcohol/week: 0.0 - 4.2 standard drinks     Drug use: No     Sexual activity: Not Currently     Partners: Female   Other Topics Concern     Parent/sibling w/ CABG, MI or angioplasty before 65F 55M? Not Asked   Social History Narrative     Not on file     Social Determinants of Health     Financial Resource Strain: Not on file   Food Insecurity: Not on file   Transportation Needs: Not on file   Physical Activity: Not on file   Stress: Not on file   Social Connections: Not on file   Intimate Partner Violence: Not on file   Housing Stability: Not on file       Outpatient Encounter Medications as of 3/6/2023   Medication Sig Dispense Refill     albuterol (PROAIR HFA/PROVENTIL HFA/VENTOLIN HFA) 108 (90 Base) MCG/ACT inhaler Inhale 1-2 puffs into the lungs every 4 hours as needed for shortness of breath / dyspnea 18 g 2     amoxicillin-clavulanate (AUGMENTIN) 875-125 MG tablet Take 1 tablet by mouth 2 times daily 14 tablet 1     atorvastatin (LIPITOR) 20 MG tablet Take 1 tablet (20 mg) by mouth daily 90 tablet 3     digoxin (LANOXIN) 125 MCG tablet TAKE ONE TABLET BY MOUTH ONE TIME DAILY 90 tablet 0     dimenhyDRINATE (DRAMAMINE PO) Take by mouth as needed       empagliflozin (JARDIANCE) 10 MG TABS tablet Take 1 tablet (10 mg) by mouth daily  90 tablet 3     fluticasone (FLONASE) 50 MCG/ACT nasal spray Spray 1 spray into both nostrils daily 16 g 11     furosemide (LASIX) 20 MG tablet Take 1 tablet (20 mg) by mouth 1 times daily 90 tablet 0     gabapentin (NEURONTIN) 300 MG capsule Take 600 mg by mouth 2 times daily       glipiZIDE (GLUCOTROL) 5 MG tablet TAKE 1 TABLET BY MOUTH TWICE DAILY BEFORE MEAL(S) 180 tablet 3     ketoconazole (NIZORAL) 2 % external shampoo Use every 2 days to wash scalp, ears, face, and under arms. Leave on skin for 3-5 minutes before rinsing. 120 mL 11     lisinopril (ZESTRIL) 40 MG tablet Take 1 tablet (40 mg) by mouth daily 90 tablet 3     metFORMIN (GLUCOPHAGE) 1000 MG tablet TAKE 1 TABLET BY MOUTH 2 TIMES DAILY WITH MEALS 180 tablet 3     metoprolol tartrate (LOPRESSOR) 100 MG tablet TAKE 2 TABLETS BY MOUTH 2 TIMES DAILY 360 tablet 0     tiotropium (SPIRIVA HANDIHALER) 18 MCG inhaled capsule INHALE 1 CAPSULE (18 MCG) INTO THE LUNGS DAILY 90 capsule 3     warfarin ANTICOAGULANT (COUMADIN) 2.5 MG tablet TAKE 2 TABLETS (5MG) BY MOUTH ON TUESDAY & SATURDAY. TAKE 3 TABLETS (7.5MG) ON ALL OTHER DAYS OR AS DIRECTED BY INR CLINIC. 240 tablet 1     [DISCONTINUED] digoxin (LANOXIN) 125 MCG tablet TAKE ONE TABLET BY MOUTH ONE TIME DAILY 90 tablet 0     [DISCONTINUED] furosemide (LASIX) 20 MG tablet Take 1 tablet (20 mg) by mouth 1 times daily 90 tablet 1     [DISCONTINUED] metoprolol tartrate (LOPRESSOR) 100 MG tablet TAKE 2 TABLETS BY MOUTH 2 TIMES DAILY 360 tablet 0     No facility-administered encounter medications on file as of 3/6/2023.             Review Of Systems  Skin: As above  Eyes: negative  Ears/Nose/Throat: negative  Respiratory: No shortness of breath, dyspnea on exertion, cough, or hemoptysis  Cardiovascular: negative  Gastrointestinal: negative  Genitourinary: negative  Musculoskeletal: negative  Neurologic: negative  Psychiatric: negative  Hematologic/Lymphatic/Immunologic: negative  Endocrine: negative      O:   NAD,  WDWN, Alert & Oriented, Mood & Affect wnl, Vitals stable   Here today alone   General appearance michelle ii   Vitals stable   Alert, oriented and in no acute distress      Following lymph nodes palpated: Occipital, Cervical, Supraclavicular no lad  L post ear 6mm scaly papule      Stuck on papules and brown macules on trunk and ext    Red papules on trunk   Flesh colored papules on trunk          Eyes: Conjunctivae/lids:Normal     ENT: Lips, buccal mucosa, tongue: normal    MSK:Normal    Cardiovascular: peripheral edema none    Pulm: Breathing Normal    Lymph Nodes: No Head and Neck Lymphadenopathy     Neuro/Psych: Orientation:Normal; Mood/Affect:Normal      A/P:  1. Seborrheic keratosis, lentigo, angioma, dermal nevus  2. L post ear squamous cell carcinoma   It was a pleasure speaking to Keith Desir today.  Previous clinic  notes and pertinent laboratory tests were reviewed prior to Keith Desir's visit.  Signs and Symptoms of skin cancer discussed with patient.  Patient encouraged to perform monthly skin exams.  UV precautions reviewed with patient.  Risks of non-melanoma skin cancer discussed with patient   Return to clinic 6 months  PROCEDURE NOTE  L post ear squamous cell carcinoma   MOHS:   Location    The rationale for Mohs surgery was discussed with the patient and consent was obtained.  The risks and benefits as well as alternatives to therapy were discussed, in detail.  Specifically, the risks of infection, scarring, bleeding, prolonged wound healing, incomplete removal, allergy to anesthesia, nerve injury and recurrence were addressed.  Indication for Mohs was Location. Prior to the procedure, the treatment site was clearly identified and, if available, confirmed with previous photos and confirmed by the patient   All components of the Universal Protocol/PAUSE rule were completed.  The Mohs surgeon operated in two distinct and integrated capacities as the surgeon and pathologist.      The area was  prepped with Betasept.  A rim of normal appearing skin was marked circumferentially around the lesion.  The area was infiltrated with local anesthesia.  The tumor was first debulked to remove all clinically apparent tumor.  An incision following the standard Mohs approach was done and the specimen was oriented,mapped and placed in 1 block(s).  Each specimen was then chromacoded and processed in the Mohs laboratory using standard Mohs technique and submitted for frozen section histology.  Frozen section analysis showed no residual tumor but CLEAR MARGINS.      The tumor was excised using standard Mohs technique in 1 stages(s).  CLEAR MARGINS OBTAINED and Final defect size was 1.1 cm.     We discussed the options for wound management in full with the patient including risks/benefits/ possible outcomes.        REPAIR SECOND INTENT: We discussed the options for wound management in full with the patient including risks/benefits/possible outcomes. Decision made to allow the wound to heal by second intention. Cautery was used for for hemostasis. EBL minimal; complications none; wound care routine.  The patient was discharged in good condition and will return in one month or prn for wound evaluation.        Again, thank you for allowing me to participate in the care of your patient.        Sincerely,        Heriberto Preciado MD

## 2023-03-06 NOTE — PATIENT INSTRUCTIONS
Open Wound Care     for ____ Left Posterior Ear __________  No strenuous activity for 48 hours    Take Tylenol as needed for discomfort.                                                .         Do not drink alcoholic beverages for 48 hours.    Keep the pressure bandage in place for 24 hours. If the bandage becomes blood tinged or loose, reinforce it with gauze and tape.        (Refer to the reverse side of this page for management of bleeding).    Remove bandage in 24 hours and begin wound care as follows:     Clean area with tap water using a Q tip or gauze pad, (shower / bathe normally)  Dry wound with Q tip or gauze pad  Apply Aquaphor, Vaseline, Polysporin or Bacitracin Ointment with a Q tip  Do NOT use Neosporin Ointment *  Cover the wound with a band-aid or nonstick gauze pad and paper tape.  Repeat wound care once a day until wound is completely healed.    It is an old wives tale that a wound heals better when it is exposed to air and allowed to dry out. The wound will heal faster with a better cosmetic result if it is kept moist with ointment and covered with a bandage.  Do not let the wound dry out.    Supplies Needed:                Qtips or gauze pads                Polysporin or Bacitracin Ointment                Bandaids or nonstick gauze pads and paper tape    Wound care kits and brown paper tape are available for purchase at   the pharmacy.    BLEEDING:    Use tightly rolled up gauze or cloth to apply direct pressure over the bandage for 20   minutes.  Reapply pressure for an additional 20 minutes if necessary  Call the office or go to the nearest emergency room if pressure fails to stop the bleeding.  Use additional gauze and tape to maintain pressure once the bleeding has stopped.  Begin wound care 24 hours after surgery as directed.                  WOUND HEALING    One week after surgery a pink / red halo will form around the outside of the wound.   This is new skin.  The center of the wound will  appear yellowish white and produce some drainage.  The pink halo will slowly migrate in toward the center of the wound until the wound is covered with new shiny pink skin.  There will be no more drainage when the wound is completely healed.  It will take six months to one year for the redness to fade.  The scar may be itchy, tight and sensitive to extreme temperatures for a year after the surgery.  Massaging the area several times a day for several minutes after the wound is completely healed will help the scar soften and normalize faster. Begin massage only after healing is complete.    In case of emergency call: Dr Preciado: [phone]  [facility]: [phone]  [facility]:[phone]

## 2023-03-06 NOTE — PROGRESS NOTES
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  Patient states this has been present for a while.  Patient reports the following symptoms:  tender .  Patient reports the following previous treatments none.  Patient reports the following modifying factors none.  Associated symptoms: none.  Patient has no other skin complaints today.  Remainder of the HPI, Meds, PMH, Allergies, FH, and SH was reviewed in chart.      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     Hepatitis C      HTN (hypertension)      Hyperlipidemia      Morbid obesity (H)      NAFLD (nonalcoholic fatty liver disease)      Polycythemia secondary to smoking      Squamous cell carcinoma of skin, unspecified      Umbilical hernia without obstruction and without gangrene      Varicose veins of legs 08/08/2018       Past Surgical History:   Procedure Laterality Date     ANGIOGRAM  08/2006    normal     LUMBAR DISC SURGERY  11/11/2021     TONSILLECTOMY & ADENOIDECTOMY          Family History   Problem Relation Age of Onset     Diabetes Mother      Cerebrovascular Disease Father      Heart Disease Brother         pacer      Cancer No family hx of      Glaucoma No family hx of      Macular Degeneration No family hx of        Social History     Socioeconomic History     Marital status:      Spouse name: Melissa     Number of children: 4     Years of education: 7     Highest education level: Not on file   Occupational  History     Occupation: foundry work     Employer: DISABLED     Employer: RETIRED   Tobacco Use     Smoking status: Every Day     Packs/day: 1.00     Years: 45.00     Pack years: 45.00     Types: Cigarettes     Smokeless tobacco: Never     Tobacco comments:     cut down to 0.5 ppd   Substance and Sexual Activity     Alcohol use: Yes     Alcohol/week: 0.0 - 4.2 standard drinks     Drug use: No     Sexual activity: Not Currently     Partners: Female   Other Topics Concern     Parent/sibling w/ CABG, MI or angioplasty before 65F 55M? Not Asked   Social History Narrative     Not on file     Social Determinants of Health     Financial Resource Strain: Not on file   Food Insecurity: Not on file   Transportation Needs: Not on file   Physical Activity: Not on file   Stress: Not on file   Social Connections: Not on file   Intimate Partner Violence: Not on file   Housing Stability: Not on file       Outpatient Encounter Medications as of 3/6/2023   Medication Sig Dispense Refill     albuterol (PROAIR HFA/PROVENTIL HFA/VENTOLIN HFA) 108 (90 Base) MCG/ACT inhaler Inhale 1-2 puffs into the lungs every 4 hours as needed for shortness of breath / dyspnea 18 g 2     amoxicillin-clavulanate (AUGMENTIN) 875-125 MG tablet Take 1 tablet by mouth 2 times daily 14 tablet 1     atorvastatin (LIPITOR) 20 MG tablet Take 1 tablet (20 mg) by mouth daily 90 tablet 3     digoxin (LANOXIN) 125 MCG tablet TAKE ONE TABLET BY MOUTH ONE TIME DAILY 90 tablet 0     dimenhyDRINATE (DRAMAMINE PO) Take by mouth as needed       empagliflozin (JARDIANCE) 10 MG TABS tablet Take 1 tablet (10 mg) by mouth daily 90 tablet 3     fluticasone (FLONASE) 50 MCG/ACT nasal spray Spray 1 spray into both nostrils daily 16 g 11     furosemide (LASIX) 20 MG tablet Take 1 tablet (20 mg) by mouth 1 times daily 90 tablet 0     gabapentin (NEURONTIN) 300 MG capsule Take 600 mg by mouth 2 times daily       glipiZIDE (GLUCOTROL) 5 MG tablet TAKE 1 TABLET BY MOUTH TWICE DAILY  BEFORE MEAL(S) 180 tablet 3     ketoconazole (NIZORAL) 2 % external shampoo Use every 2 days to wash scalp, ears, face, and under arms. Leave on skin for 3-5 minutes before rinsing. 120 mL 11     lisinopril (ZESTRIL) 40 MG tablet Take 1 tablet (40 mg) by mouth daily 90 tablet 3     metFORMIN (GLUCOPHAGE) 1000 MG tablet TAKE 1 TABLET BY MOUTH 2 TIMES DAILY WITH MEALS 180 tablet 3     metoprolol tartrate (LOPRESSOR) 100 MG tablet TAKE 2 TABLETS BY MOUTH 2 TIMES DAILY 360 tablet 0     tiotropium (SPIRIVA HANDIHALER) 18 MCG inhaled capsule INHALE 1 CAPSULE (18 MCG) INTO THE LUNGS DAILY 90 capsule 3     warfarin ANTICOAGULANT (COUMADIN) 2.5 MG tablet TAKE 2 TABLETS (5MG) BY MOUTH ON TUESDAY & SATURDAY. TAKE 3 TABLETS (7.5MG) ON ALL OTHER DAYS OR AS DIRECTED BY INR CLINIC. 240 tablet 1     [DISCONTINUED] digoxin (LANOXIN) 125 MCG tablet TAKE ONE TABLET BY MOUTH ONE TIME DAILY 90 tablet 0     [DISCONTINUED] furosemide (LASIX) 20 MG tablet Take 1 tablet (20 mg) by mouth 1 times daily 90 tablet 1     [DISCONTINUED] metoprolol tartrate (LOPRESSOR) 100 MG tablet TAKE 2 TABLETS BY MOUTH 2 TIMES DAILY 360 tablet 0     No facility-administered encounter medications on file as of 3/6/2023.             Review Of Systems  Skin: As above  Eyes: negative  Ears/Nose/Throat: negative  Respiratory: No shortness of breath, dyspnea on exertion, cough, or hemoptysis  Cardiovascular: negative  Gastrointestinal: negative  Genitourinary: negative  Musculoskeletal: negative  Neurologic: negative  Psychiatric: negative  Hematologic/Lymphatic/Immunologic: negative  Endocrine: negative      O:   NAD, WDWN, Alert & Oriented, Mood & Affect wnl, Vitals stable   Here today alone   General appearance michelle ii   Vitals stable   Alert, oriented and in no acute distress      Following lymph nodes palpated: Occipital, Cervical, Supraclavicular no lad  L post ear 6mm scaly papule      Stuck on papules and brown macules on trunk and ext    Red papules on  trunk   Flesh colored papules on trunk          Eyes: Conjunctivae/lids:Normal     ENT: Lips, buccal mucosa, tongue: normal    MSK:Normal    Cardiovascular: peripheral edema none    Pulm: Breathing Normal    Lymph Nodes: No Head and Neck Lymphadenopathy     Neuro/Psych: Orientation:Normal; Mood/Affect:Normal      A/P:  1. Seborrheic keratosis, lentigo, angioma, dermal nevus  2. L post ear squamous cell carcinoma   It was a pleasure speaking to Keith Desir today.  Previous clinic  notes and pertinent laboratory tests were reviewed prior to Keith Desir's visit.  Signs and Symptoms of skin cancer discussed with patient.  Patient encouraged to perform monthly skin exams.  UV precautions reviewed with patient.  Risks of non-melanoma skin cancer discussed with patient   Return to clinic 6 months  PROCEDURE NOTE  L post ear squamous cell carcinoma   MOHS:   Location    The rationale for Mohs surgery was discussed with the patient and consent was obtained.  The risks and benefits as well as alternatives to therapy were discussed, in detail.  Specifically, the risks of infection, scarring, bleeding, prolonged wound healing, incomplete removal, allergy to anesthesia, nerve injury and recurrence were addressed.  Indication for Mohs was Location. Prior to the procedure, the treatment site was clearly identified and, if available, confirmed with previous photos and confirmed by the patient   All components of the Universal Protocol/PAUSE rule were completed.  The Mohs surgeon operated in two distinct and integrated capacities as the surgeon and pathologist.      The area was prepped with Betasept.  A rim of normal appearing skin was marked circumferentially around the lesion.  The area was infiltrated with local anesthesia.  The tumor was first debulked to remove all clinically apparent tumor.  An incision following the standard Mohs approach was done and the specimen was oriented,mapped and placed in 1 block(s).  Each  specimen was then chromacoded and processed in the Mohs laboratory using standard Mohs technique and submitted for frozen section histology.  Frozen section analysis showed no residual tumor but CLEAR MARGINS.      The tumor was excised using standard Mohs technique in 1 stages(s).  CLEAR MARGINS OBTAINED and Final defect size was 1.1 cm.     We discussed the options for wound management in full with the patient including risks/benefits/ possible outcomes.        REPAIR SECOND INTENT: We discussed the options for wound management in full with the patient including risks/benefits/possible outcomes. Decision made to allow the wound to heal by second intention. Cautery was used for for hemostasis. EBL minimal; complications none; wound care routine.  The patient was discharged in good condition and will return in one month or prn for wound evaluation.

## 2023-03-07 ENCOUNTER — OFFICE VISIT (OUTPATIENT)
Dept: FAMILY MEDICINE | Facility: CLINIC | Age: 78
End: 2023-03-07
Payer: MEDICARE

## 2023-03-07 VITALS
DIASTOLIC BLOOD PRESSURE: 69 MMHG | WEIGHT: 200 LBS | HEART RATE: 54 BPM | HEIGHT: 67 IN | OXYGEN SATURATION: 98 % | BODY MASS INDEX: 31.39 KG/M2 | RESPIRATION RATE: 16 BRPM | TEMPERATURE: 97.4 F | SYSTOLIC BLOOD PRESSURE: 131 MMHG

## 2023-03-07 DIAGNOSIS — N18.2 TYPE 2 DIABETES MELLITUS WITH STAGE 2 CHRONIC KIDNEY DISEASE, WITHOUT LONG-TERM CURRENT USE OF INSULIN (H): Primary | ICD-10-CM

## 2023-03-07 DIAGNOSIS — E11.42 DIABETIC POLYNEUROPATHY ASSOCIATED WITH TYPE 2 DIABETES MELLITUS (H): ICD-10-CM

## 2023-03-07 DIAGNOSIS — E78.5 HYPERLIPIDEMIA LDL GOAL <70: ICD-10-CM

## 2023-03-07 DIAGNOSIS — I12.9 RENAL HYPERTENSION: ICD-10-CM

## 2023-03-07 DIAGNOSIS — I50.32 CHRONIC HEART FAILURE WITH PRESERVED EJECTION FRACTION (H): ICD-10-CM

## 2023-03-07 DIAGNOSIS — E11.22 TYPE 2 DIABETES MELLITUS WITH STAGE 2 CHRONIC KIDNEY DISEASE, WITHOUT LONG-TERM CURRENT USE OF INSULIN (H): Primary | ICD-10-CM

## 2023-03-07 DIAGNOSIS — J43.9 PULMONARY EMPHYSEMA, UNSPECIFIED EMPHYSEMA TYPE (H): ICD-10-CM

## 2023-03-07 DIAGNOSIS — I48.20 CHRONIC ATRIAL FIBRILLATION (H): ICD-10-CM

## 2023-03-07 LAB
ANION GAP SERPL CALCULATED.3IONS-SCNC: 6 MMOL/L (ref 3–14)
BUN SERPL-MCNC: 15 MG/DL (ref 7–30)
CALCIUM SERPL-MCNC: 9.4 MG/DL (ref 8.5–10.1)
CHLORIDE BLD-SCNC: 106 MMOL/L (ref 94–109)
CO2 SERPL-SCNC: 28 MMOL/L (ref 20–32)
CREAT SERPL-MCNC: 0.9 MG/DL (ref 0.66–1.25)
GFR SERPL CREATININE-BSD FRML MDRD: 89 ML/MIN/1.73M2
GLUCOSE BLD-MCNC: 119 MG/DL (ref 70–99)
HBA1C MFR BLD: 6.4 % (ref 0–5.6)
INR PPP: 1.93 (ref 0.85–1.15)
POTASSIUM BLD-SCNC: 5.3 MMOL/L (ref 3.4–5.3)
SODIUM SERPL-SCNC: 140 MMOL/L (ref 133–144)
TSH SERPL DL<=0.005 MIU/L-ACNC: 1.82 MU/L (ref 0.4–4)

## 2023-03-07 PROCEDURE — 83036 HEMOGLOBIN GLYCOSYLATED A1C: CPT | Performed by: FAMILY MEDICINE

## 2023-03-07 PROCEDURE — 85610 PROTHROMBIN TIME: CPT | Performed by: FAMILY MEDICINE

## 2023-03-07 PROCEDURE — 99214 OFFICE O/P EST MOD 30 MIN: CPT | Performed by: FAMILY MEDICINE

## 2023-03-07 PROCEDURE — 84443 ASSAY THYROID STIM HORMONE: CPT | Performed by: FAMILY MEDICINE

## 2023-03-07 PROCEDURE — 80048 BASIC METABOLIC PNL TOTAL CA: CPT | Performed by: FAMILY MEDICINE

## 2023-03-07 PROCEDURE — 36415 COLL VENOUS BLD VENIPUNCTURE: CPT | Performed by: FAMILY MEDICINE

## 2023-03-07 RX ORDER — GLIPIZIDE 5 MG/1
TABLET ORAL
Qty: 180 TABLET | Refills: 3 | Status: SHIPPED | OUTPATIENT
Start: 2023-03-07 | End: 2024-03-26

## 2023-03-07 RX ORDER — FUROSEMIDE 20 MG
20 TABLET ORAL DAILY
Qty: 90 TABLET | Refills: 1 | Status: SHIPPED | OUTPATIENT
Start: 2023-03-07 | End: 2023-11-17

## 2023-03-07 RX ORDER — METOPROLOL TARTRATE 100 MG
200 TABLET ORAL 2 TIMES DAILY
Qty: 360 TABLET | Refills: 3 | Status: SHIPPED | OUTPATIENT
Start: 2023-03-07 | End: 2024-03-26

## 2023-03-07 RX ORDER — DIGOXIN 125 MCG
125 TABLET ORAL DAILY
Qty: 90 TABLET | Refills: 3 | Status: SHIPPED | OUTPATIENT
Start: 2023-03-07 | End: 2024-03-19

## 2023-03-07 RX ORDER — ATORVASTATIN CALCIUM 20 MG/1
20 TABLET, FILM COATED ORAL DAILY
Qty: 90 TABLET | Refills: 3 | Status: SHIPPED | OUTPATIENT
Start: 2023-03-07 | End: 2024-03-26

## 2023-03-07 NOTE — PROGRESS NOTES
"  Assessment & Plan     (E11.22,  N18.2) Type 2 diabetes mellitus with stage 2 chronic kidney disease, without long-term current use of insulin (H)  (primary encounter diagnosis)  (E11.42) Diabetic polyneuropathy associated with type 2 diabetes mellitus (H)  Plan: lab today     (I50.32) Chronic heart failure with preserved ejection fraction (H)  Comment: euvolemic, on beta blocker, SGL2i and ACE/ARB   Plan: digoxin (LANOXIN) 125 MCG tablet, furosemide         (LASIX) 20 MG tablet, metoprolol tartrate         (LOPRESSOR) 100 MG tablet           (I48.20) Chronic atrial fibrillation (H)  Comment: rate controlled and anticoagulated   Plan: metoprolol tartrate (LOPRESSOR) 100 MG tablet,         INR        Continue chronic anticoagulation under surveillance of [facility] clinic with goal INR 2 - 3 indefinitely      (I12.9) Renal hypertension  Comment: Well controlled with medications without side effects.   Plan: metoprolol tartrate (LOPRESSOR) 100 MG tablet          (E78.5) Hyperlipidemia LDL goal <70  Comment: Well controlled with medications without side effects.   Plan: atorvastatin (LIPITOR) 20 MG tablet          (J43.9) Pulmonary emphysema, unspecified emphysema type (H)  Comment: Well controlled with medications without side effects.   Urged smoking cessation and offered my support.              BMI:   Estimated body mass index is 31.61 kg/m  as calculated from the following:    Height as of this encounter: 1.694 m (5' 6.69\").    Weight as of this encounter: 90.7 kg (200 lb).   Weight management plan: Discussed healthy diet and exercise guidelines    See Patient Instructions    Return in about 6 months (around 9/6/2023) for Wellness visit.   Follow-up Visit   Expected date: Sep 06, 2023      Follow Up Appointment Details:     Follow-up with whom?: PCP    Follow-Up for what?: Medicare Wellness    Any Additional Chronic Condition Management?:  Diabetes  COPD  Hypertension  Hyperlipidemia  Heart Failure       Welcome or " "Annual?: Annual Wellness    How?: In Person or Video    Is this an as-needed follow-up?: No                    Cayla Gregorio MD  [facility]   Ed is a 77 year old accompanied by his spouse, presenting for the following health issues:  Diabetes      History of Present Illness       Diabetes:   He presents for follow up of diabetes.  He is not checking blood glucose. He has no concerns regarding his diabetes at this time.  He is having numbness in feet. The patient has not had a diabetic eye exam in the last 12 months.         He eats 0-1 servings of fruits and vegetables daily.He consumes 1 sweetened beverage(s) daily.He exercises with enough effort to increase his heart rate 9 or less minutes per day.  He exercises with enough effort to increase his heart rate 3 or less days per week.   He is taking medications regularly.             Review of Systems   CONSTITUTIONAL: NEGATIVE for fever, chills, change in weight  INTEGUMENTARY/SKIN: NEGATIVE for worrisome rashes, moles or lesions  EYES: NEGATIVE for vision changes or irritation  ENT/MOUTH: NEGATIVE for ear, mouth and throat problems  RESP: NEGATIVE for significant cough or SOB  CV: NEGATIVE for chest pain/chest pressure and worsening edema   MUSCULOSKELETAL: NEGATIVE for significant arthralgias or myalgia      Objective    /69 (BP Location: Right arm, Patient Position: Sitting, Cuff Size: Adult Large)   Pulse 54   Temp 97.4  F (36.3  C) (Oral)   Resp 16   Ht 1.694 m (5' 6.69\")   Wt 90.7 kg (200 lb)   SpO2 98%   BMI 31.61 kg/m    Body mass index is 31.61 kg/m .  Physical Exam   GENERAL: alert, no distress, obese and elderly  NECK: no adenopathy, no asymmetry, masses, or scars and thyroid normal to palpation  RESP: lungs clear to auscultation - no rales, rhonchi or wheezes  CV: irregularly irregular rhythm, normal S1 S2, no S3 or S4, no murmur, click or rub and trace edema   MS: normal gait   SKIN: no suspicious " lesions or rashes and diffuse varicosities and spider veins of lower extremities   PSYCH: mentation appears normal, affect normal/bright    Office Visit on 02/02/2023   Component Date Value Ref Range Status     Case Report 02/02/2023    Final                    Value:Surgical Pathology Report                         Case: [id number]                                  Authorizing Provider:  Cheryl Huang APRN   Collected:           02/02/2023 10:05 AM                                 CNP                                                                          Ordering Location:     [facility]   Received:            02/02/2023 10:05 AM                                 [place]                                                                      Pathologist:           Nazario Hutton MD                                                         Specimen:    Skin, Left posterior ear                                                                    Final Diagnosis 02/02/2023    Final                    Value:This result contains rich text formatting which cannot be displayed here.     Clinical Information 02/02/2023    Final                    Value:This result contains rich text formatting which cannot be displayed here.     Gross Description 02/02/2023    Final                    Value:This result contains rich text formatting which cannot be displayed here.     Microscopic Description 02/02/2023    Final                    Value:This result contains rich text formatting which cannot be displayed here.     MCRS 02/02/2023 Yes (A)  N/A Final     Performing Labs 02/02/2023    Final                    Value:This result contains rich text formatting which cannot be displayed here.     No results found for this or any previous visit (from the past 24 hour(s)).

## 2023-03-08 ENCOUNTER — ANTICOAGULATION THERAPY VISIT (OUTPATIENT)
Dept: ANTICOAGULATION | Facility: CLINIC | Age: 78
End: 2023-03-08
Payer: MEDICARE

## 2023-03-08 DIAGNOSIS — Z79.01 LONG TERM CURRENT USE OF ANTICOAGULANT THERAPY: Primary | ICD-10-CM

## 2023-03-08 DIAGNOSIS — I48.20 CHRONIC ATRIAL FIBRILLATION (H): ICD-10-CM

## 2023-03-08 NOTE — PROGRESS NOTES
ANTICOAGULATION MANAGEMENT     Keith Desir 77 year old male is on warfarin with subtherapeutic INR result. (Goal INR 2.0-3.0)    Recent labs: (last 7 days)     03/07/23  1011   INR 1.93*       ASSESSMENT       Source(s): Chart Review and Patient/Caregiver Call       Warfarin doses taken: Warfarin taken as instructed    Diet: was worried abour ear procedure recently and wasnot eating as much eating normally again    New illness, injury, or hospitalization: skin ca taken off from ear    Medication/supplement changes: None noted    Signs or symptoms of bleeding or clotting: No    Previous INR: Therapeutic last visit; previously outside of goal range    Additional findings: runs at lower end of range will check in 2 weeks if continues low would need increase in maint, dose         PLAN     Recommended plan for temporary change(s) affecting INR     Dosing Instructions: Continue your current warfarin dose with next INR in 2 weeks       Summary  As of 3/8/2023    Full warfarin instructions:  5 mg every Tue; 7.5 mg all other days   Next INR check:  3/22/2023             Telephone call with  Melissa who verbalizes understanding and agrees to plan    Lab visit scheduled, states he will not come back in 2 weeks agrees to 3 weeks    Education provided:     Please call back if any changes to your diet, medications or how you've been taking warfarin    Goal range and lab monitoring: goal range and significance of current result, Importance of therapeutic range and Importance of following up at instructed interval    Plan made per [facility] anticoagulation protocol    Hortencia Carlson RN  Anticoagulation Clinic  3/8/2023    _______________________________________________________________________     Anticoagulation Episode Summary     Current INR goal:  2.0-3.0   TTR:  68.2 % (1 y)   Target end date:  Indefinite   Send INR reminders to:  AJ PALUMBO    Indications    Long term current use of anticoagulant therapy [Z79.01]  Chronic  atrial fibrillation (HCC) [I48.20]           Comments:           Anticoagulation Care Providers     Provider Role Specialty Phone number    Cayal Gregorio MD [facility] [phone]

## 2023-03-14 NOTE — TELEPHONE ENCOUNTER
Has the patient previously taken warfarin? Yes  If yes, for what indication? Chronic atrial fibrillation (HCC) [I48.2]     Does the patient have any of the following indications for a higher range of 2.5-3.5:    Mitral position mechanical valve? No    Earnestine-Shiley, Ball and Cage or Monoleaflet valve (regardless of position) No    Other (if yes, please explain) No     Please sign INR referral. Please review and complete drop boxes.      Indication for Anticoagulation:  If nonstandard INR is desired, indicate goal range and explanation:   Expected Duration of Therapy:      Send back to mikki snider anticoagulation.    Appreciated,  Brandi Scott RN   None

## 2023-03-29 ENCOUNTER — LAB (OUTPATIENT)
Dept: LAB | Facility: CLINIC | Age: 78
End: 2023-03-29
Payer: MEDICARE

## 2023-03-29 ENCOUNTER — OFFICE VISIT (OUTPATIENT)
Dept: OPTOMETRY | Facility: CLINIC | Age: 78
End: 2023-03-29
Attending: FAMILY MEDICINE
Payer: MEDICARE

## 2023-03-29 ENCOUNTER — ANTICOAGULATION THERAPY VISIT (OUTPATIENT)
Dept: ANTICOAGULATION | Facility: CLINIC | Age: 78
End: 2023-03-29

## 2023-03-29 DIAGNOSIS — H52.4 PRESBYOPIA: ICD-10-CM

## 2023-03-29 DIAGNOSIS — Z79.01 LONG TERM CURRENT USE OF ANTICOAGULANT THERAPY: ICD-10-CM

## 2023-03-29 DIAGNOSIS — E11.22 TYPE 2 DIABETES MELLITUS WITH STAGE 2 CHRONIC KIDNEY DISEASE, WITHOUT LONG-TERM CURRENT USE OF INSULIN (H): ICD-10-CM

## 2023-03-29 DIAGNOSIS — Z79.01 LONG TERM CURRENT USE OF ANTICOAGULANT THERAPY: Primary | ICD-10-CM

## 2023-03-29 DIAGNOSIS — H52.222 REGULAR ASTIGMATISM OF LEFT EYE: ICD-10-CM

## 2023-03-29 DIAGNOSIS — I48.20 CHRONIC ATRIAL FIBRILLATION (H): ICD-10-CM

## 2023-03-29 DIAGNOSIS — N18.2 TYPE 2 DIABETES MELLITUS WITH STAGE 2 CHRONIC KIDNEY DISEASE, WITHOUT LONG-TERM CURRENT USE OF INSULIN (H): ICD-10-CM

## 2023-03-29 DIAGNOSIS — H25.13 NUCLEAR SCLEROTIC CATARACT OF BOTH EYES: Primary | ICD-10-CM

## 2023-03-29 LAB — INR BLD: 2.2 (ref 0.9–1.1)

## 2023-03-29 PROCEDURE — 36416 COLLJ CAPILLARY BLOOD SPEC: CPT

## 2023-03-29 PROCEDURE — 92015 DETERMINE REFRACTIVE STATE: CPT | Mod: GY | Performed by: OPTOMETRIST

## 2023-03-29 PROCEDURE — 92014 COMPRE OPH EXAM EST PT 1/>: CPT | Performed by: OPTOMETRIST

## 2023-03-29 PROCEDURE — 85610 PROTHROMBIN TIME: CPT

## 2023-03-29 ASSESSMENT — VISUAL ACUITY
METHOD: SNELLEN - LINEAR
OD_SC: 20/40
OS_SC+: -2
OS_PH_SC: 20/40
OS_SC: 20/40
OS_PH_SC+: -2
OD_SC: J5
OD_SC+: -2

## 2023-03-29 ASSESSMENT — REFRACTION_MANIFEST
OD_CYLINDER: +0.50
OD_ADD: +2.50
OD_SPHERE: -0.50
OS_AXIS: 105
OS_CYLINDER: +1.25
OS_AXIS: 101
OS_CYLINDER: +2.25
OD_CYLINDER: SPHERE
OS_SPHERE: -0.75
OD_AXIS: 091
OS_SPHERE: -0.75
OD_SPHERE: -0.50
OS_ADD: +2.50

## 2023-03-29 ASSESSMENT — KERATOMETRY
OD_K1POWER_DIOPTERS: 46.25
OD_AXISANGLE2_DEGREES: 003
OS_K2POWER_DIOPTERS: 48.00
OS_AXISANGLE2_DEGREES: 005
OD_AXISANGLE_DEGREES: 093
OD_K2POWER_DIOPTERS: 47.00
OS_AXISANGLE_DEGREES: 095
OS_K1POWER_DIOPTERS: 46.25

## 2023-03-29 ASSESSMENT — REFRACTION_WEARINGRX
OS_ADD: +2.50
OD_ADD: +2.50
OS_AXIS: 105
SPECS_TYPE: LINED BIFOCAL
OS_CYLINDER: +0.75
OD_SPHERE: +1.75
OS_SPHERE: +1.25
OD_CYLINDER: SPHERE

## 2023-03-29 ASSESSMENT — TONOMETRY
IOP_METHOD: APPLANATION
OS_IOP_MMHG: 22
OD_IOP_MMHG: 22

## 2023-03-29 ASSESSMENT — CONF VISUAL FIELD
OD_NORMAL: 1
OD_SUPERIOR_NASAL_RESTRICTION: 0
OD_INFERIOR_TEMPORAL_RESTRICTION: 0
METHOD: COUNTING FINGERS
OD_SUPERIOR_TEMPORAL_RESTRICTION: 0
OD_INFERIOR_NASAL_RESTRICTION: 0

## 2023-03-29 ASSESSMENT — CUP TO DISC RATIO
OS_RATIO: 0.5
OD_RATIO: 0.5

## 2023-03-29 ASSESSMENT — SLIT LAMP EXAM - LIDS
COMMENTS: 1+ DERMATOCHALASIS
COMMENTS: 1+ DERMATOCHALASIS

## 2023-03-29 ASSESSMENT — EXTERNAL EXAM - LEFT EYE: OS_EXAM: NORMAL

## 2023-03-29 ASSESSMENT — EXTERNAL EXAM - RIGHT EYE: OD_EXAM: NORMAL

## 2023-03-29 NOTE — LETTER
3/29/2023         RE: Keith Desir  [address] [facility] [address]        Dear Colleague,    Thank you for referring your patient, Keith Desir, to the [facility]. No diabetic retinopathy was found at eye exam.  Please see a copy of my visit note below.    Chief Complaint   Patient presents with     Diabetic Eye Exam        Chief Complaint(s) and History of Present Illness(es)     Diabetic Eye Exam            Diabetes Type: Type 2 and taking oral medications               Lab Results   Component Value Date    A1C 6.4 03/07/2023    A1C 6.9 09/06/2022    A1C 7.4 03/09/2022    A1C 7.0 09/07/2021    A1C 6.7 03/02/2021    A1C 6.1 11/17/2020    A1C 7.5 09/02/2020    A1C 6.9 02/26/2020    A1C 7.4 07/22/2019            Last Eye Exam: March 2022  Dilated Previously: Yes, side effects of dilation explained today    What are you currently using to see?  Bifocals  - he wears glasses when reading the newspaper or menus.  Otherwise he doesn't wear them full time. Glasses are not present today.     Pt uses old glasses from 2019 & he is in a lined bifocal. He could not adjust to progressive.     Distance Vision Acuity: Noticed gradual change in right eye    Near Vision Acuity: Satisfied with vision while reading  with glasses    Eye Comfort: good  Do you use eye drops? : No  Occupation or Hobbies: retired    Jewels Gaona, OA      Medical, surgical and family histories reviewed and updated 3/29/2023.       OBJECTIVE: See Ophthalmology exam    ASSESSMENT:    ICD-10-CM    1. Nuclear sclerotic cataract of both eyes  H25.13 EYE EXAM (SIMPLE-NONBILLABLE)     REFRACTION      2. Type 2 diabetes mellitus with stage 2 chronic kidney disease, without long-term current use of insulin (H)  E11.22 Adult Eye  Referral    N18.2 EYE EXAM (SIMPLE-NONBILLABLE)     REFRACTION    no diabetic retinopathy found at eye exam       3. Regular astigmatism of left eye  H52.222 EYE EXAM (SIMPLE-NONBILLABLE)      REFRACTION      4. Presbyopia  H52.4 EYE EXAM (SIMPLE-NONBILLABLE)     REFRACTION          PLAN:    Keith Desir aware  eye exam results will be sent to Cayla Gregorio.  Patient Instructions   Fill glasses prescription for reading glasses   Allow 2 weeks to adapt to change in glasses  Mild cataracts in both eyes- need to monitor   Return in 1 year for eye exam    Kristy Rosales O.D.  [facility]  [address] [place] [address] [phone]              Again, thank you for allowing me to participate in the care of your patient.        Sincerely,        Kristy Rosales, OD

## 2023-03-29 NOTE — PROGRESS NOTES
ANTICOAGULATION MANAGEMENT     Keith Desir 77 year old male is on warfarin with therapeutic INR result. (Goal INR 2.0-3.0)    Recent labs: (last 7 days)     03/29/23  0957   INR 2.2*       ASSESSMENT     Warfarin Lab Questionnaire    Dose in Tablet or Mg 3/28/2023   TAB or MG? milligram (mg)     Pt Rptd Dose GRISELDA MONDAY TUESDAY WEDNESDAY THURSDAY FRIDAY SATURDAY   3/28/2023 7.5 7.5 5 7.5 7.5 7.5 7.5     Warfarin Lab Questionnaire 3/28/2023   Missed doses? No   Medication changes? No   Abnormal bleeding? No   Shortness of breath? No   Injuries or illness since last INR? No   Changes in diet or alcohol? No   Upcoming surgery, procedure? No   Best number to call with results? -     Additional findings: Last INR subtherapeutic, in range 1/31/23     PLAN     Recommended plan for no diet, medication or health factor changes affecting INR     Dosing Instructions: Continue your current warfarin dose with next INR in 4 weeks       Summary  As of 3/29/2023    Full warfarin instructions:  5 mg every Tue; 7.5 mg all other days   Next INR check:  4/26/2023             Detailed voice message left for Ed with dosing instructions and follow up date.     Contact [phone]  to schedule and with any changes, questions or concerns.     Education provided:     Please call back if any changes to your diet, medications or how you've been taking warfarin    Plan made per [facility] anticoagulation protocol    Jenniffer Loera RN  Anticoagulation Clinic  3/29/2023    _______________________________________________________________________     Anticoagulation Episode Summary     Current INR goal:  2.0-3.0   TTR:  66.7 % (1 y)   Target end date:  Indefinite   Send INR reminders to:  AJ PALUMBO    Indications    Long term current use of anticoagulant therapy [Z79.01]  Chronic atrial fibrillation (HCC) [I48.20]           Comments:           Anticoagulation Care Providers     Provider Role Specialty Phone number    Cayla Gregorio,  MD [facility] [phone]

## 2023-03-29 NOTE — PATIENT INSTRUCTIONS
Fill glasses prescription for reading glasses   Allow 2 weeks to adapt to change in glasses  Mild cataracts in both eyes- need to monitor   Return in 1 year for eye exam    Kristy Rosales O.D.  [facility]  [address] [place] [address] [phone]

## 2023-04-04 DIAGNOSIS — I48.20 CHRONIC ATRIAL FIBRILLATION (H): ICD-10-CM

## 2023-04-04 RX ORDER — WARFARIN SODIUM 2.5 MG/1
TABLET ORAL
Qty: 240 TABLET | Refills: 1 | Status: SHIPPED | OUTPATIENT
Start: 2023-04-04 | End: 2023-09-18

## 2023-04-04 NOTE — TELEPHONE ENCOUNTER
ANTICOAGULATION MANAGEMENT:  Medication Refill    Anticoagulation Summary  As of 3/29/2023    Warfarin maintenance plan:  5 mg (2.5 mg x 2) every Tue; 7.5 mg (2.5 mg x 3) all other days   Next INR check:  4/26/2023   Target end date:  Indefinite    Indications    Long term current use of anticoagulant therapy [Z79.01]  Chronic atrial fibrillation (HCC) [I48.20]             Anticoagulation Care Providers     Provider Role Specialty Phone number    Cayla Gregorio MD [facility] [phone]          Visit with referring provider/group within last year: Yes    [facility] referral signed within last year: Yes    Edward meets all criteria for refill (current [facility] referral, office visit with referring provider/group in last year, lab monitoring up to date or not exceeding 2 weeks overdue). Rx instructions and quantity supplied updated to match patient's current dosing plan. Warfarin 90 day supply with 1 refill granted per [facility] protocol     Ban Matamoros RN  Anticoagulation Clinic

## 2023-05-03 ENCOUNTER — ANTICOAGULATION THERAPY VISIT (OUTPATIENT)
Dept: ANTICOAGULATION | Facility: CLINIC | Age: 78
End: 2023-05-03

## 2023-05-03 ENCOUNTER — LAB (OUTPATIENT)
Dept: LAB | Facility: CLINIC | Age: 78
End: 2023-05-03
Payer: MEDICARE

## 2023-05-03 DIAGNOSIS — I48.20 CHRONIC ATRIAL FIBRILLATION (H): ICD-10-CM

## 2023-05-03 DIAGNOSIS — Z79.01 LONG TERM CURRENT USE OF ANTICOAGULANT THERAPY: Primary | ICD-10-CM

## 2023-05-03 DIAGNOSIS — Z79.01 LONG TERM CURRENT USE OF ANTICOAGULANT THERAPY: ICD-10-CM

## 2023-05-03 LAB — INR BLD: 1.7 (ref 0.9–1.1)

## 2023-05-03 PROCEDURE — 36416 COLLJ CAPILLARY BLOOD SPEC: CPT

## 2023-05-03 PROCEDURE — 85610 PROTHROMBIN TIME: CPT

## 2023-05-03 NOTE — PROGRESS NOTES
ANTICOAGULATION MANAGEMENT     Keith Desir 77 year old male is on warfarin with subtherapeutic INR result. (Goal INR 2.0-3.0)    Recent labs: (last 7 days)     05/03/23  0922   INR 1.7*       ASSESSMENT     Warfarin Lab Questionnaire    Warfarin Doses Last 7 Days      5/2/2023     9:44 AM   Dose in Tablet or Mg   TAB or MG? milligram (mg)     Pt Rptd Dose SUNDAY MONDAY TUESDAY WED THURS FRIDAY SATURDAY 5/2/2023   9:44 AM 7.5 7.5 5 7.5 7.5 7.5 7.5         5/2/2023   Warfarin Lab Questionnaire   Missed doses within past 14 days? No   Changes in diet or alcohol within past 14 days? No   Medication changes since last result? No   Injuries or illness since last result? No   New shortness of breath, severe headaches or sudden changes in vision since last result? No   Abnormal bleeding since last result? No   Upcoming surgery, procedure? No        Previous result: Therapeutic last visit; previously outside of goal range  Additional findings: None       PLAN     Recommended plan for no diet, medication or health factor changes affecting INR     Dosing Instructions: Continue your current warfarin dose with next INR in 2 weeks       Summary  As of 5/3/2023    Full warfarin instructions:  5 mg every Tue; 7.5 mg all other days   Next INR check:  5/17/2023             Telephone call with  Melissa who verbalizes understanding and agrees to plan    Patient elected to schedule next visit in 4 weeks    Education provided:     Goal range and lab monitoring: goal range and significance of current result    Plan made per [facility] anticoagulation protocol    Ban Matamoros RN  Anticoagulation Clinic  5/3/2023    _______________________________________________________________________     Anticoagulation Episode Summary     Current INR goal:  2.0-3.0   TTR:  61.0 % (1 y)   Target end date:  Indefinite   Send INR reminders to:  AJ PALUMBO    Indications    Long term current use of anticoagulant therapy [Z79.01]  Chronic atrial  fibrillation (HCC) [I48.20]           Comments:           Anticoagulation Care Providers     Provider Role Specialty Phone number    Cayla Gregorio MD [facility] [phone]

## 2023-05-03 NOTE — PROGRESS NOTES
ANTICOAGULATION MANAGEMENT     Keith Desir 77 year old male is on warfarin with subtherapeutic INR result. (Goal INR 2.0-3.0)    Recent labs: (last 7 days)     05/03/23  0922   INR 1.7*       ASSESSMENT     Warfarin Lab Questionnaire    Warfarin Doses Last 7 Days      5/2/2023     9:44 AM   Dose in Tablet or Mg   TAB or MG? milligram (mg)     Pt Rptd Dose SUNDAY MONDAY TUESDAY WED THURS FRIDAY SATURDAY 5/2/2023   9:44 AM 7.5 7.5 5 7.5 7.5 7.5 7.5         5/2/2023   Warfarin Lab Questionnaire   Missed doses within past 14 days? No   Changes in diet or alcohol within past 14 days? No   Medication changes since last result? No   Injuries or illness since last result? No   New shortness of breath, severe headaches or sudden changes in vision since last result? No   Abnormal bleeding since last result? No   Upcoming surgery, procedure? No        Previous result: Therapeutic last visit; previously outside of goal range  Additional findings: None       PLAN     Unable to reach Ed today.    LMTCB    Follow up required to confirm warfarin dose taken and assess for changes    Ban Matamoros RN  Anticoagulation Clinic  5/3/2023

## 2023-05-31 ENCOUNTER — LAB (OUTPATIENT)
Dept: LAB | Facility: CLINIC | Age: 78
End: 2023-05-31
Payer: MEDICARE

## 2023-05-31 ENCOUNTER — ANTICOAGULATION THERAPY VISIT (OUTPATIENT)
Dept: ANTICOAGULATION | Facility: CLINIC | Age: 78
End: 2023-05-31

## 2023-05-31 DIAGNOSIS — I48.20 CHRONIC ATRIAL FIBRILLATION (H): ICD-10-CM

## 2023-05-31 DIAGNOSIS — Z79.01 LONG TERM CURRENT USE OF ANTICOAGULANT THERAPY: Primary | ICD-10-CM

## 2023-05-31 DIAGNOSIS — Z79.01 LONG TERM CURRENT USE OF ANTICOAGULANT THERAPY: ICD-10-CM

## 2023-05-31 LAB — INR BLD: 2.4 (ref 0.9–1.1)

## 2023-05-31 PROCEDURE — 36416 COLLJ CAPILLARY BLOOD SPEC: CPT

## 2023-05-31 PROCEDURE — 85610 PROTHROMBIN TIME: CPT

## 2023-05-31 NOTE — PROGRESS NOTES
ANTICOAGULATION MANAGEMENT     Keith Desir 77 year old male is on warfarin with therapeutic INR result. (Goal INR 2.0-3.0)    Recent labs: (last 7 days)     05/31/23  0958   INR 2.4*       ASSESSMENT     Warfarin Lab Questionnaire    Warfarin Doses Last 7 Days      5/30/2023     1:44 PM   Dose in Tablet or Mg   TAB or MG? milligram (mg)     Pt Rptd Dose SUNDAY MONDAY TUESDAY WED THURS FRIDAY SATURDAY 5/30/2023   1:44 PM 7.5 7.5 5 7.5 7.5 7.5 7.5         5/30/2023   Warfarin Lab Questionnaire   Missed doses within past 14 days? No   Changes in diet or alcohol within past 14 days? No   Medication changes since last result? No   Injuries or illness since last result? No   New shortness of breath, severe headaches or sudden changes in vision since last result? No   Abnormal bleeding since last result? No   Upcoming surgery, procedure? No        Previous result: Subtherapeutic  Additional findings: None       PLAN     Recommended plan for no diet, medication or health factor changes affecting INR     Dosing Instructions: Continue your current warfarin dose with next INR in 6 weeks       Summary  As of 5/31/2023    Full warfarin instructions:  5 mg every Tue; 7.5 mg all other days   Next INR check:  7/12/2023             Telephone call with Ed who verbalizes understanding and agrees to plan    Lab visit scheduled    Education provided:     Goal range and lab monitoring: goal range and significance of current result    Plan made per [facility] anticoagulation protocol    Ban Matamoros RN  Anticoagulation Clinic  5/31/2023    _______________________________________________________________________     Anticoagulation Episode Summary     Current INR goal:  2.0-3.0   TTR:  57.7 % (1 y)   Target end date:  Indefinite   Send INR reminders to:  AJ PALUMBO    Indications    Long term current use of anticoagulant therapy [Z79.01]  Chronic atrial fibrillation (HCC) [I48.20]           Comments:           Anticoagulation Care  Providers     Provider Role Specialty Phone number    Cayla Gregorio MD [facility] [phone]

## 2023-06-17 ENCOUNTER — HEALTH MAINTENANCE LETTER (OUTPATIENT)
Age: 78
End: 2023-06-17

## 2023-06-26 ENCOUNTER — TELEPHONE (OUTPATIENT)
Dept: OPTOMETRY | Facility: CLINIC | Age: 78
End: 2023-06-26
Payer: MEDICARE

## 2023-06-26 NOTE — LETTER
Keith Desir  1945    Patient Active Problem List   Diagnosis    Hyperlipidemia LDL goal <70    Tobacco abuse    Chronic atrial fibrillation (HCC)    Renal hypertension    Long term current use of anticoagulant therapy    Non morbid obesity due to excess calories    Pulmonary emphysema, unspecified emphysema type (H)    NAFLD (nonalcoholic fatty liver disease)    Varicose veins of legs    Type 2 diabetes mellitus with stage 2 chronic kidney disease, without long-term current use of insulin (H)    Diabetic neuropathy (H)    Gait disturbance    Allergic rhinitis, unspecified seasonality, unspecified trigger    Chronic heart failure with preserved ejection fraction (H)    Umbilical hernia without obstruction and without gangrene    History of colonic polyps     Current Outpatient Medications   Medication    albuterol (PROAIR HFA/PROVENTIL HFA/VENTOLIN HFA) 108 (90 Base) MCG/ACT inhaler    atorvastatin (LIPITOR) 20 MG tablet    digoxin (LANOXIN) 125 MCG tablet    dimenhyDRINATE (DRAMAMINE PO)    empagliflozin (JARDIANCE) 10 MG TABS tablet    fluticasone (FLONASE) 50 MCG/ACT nasal spray    fluticasone (FLOVENT DISKUS) 50 MCG/ACT inhaler    furosemide (LASIX) 20 MG tablet    glipiZIDE (GLUCOTROL) 5 MG tablet    ketoconazole (NIZORAL) 2 % external shampoo    lisinopril (ZESTRIL) 40 MG tablet    metFORMIN (GLUCOPHAGE) 1000 MG tablet    metoprolol tartrate (LOPRESSOR) 100 MG tablet    tiotropium (SPIRIVA HANDIHALER) 18 MCG inhaled capsule    warfarin ANTICOAGULANT (COUMADIN) 2.5 MG tablet     No current facility-administered medications for this visit.     Allergies   Allergen Reactions    Nkda [No Known Drug Allergy]

## 2023-06-30 NOTE — TELEPHONE ENCOUNTER
From: Zandra Wilhelm  Sent: 6/26/2023   2:28 PM CDT  To: Kristy Rosales, OD  Subject: Patient wants referral to [facility]*    Dr. Rosales    Patients wife stopped in and would like patients records faxed to [facility] [place] office for a referral. Patient got glasses at [facility] with the rx given by you (wife mentioned they made the glasses wrong at some point) Patient then had another exam with [facility] and still cant see out of glasses for reading. I have no idea what the patient was wearing or had made. Let me know if I can help with anything.       Printed exam chart notes 6/29/2023 and letter with health details . Will have staff FAX 6-30-23  Kristy Rosales O.D.

## 2023-07-12 ENCOUNTER — ANTICOAGULATION THERAPY VISIT (OUTPATIENT)
Dept: ANTICOAGULATION | Facility: CLINIC | Age: 78
End: 2023-07-12

## 2023-07-12 ENCOUNTER — LAB (OUTPATIENT)
Dept: LAB | Facility: CLINIC | Age: 78
End: 2023-07-12
Payer: MEDICARE

## 2023-07-12 DIAGNOSIS — Z79.01 LONG TERM CURRENT USE OF ANTICOAGULANT THERAPY: ICD-10-CM

## 2023-07-12 DIAGNOSIS — Z79.01 LONG TERM CURRENT USE OF ANTICOAGULANT THERAPY: Primary | ICD-10-CM

## 2023-07-12 DIAGNOSIS — I48.20 CHRONIC ATRIAL FIBRILLATION (H): ICD-10-CM

## 2023-07-12 LAB — INR BLD: 2.3 (ref 0.9–1.1)

## 2023-07-12 PROCEDURE — 36416 COLLJ CAPILLARY BLOOD SPEC: CPT

## 2023-07-12 PROCEDURE — 85610 PROTHROMBIN TIME: CPT

## 2023-07-12 NOTE — PROGRESS NOTES
ANTICOAGULATION MANAGEMENT     Keith Desir 77 year old male is on warfarin with therapeutic INR result. (Goal INR 2.0-3.0)    Recent labs: (last 7 days)     07/12/23  0837   INR 2.3*       ASSESSMENT       Source(s): Chart Review and Patient/Caregiver Call       Warfarin doses taken: Warfarin taken as instructed    Diet: No new diet changes identified    Medication/supplement changes: None noted    New illness, injury, or hospitalization: No    Signs or symptoms of bleeding or clotting: No    Previous result: Therapeutic last 2(+) visits    Additional findings: None         PLAN     Recommended plan for no diet, medication or health factor changes affecting INR     Dosing Instructions: Continue your current warfarin dose with next INR in 7 weeks       Summary  As of 7/12/2023    Full warfarin instructions:  5 mg every Tue; 7.5 mg all other days   Next INR check:  8/30/2023             Telephone call with  Melissa who verbalizes understanding and agrees to plan    Lab visit scheduled    Education provided:     Goal range and lab monitoring: goal range and significance of current result    Plan made per [facility] anticoagulation protocol    Ban Matamoros RN  Anticoagulation Clinic  7/12/2023    _______________________________________________________________________     Anticoagulation Episode Summary     Current INR goal:  2.0-3.0   TTR:  58.9 % (1 y)   Target end date:  Indefinite   Send INR reminders to:  AJ PALUMBO    Indications    Long term current use of anticoagulant therapy [Z79.01]  Chronic atrial fibrillation (HCC) [I48.20]           Comments:           Anticoagulation Care Providers     Provider Role Specialty Phone number    Cayla Gregorio MD Referring Family Medicine [phone]

## 2023-08-16 ENCOUNTER — TRANSFERRED RECORDS (OUTPATIENT)
Dept: HEALTH INFORMATION MANAGEMENT | Facility: CLINIC | Age: 78
End: 2023-08-16
Payer: MEDICARE

## 2023-08-17 ENCOUNTER — MYC MEDICAL ADVICE (OUTPATIENT)
Dept: FAMILY MEDICINE | Facility: CLINIC | Age: 78
End: 2023-08-17
Payer: MEDICARE

## 2023-08-25 ENCOUNTER — ANTICOAGULATION THERAPY VISIT (OUTPATIENT)
Dept: ANTICOAGULATION | Facility: CLINIC | Age: 78
End: 2023-08-25

## 2023-08-25 ENCOUNTER — OFFICE VISIT (OUTPATIENT)
Dept: FAMILY MEDICINE | Facility: CLINIC | Age: 78
End: 2023-08-25
Payer: MEDICARE

## 2023-08-25 ENCOUNTER — DOCUMENTATION ONLY (OUTPATIENT)
Dept: ANTICOAGULATION | Facility: CLINIC | Age: 78
End: 2023-08-25

## 2023-08-25 VITALS
HEART RATE: 87 BPM | RESPIRATION RATE: 18 BRPM | WEIGHT: 199 LBS | SYSTOLIC BLOOD PRESSURE: 115 MMHG | OXYGEN SATURATION: 95 % | BODY MASS INDEX: 31.46 KG/M2 | DIASTOLIC BLOOD PRESSURE: 80 MMHG | TEMPERATURE: 97.9 F

## 2023-08-25 DIAGNOSIS — E11.22 TYPE 2 DIABETES MELLITUS WITH STAGE 2 CHRONIC KIDNEY DISEASE, WITHOUT LONG-TERM CURRENT USE OF INSULIN (H): Primary | ICD-10-CM

## 2023-08-25 DIAGNOSIS — N18.2 TYPE 2 DIABETES MELLITUS WITH STAGE 2 CHRONIC KIDNEY DISEASE, WITHOUT LONG-TERM CURRENT USE OF INSULIN (H): Primary | ICD-10-CM

## 2023-08-25 DIAGNOSIS — Z79.01 LONG TERM CURRENT USE OF ANTICOAGULANT THERAPY: ICD-10-CM

## 2023-08-25 DIAGNOSIS — Z79.01 LONG TERM CURRENT USE OF ANTICOAGULANT THERAPY: Primary | ICD-10-CM

## 2023-08-25 DIAGNOSIS — E78.5 HYPERLIPIDEMIA LDL GOAL <70: ICD-10-CM

## 2023-08-25 DIAGNOSIS — I48.20 CHRONIC ATRIAL FIBRILLATION (H): ICD-10-CM

## 2023-08-25 DIAGNOSIS — H25.9 SENILE CATARACT OF RIGHT EYE, UNSPECIFIED AGE-RELATED CATARACT TYPE: ICD-10-CM

## 2023-08-25 DIAGNOSIS — Z79.01 WARFARIN ANTICOAGULATION: ICD-10-CM

## 2023-08-25 DIAGNOSIS — Z01.818 PREOP GENERAL PHYSICAL EXAM: ICD-10-CM

## 2023-08-25 DIAGNOSIS — I12.9 RENAL HYPERTENSION: ICD-10-CM

## 2023-08-25 LAB
ANION GAP SERPL CALCULATED.3IONS-SCNC: 13 MMOL/L (ref 7–15)
BUN SERPL-MCNC: 18.3 MG/DL (ref 8–23)
CALCIUM SERPL-MCNC: 9.6 MG/DL (ref 8.8–10.2)
CHLORIDE SERPL-SCNC: 100 MMOL/L (ref 98–107)
CREAT SERPL-MCNC: 0.82 MG/DL (ref 0.67–1.17)
DEPRECATED HCO3 PLAS-SCNC: 26 MMOL/L (ref 22–29)
FASTING STATUS PATIENT QL REPORTED: ABNORMAL
GFR SERPL CREATININE-BSD FRML MDRD: >90 ML/MIN/1.73M2
GLUCOSE SERPL-MCNC: 151 MG/DL (ref 70–99)
GLUCOSE SERPL-MCNC: 151 MG/DL (ref 70–99)
HBA1C MFR BLD: 6.7 % (ref 0–5.6)
INR BLD: 1.7 (ref 0.9–1.1)
POTASSIUM SERPL-SCNC: 4.6 MMOL/L (ref 3.4–5.3)
SODIUM SERPL-SCNC: 139 MMOL/L (ref 136–145)

## 2023-08-25 PROCEDURE — 85610 PROTHROMBIN TIME: CPT | Performed by: FAMILY MEDICINE

## 2023-08-25 PROCEDURE — 80048 BASIC METABOLIC PNL TOTAL CA: CPT | Performed by: FAMILY MEDICINE

## 2023-08-25 PROCEDURE — 99214 OFFICE O/P EST MOD 30 MIN: CPT | Performed by: FAMILY MEDICINE

## 2023-08-25 PROCEDURE — 36415 COLL VENOUS BLD VENIPUNCTURE: CPT | Performed by: FAMILY MEDICINE

## 2023-08-25 PROCEDURE — 36416 COLLJ CAPILLARY BLOOD SPEC: CPT | Performed by: FAMILY MEDICINE

## 2023-08-25 PROCEDURE — 83036 HEMOGLOBIN GLYCOSYLATED A1C: CPT | Performed by: FAMILY MEDICINE

## 2023-08-25 NOTE — PROGRESS NOTES
ANTICOAGULATION CLINIC REFERRAL RENEWAL REQUEST       An annual renewal order is required for all patients referred to [facility].?  Please review and sign the pended referral order for Keith Desir.       ANTICOAGULATION SUMMARY      Warfarin indication(s)   Atrial Fibrillation    Mechanical heart valve present?  NO       Current goal range   INR: 2.0-3.0     Goal appropriate for indication? Goal INR 2-3, standard for indication(s) above     Time in Therapeutic Range (TTR)  (Goal > 60%) 64.4%       Office visit with referring provider's group within last year yes on 3/7/23       Marleen Marquez RN  [facility]

## 2023-08-25 NOTE — PATIENT INSTRUCTIONS
For informational purposes only. Not to replace the advice of your health care provider. Copyright   2003,  [facility]. All rights reserved. Clinically reviewed by Beatriz New MD. Ganos [id number] - REV .  Preparing for Your Surgery  Getting started  A nurse will call you to review your health history and instructions. They will give you an arrival time based on your scheduled surgery time. Please be ready to share:  Your doctor's clinic name and phone number  Your medical, surgical, and anesthesia history  A list of allergies and sensitivities  A list of medicines, including herbal treatments and over-the-counter drugs  Whether the patient has a legal guardian (ask how to send us the papers in advance)  Please tell us if you're pregnant--or if there's any chance you might be pregnant. Some surgeries may injure a fetus (unborn baby), so they require a pregnancy test. Surgeries that are safe for a fetus don't always need a test, and you can choose whether to have one.   If you have a child who's having surgery, please ask for a copy of Preparing for Your Child's Surgery.    Preparing for surgery  Within 10 to 30 days of surgery: Have a pre-op exam (sometimes called an H&P, or History and Physical). This can be done at a clinic or pre-operative center.  If you're having a , you may not need this exam. Talk to your care team.  At your pre-op exam, talk to your care team about all medicines you take. If you need to stop any medicines before surgery, ask when to start taking them again.  We do this for your safety. Many medicines can make you bleed too much during surgery. Some change how well surgery (anesthesia) drugs work.  Call your insurance company to let them know you're having surgery. (If you don't have insurance, call [phone].)  Call your clinic if there's any change in your health. This includes signs of a cold or flu (sore throat, runny nose, cough, rash, fever). It also  includes a scrape or scratch near the surgery site.  If you have questions on the day of surgery, call your hospital or surgery center.  Eating and drinking guidelines  For your safety: Unless your surgeon tells you otherwise, follow the guidelines below.  Eat and drink as usual until 8 hours before you arrive for surgery. After that, no food or milk.  Drink clear liquids until 2 hours before you arrive. These are liquids you can see through, like water, Gatorade, and Propel Water. They also include plain black coffee and tea (no cream or milk), candy, and breath mints. You can spit out gum when you arrive.  If you drink alcohol: Stop drinking it the night before surgery.  If your care team tells you to take medicine on the morning of surgery, it's okay to take it with a sip of water.  Preventing infection  Shower or bathe the night before and morning of your surgery. Follow the instructions your clinic gave you. (If no instructions, use regular soap.)  Don't shave or clip hair near your surgery site. We'll remove the hair if needed.  Don't smoke or vape the morning of surgery. You may chew nicotine gum up to 2 hours before surgery. A nicotine patch is okay.  Note: Some surgeries require you to completely quit smoking and nicotine. Check with your surgeon.  Your care team will make every effort to keep you safe from infection. We will:  Clean our hands often with soap and water (or an alcohol-based hand rub).  Clean the skin at your surgery site with a special soap that kills germs.  Give you a special gown to keep you warm. (Cold raises the risk of infection.)  Wear special hair covers, masks, gowns and gloves during surgery.  Give antibiotic medicine, if prescribed. Not all surgeries need antibiotics.  What to bring on the day of surgery  Photo ID and insurance card  Copy of your health care directive, if you have one  Glasses and hearing aids (bring cases)  You can't wear contacts during surgery  Inhaler and eye  drops, if you use them (tell us about these when you arrive)  CPAP machine or breathing device, if you use them  A few personal items, if spending the night  If you have . . .  A pacemaker, ICD (cardiac defibrillator) or other implant: Bring the ID card.  An implanted stimulator: Bring the remote control.  A legal guardian: Bring a copy of the certified (court-stamped) guardianship papers.  Please remove any jewelry, including body piercings. Leave jewelry and other valuables at home.  If you're going home the day of surgery  You must have a responsible adult drive you home. They should stay with you overnight as well.  If you don't have someone to stay with you, and you aren't safe to go home alone, we may keep you overnight. Insurance often won't pay for this.  After surgery  If it's hard to control your pain or you need more pain medicine, please call your surgeon's office.  Questions?   If you have any questions for your care team, list them here: _________________________________________________________________________________________________________________________________________________________________________ ____________________________________ ____________________________________ ____________________________________    How to Take Your Medication Before Surgery  - Take all of your medications before surgery as usual

## 2023-08-25 NOTE — PROGRESS NOTES
[facility]  [address]  [place] [address]  Phone: [phone]  Primary Provider: Cayla Gregorio  Pre-op Performing Provider: TEGAN RPOCTOR      PREOPERATIVE EVALUATION:  Today's date: 8/25/2023    Keith Desir is a 77 year old male who presents for a preoperative evaluation.      8/25/2023    11:35 AM   Additional Questions   Roomed by Codie BAEZ CMA   Accompanied by Wife       Surgical Information:  Surgery/Procedure: Cataract Surgery  Surgery Location: [facility]  Surgeon: Dr. Ken Weinstein  Surgery Date: 08/29/2023  Time of Surgery: 2:10PM  Where patient plans to recover: At home with family  Fax number for surgical facility: [phone]    Assessment & Plan     The proposed surgical procedure is considered LOW risk.    Problem List Items Addressed This Visit          Endocrine    Hyperlipidemia LDL goal <70    Type 2 diabetes mellitus with stage 2 chronic kidney disease, without long-term current use of insulin (H) - Primary    Relevant Orders    Random Glucose    Hemoglobin A1c    Basic metabolic panel  (Ca, Cl, CO2, Creat, Gluc, K, Na, BUN)       Circulatory    Chronic atrial fibrillation (HCC)    Renal hypertension    Relevant Orders    Basic metabolic panel  (Ca, Cl, CO2, Creat, Gluc, K, Na, BUN)     Other Visit Diagnoses       Warfarin anticoagulation        Senile cataract of right eye, unspecified age-related cataract type        Preop general physical exam                        - No identified additional risk factors other than previously addressed    Antiplatelet or Anticoagulation Medication Instructions:   - Bleeding risk is low for this procedure (e.g. dental, skin, cataract).    Additional Medication Instructions:  Patient is to take all scheduled medications on the day of surgery    RECOMMENDATION:  APPROVAL GIVEN to proceed with proposed procedure, without further diagnostic evaluation.        Subjective       HPI  related to upcoming procedure: cataracts requiring surgery        8/24/2023    12:46 PM   Preop Questions   1. Have you ever had a heart attack or stroke? No   2. Have you ever had surgery on your heart or blood vessels, such as a stent placement, a coronary artery bypass, or surgery on an artery in your head, neck, heart, or legs? No   3. Do you have chest pain with activity? No   4. Do you have a history of  heart failure? UNKNOWN -    5. Do you currently have a cold, bronchitis or symptoms of other infection? No   6. Do you have a cough, shortness of breath, or wheezing? YES -    7. Do you or anyone in your family have previous history of blood clots? No   8. Do you or does anyone in your family have a serious bleeding problem such as prolonged bleeding following surgeries or cuts? No   9. Have you ever had problems with anemia or been told to take iron pills? No   10. Have you had any abnormal blood loss such as black, tarry or bloody stools? No   11. Have you ever had a blood transfusion? No   12. Are you willing to have a blood transfusion if it is medically needed before, during, or after your surgery? Yes   13. Have you or any of your relatives ever had problems with anesthesia? No   14. Do you have sleep apnea, excessive snoring or daytime drowsiness? No   15. Do you have any artifical heart valves or other implanted medical devices like a pacemaker, defibrillator, or continuous glucose monitor? No   16. Do you have artificial joints? No   17. Are you allergic to latex? No       Health Care Directive:  Patient does not have a Health Care Directive or Living Will: Discussed advance care planning with patient; information given to patient to review.    Preoperative Review of :   reviewed - no record of controlled substances prescribed.          Review of Systems  CONSTITUTIONAL: NEGATIVE for fever, chills, change in weight  ENT/MOUTH: NEGATIVE for ear, mouth and throat problems  RESP: NEGATIVE for  significant cough or SOB  CV: NEGATIVE for chest pain, palpitations or peripheral edema    Patient Active Problem List    Diagnosis Date Noted    Renal hypertension 11/19/2010     Priority: High    Hyperlipidemia LDL goal <70      Priority: High            Chronic atrial fibrillation (HCC)      Priority: High     [facility]. Jiang, [facility]      Umbilical hernia without obstruction and without gangrene 01/27/2023     Priority: Medium    History of colonic polyps 01/09/2023     Priority: Medium    Chronic heart failure with preserved ejection fraction (H) 09/06/2022     Priority: Medium    Allergic rhinitis, unspecified seasonality, unspecified trigger 03/09/2022     Priority: Medium    Gait disturbance 01/17/2022     Priority: Medium    Type 2 diabetes mellitus with stage 2 chronic kidney disease, without long-term current use of insulin (H) 02/26/2020     Priority: Medium    Diabetic neuropathy (H)      Priority: Medium    Varicose veins of legs 08/08/2018     Priority: Medium    NAFLD (nonalcoholic fatty liver disease)      Priority: Medium    Pulmonary emphysema, unspecified emphysema type (H) 02/15/2017     Priority: Medium    Non morbid obesity due to excess calories 07/20/2016     Priority: Medium    Long term current use of anticoagulant therapy 03/24/2016     Priority: Medium    Tobacco abuse      Priority: Medium      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/2013    Hepatitis C     HTN (hypertension)     Hyperlipidemia     Morbid obesity (H)     NAFLD (nonalcoholic  fatty liver disease)     Polycythemia secondary to smoking     Squamous cell carcinoma of skin, unspecified     Umbilical hernia without obstruction and without gangrene     Varicose veins of legs 08/08/2018     Past Surgical History:   Procedure Laterality Date    ANGIOGRAM  08/2006    normal    LUMBAR DISC SURGERY  11/11/2021    TONSILLECTOMY & ADENOIDECTOMY       Current Outpatient Medications   Medication Sig Dispense Refill    albuterol (PROAIR HFA/PROVENTIL HFA/VENTOLIN HFA) 108 (90 Base) MCG/ACT inhaler Inhale 1-2 puffs into the lungs every 4 hours as needed for shortness of breath / dyspnea 18 g 2    atorvastatin (LIPITOR) 20 MG tablet Take 1 tablet (20 mg) by mouth daily 90 tablet 3    digoxin (LANOXIN) 125 MCG tablet Take 1 tablet (125 mcg) by mouth daily 90 tablet 3    dimenhyDRINATE (DRAMAMINE PO) Take by mouth as needed      empagliflozin (JARDIANCE) 10 MG TABS tablet Take 1 tablet (10 mg) by mouth daily 90 tablet 3    fluticasone (FLONASE) 50 MCG/ACT nasal spray Spray 1 spray into both nostrils daily 16 g 11    fluticasone (FLOVENT DISKUS) 50 MCG/ACT inhaler Inhale 1 puff into the lungs every 12 hours      furosemide (LASIX) 20 MG tablet Take 1 tablet (20 mg) by mouth daily 90 tablet 1    glipiZIDE (GLUCOTROL) 5 MG tablet TAKE 1 TABLET BY MOUTH TWICE DAILY BEFORE MEAL(S) 180 tablet 3    ketoconazole (NIZORAL) 2 % external shampoo Use every 2 days to wash scalp, ears, face, and under arms. Leave on skin for 3-5 minutes before rinsing. 120 mL 11    lisinopril (ZESTRIL) 40 MG tablet Take 1 tablet (40 mg) by mouth daily 90 tablet 3    metFORMIN (GLUCOPHAGE) 1000 MG tablet TAKE 1 TABLET BY MOUTH 2 TIMES DAILY WITH MEALS 180 tablet 3    metoprolol tartrate (LOPRESSOR) 100 MG tablet Take 2 tablets (200 mg) by mouth 2 times daily 360 tablet 3    tiotropium (SPIRIVA HANDIHALER) 18 MCG inhaled capsule INHALE 1 CAPSULE (18 MCG) INTO THE LUNGS DAILY 90 capsule 3    warfarin ANTICOAGULANT (COUMADIN) 2.5 MG tablet  TAKE 2 TABLETS (5MG) BY MOUTH ON TUESDAY. TAKE 3 TABLETS (7.5MG) ON ALL OTHER DAYS OR AS DIRECTED BY INR CLINIC. 240 tablet 1       Allergies   Allergen Reactions    Nkda [No Known Drug Allergy]         Social History     Tobacco Use    Smoking status: Every Day     Packs/day: 0.50     Years: 45.00     Pack years: 22.50     Types: Cigarettes    Smokeless tobacco: Never    Tobacco comments:     cut down to 0.5 ppd   Substance Use Topics    Alcohol use: Yes     Alcohol/week: 0.0 - 4.2 standard drinks of alcohol       History   Drug Use No         Objective     /80 (BP Location: Right arm, Patient Position: Chair, Cuff Size: Adult Large)   Pulse 87   Temp 97.9  F (36.6  C) (Oral)   Resp 18   Wt 90.3 kg (199 lb)   SpO2 95%   BMI 31.46 kg/m      Physical Exam  GENERAL APPEARANCE: healthy, alert and no distress  HENT: ear canals and TM's normal and nose and mouth without ulcers or lesions  RESP: lungs clear to auscultation - no rales, rhonchi or wheezes  CV: irregularly irregular  ABDOMEN: soft, nontender, no HSM or masses and bowel sounds normal  NEURO: Normal strength and tone, sensory exam grossly normal, mentation intact and speech normal    Recent Labs   Lab Test 07/12/23  0837 05/31/23  0958 03/29/23  0957 03/07/23  1011 09/28/22  0953 09/06/22  1002 11/02/21  0940 10/25/21  1002   HGB  --   --   --   --   --  17.7  --  17.1   PLT  --   --   --   --   --  173  --  167   INR 2.3* 2.4*   < > 1.93*   < >  --    < >  --    NA  --   --   --  140  --  136  --  136   POTASSIUM  --   --   --  5.3  --  4.9  --  4.7   CR  --   --   --  0.90  --  0.84  --  0.96   A1C  --   --   --  6.4*  --  6.9*   < >  --     < > = values in this interval not displayed.        Diagnostics:  Labs pending at this time.  Results will be reviewed when available.   No EKG required for low risk surgery (cataract, skin procedure, breast biopsy, etc).    Revised Cardiac Risk Index (RCRI):  The patient has the following serious  cardiovascular risks for perioperative complications:   - No serious cardiac risks = 0 points     RCRI Interpretation: 0 points: Class I (very low risk - 0.4% complication rate)         Signed Electronically by: TEGAN PROCTOR DO  Copy of this evaluation report is provided to requesting physician.

## 2023-08-25 NOTE — PROGRESS NOTES
ANTICOAGULATION MANAGEMENT     Keith Desir 77 year old male is on warfarin with subtherapeutic INR result. (Goal INR 2.0-3.0)    Recent labs: (last 7 days)     08/25/23  1227   INR 1.7*       ASSESSMENT     Source(s): Chart Review and Patient/Caregiver Call     Warfarin doses taken: Warfarin taken as instructed  Diet: Increased greens/vitamin K in diet; plans to resume previous intake  Medication/supplement changes: None noted  New illness, injury, or hospitalization: No  Signs or symptoms of bleeding or clotting: No  Previous result: Therapeutic last 2(+) visits  Additional findings: Upcoming surgery/procedure cataracts 8/29 pre op today       PLAN     Recommended plan for temporary change(s) affecting INR     Dosing Instructions: booster dose then continue your current warfarin dose with next INR in 2 weeks       Summary  As of 8/25/2023      Full warfarin instructions:  8/25: 10 mg; Otherwise 5 mg every Tue; 7.5 mg all other days   Next INR check:  9/8/2023               Telephone call with Melissa who verbalizes understanding and agrees to plan and who agrees to plan and repeated back plan correctly    Check at provider office visit    Education provided:   Please call back if any changes to your diet, medications or how you've been taking warfarin  Goal range and lab monitoring: goal range and significance of current result and Importance of therapeutic range    Plan made per [facility] anticoagulation protocol    Marleen Marquez RN  Anticoagulation Clinic  8/25/2023    _______________________________________________________________________     Anticoagulation Episode Summary       Current INR goal:  2.0-3.0   TTR:  64.4 % (1 y)   Target end date:  Indefinite   Send INR reminders to:  AJ PALUMBO    Indications    Long term current use of anticoagulant therapy [Z79.01]  Chronic atrial fibrillation (HCC) [I48.20]             Comments:               Anticoagulation Care Providers       Provider Role Specialty  Phone number    Cayla Gregorio MD Referring Family Medicine [phone]

## 2023-09-08 ASSESSMENT — ENCOUNTER SYMPTOMS
SHORTNESS OF BREATH: 0
HEARTBURN: 0
WEAKNESS: 0
NERVOUS/ANXIOUS: 0
ABDOMINAL PAIN: 0
DIARRHEA: 0
EYE PAIN: 0
NAUSEA: 0
CHILLS: 0
DIZZINESS: 1
PARESTHESIAS: 0
HEADACHES: 0
FREQUENCY: 0
HEMATOCHEZIA: 0
PALPITATIONS: 0
FEVER: 0
CONSTIPATION: 0
DYSURIA: 0
MYALGIAS: 0
COUGH: 0
JOINT SWELLING: 1
SORE THROAT: 0
ARTHRALGIAS: 0
HEMATURIA: 0

## 2023-09-08 ASSESSMENT — ACTIVITIES OF DAILY LIVING (ADL): CURRENT_FUNCTION: NO ASSISTANCE NEEDED

## 2023-09-11 ENCOUNTER — ANTICOAGULATION THERAPY VISIT (OUTPATIENT)
Dept: ANTICOAGULATION | Facility: CLINIC | Age: 78
End: 2023-09-11

## 2023-09-11 ENCOUNTER — OFFICE VISIT (OUTPATIENT)
Dept: FAMILY MEDICINE | Facility: CLINIC | Age: 78
End: 2023-09-11
Payer: MEDICARE

## 2023-09-11 VITALS
OXYGEN SATURATION: 98 % | SYSTOLIC BLOOD PRESSURE: 113 MMHG | HEIGHT: 67 IN | WEIGHT: 200.4 LBS | HEART RATE: 77 BPM | BODY MASS INDEX: 31.45 KG/M2 | DIASTOLIC BLOOD PRESSURE: 78 MMHG | TEMPERATURE: 97.6 F

## 2023-09-11 DIAGNOSIS — Z00.00 ENCOUNTER FOR MEDICARE ANNUAL WELLNESS EXAM: ICD-10-CM

## 2023-09-11 DIAGNOSIS — Z79.01 LONG TERM CURRENT USE OF ANTICOAGULANT THERAPY: Primary | ICD-10-CM

## 2023-09-11 DIAGNOSIS — E11.22 TYPE 2 DIABETES MELLITUS WITH STAGE 2 CHRONIC KIDNEY DISEASE, WITHOUT LONG-TERM CURRENT USE OF INSULIN (H): ICD-10-CM

## 2023-09-11 DIAGNOSIS — Z00.00 WELL ADULT EXAM: Primary | ICD-10-CM

## 2023-09-11 DIAGNOSIS — I48.20 CHRONIC ATRIAL FIBRILLATION (H): ICD-10-CM

## 2023-09-11 DIAGNOSIS — Z79.01 LONG TERM CURRENT USE OF ANTICOAGULANT THERAPY: ICD-10-CM

## 2023-09-11 DIAGNOSIS — E78.5 HYPERLIPIDEMIA LDL GOAL <70: ICD-10-CM

## 2023-09-11 DIAGNOSIS — N18.2 TYPE 2 DIABETES MELLITUS WITH STAGE 2 CHRONIC KIDNEY DISEASE, WITHOUT LONG-TERM CURRENT USE OF INSULIN (H): ICD-10-CM

## 2023-09-11 LAB
ALT SERPL W P-5'-P-CCNC: 20 U/L (ref 0–70)
CHOLEST SERPL-MCNC: 110 MG/DL
DIGOXIN SERPL-MCNC: 0.6 NG/ML (ref 0.6–2)
ERYTHROCYTE [DISTWIDTH] IN BLOOD BY AUTOMATED COUNT: 13.5 % (ref 10–15)
HCT VFR BLD AUTO: 53.7 % (ref 40–53)
HDLC SERPL-MCNC: 29 MG/DL
HGB BLD-MCNC: 18 G/DL (ref 13.3–17.7)
INR BLD: 3.3 (ref 0.9–1.1)
LDLC SERPL CALC-MCNC: 51 MG/DL
MCH RBC QN AUTO: 33 PG (ref 26.5–33)
MCHC RBC AUTO-ENTMCNC: 33.5 G/DL (ref 31.5–36.5)
MCV RBC AUTO: 98 FL (ref 78–100)
NONHDLC SERPL-MCNC: 81 MG/DL
PLATELET # BLD AUTO: 177 10E3/UL (ref 150–450)
RBC # BLD AUTO: 5.46 10E6/UL (ref 4.4–5.9)
TRIGL SERPL-MCNC: 151 MG/DL
WBC # BLD AUTO: 9.3 10E3/UL (ref 4–11)

## 2023-09-11 PROCEDURE — 36415 COLL VENOUS BLD VENIPUNCTURE: CPT | Performed by: FAMILY MEDICINE

## 2023-09-11 PROCEDURE — 80061 LIPID PANEL: CPT | Performed by: FAMILY MEDICINE

## 2023-09-11 PROCEDURE — 84460 ALANINE AMINO (ALT) (SGPT): CPT | Performed by: FAMILY MEDICINE

## 2023-09-11 PROCEDURE — 85027 COMPLETE CBC AUTOMATED: CPT | Performed by: FAMILY MEDICINE

## 2023-09-11 PROCEDURE — 80162 ASSAY OF DIGOXIN TOTAL: CPT | Performed by: FAMILY MEDICINE

## 2023-09-11 PROCEDURE — 85610 PROTHROMBIN TIME: CPT | Performed by: FAMILY MEDICINE

## 2023-09-11 PROCEDURE — 36416 COLLJ CAPILLARY BLOOD SPEC: CPT | Performed by: FAMILY MEDICINE

## 2023-09-11 PROCEDURE — G0439 PPPS, SUBSEQ VISIT: HCPCS | Performed by: FAMILY MEDICINE

## 2023-09-11 ASSESSMENT — ACTIVITIES OF DAILY LIVING (ADL): CURRENT_FUNCTION: NO ASSISTANCE NEEDED

## 2023-09-11 ASSESSMENT — ENCOUNTER SYMPTOMS
DYSURIA: 0
MYALGIAS: 0
COUGH: 0
FEVER: 0
ABDOMINAL PAIN: 0
ARTHRALGIAS: 0
DIARRHEA: 0
PARESTHESIAS: 0
NAUSEA: 0
HEADACHES: 0
EYE PAIN: 0
HEMATOCHEZIA: 0
JOINT SWELLING: 1
WEAKNESS: 0
HEMATURIA: 0
DIZZINESS: 1
FREQUENCY: 0
CHILLS: 0
HEARTBURN: 0
NERVOUS/ANXIOUS: 0
CONSTIPATION: 0
PALPITATIONS: 0
SHORTNESS OF BREATH: 0
SORE THROAT: 0

## 2023-09-11 NOTE — PROGRESS NOTES
ANTICOAGULATION MANAGEMENT     Keith Desir 77 year old male is on warfarin with supratherapeutic INR result. (Goal INR 2.0-3.0)    Recent labs: (last 7 days)     09/11/23  1009   INR 3.3*       ASSESSMENT     Source(s): Chart Review and Patient/Caregiver Call     Warfarin doses taken: Warfarin taken as instructed  Diet: Change in alcohol intake may be affecting INR. Had 5-6 beers yesterday   Medication/supplement changes: None noted  New illness, injury, or hospitalization: No  Signs or symptoms of bleeding or clotting: No  Previous result: Subtherapeutic  Additional findings: None       PLAN     Recommended plan for temporary change(s) affecting INR     Dosing Instructions: partial hold then continue your current warfarin dose with next INR in 2 weeks       Summary  As of 9/11/2023      Full warfarin instructions:  9/11: 5 mg; Otherwise 5 mg every Tue; 7.5 mg all other days   Next INR check:  9/25/2023               Telephone call with Melissa who verbalizes understanding and agrees to plan    Lab visit scheduled    Education provided:   Please call back if any changes to your diet, medications or how you've been taking warfarin  Healthy lifestyle considerations: potential interaction between warfarin and alcohol, limit alcohol intake to no more than 1 to 2 drinks in 24 hours if you choose to drink alcohol, and avoid excessive alcohol drinking (binge drinking) while on warfarin due to increased risk of bleeding from effect on INR and fall risk  Contact [phone]  with any changes, questions or concerns.     Plan made per [facility] anticoagulation protocol    Dot Branham RN  Anticoagulation Clinic  9/11/2023    _______________________________________________________________________     Anticoagulation Episode Summary       Current INR goal:  2.0-3.0   TTR:  62.6 % (1 y)   Target end date:  Indefinite   Send INR reminders to:  AJ PALUMBO    Indications    Long term current use of anticoagulant therapy  [Z79.01]  Chronic atrial fibrillation (HCC) [I48.20]             Comments:               Anticoagulation Care Providers       Provider Role Specialty Phone number    Cayla Gregorio MD [facility] Medicine [phone]

## 2023-09-11 NOTE — PROGRESS NOTES
"SUBJECTIVE:   Ed is a 77 year old who presents for Preventive Visit.      9/11/2023     9:21 AM   Additional Questions   Roomed by Lara   Accompanied by Wife         9/11/2023     9:21 AM   Patient Reported Additional Medications   Patient reports taking the following new medications none       Are you in the first 12 months of your Medicare coverage?  No    Healthy Habits:     In general, how would you rate your overall health?  Fair    Frequency of exercise:  None    Do you usually eat at least 4 servings of fruit and vegetables a day, include whole grains    & fiber and avoid regularly eating high fat or \"junk\" foods?  No    Taking medications regularly:  Yes    Medication side effects:  Not applicable    Ability to successfully perform activities of daily living:  No assistance needed    Home Safety:  No safety concerns identified    Hearing Impairment:  No hearing concerns    In the past 6 months, have you been bothered by leaking of urine?  No    In general, how would you rate your overall mental or emotional health?  Good    Additional concerns today:  Yes        Have you ever done Advance Care Planning? (For example, a Health Directive, POLST, or a discussion with a medical provider or your loved ones about your wishes): No, advance care planning information given to patient to review.  Patient declined advance care planning discussion at this time.       Fall risk  Fallen 2 or more times in the past year?: Yes  Any fall with injury in the past year?: No    Cognitive Screening   1) Repeat 3 items (Leader, Season, Table)    2) Clock draw: NORMAL  3) 3 item recall: Recalls NO objects   Results: 0 items recalled: PROBABLE COGNITIVE IMPAIRMENT, **INFORM PROVIDER**    Mini-CogTM Copyright SHONDA Allred. Licensed by the author for use in [facility]; reprinted with permission (magaly@.[facility]). All rights reserved.      Do you have sleep apnea, excessive snoring or daytime drowsiness? : no    Reviewed and " updated as needed this visit by clinical staff    Allergies  Meds              Reviewed and updated as needed this visit by Provider                 Social History     Tobacco Use    Smoking status: Every Day     Packs/day: 0.50     Years: 45.00     Pack years: 22.50     Types: Cigarettes    Smokeless tobacco: Never    Tobacco comments:     cut down to 0.5 ppd   Substance Use Topics    Alcohol use: Yes     Alcohol/week: 0.0 - 4.2 standard drinks of alcohol             9/8/2023    11:28 AM   Alcohol Use   Prescreen: >3 drinks/day or >7 drinks/week? No          No data to display              Do you have a current opioid prescription? No  Do you use any other controlled substances or medications that are not prescribed by a provider? None              Current providers sharing in care for this patient include:   Patient Care Team:  Cayla Gregorio MD as PCP - General  Cayla Gregorio MD as Assigned PCP  Cheryl Huang APRN CNP as Nurse Practitioner (Dermatology)  Kristy Rosales OD (Optometry)  Cayla Gregorio MD as Referring Physician (Family Medicine)  Kristy Rosales OD as Assigned Surgical Provider  Riedel, Patrick J, MD as MD (Ophthalmology)    The following health maintenance items are reviewed in Epic and correct as of today:  Health Maintenance   Topic Date Due    DIABETIC FOOT EXAM  09/07/2022    COVID-19 Vaccine (6 - Pfizer series) 01/09/2023    MICROALBUMIN  09/06/2023    A1C  11/25/2023    BMP  02/25/2024    EYE EXAM  08/16/2024    NICOTINE/TOBACCO CESSATION COUNSELING Q 1 YR  09/11/2024    MEDICARE ANNUAL WELLNESS VISIT  09/11/2024    ALT  09/11/2024    LIPID  09/11/2024    DIGOXIN  09/11/2024    ANNUAL REVIEW OF HM ORDERS  09/11/2024    FALL RISK ASSESSMENT  09/11/2024    CBC  09/11/2024    HF ACTION PLAN  09/06/2025    ADVANCE CARE PLANNING  09/11/2028    DTAP/TDAP/TD IMMUNIZATION (3 - Td or Tdap) 10/25/2031    TSH W/FREE T4 REFLEX  Completed    SPIROMETRY   "Completed    HEPATITIS C SCREENING  Completed    COPD ACTION PLAN  Completed    PHQ-2 (once per calendar year)  Completed    INFLUENZA VACCINE  Completed    Pneumococcal Vaccine: 65+ Years  Completed    URINALYSIS  Completed    ZOSTER IMMUNIZATION  Completed    AORTIC ANEURYSM SCREENING (SYSTEM ASSIGNED)  Completed    IPV IMMUNIZATION  Aged Out    HPV IMMUNIZATION  Aged Out    MENINGITIS IMMUNIZATION  Aged Out    COLORECTAL CANCER SCREENING  Discontinued    LUNG CANCER SCREENING  Discontinued     BP Readings from Last 3 Encounters:   09/11/23 113/78   08/25/23 115/80   03/07/23 131/69    Wt Readings from Last 3 Encounters:   09/11/23 90.9 kg (200 lb 6.4 oz)   08/25/23 90.3 kg (199 lb)   03/07/23 90.7 kg (200 lb)                          Review of Systems   Constitutional:  Negative for chills and fever.   HENT:  Negative for congestion, ear pain, hearing loss and sore throat.    Eyes:  Positive for visual disturbance. Negative for pain.   Respiratory:  Negative for cough and shortness of breath.    Cardiovascular:  Negative for chest pain, palpitations and peripheral edema.   Gastrointestinal:  Negative for abdominal pain, constipation, diarrhea, heartburn, hematochezia and nausea.   Genitourinary:  Negative for dysuria, frequency, genital sores, hematuria, impotence, penile discharge and urgency.   Musculoskeletal:  Positive for joint swelling. Negative for arthralgias and myalgias.   Skin:  Negative for rash.   Neurological:  Positive for dizziness. Negative for weakness, headaches and paresthesias.   Psychiatric/Behavioral:  Negative for mood changes. The patient is not nervous/anxious.          OBJECTIVE:   /78   Pulse 77   Temp 97.6  F (36.4  C) (Oral)   Ht 1.703 m (5' 7.05\")   Wt 90.9 kg (200 lb 6.4 oz)   SpO2 98%   BMI 31.34 kg/m   Estimated body mass index is 31.34 kg/m  as calculated from the following:    Height as of this encounter: 1.703 m (5' 7.05\").    Weight as of this encounter: 90.9 kg " (200 lb 6.4 oz).  Physical Exam  Vitals reviewed.   Constitutional:       Appearance: Normal appearance. He is not ill-appearing.   HENT:      Head: Normocephalic.      Right Ear: Tympanic membrane and ear canal normal.      Left Ear: Tympanic membrane and ear canal normal.      Nose: Nose normal.   Eyes:      Extraocular Movements: Extraocular movements intact.   Cardiovascular:      Rate and Rhythm: Normal rate and regular rhythm.      Heart sounds: Normal heart sounds. No murmur heard.  Pulmonary:      Effort: Pulmonary effort is normal. No respiratory distress.      Breath sounds: Normal breath sounds. No wheezing or rales.   Abdominal:      Palpations: Abdomen is soft.   Musculoskeletal:         General: Normal range of motion.      Cervical back: Normal range of motion and neck supple.   Skin:     General: Skin is warm and dry.      Findings: No lesion.   Neurological:      Mental Status: He is alert and oriented to person, place, and time.   Psychiatric:         Mood and Affect: Mood normal.         Behavior: Behavior normal.         Thought Content: Thought content normal.         Judgment: Judgment normal.               ASSESSMENT / PLAN:       ICD-10-CM    1. Well adult exam  Z00.00       2. Encounter for Medicare annual wellness exam  Z00.00       3. Hyperlipidemia LDL goal <70  E78.5 ALT     ALT      4. Type 2 diabetes mellitus with stage 2 chronic kidney disease, without long-term current use of insulin (H)  E11.22 Lipid panel reflex to direct LDL Non-fasting    N18.2 Albumin Random Urine Quantitative with Creat Ratio     CBC with Platelets     Lipid panel reflex to direct LDL Non-fasting     CBC with Platelets     Albumin Random Urine Quantitative with Creat Ratio     CANCELED: Albumin Random Urine Quantitative with Creat Ratio      5. Chronic atrial fibrillation (H)  I48.20 Digoxin level     Digoxin level     INR point of care      6. Long term current use of anticoagulant therapy  Z79.01 INR point of  "care            Patient has been advised of split billing requirements and indicates understanding: Yes      COUNSELING:  Reviewed preventive health counseling, as reflected in patient instructions       Regular exercise       Healthy diet/nutrition      BMI:   Estimated body mass index is 31.34 kg/m  as calculated from the following:    Height as of this encounter: 1.703 m (5' 7.05\").    Weight as of this encounter: 90.9 kg (200 lb 6.4 oz).   Weight management plan: Discussed healthy diet and exercise guidelines      He reports that he has been smoking cigarettes. He has a 22.50 pack-year smoking history. He has never used smokeless tobacco.  Nicotine/Tobacco Cessation Plan:   Self help information given to patient      Appropriate preventive services were discussed with this patient, including applicable screening as appropriate for cardiovascular disease, diabetes, osteopenia/osteoporosis, and glaucoma.  As appropriate for age/gender, discussed screening for colorectal cancer, prostate cancer, breast cancer, and cervical cancer. Checklist reviewing preventive services available has been given to the patient.    Reviewed patients plan of care and provided an AVS. The Basic Care Plan (routine screening as documented in Health Maintenance) for Keith meets the Care Plan requirement. This Care Plan has been established and reviewed with the Patient.          Heriberto Pena MD  [facility]    Identified Health Risks:  The patient was provided with suggestions to help him develop a healthy physical lifestyle.  He is at risk for lack of exercise and has been provided with information to increase physical activity for the benefit of his well-being.  The patient was counseled and encouraged to consider modifying their diet and eating habits. He was provided with information on recommended healthy diet options.  "

## 2023-09-16 DIAGNOSIS — I48.20 CHRONIC ATRIAL FIBRILLATION (H): Primary | ICD-10-CM

## 2023-09-18 RX ORDER — WARFARIN SODIUM 2.5 MG/1
TABLET ORAL
Qty: 240 TABLET | Refills: 1 | Status: SHIPPED | OUTPATIENT
Start: 2023-09-18 | End: 2024-03-05

## 2023-09-18 NOTE — TELEPHONE ENCOUNTER
ANTICOAGULATION MANAGEMENT:  Medication Refill    Anticoagulation Summary  As of 9/11/2023      Warfarin maintenance plan:  5 mg (2.5 mg x 2) every Tue; 7.5 mg (2.5 mg x 3) all other days   Next INR check:  9/25/2023   Target end date:  Indefinite    Indications    Long term current use of anticoagulant therapy [Z79.01]  Chronic atrial fibrillation (HCC) [I48.20]                 Anticoagulation Care Providers       Provider Role Specialty Phone number    Cayla Gregorio MD Referring Family Medicine [phone]            Refill Criteria    Visit with referring provider/group: Meets criteria: office visit within referring provider group in the last 1 year on 9/11/23    [facility] referral signed last signed: 08/25/2023; within last year: Yes    Lab monitoring not exceeding 2 weeks overdue: Yes    Edward meets all criteria for refill. Rx instructions and quantity match patient's current dosing plan. Warfarin 90 day supply with 1 refill granted per [facility] protocol     Hortencia Carlson RN  Anticoagulation Clinic

## 2023-09-19 ENCOUNTER — TELEPHONE (OUTPATIENT)
Dept: FAMILY MEDICINE | Facility: CLINIC | Age: 78
End: 2023-09-19
Payer: MEDICARE

## 2023-09-19 ENCOUNTER — MYC MEDICAL ADVICE (OUTPATIENT)
Dept: FAMILY MEDICINE | Facility: CLINIC | Age: 78
End: 2023-09-19
Payer: MEDICARE

## 2023-09-19 NOTE — PATIENT INSTRUCTIONS
Patient Education   Personalized Prevention Plan  You are due for the preventive services outlined below.  Your care team is available to assist you in scheduling these services.  If you have already completed any of these items, please share that information with your care team to update in your medical record.  Health Maintenance Due   Topic Date Due     Diabetic Foot Exam  09/07/2022     COVID-19 Vaccine (6 - Pfizer series) 01/09/2023     Kidney Microalbumin Urine Test  09/06/2023     Annual Wellness Visit  09/06/2023     Your Health Risk Assessment indicates you feel you are not in good health    A healthy lifestyle helps keep the body fit and the mind alert. It helps protect you from disease, helps you fight disease, and helps prevent chronic disease (disease that doesn't go away) from getting worse. This is important as you get older and begin to notice twinges in muscles and joints and a decline in the strength and stamina you once took for granted. A healthy lifestyle includes good healthcare, good nutrition, weight control, recreation, and regular exercise. Avoid harmful substances and do what you can to keep safe. Another part of a healthy lifestyle is stay mentally active and socially involved.    Good healthcare   Have a wellness visit every year.   If you have new symptoms, let us know right away. Don't wait until the next checkup.   Take medicines exactly as prescribed and keep your medicines in a safe place. Tell us if your medicine causes problems.   Healthy diet and weight control   Eat 3 or 4 small, nutritious, low-fat, high-fiber meals a day. Include a variety of fruits, vegetables, and whole-grain foods.   Make sure you get enough calcium in your diet. Calcium, vitamin D, and exercise help prevent osteoporosis (bone thinning).   If you live alone, try eating with others when you can. That way you get a good meal and have company while you eat it.   Try to keep a healthy weight. If you eat more  "calories than your body uses for energy, it will be stored as fat and you will gain weight.     Recreation   Recreation is not limited to sports and team events. It includes any activity that provides relaxation, interest, enjoyment, and exercise. Recreation provides an outlet for physical, mental, and social energy. It can give a sense of worth and achievement. It can help you stay healthy.    Mental Exercise and Social Involvement  Mental and emotional health is as important as physical health. Keep in touch with friends and family. Stay as active as possible. Continue to learn and challenge yourself.   Things you can do to stay mentally active are:  Learn something new, like a foreign language or musical instrument.   Play SCRABBLE or do crossword puzzles. If you cannot find people to play these games with you at home, you can play them with others on your computer through the Internet.   Join a games club--anything from card games to chess or checkers or lawn bowling.   Start a new hobby.   Go back to school.   Volunteer.   Read.   Keep up with world events.  Learning About Being Physically Active  What is physical activity?     Being physically active means doing any kind of activity that gets your body moving.  The types of physical activity that can help you get fit and stay healthy include:  Aerobic or \"cardio\" activities. These make your heart beat faster and make you breathe harder, such as brisk walking, riding a bike, or running. They strengthen your heart and lungs and build up your endurance.  Strength training activities. These make your muscles work against, or \"resist,\" something. Examples include lifting weights or doing push-ups. These activities help tone and strengthen your muscles and bones.  Stretches. These let you move your joints and muscles through their full range of motion. Stretching helps you be more flexible.  Reaching a balance between these three types of physical activity is " "important because each one contributes to your overall fitness.  What are the benefits of being active?  Being active is one of the best things you can do for your health. It helps you to:  Feel stronger and have more energy to do all the things you like to do.  Focus better at school or work.  Feel, think, and sleep better.  Reach and stay at a healthy weight.  Lose fat and build lean muscle.  Lower your risk for serious health problems, including diabetes, heart attack, high blood pressure, and some cancers.  Keep your heart, lungs, bones, muscles, and joints strong and healthy.  How can you make being active part of your life?  Start slowly. Make it your long-term goal to get at least 30 minutes of exercise on most days of the week. Walking is a good choice. You also may want to do other activities, such as running, swimming, cycling, or playing tennis or team sports.  Pick activities that you like--ones that make your heart beat faster, your muscles stronger, and your muscles and joints more flexible. If you find more than one thing you like doing, do them all. You don't have to do the same thing every day.  Get your heart pumping every day. Any activity that makes your heart beat faster and keeps it at that rate for a while counts.  Here are some great ways to get your heart beating faster:  Go for a brisk walk, run, or bike ride.  Go for a hike or swim.  Go in-line skating.  Play a game of touch football, basketball, or soccer.  Ride a bike.  Play tennis or racquetball.  Climb stairs.  Even some household chores can be aerobic--just do them at a faster pace. Vacuuming, raking or mowing the lawn, sweeping the garage, and washing and waxing the car all can help get your heart rate up.  Strengthen your muscles during the week. You don't have to lift heavy weights or grow big, bulky muscles to get stronger. Doing a few simple activities that make your muscles work against, or \"resist,\" something can help you get " "stronger.  For example, you can:  Do push-ups or sit-ups, which use your own body weight as resistance.  Lift weights or dumbbells or use stretch bands at home or in a gym or community center.  Stretch your muscles often. Stretching will help you as you become more active. It can help you stay flexible, loosen tight muscles, and avoid injury. It can also help improve your balance and posture and can be a great way to relax.  Be sure to stretch the muscles you'll be using when you work out. It's best to warm your muscles slightly before you stretch them. Walk or do some other light aerobic activity for a few minutes, and then start stretching.  When you stretch your muscles:  Do it slowly. Stretching is not about going fast or making sudden movements.  Don't push or bounce during a stretch.  Hold each stretch for at least 15 to 30 seconds, if you can. You should feel a stretch in the muscle, but not pain.  Breathe out as you do the stretch. Then breathe in as you hold the stretch. Don't hold your breath.  If you're worried about how more activity might affect your health, have a checkup before you start. Follow any special advice your doctor gives you for getting a smart start.  Where can you learn more?  Go to https://www.[facility].net/patiented  Enter W332 in the search box to learn more about \"Learning About Being Physically Active.\"  Current as of: October 10, 2022               Content Version: 13.7    9942-6472 [employer].   Care instructions adapted under license by your healthcare professional. If you have questions about a medical condition or this instruction, always ask your healthcare professional. [employer] disclaims any warranty or liability for your use of this information.      Learning About Dietary Guidelines  What are the Dietary Guidelines for Americans?     Dietary Guidelines for Americans provide tips for eating well and staying healthy. This helps reduce the risk " for long-term (chronic) diseases.  These guidelines recommend that you:  Eat and drink the right amount for you. The U.[place] government's food guide is called MyPlate. It can help you make your own well-balanced eating plan.  Try to balance your eating with your activity. This helps you stay at a healthy weight.  Drink alcohol in moderation, if at all.  Limit foods high in salt, saturated fat, trans fat, and added sugar.  These guidelines are from the U.S. Department of Agriculture and the U.S. Department of Health and Human Services. They are updated every 5 years.  What is MyPlate?  MyPlate is the [place] government's food guide. It can help you make your own well-balanced eating plan. A balanced eating plan means that you eat enough, but not too much, and that your food gives you the nutrients you need to stay healthy.  MyPlate focuses on eating plenty of whole grains, fruits, and vegetables, and on limiting fat and sugar. It is available online at www.ChooseMyPlate.gov.  How can you get started?  If you're trying to eat healthier, you can slowly change your eating habits over time. You don't have to make big changes all at once. Start by adding one or two healthy foods to your meals each day.  Grains  Choose whole-grain breads and cereals and whole-wheat pasta and whole-grain crackers.  Vegetables  Eat a variety of vegetables every day. They have lots of nutrients and are part of a heart-healthy diet.  Fruits  Eat a variety of fruits every day. Fruits contain lots of nutrients. Choose fresh fruit instead of fruit juice.  Protein foods  Choose fish and lean poultry more often. Eat red meat and fried meats less often. Dried beans, tofu, and nuts are also good sources of protein.  Dairy  Choose low-fat or fat-free products from this food group. If you have problems digesting milk, try eating cheese or yogurt instead.  Fats and oils  Limit fats and oils if you're trying to cut calories. Choose healthy fats when you cook.  "These include canola oil and olive oil.  Where can you learn more?  Go to https://www.[employer].net/patiented  Enter D676 in the search box to learn more about \"Learning About Dietary Guidelines.\"  Current as of: March 1, 2023               Content Version: 13.7    7536-9161 [employer].   Care instructions adapted under license by your healthcare professional. If you have questions about a medical condition or this instruction, always ask your healthcare professional. [employer] disclaims any warranty or liability for your use of this information.         "

## 2023-09-19 NOTE — TELEPHONE ENCOUNTER
Patient Quality Outreach    Patient is due for the following:   Physical Annual Wellness Visit    Next Steps:   Completed on 9/11/23  with Dr. Nathan.    Type of outreach:    Chart review performed, no outreach needed.    Next Steps:  Reach out within 90 days via [facility].    Max number of attempts reached: Yes. Will try again in 90 days if patient still on fail list.    Questions for provider review:    None           Nafisa Saeed, [facility]  Chart routed to Care Team.

## 2023-09-19 NOTE — TELEPHONE ENCOUNTER
Patient Quality Outreach    Patient is due for the following:   Diabetes -  Microalbumin and Foot Exam  Physical Annual Wellness Visit      Topic Date Due    COVID-19 Vaccine (6 - Pfizer series) 01/09/2023    Flu Vaccine (1) 09/01/2023       Next Steps:   Schedule a Annual Wellness Visit    Type of outreach:    Sent Kenzei message.      Questions for provider review:    None           Nafisa Saeed, [facility]  Chart routed to Care Team.

## 2023-09-27 ENCOUNTER — ANTICOAGULATION THERAPY VISIT (OUTPATIENT)
Dept: ANTICOAGULATION | Facility: CLINIC | Age: 78
End: 2023-09-27

## 2023-09-27 ENCOUNTER — LAB (OUTPATIENT)
Dept: LAB | Facility: CLINIC | Age: 78
End: 2023-09-27
Payer: MEDICARE

## 2023-09-27 DIAGNOSIS — Z79.01 LONG TERM CURRENT USE OF ANTICOAGULANT THERAPY: ICD-10-CM

## 2023-09-27 DIAGNOSIS — I48.20 CHRONIC ATRIAL FIBRILLATION (H): ICD-10-CM

## 2023-09-27 DIAGNOSIS — Z79.01 LONG TERM CURRENT USE OF ANTICOAGULANT THERAPY: Primary | ICD-10-CM

## 2023-09-27 LAB — INR BLD: 2.6 (ref 0.9–1.1)

## 2023-09-27 PROCEDURE — 36416 COLLJ CAPILLARY BLOOD SPEC: CPT

## 2023-09-27 PROCEDURE — 85610 PROTHROMBIN TIME: CPT

## 2023-09-27 NOTE — PROGRESS NOTES
ANTICOAGULATION MANAGEMENT     Keith Desir 77 year old male is on warfarin with therapeutic INR result. (Goal INR 2.0-3.0)    Recent labs: (last 7 days)     09/27/23  0954   INR 2.6*       ASSESSMENT     Warfarin Lab Questionnaire    Warfarin Doses Last 7 Days      9/26/2023    10:48 AM   Dose in Tablet or Mg   TAB or MG? milligram (mg)     Pt Rptd Dose SUNDAY MONDAY TUESDAY WED THURS FRIDAY SATURDAY 9/26/2023  10:48 AM 7.5 7.5 5 7.5 7.5 7.5 7.5         9/26/2023   Warfarin Lab Questionnaire   Missed doses within past 14 days? No   Changes in diet or alcohol within past 14 days? No   Medication changes since last result? No   Injuries or illness since last result? No   New shortness of breath, severe headaches or sudden changes in vision since last result? No   Abnormal bleeding since last result? No   Upcoming surgery, procedure? No   Best number to call with results? [phone]     Previous result: Supratherapeutic  Additional findings: None       PLAN     Recommended plan for no diet, medication or health factor changes affecting INR     Dosing Instructions: Continue your current warfarin dose with next INR in 3 weeks       Summary  As of 9/27/2023      Full warfarin instructions:  5 mg every Tue; 7.5 mg all other days   Next INR check:  10/18/2023               Telephone call with Melissa who verbalizes understanding and agrees to plan    Patient elected to schedule next visit in 6 weeks    Education provided:   Goal range and lab monitoring: goal range and significance of current result    Plan made per [facility] anticoagulation protocol    Ban Matamoros RN  Anticoagulation Clinic  9/27/2023    _______________________________________________________________________     Anticoagulation Episode Summary       Current INR goal:  2.0-3.0   TTR:  60.8 % (1 y)   Target end date:  Indefinite   Send INR reminders to:  AJ PALUMBO    Indications    Long term current use of anticoagulant therapy [Z79.01]  Chronic  atrial fibrillation (HCC) [I48.20]             Comments:               Anticoagulation Care Providers       Provider Role Specialty Phone number    Cayla Gregorio MD [facility] Family Medicine [phone]

## 2023-11-08 ENCOUNTER — ANTICOAGULATION THERAPY VISIT (OUTPATIENT)
Dept: ANTICOAGULATION | Facility: CLINIC | Age: 78
End: 2023-11-08

## 2023-11-08 ENCOUNTER — LAB (OUTPATIENT)
Dept: LAB | Facility: CLINIC | Age: 78
End: 2023-11-08
Payer: MEDICARE

## 2023-11-08 DIAGNOSIS — I48.20 CHRONIC ATRIAL FIBRILLATION (H): ICD-10-CM

## 2023-11-08 DIAGNOSIS — Z79.01 LONG TERM CURRENT USE OF ANTICOAGULANT THERAPY: Primary | ICD-10-CM

## 2023-11-08 DIAGNOSIS — Z79.01 LONG TERM CURRENT USE OF ANTICOAGULANT THERAPY: ICD-10-CM

## 2023-11-08 LAB — INR BLD: 2.3 (ref 0.9–1.1)

## 2023-11-08 PROCEDURE — 36416 COLLJ CAPILLARY BLOOD SPEC: CPT

## 2023-11-08 PROCEDURE — 85610 PROTHROMBIN TIME: CPT

## 2023-11-08 NOTE — PROGRESS NOTES
ANTICOAGULATION MANAGEMENT     Keith Desir 77 year old male is on warfarin with therapeutic INR result. (Goal INR 2.0-3.0)    Recent labs: (last 7 days)     11/08/23  0919   INR 2.3*       ASSESSMENT     Source(s): Chart Review and Patient/Caregiver Call     Warfarin doses taken: Warfarin taken as instructed  Diet: No new diet changes identified  Medication/supplement changes: None noted  New illness, injury, or hospitalization: No  Signs or symptoms of bleeding or clotting: No  Previous result: Therapeutic last visit; previously outside of goal range  Additional findings: None     PLAN     Recommended plan for no diet, medication or health factor changes affecting INR     Dosing Instructions: Continue your current warfarin dose with next INR in 4 weeks       Pt prefers a 6 week recheck.    Summary  As of 11/8/2023      Full warfarin instructions:  5 mg every Tue; 7.5 mg all other days   Next INR check:  12/6/2023               Telephone call with Melissa who verbalizes understanding and agrees to plan    Lab visit scheduled    Education provided:   Please call back if any changes to your diet, medications or how you've been taking warfarin    Plan made per [facility] anticoagulation protocol    Jenniffer Loera RN  Anticoagulation Clinic  11/8/2023    _______________________________________________________________________     Anticoagulation Episode Summary       Current INR goal:  2.0-3.0   TTR:  60.7% (1 y)   Target end date:  Indefinite   Send INR reminders to:  AJ PALUMBO    Indications    Long term current use of anticoagulant therapy [Z79.01]  Chronic atrial fibrillation (HCC) [I48.20]             Comments:               Anticoagulation Care Providers       Provider Role Specialty Phone number    Cayla Gregorio MD Referring Family Medicine [phone]

## 2023-11-09 DIAGNOSIS — N18.2 TYPE 2 DIABETES MELLITUS WITH STAGE 2 CHRONIC KIDNEY DISEASE, WITHOUT LONG-TERM CURRENT USE OF INSULIN (H): ICD-10-CM

## 2023-11-09 DIAGNOSIS — E11.22 TYPE 2 DIABETES MELLITUS WITH STAGE 2 CHRONIC KIDNEY DISEASE, WITHOUT LONG-TERM CURRENT USE OF INSULIN (H): ICD-10-CM

## 2023-11-09 RX ORDER — EMPAGLIFLOZIN 10 MG/1
10 TABLET, FILM COATED ORAL DAILY
Qty: 90 TABLET | Refills: 0 | Status: SHIPPED | OUTPATIENT
Start: 2023-11-09 | End: 2024-02-06

## 2023-11-17 DIAGNOSIS — I50.32 CHRONIC HEART FAILURE WITH PRESERVED EJECTION FRACTION (H): ICD-10-CM

## 2023-11-17 RX ORDER — FUROSEMIDE 20 MG
20 TABLET ORAL DAILY
Qty: 90 TABLET | Refills: 0 | Status: SHIPPED | OUTPATIENT
Start: 2023-11-17 | End: 2024-02-16

## 2023-11-28 ENCOUNTER — PATIENT OUTREACH (OUTPATIENT)
Dept: GASTROENTEROLOGY | Facility: CLINIC | Age: 78
End: 2023-11-28
Payer: MEDICARE

## 2023-12-06 ENCOUNTER — TRANSFERRED RECORDS (OUTPATIENT)
Dept: HEALTH INFORMATION MANAGEMENT | Facility: CLINIC | Age: 78
End: 2023-12-06
Payer: MEDICARE

## 2023-12-06 LAB — RETINOPATHY: NORMAL

## 2023-12-07 PROBLEM — H40.10X3: Status: ACTIVE | Noted: 2023-12-07

## 2023-12-07 PROBLEM — H40.10X1 OPEN-ANGLE GLAUCOMA OF LEFT EYE, MILD STAGE: Status: ACTIVE | Noted: 2023-12-07

## 2023-12-20 ENCOUNTER — LAB (OUTPATIENT)
Dept: LAB | Facility: CLINIC | Age: 78
End: 2023-12-20
Payer: MEDICARE

## 2023-12-20 ENCOUNTER — ANTICOAGULATION THERAPY VISIT (OUTPATIENT)
Dept: ANTICOAGULATION | Facility: CLINIC | Age: 78
End: 2023-12-20

## 2023-12-20 DIAGNOSIS — Z79.01 LONG TERM CURRENT USE OF ANTICOAGULANT THERAPY: ICD-10-CM

## 2023-12-20 DIAGNOSIS — Z79.01 LONG TERM CURRENT USE OF ANTICOAGULANT THERAPY: Primary | ICD-10-CM

## 2023-12-20 DIAGNOSIS — I48.20 CHRONIC ATRIAL FIBRILLATION (H): ICD-10-CM

## 2023-12-20 LAB — INR BLD: 2.4 (ref 0.9–1.1)

## 2023-12-20 PROCEDURE — 36416 COLLJ CAPILLARY BLOOD SPEC: CPT

## 2023-12-20 PROCEDURE — 85610 PROTHROMBIN TIME: CPT

## 2023-12-20 NOTE — PROGRESS NOTES
ANTICOAGULATION MANAGEMENT     Keith Desir 78 year old male is on warfarin with therapeutic INR result. (Goal INR 2.0-3.0)    Recent labs: (last 7 days)     12/20/23  0915   INR 2.4*       ASSESSMENT     Warfarin Lab Questionnaire    Warfarin Doses Last 7 Days      12/19/2023     9:52 AM   Dose in Tablet or Mg   TAB or MG? milligram (mg)     Pt Rptd Dose SUNDAY MONDAY TUESDAY WED THURS FRIDAY SATURDAY 12/19/2023   9:52 AM 7.5 7.5 5 7.5 7.5 7.5 7.5         12/19/2023   Warfarin Lab Questionnaire   Missed doses within past 14 days? No   Changes in diet or alcohol within past 14 days? No   Medication changes since last result? No   Injuries or illness since last result? No   New shortness of breath, severe headaches or sudden changes in vision since last result? No   Abnormal bleeding since last result? No   Upcoming surgery, procedure? No     Previous result: Therapeutic last 2(+) visits  Additional findings: None       PLAN     Recommended plan for no diet, medication or health factor changes affecting INR     Dosing Instructions: Continue your current warfarin dose with next INR in 6 weeks       Summary  As of 12/20/2023      Full warfarin instructions:  5 mg every Tue; 7.5 mg all other days   Next INR check:  1/31/2024               Telephone call with Ed who verbalizes understanding and agrees to plan    Patient elected to schedule next visit 7 weeks    Education provided:   Goal range and lab monitoring: goal range and significance of current result    Plan made per [facility] anticoagulation protocol    Ban Matamoros RN  Anticoagulation Clinic  12/20/2023    _______________________________________________________________________     Anticoagulation Episode Summary       Current INR goal:  2.0-3.0   TTR:  60.7% (1 y)   Target end date:  Indefinite   Send INR reminders to:  AJ PALUMBO    Indications    Long term current use of anticoagulant therapy [Z79.01]  Chronic atrial fibrillation (HCC) [I48.20]              Comments:               Anticoagulation Care Providers       Provider Role Specialty Phone number    Cayla Gregorio MD Referring Family Medicine [phone]

## 2024-01-04 ENCOUNTER — TELEPHONE (OUTPATIENT)
Dept: FAMILY MEDICINE | Facility: CLINIC | Age: 79
End: 2024-01-04

## 2024-01-04 ENCOUNTER — VIRTUAL VISIT (OUTPATIENT)
Dept: FAMILY MEDICINE | Facility: CLINIC | Age: 79
End: 2024-01-04
Payer: MEDICARE

## 2024-01-04 DIAGNOSIS — U07.1 INFECTION DUE TO 2019 NOVEL CORONAVIRUS: Primary | ICD-10-CM

## 2024-01-04 DIAGNOSIS — J43.9 PULMONARY EMPHYSEMA, UNSPECIFIED EMPHYSEMA TYPE (H): Chronic | ICD-10-CM

## 2024-01-04 DIAGNOSIS — I48.20 CHRONIC ATRIAL FIBRILLATION (H): ICD-10-CM

## 2024-01-04 PROCEDURE — 99442 PR PHYSICIAN TELEPHONE EVALUATION 11-20 MIN: CPT | Mod: 93 | Performed by: STUDENT IN AN ORGANIZED HEALTH CARE EDUCATION/TRAINING PROGRAM

## 2024-01-04 NOTE — PROGRESS NOTES
Ed is a 78 year old who is being evaluated via a billable telephone visit.      What phone number would you like to be contacted at? [phone]  How would you like to obtain your AVS? Дмитрий    Distant Location (provider location):  On-site      Assessment & Plan       Infection due to 2019 novel coronavirus  On day 3 of illness. Interested in treatment. We will start paxlovid. Reviewed side effects. Discussed drug interactions and medication adjustments:   Hold Lipitor while on medication and for 3 days after.   Continue coumadin at same dose, may decrease INR due to drug interaction. Needs close INR f/up once paxlovid complete.   Decrease digoxin dose by 30-50% ---> so HOLD digoxin on day 2 and 4 of the paxlovid treatment.    - nirmatrelvir and ritonavir (PAXLOVID) 300 mg/100 mg therapy pack; Take 3 tablets by mouth 2 times daily for 5 days (Take 2 Nirmatrelvir tablets and 1 Ritonavir tablet twice daily for 5 days)    Chronic atrial fibrillation (H)  Of note in relation to medication adjustments and comorbid conditions. Recheck INR 1-2 days after stopping paxlovid     Pulmonary emphysema, unspecified emphysema type (H)  Continue inhalers. Denies increased use of rescue inhalers at this time      Esther Maher, DO  [facility]   Ed is a 78 year old, presenting for the following health issues:  Covid Concern        1/4/2024    12:53 PM   Additional Questions   Accompanied by wife         1/4/2024    12:53 PM   Patient Reported Additional Medications   Patient reports taking the following new medications none       HPI       COVID-19 Symptom Review  How many days ago did these symptoms start? Sx started 01/02  Exposure:  wife's daughter. Went out to eat Monday  Wife negative    Are any of the following symptoms significant for you?  New or worsening difficulty breathing? No not really  Worsening cough? Yes, I am coughing up mucus. Wet cough. Little wheezy   Fever or  chills? No   Headache: No  Sore throat: No  Chest pain: No  Diarrhea: No  Body aches? No  Fatigue      Feeling very weak and tired. Sleeping a lot.   Coughing a lot, productive.   No chest pain. No trouble breathing. No wheezing.  Borderline fever.     Hx of emphysema. Hasn't needed to use his rescue inhalers.   Has chronic A.fib and takes digoxin and warfarin.       What treatments has patient tried? Small    Does patient live in a nursing home, group home, or shelter? No  Does patient have a way to get food/medications during quarantined? Yes, I have a friend or family member who can help me.                    Review of Systems   Constitutional, HEENT, cardiovascular, pulmonary, gi and gu systems are negative, except as otherwise noted.      Objective           Vitals:  No vitals were obtained today due to virtual visit.    Physical Exam   healthy, alert, and no distress  PSYCH: Alert and oriented times 3; coherent speech, normal   rate and volume, able to articulate logical thoughts, able   to abstract reason, no tangential thoughts, no hallucinations   or delusions  His affect is normal  RESP: No cough, no audible wheezing, able to talk in full sentences  Remainder of exam unable to be completed due to telephone visits                Phone call duration: 15 minutes

## 2024-01-04 NOTE — TELEPHONE ENCOUNTER
Pt's wife calling today    Pt just tested positive for covid today- tired, cough, sneezing, warm to touch, currently increased SOB with activity.    Right now pt is resting    They were exposed by a family member, pt's symptoms started 1/2.    Pt on coumadin so will need an appointment with a provider. Scheduled with availably provider          DIANN Kamara    Triage Nurse  [facility]

## 2024-01-11 ENCOUNTER — ANTICOAGULATION THERAPY VISIT (OUTPATIENT)
Dept: ANTICOAGULATION | Facility: CLINIC | Age: 79
End: 2024-01-11

## 2024-01-11 ENCOUNTER — LAB (OUTPATIENT)
Dept: LAB | Facility: CLINIC | Age: 79
End: 2024-01-11
Payer: MEDICARE

## 2024-01-11 DIAGNOSIS — I48.20 CHRONIC ATRIAL FIBRILLATION (H): ICD-10-CM

## 2024-01-11 DIAGNOSIS — Z79.01 LONG TERM CURRENT USE OF ANTICOAGULANT THERAPY: Primary | ICD-10-CM

## 2024-01-11 DIAGNOSIS — N18.2 TYPE 2 DIABETES MELLITUS WITH STAGE 2 CHRONIC KIDNEY DISEASE, WITHOUT LONG-TERM CURRENT USE OF INSULIN (H): Primary | ICD-10-CM

## 2024-01-11 DIAGNOSIS — Z79.01 LONG TERM CURRENT USE OF ANTICOAGULANT THERAPY: ICD-10-CM

## 2024-01-11 DIAGNOSIS — E11.22 TYPE 2 DIABETES MELLITUS WITH STAGE 2 CHRONIC KIDNEY DISEASE, WITHOUT LONG-TERM CURRENT USE OF INSULIN (H): Primary | ICD-10-CM

## 2024-01-11 LAB
HBA1C MFR BLD: 6.7 % (ref 0–5.6)
INR BLD: 1.9 (ref 0.9–1.1)

## 2024-01-11 PROCEDURE — 36415 COLL VENOUS BLD VENIPUNCTURE: CPT

## 2024-01-11 PROCEDURE — 85610 PROTHROMBIN TIME: CPT

## 2024-01-11 PROCEDURE — 36416 COLLJ CAPILLARY BLOOD SPEC: CPT

## 2024-01-11 PROCEDURE — 83036 HEMOGLOBIN GLYCOSYLATED A1C: CPT

## 2024-01-11 NOTE — PROGRESS NOTES
ANTICOAGULATION MANAGEMENT     Keith Desir 78 year old male is on warfarin with subtherapeutic INR result. (Goal INR 2.0-3.0)    Recent labs: (last 7 days)     01/11/24  1451   INR 1.9*       ASSESSMENT     Warfarin Lab Questionnaire    Warfarin Doses Last 7 Days      1/10/2024     9:45 AM   Dose in Tablet or Mg   TAB or MG? milligram (mg)     Pt Rptd Dose GRISELDA MONDAY TUESDAY WED THURS FRIDAY SATURDAY   1/10/2024   9:45 AM 7.5 7.5 5 7.5 7.5 7.5 7.5         1/10/2024   Warfarin Lab Questionnaire   Missed doses within past 14 days? No   Changes in diet or alcohol within past 14 days? No   Medication changes since last result? Yes   Please list: Plaxlovid   Injuries or illness since last result? Yes   If yes, please explain: Covid   New shortness of breath, severe headaches or sudden changes in vision since last result? No   Abnormal bleeding since last result? No   Upcoming surgery, procedure? No   Best number to call with results? [phone]     Previous result: Therapeutic last 2(+) visits  Additional findings: None       PLAN     Recommended plan for temporary change(s) affecting INR     Dosing Instructions: booster dose then continue your current warfarin dose with next INR in 2 weeks       Summary  As of 1/11/2024      Full warfarin instructions:  1/11: 10 mg; Otherwise 5 mg every Tue; 7.5 mg all other days   Next INR check:  2/7/2024               Telephone call with Melissa who verbalizes understanding and agrees to plan    Patient elected to schedule next visit in 4 weeks    Education provided:   Goal range and lab monitoring: goal range and significance of current result    Plan made per [facility] anticoagulation protocol    Ban Matamoros RN  Anticoagulation Clinic  1/11/2024    _______________________________________________________________________     Anticoagulation Episode Summary       Current INR goal:  2.0-3.0   TTR:  63.4% (1 y)   Target end date:  Indefinite   Send INR reminders to:  AJ  FRIDLEY    Indications    Long term current use of anticoagulant therapy [Z79.01]  Chronic atrial fibrillation (HCC) [I48.20]             Comments:               Anticoagulation Care Providers       Provider Role Specialty Phone number    Cayla Gregorio MD [facility] Medicine [phone]

## 2024-01-29 ENCOUNTER — OFFICE VISIT (OUTPATIENT)
Dept: FAMILY MEDICINE | Facility: CLINIC | Age: 79
End: 2024-01-29
Payer: MEDICARE

## 2024-01-29 VITALS
DIASTOLIC BLOOD PRESSURE: 79 MMHG | HEIGHT: 68 IN | RESPIRATION RATE: 18 BRPM | SYSTOLIC BLOOD PRESSURE: 126 MMHG | TEMPERATURE: 97.2 F | OXYGEN SATURATION: 97 % | WEIGHT: 203 LBS | HEART RATE: 64 BPM | BODY MASS INDEX: 30.77 KG/M2

## 2024-01-29 DIAGNOSIS — B96.89 ACUTE BACTERIAL SINUSITIS: Primary | ICD-10-CM

## 2024-01-29 DIAGNOSIS — K14.9 DISEASE OF TONGUE: ICD-10-CM

## 2024-01-29 DIAGNOSIS — I50.32 CHRONIC HEART FAILURE WITH PRESERVED EJECTION FRACTION (H): ICD-10-CM

## 2024-01-29 DIAGNOSIS — J01.90 ACUTE BACTERIAL SINUSITIS: Primary | ICD-10-CM

## 2024-01-29 PROCEDURE — 99213 OFFICE O/P EST LOW 20 MIN: CPT

## 2024-01-29 RX ORDER — LATANOPROST 50 UG/ML
1 SOLUTION/ DROPS OPHTHALMIC DAILY
COMMUNITY
Start: 2023-12-07 | End: 2024-03-19

## 2024-01-29 ASSESSMENT — PAIN SCALES - GENERAL: PAINLEVEL: MODERATE PAIN (5)

## 2024-01-29 ASSESSMENT — ENCOUNTER SYMPTOMS
SORE THROAT: 1
COUGH: 1

## 2024-01-29 NOTE — PROGRESS NOTES
Assessment & Plan     Acute bacterial sinusitis  - persistent symptoms, discussed ABRS  - no history of C diff or diarrhea, will tx with:  - amoxicillin-clavulanate (AUGMENTIN) 875-125 MG tablet  Dispense: 10 tablet; Refill: 0  - discussed can try 2 sprays of fluticasone (Flonase) every day for the next 5-7d to see if this helps too  - discussed sore throat likely caused by PND  - advised to continue supportive measures: saline flushes, cough drops, cough syrups, humidifier, rest, fluids  - if symptoms persist or do not improve, RTC for further assessment / follow up    Disease of tongue  - abnormal discharge of tongue, removable, w/o lesions  - advised dental care and follow up prn if persists    Chronic heart failure with preserved ejection fraction (H)  - followed by cardiology  - encouraged to keep follow up    RTC prn with persistent or worsening symptoms. The patient verbalized understanding and agreement with the plan today and has no additional questions or concerns at this time.      Subjective   Ed is a 78 year old, presenting for the following health issues:  Pharyngitis and Cough        1/29/2024     9:40 AM   Additional Questions   Roomed by Crystal   Accompanied by self         1/29/2024     9:40 AM   Patient Reported Additional Medications   Patient reports taking the following new medications none     History of Present Illness       Reason for visit:  Sore throat for several days  Symptom onset:  3-4 weeks ago  Symptoms include:  Had covid beginning in the month. Then got a cold and sore throat.  Symptom intensity:  Moderate  Symptom progression:  Worsening  Had these symptoms before:  No  What makes it worse:  No  What makes it better:  Using cough syrup and throat lozenges.    He eats 0-1 servings of fruits and vegetables daily.He consumes 1 sweetened beverage(s) daily.He exercises with enough effort to increase his heart rate 9 or less minutes per day.  He exercises with enough effort to  increase his heart rate 3 or less days per week.   He is taking medications regularly.     Acute Illness  Acute illness concerns: Pharyngitis   Associated symptoms include cough.   Cough  Associated symptoms include sore throat.   History of Present Illness     Reason for visit:  Sore throat for several days  Symptom onset:  3-4 weeks ago  Symptoms include:  Had covid beginning in the month. Then got a cold and sore throat.  Symptom intensity:  Moderate  Symptom progression:  Worsening  Had these symptoms before:  No  What makes it worse:  No  What makes it better:  Using cough syrup and throat lozenges.    He eats 0-1 servings of fruits and vegetables daily.He consumes 1 sweetened beverage(s) daily.He exercises with enough effort to increase his heart rate 9 or less minutes per day.  He exercises with enough effort to increase his heart rate 3 or less days per week.   He is taking medications regularly.  Onset/Duration: Pharyngitis   Associated symptoms include cough.   Cough  Associated symptoms include sore throat.   History of Present Illness     He eats 0-1 servings of fruits and vegetables daily.He consumes 1 sweetened beverage(s) daily.He exercises with enough effort to increase his heart rate 9 or less minutes per day.  He exercises with enough effort to increase his heart rate 3 or less days per week.   He is taking medications regularly.    Symptoms:  Fever: No  Chills/Sweats: No  Headache (location?): No  Sinus Pressure: No  Conjunctivitis:  No  Ear Pain: no  Rhinorrhea: YES  Congestion: YES  Sore Throat: YES  Cough: YES-non-productive  Wheeze: No  Decreased Appetite: YES  Nausea: No  Vomiting: No  Diarrhea: No  Dysuria/Freq.: No  Dysuria or Hematuria: No  Fatigue/Achiness: YES  Sick/Strep Exposure: No  Therapies tried and outcome: None    Patient reports slightly productive cough with sore throat, congestion, and rhinorrhea. Having a lot of post nasal drip. Taking fluticasone (Flonase) one spray once per  "day, almost every day, doesn't help at all. No fevers but sometimes feels \"hot on the inside\". History of COPD and emphysema. No trouble breathing or shortness of breath. No chest pains. Been taking the cough syrup for high blood pressure. Takes at night and in the morning. Tried vapor cool cough drops for sore throat. Symptoms have been about the same if not worsening. Was dx with covid-19 on 1/4/24 and completed course of Paxlovid. Symptoms persistent since then.     Heart failure - patient and wife deny history of this. Patient is followed by cardiology for a fib. Apt next week.     Review of Systems  Constitutional, neuro, ENT, endocrine, pulmonary, cardiac, gastrointestinal, genitourinary, musculoskeletal, integument and psychiatric systems are negative, except as otherwise noted.      Objective    /79 (BP Location: Left arm, Patient Position: Sitting, Cuff Size: Adult Regular)   Pulse 64   Temp 97.2  F (36.2  C) (Oral)   Resp 18   Ht 1.73 m (5' 8.11\")   Wt 92.1 kg (203 lb)   SpO2 97%   BMI 30.77 kg/m    Body mass index is 30.77 kg/m .  Physical Exam     General:  alert, oriented, pleasant and conversant. NAD. Non-toxic  HEENT:  normocephalic, atraumatic. Sclera white, conjunctiva clear, EOMs intact. TMs grey, cone of light and bony landmarks visualized bilaterally. Nares patent. OP w/ mild erythema but no edema, discharge, or lesions. Tonsils WNL. Tongue with grey/brown plaque, scrapes off. +congestion. +rhinorrhea. +PND.   Neck:  supple, FROM  Chest: chest expansion symmetrical bilaterally, lung sounds clear without wheezes, rhonchi or rales  CV: S1, S2,RRR without murmurs, rubs or gallops. No edema  MSK: steady gait, FROM  Derm: no rashes, lesions, or ulcers. No erythma, induration or nodules  Neuro: CNII-XII grossly intact, clear and logical thought and speech  Psych:  cooperative, calm mood and congruent affect    Signed Electronically by: IDANIA Hernandez CNP  "

## 2024-02-06 DIAGNOSIS — E11.22 TYPE 2 DIABETES MELLITUS WITH STAGE 2 CHRONIC KIDNEY DISEASE, WITHOUT LONG-TERM CURRENT USE OF INSULIN (H): ICD-10-CM

## 2024-02-06 DIAGNOSIS — N18.2 TYPE 2 DIABETES MELLITUS WITH STAGE 2 CHRONIC KIDNEY DISEASE, WITHOUT LONG-TERM CURRENT USE OF INSULIN (H): ICD-10-CM

## 2024-02-07 ENCOUNTER — ANTICOAGULATION THERAPY VISIT (OUTPATIENT)
Dept: ANTICOAGULATION | Facility: CLINIC | Age: 79
End: 2024-02-07

## 2024-02-07 ENCOUNTER — LAB (OUTPATIENT)
Dept: LAB | Facility: CLINIC | Age: 79
End: 2024-02-07
Payer: MEDICARE

## 2024-02-07 DIAGNOSIS — Z79.01 LONG TERM CURRENT USE OF ANTICOAGULANT THERAPY: ICD-10-CM

## 2024-02-07 DIAGNOSIS — I48.20 CHRONIC ATRIAL FIBRILLATION (H): ICD-10-CM

## 2024-02-07 DIAGNOSIS — Z79.01 LONG TERM CURRENT USE OF ANTICOAGULANT THERAPY: Primary | ICD-10-CM

## 2024-02-07 LAB — INR BLD: 2 (ref 0.9–1.1)

## 2024-02-07 PROCEDURE — 36416 COLLJ CAPILLARY BLOOD SPEC: CPT

## 2024-02-07 PROCEDURE — 85610 PROTHROMBIN TIME: CPT

## 2024-02-07 NOTE — PROGRESS NOTES
ANTICOAGULATION MANAGEMENT     Keith Desir 78 year old male is on warfarin with therapeutic INR result. (Goal INR 2.0-3.0)    Recent labs: (last 7 days)     02/07/24  0935   INR 2.0*       ASSESSMENT     Warfarin Lab Questionnaire    Warfarin Doses Last 7 Days      2/6/2024    10:48 AM   Dose in Tablet or Mg   TAB or MG? tablet (tab)     Pt Rptd Dose SUNDAY MONDAY TUESDAY WED THURS FRIDAY SATURDAY 2/6/2024  10:48 AM 3 3 2 3 3 3 3         2/6/2024   Warfarin Lab Questionnaire   Missed doses within past 14 days? No   Changes in diet or alcohol within past 14 days? No   Medication changes since last result? No   Injuries or illness since last result? No   New shortness of breath, severe headaches or sudden changes in vision since last result? No   Abnormal bleeding since last result? No   Upcoming surgery, procedure? No     Previous result: Subtherapeutic  Additional findings: None       PLAN     Recommended plan for no diet, medication or health factor changes affecting INR     Dosing Instructions: Continue your current warfarin dose with next INR in 5 weeks       Summary  As of 2/7/2024      Full warfarin instructions:  5 mg every Tue; 7.5 mg all other days   Next INR check:  3/13/2024               Detailed voice message left for Ed and wife Melissa with dosing instructions and follow up date. "[employer] message with dosing sent.     Contact [phone]  to schedule and with any changes, questions or concerns.     Education provided:   Contact [phone]  with any changes, questions or concerns.     Plan made per [facility] anticoagulation protocol    Dot Ramirez, RN  Anticoagulation Clinic  2/7/2024    _______________________________________________________________________     Anticoagulation Episode Summary       Current INR goal:  2.0-3.0   TTR:  63.4% (1 y)   Target end date:  Indefinite   Send INR reminders to:  AJ PALUMBO    Indications    Long term current use of anticoagulant therapy  [Z79.01]  Chronic atrial fibrillation (HCC) [I48.20]             Comments:               Anticoagulation Care Providers       Provider Role Specialty Phone number    Cayla Gregorio MD [facility] Medicine [phone]

## 2024-02-12 DIAGNOSIS — E11.22 TYPE 2 DIABETES MELLITUS WITH STAGE 2 CHRONIC KIDNEY DISEASE, WITHOUT LONG-TERM CURRENT USE OF INSULIN (H): ICD-10-CM

## 2024-02-12 DIAGNOSIS — N18.2 TYPE 2 DIABETES MELLITUS WITH STAGE 2 CHRONIC KIDNEY DISEASE, WITHOUT LONG-TERM CURRENT USE OF INSULIN (H): ICD-10-CM

## 2024-02-12 RX ORDER — EMPAGLIFLOZIN 10 MG/1
10 TABLET, FILM COATED ORAL DAILY
Qty: 90 TABLET | Refills: 0 | OUTPATIENT
Start: 2024-02-12

## 2024-02-14 ENCOUNTER — TRANSFERRED RECORDS (OUTPATIENT)
Dept: HEALTH INFORMATION MANAGEMENT | Facility: CLINIC | Age: 79
End: 2024-02-14

## 2024-02-14 DIAGNOSIS — H40.1113 PRIMARY OPEN ANGLE GLAUCOMA (POAG) OF RIGHT EYE, SEVERE STAGE: Primary | ICD-10-CM

## 2024-02-14 PROCEDURE — 99207 PR NO CHARGE LOS: CPT | Performed by: OPTOMETRIST

## 2024-02-14 RX ORDER — LATANOPROST 50 UG/ML
1 SOLUTION/ DROPS OPHTHALMIC AT BEDTIME
COMMUNITY
Start: 2023-12-06

## 2024-02-14 NOTE — PROGRESS NOTES
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    Kristy Rosales, OD

## 2024-02-16 DIAGNOSIS — I50.32 CHRONIC HEART FAILURE WITH PRESERVED EJECTION FRACTION (H): ICD-10-CM

## 2024-02-16 RX ORDER — FUROSEMIDE 20 MG
20 TABLET ORAL DAILY
Qty: 90 TABLET | Refills: 0 | Status: SHIPPED | OUTPATIENT
Start: 2024-02-16 | End: 2024-03-26

## 2024-03-05 DIAGNOSIS — I48.20 CHRONIC ATRIAL FIBRILLATION (H): ICD-10-CM

## 2024-03-05 RX ORDER — WARFARIN SODIUM 2.5 MG/1
TABLET ORAL
Qty: 240 TABLET | Refills: 1 | Status: SHIPPED | OUTPATIENT
Start: 2024-03-05 | End: 2024-08-12

## 2024-03-05 NOTE — TELEPHONE ENCOUNTER
ANTICOAGULATION MANAGEMENT:  Medication Refill    Anticoagulation Summary  As of 2/7/2024      Warfarin maintenance plan:  5 mg (2.5 mg x 2) every Tue; 7.5 mg (2.5 mg x 3) all other days   Next INR check:  3/13/2024   Target end date:  Indefinite    Indications    Long term current use of anticoagulant therapy [Z79.01]  Chronic atrial fibrillation (HCC) [I48.20]                 Anticoagulation Care Providers       Provider Role Specialty Phone number    Cayla Gregorio MD Referring Family Medicine [phone]            Refill Criteria    Visit with referring provider/group: Meets criteria: office visit within referring provider group in the last 1 year on 9/11/23    [facility] referral last signed: 08/25/2023; within last year: Yes    Lab monitoring not exceeding 2 weeks overdue: Yes    Edward meets all criteria for refill. Rx instructions and quantity match patient's current dosing plan. Warfarin 90 day supply with 1 refill granted per [facility] protocol     Ban Matamoros RN  Anticoagulation Clinic

## 2024-03-06 ENCOUNTER — OFFICE VISIT (OUTPATIENT)
Dept: DERMATOLOGY | Facility: CLINIC | Age: 79
End: 2024-03-06
Payer: MEDICARE

## 2024-03-06 ENCOUNTER — TELEPHONE (OUTPATIENT)
Dept: FAMILY MEDICINE | Facility: CLINIC | Age: 79
End: 2024-03-06

## 2024-03-06 DIAGNOSIS — L81.4 LENTIGO: ICD-10-CM

## 2024-03-06 DIAGNOSIS — L82.1 SEBORRHEIC KERATOSES: ICD-10-CM

## 2024-03-06 DIAGNOSIS — D23.9 DERMAL NEVUS: Primary | ICD-10-CM

## 2024-03-06 DIAGNOSIS — Z85.828 HISTORY OF SKIN CANCER: ICD-10-CM

## 2024-03-06 DIAGNOSIS — D18.01 ANGIOMA OF SKIN: ICD-10-CM

## 2024-03-06 PROCEDURE — 99213 OFFICE O/P EST LOW 20 MIN: CPT | Performed by: DERMATOLOGY

## 2024-03-06 NOTE — LETTER
3/6/2024         RE: Keith Desir  [id number] [facility] [id number]        Dear Colleague,    Thank you for referring your patient, Keith Desir, to the [facility]. Please see a copy of my visit note below.    Keith Desir is an extremely pleasant 78 year old year old male patient here today for hx of non-melanoma skin cancer.  Patient has no other skin complaints today.  Remainder of the HPI, Meds, PMH, Allergies, FH, and SH was reviewed in chart.      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     Hepatitis C      HTN (hypertension)      Hyperlipidemia      Morbid obesity (H)      NAFLD (nonalcoholic fatty liver disease)      Polycythemia secondary to smoking      Primary open angle glaucoma of left eye, mild stage 12/06/2023    Dr Riedel     Primary open-angle glaucoma, right eye, severe stage 12/06/2023    [facility] , Dr Patrick Riedel     Squamous cell carcinoma of skin, unspecified      Umbilical hernia without obstruction and without gangrene      Varicose veins of legs 08/08/2018       Past Surgical History:   Procedure Laterality Date     ANGIOGRAM  08/2006    normal     LUMBAR DISC SURGERY  11/11/2021     TONSILLECTOMY & ADENOIDECTOMY          Family History   Problem Relation Age of Onset     Diabetes Mother      Cerebrovascular Disease Father      Heart Disease Brother         pacer      Deep Vein Thrombosis Son      Hodgkin's lymphoma Son      Cancer No family hx of      Glaucoma No family hx of      Macular Degeneration  No family hx of        Social History     Socioeconomic History     Marital status:      Spouse name: Melissa     Number of children: 4     Years of education: 7     Highest education level: Not on file   Occupational History     Occupation: foundry work     Employer: DISABLED     Employer: RETIRED   Tobacco Use     Smoking status: Every Day     Packs/day: 0.50     Years: 45.00     Additional pack years: 0.00     Total pack years: 22.50     Types: Cigarettes     Smokeless tobacco: Never     Tobacco comments:     cut down to 0.5 ppd   Vaping Use     Vaping Use: Never used   Substance and Sexual Activity     Alcohol use: Yes     Alcohol/week: 0.0 - 4.2 standard drinks of alcohol     Drug use: No     Sexual activity: Not Currently     Partners: Female   Other Topics Concern     Parent/sibling w/ CABG, MI or angioplasty before 65F 55M? Not Asked   Social History Narrative     Not on file     Social Determinants of Health     Financial Resource Strain: Low Risk  (1/29/2024)    Financial Resource Strain      Within the past 12 months, have you or your family members you live with been unable to get utilities (heat, electricity) when it was really needed?: No   Food Insecurity: Low Risk  (1/29/2024)    Food Insecurity      Within the past 12 months, did you worry that your food would run out before you got money to buy more?: No      Within the past 12 months, did the food you bought just not last and you didn t have money to get more?: No   Transportation Needs: Low Risk  (1/29/2024)    Transportation Needs      Within the past 12 months, has lack of transportation kept you from medical appointments, getting your medicines, non-medical meetings or appointments, work, or from getting things that you need?: No   Physical Activity: Not on file   Stress: Not on file   Social Connections: Not on file   Interpersonal Safety: Low Risk  (1/29/2024)    Interpersonal Safety      Do you feel physically and emotionally safe  where you currently live?: Yes      Within the past 12 months, have you been hit, slapped, kicked or otherwise physically hurt by someone?: No      Within the past 12 months, have you been humiliated or emotionally abused in other ways by your partner or ex-partner?: No   Housing Stability: Low Risk  (1/29/2024)    Housing Stability      Do you have housing? : Yes      Are you worried about losing your housing?: No       Outpatient Encounter Medications as of 3/6/2024   Medication Sig Dispense Refill     albuterol (PROAIR HFA/PROVENTIL HFA/VENTOLIN HFA) 108 (90 Base) MCG/ACT inhaler Inhale 1-2 puffs into the lungs every 4 hours as needed for shortness of breath / dyspnea 18 g 2     atorvastatin (LIPITOR) 20 MG tablet Take 1 tablet (20 mg) by mouth daily 90 tablet 3     digoxin (LANOXIN) 125 MCG tablet Take 1 tablet (125 mcg) by mouth daily 90 tablet 3     dimenhyDRINATE (DRAMAMINE PO) Take by mouth as needed       empagliflozin (JARDIANCE) 10 MG TABS tablet Take 1 tablet (10 mg) by mouth daily 90 tablet 0     fluticasone (FLONASE) 50 MCG/ACT nasal spray Spray 1 spray into both nostrils daily 16 g 11     fluticasone (FLOVENT DISKUS) 50 MCG/ACT inhaler Inhale 1 puff into the lungs every 12 hours       furosemide (LASIX) 20 MG tablet Take 1 tablet (20 mg) by mouth daily 90 tablet 0     glipiZIDE (GLUCOTROL) 5 MG tablet TAKE 1 TABLET BY MOUTH TWICE DAILY BEFORE MEAL(S) 180 tablet 3     ketoconazole (NIZORAL) 2 % external shampoo Use every 2 days to wash scalp, ears, face, and under arms. Leave on skin for 3-5 minutes before rinsing. 120 mL 11     latanoprost (XALATAN) 0.005 % ophthalmic solution Place 1 drop into the right eye at bedtime       latanoprost (XALATAN) 0.005 % ophthalmic solution Place 1 drop into the right eye daily       lisinopril (ZESTRIL) 40 MG tablet Take 1 tablet (40 mg) by mouth daily 90 tablet 3     metFORMIN (GLUCOPHAGE) 1000 MG tablet TAKE 1 TABLET BY MOUTH 2 TIMES DAILY WITH MEALS 180 tablet 0      metoprolol tartrate (LOPRESSOR) 100 MG tablet Take 2 tablets (200 mg) by mouth 2 times daily 360 tablet 3     tiotropium (SPIRIVA HANDIHALER) 18 MCG inhaled capsule INHALE 1 CAPSULE (18 MCG) INTO THE LUNGS DAILY 90 capsule 3     warfarin ANTICOAGULANT (COUMADIN) 2.5 MG tablet TAKE 2 TABLETS (5MG) BY MOUTH ON TUESDAY. TAKE 3 TABLETS (7.5MG) ON ALL OTHER DAYS OR AS DIRECTED BY INR CLINIC. 240 tablet 1     No facility-administered encounter medications on file as of 3/6/2024.             O:   NAD, WDWN, Alert & Oriented, Mood & Affect wnl, Vitals stable   General appearance normal   Vitals stable   Alert, oriented and in no acute distress        Stuck on papules and brown macules on trunk and ext   Red papules on trunk  Flesh colored papules on trunk     The remainder of the full exam was normal; the following areas were examined:  conjunctiva/lids, , neck, peripheral vascular system, abdomen, lymph nodes, digits/nails, eccrine and apocrine glands, scalp/hair, face, neck, chest, abdomen, buttocks, back, RUE, LUE, RLE, LLE       Eyes: Conjunctivae/lids:Normal     ENT: Lips, buccal mucosa, tongue: normal    MSK:Normal    Cardiovascular: peripheral edema none    Pulm: Breathing Normal    Lymph Nodes: No Head and Neck Lymphadenopathy     Neuro/Psych: Orientation:Alert and Orientedx3 ; Mood/Affect:normal       A/P:  1. Seborrheic keratosis, lentigo, angioma, dermal nevus, hx of non-melanoma skin cancer   It was a pleasure speaking to Keith Desir today.  Previous clinic notes and pertinent laboratory tests were reviewed prior to Keith Desir's visit.  Signs and Symptoms of skin cancer discussed with patient.  Patient encouraged to perform monthly skin exams.  UV precautions reviewed with patient.  Risks of non-melanoma skin cancer discussed with patient   Return to clinic 12 months      Again, thank you for allowing me to participate in the care of your patient.        Sincerely,        Heriberto Preciado,  MD

## 2024-03-06 NOTE — PROGRESS NOTES
Keith Desir is an extremely pleasant 78 year old year old male patient here today for hx of non-melanoma skin cancer.  Patient has no other skin complaints today.  Remainder of the HPI, Meds, PMH, Allergies, FH, and SH was reviewed in chart.      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/2013    Hepatitis C     HTN (hypertension)     Hyperlipidemia     Morbid obesity (H)     NAFLD (nonalcoholic fatty liver disease)     Polycythemia secondary to smoking     Primary open angle glaucoma of left eye, mild stage 12/06/2023    Dr Riedel    Primary open-angle glaucoma, right eye, severe stage 12/06/2023    [facility] , Dr Patrick Riedel    Squamous cell carcinoma of skin, unspecified     Umbilical hernia without obstruction and without gangrene     Varicose veins of legs 08/08/2018       Past Surgical History:   Procedure Laterality Date    ANGIOGRAM  08/2006    normal    LUMBAR DISC SURGERY  11/11/2021    TONSILLECTOMY & ADENOIDECTOMY          Family History   Problem Relation Age of Onset    Diabetes Mother     Cerebrovascular Disease Father     Heart Disease Brother         pacer     Deep Vein Thrombosis Son     Hodgkin's lymphoma Son     Cancer No family hx of     Glaucoma No family hx of     Macular Degeneration No family hx of        Social History     Socioeconomic History    Marital status:      Spouse name: Melissa    Number of children: 4    Years of education: 7    Highest education level: Not on file   Occupational History    Occupation: foundry work     Employer: DISABLED      Employer: RETIRED   Tobacco Use    Smoking status: Every Day     Packs/day: 0.50     Years: 45.00     Additional pack years: 0.00     Total pack years: 22.50     Types: Cigarettes    Smokeless tobacco: Never    Tobacco comments:     cut down to 0.5 ppd   Vaping Use    Vaping Use: Never used   Substance and Sexual Activity    Alcohol use: Yes     Alcohol/week: 0.0 - 4.2 standard drinks of alcohol    Drug use: No    Sexual activity: Not Currently     Partners: Female   Other Topics Concern    Parent/sibling w/ CABG, MI or angioplasty before 65F 55M? Not Asked   Social History Narrative    Not on file     Social Determinants of Health     Financial Resource Strain: Low Risk  (1/29/2024)    Financial Resource Strain     Within the past 12 months, have you or your family members you live with been unable to get utilities (heat, electricity) when it was really needed?: No   Food Insecurity: Low Risk  (1/29/2024)    Food Insecurity     Within the past 12 months, did you worry that your food would run out before you got money to buy more?: No     Within the past 12 months, did the food you bought just not last and you didn t have money to get more?: No   Transportation Needs: Low Risk  (1/29/2024)    Transportation Needs     Within the past 12 months, has lack of transportation kept you from medical appointments, getting your medicines, non-medical meetings or appointments, work, or from getting things that you need?: No   Physical Activity: Not on file   Stress: Not on file   Social Connections: Not on file   Interpersonal Safety: Low Risk  (1/29/2024)    Interpersonal Safety     Do you feel physically and emotionally safe where you currently live?: Yes     Within the past 12 months, have you been hit, slapped, kicked or otherwise physically hurt by someone?: No     Within the past 12 months, have you been humiliated or emotionally abused in other ways by your partner or ex-partner?: No   Housing Stability: Low Risk   (1/29/2024)    Housing Stability     Do you have housing? : Yes     Are you worried about losing your housing?: No       Outpatient Encounter Medications as of 3/6/2024   Medication Sig Dispense Refill    albuterol (PROAIR HFA/PROVENTIL HFA/VENTOLIN HFA) 108 (90 Base) MCG/ACT inhaler Inhale 1-2 puffs into the lungs every 4 hours as needed for shortness of breath / dyspnea 18 g 2    atorvastatin (LIPITOR) 20 MG tablet Take 1 tablet (20 mg) by mouth daily 90 tablet 3    digoxin (LANOXIN) 125 MCG tablet Take 1 tablet (125 mcg) by mouth daily 90 tablet 3    dimenhyDRINATE (DRAMAMINE PO) Take by mouth as needed      empagliflozin (JARDIANCE) 10 MG TABS tablet Take 1 tablet (10 mg) by mouth daily 90 tablet 0    fluticasone (FLONASE) 50 MCG/ACT nasal spray Spray 1 spray into both nostrils daily 16 g 11    fluticasone (FLOVENT DISKUS) 50 MCG/ACT inhaler Inhale 1 puff into the lungs every 12 hours      furosemide (LASIX) 20 MG tablet Take 1 tablet (20 mg) by mouth daily 90 tablet 0    glipiZIDE (GLUCOTROL) 5 MG tablet TAKE 1 TABLET BY MOUTH TWICE DAILY BEFORE MEAL(S) 180 tablet 3    ketoconazole (NIZORAL) 2 % external shampoo Use every 2 days to wash scalp, ears, face, and under arms. Leave on skin for 3-5 minutes before rinsing. 120 mL 11    latanoprost (XALATAN) 0.005 % ophthalmic solution Place 1 drop into the right eye at bedtime      latanoprost (XALATAN) 0.005 % ophthalmic solution Place 1 drop into the right eye daily      lisinopril (ZESTRIL) 40 MG tablet Take 1 tablet (40 mg) by mouth daily 90 tablet 3    metFORMIN (GLUCOPHAGE) 1000 MG tablet TAKE 1 TABLET BY MOUTH 2 TIMES DAILY WITH MEALS 180 tablet 0    metoprolol tartrate (LOPRESSOR) 100 MG tablet Take 2 tablets (200 mg) by mouth 2 times daily 360 tablet 3    tiotropium (SPIRIVA HANDIHALER) 18 MCG inhaled capsule INHALE 1 CAPSULE (18 MCG) INTO THE LUNGS DAILY 90 capsule 3    warfarin ANTICOAGULANT (COUMADIN) 2.5 MG tablet TAKE 2 TABLETS (5MG) BY MOUTH ON TUESDAY.  TAKE 3 TABLETS (7.5MG) ON ALL OTHER DAYS OR AS DIRECTED BY INR CLINIC. 240 tablet 1     No facility-administered encounter medications on file as of 3/6/2024.             O:   NAD, WDWN, Alert & Oriented, Mood & Affect wnl, Vitals stable   General appearance normal   Vitals stable   Alert, oriented and in no acute distress        Stuck on papules and brown macules on trunk and ext   Red papules on trunk  Flesh colored papules on trunk     The remainder of the full exam was normal; the following areas were examined:  conjunctiva/lids, , neck, peripheral vascular system, abdomen, lymph nodes, digits/nails, eccrine and apocrine glands, scalp/hair, face, neck, chest, abdomen, buttocks, back, RUE, LUE, RLE, LLE       Eyes: Conjunctivae/lids:Normal     ENT: Lips, buccal mucosa, tongue: normal    MSK:Normal    Cardiovascular: peripheral edema none    Pulm: Breathing Normal    Lymph Nodes: No Head and Neck Lymphadenopathy     Neuro/Psych: Orientation:Alert and Orientedx3 ; Mood/Affect:normal       A/P:  1. Seborrheic keratosis, lentigo, angioma, dermal nevus, hx of non-melanoma skin cancer   It was a pleasure speaking to Keith Desir today.  Previous clinic notes and pertinent laboratory tests were reviewed prior to Keith Desir's visit.  Signs and Symptoms of skin cancer discussed with patient.  Patient encouraged to perform monthly skin exams.  UV precautions reviewed with patient.  Risks of non-melanoma skin cancer discussed with patient   Return to clinic 12 months

## 2024-03-06 NOTE — TELEPHONE ENCOUNTER
Pharmacy called regarding Warfarin.  They are wondering if patient is on the 2.5.mg tablets.  Advised yes, and they are managed by our INR clinic for dosing.    Ade ROLLINS RN  Triage Nurse  [facility]

## 2024-03-20 ENCOUNTER — ANTICOAGULATION THERAPY VISIT (OUTPATIENT)
Dept: ANTICOAGULATION | Facility: CLINIC | Age: 79
End: 2024-03-20

## 2024-03-20 ENCOUNTER — LAB (OUTPATIENT)
Dept: LAB | Facility: CLINIC | Age: 79
End: 2024-03-20
Payer: MEDICARE

## 2024-03-20 DIAGNOSIS — I48.20 CHRONIC ATRIAL FIBRILLATION (H): ICD-10-CM

## 2024-03-20 DIAGNOSIS — Z79.01 LONG TERM CURRENT USE OF ANTICOAGULANT THERAPY: Primary | ICD-10-CM

## 2024-03-20 DIAGNOSIS — Z79.01 LONG TERM CURRENT USE OF ANTICOAGULANT THERAPY: ICD-10-CM

## 2024-03-20 LAB — INR BLD: 1.8 (ref 0.9–1.1)

## 2024-03-20 PROCEDURE — 85610 PROTHROMBIN TIME: CPT

## 2024-03-20 PROCEDURE — 36416 COLLJ CAPILLARY BLOOD SPEC: CPT

## 2024-03-20 NOTE — PROGRESS NOTES
ANTICOAGULATION MANAGEMENT     Keith Desir 78 year old male is on warfarin with subtherapeutic INR result. (Goal INR 2.0-3.0)    Recent labs: (last 7 days)     03/20/24  0918   INR 1.8*       ASSESSMENT     Warfarin Lab Questionnaire    Warfarin Doses Last 7 Days      3/19/2024    10:34 AM   Dose in Tablet or Mg   TAB or MG? milligram (mg)     Pt Rptd Dose GRISELDA MONDAY TUESDAY WED THURS FRIDAY SATURDAY   3/19/2024  10:34 AM 7.5 7.5 5 7.5 7.5 7.5 7.5         3/19/2024   Warfarin Lab Questionnaire   Missed doses within past 14 days? No   Changes in diet or alcohol within past 14 days? No   Medication changes since last result? No   Injuries or illness since last result? No   New shortness of breath, severe headaches or sudden changes in vision since last result? No   Abnormal bleeding since last result? No   Upcoming surgery, procedure? No     Previous result: Therapeutic last visit; previously outside of goal range  Additional findings: pt has been consistently running on the low end of normal or subtherapeutic x months.  Will try slight increase as today's INR is subtherapeutic again.        PLAN     Recommended plan for no diet, medication or health factor changes affecting INR     Dosing Instructions: Increase your warfarin dose (5% change) with next INR in 1-2 weeks.  Recommended to recheck in 1-2 weeks and wife states patient is not willing to come in for another INR until next month.     Summary  As of 3/20/2024      Full warfarin instructions:  7.5 mg every day   Next INR check:  4/17/2024               Telephone call with patients wife, Melissa who verbalizes understanding and agrees to plan    Lab visit scheduled    Education provided:   Please call back if any changes to your diet, medications or how you've been taking warfarin  Goal range and lab monitoring: goal range and significance of current result, Importance of therapeutic range, and Importance of following up at instructed interval    Plan made  per [facility] anticoagulation protocol    Crystal Su RN  Anticoagulation Clinic  3/20/2024    _______________________________________________________________________     Anticoagulation Episode Summary       Current INR goal:  2.0-3.0   TTR:  61.3% (1 y)   Target end date:  Indefinite   Send INR reminders to:  AJ PALUMBO    Indications    Long term current use of anticoagulant therapy [Z79.01]  Chronic atrial fibrillation (HCC) [I48.20]             Comments:               Anticoagulation Care Providers       Provider Role Specialty Phone number    Cayla Gregorio MD Referring Family Medicine [phone]

## 2024-03-26 ENCOUNTER — OFFICE VISIT (OUTPATIENT)
Dept: FAMILY MEDICINE | Facility: CLINIC | Age: 79
End: 2024-03-26
Payer: MEDICARE

## 2024-03-26 VITALS
DIASTOLIC BLOOD PRESSURE: 68 MMHG | SYSTOLIC BLOOD PRESSURE: 126 MMHG | OXYGEN SATURATION: 96 % | WEIGHT: 208 LBS | HEART RATE: 67 BPM | BODY MASS INDEX: 31.52 KG/M2 | HEIGHT: 68 IN | TEMPERATURE: 98.1 F | RESPIRATION RATE: 24 BRPM

## 2024-03-26 DIAGNOSIS — I48.20 CHRONIC ATRIAL FIBRILLATION (H): ICD-10-CM

## 2024-03-26 DIAGNOSIS — L21.9 SEBORRHEIC DERMATITIS: ICD-10-CM

## 2024-03-26 DIAGNOSIS — J43.9 PULMONARY EMPHYSEMA, UNSPECIFIED EMPHYSEMA TYPE (H): Chronic | ICD-10-CM

## 2024-03-26 DIAGNOSIS — E11.42 DIABETIC POLYNEUROPATHY ASSOCIATED WITH TYPE 2 DIABETES MELLITUS (H): ICD-10-CM

## 2024-03-26 DIAGNOSIS — I12.9 RENAL HYPERTENSION: ICD-10-CM

## 2024-03-26 DIAGNOSIS — E78.5 HYPERLIPIDEMIA LDL GOAL <70: ICD-10-CM

## 2024-03-26 DIAGNOSIS — I50.32 CHRONIC HEART FAILURE WITH PRESERVED EJECTION FRACTION (H): ICD-10-CM

## 2024-03-26 DIAGNOSIS — E11.22 TYPE 2 DIABETES MELLITUS WITH STAGE 2 CHRONIC KIDNEY DISEASE, WITHOUT LONG-TERM CURRENT USE OF INSULIN (H): Primary | ICD-10-CM

## 2024-03-26 DIAGNOSIS — N18.2 TYPE 2 DIABETES MELLITUS WITH STAGE 2 CHRONIC KIDNEY DISEASE, WITHOUT LONG-TERM CURRENT USE OF INSULIN (H): Primary | ICD-10-CM

## 2024-03-26 PROBLEM — Z86.0100 HISTORY OF COLONIC POLYPS: Status: RESOLVED | Noted: 2023-01-09 | Resolved: 2024-03-26

## 2024-03-26 PROBLEM — H90.3 SENSORINEURAL HEARING LOSS, BILATERAL: Status: ACTIVE | Noted: 2024-03-26

## 2024-03-26 LAB
ANION GAP SERPL CALCULATED.3IONS-SCNC: 10 MMOL/L (ref 7–15)
BUN SERPL-MCNC: 19.4 MG/DL (ref 8–23)
CALCIUM SERPL-MCNC: 9.7 MG/DL (ref 8.8–10.2)
CHLORIDE SERPL-SCNC: 102 MMOL/L (ref 98–107)
CREAT SERPL-MCNC: 0.94 MG/DL (ref 0.67–1.17)
DEPRECATED HCO3 PLAS-SCNC: 29 MMOL/L (ref 22–29)
EGFRCR SERPLBLD CKD-EPI 2021: 84 ML/MIN/1.73M2
GLUCOSE SERPL-MCNC: 163 MG/DL (ref 70–99)
POTASSIUM SERPL-SCNC: 5.2 MMOL/L (ref 3.4–5.3)
SODIUM SERPL-SCNC: 141 MMOL/L (ref 135–145)

## 2024-03-26 PROCEDURE — 80048 BASIC METABOLIC PNL TOTAL CA: CPT | Performed by: FAMILY MEDICINE

## 2024-03-26 PROCEDURE — G2211 COMPLEX E/M VISIT ADD ON: HCPCS | Performed by: FAMILY MEDICINE

## 2024-03-26 PROCEDURE — 36415 COLL VENOUS BLD VENIPUNCTURE: CPT | Performed by: FAMILY MEDICINE

## 2024-03-26 PROCEDURE — 99214 OFFICE O/P EST MOD 30 MIN: CPT | Performed by: FAMILY MEDICINE

## 2024-03-26 RX ORDER — ATORVASTATIN CALCIUM 20 MG/1
20 TABLET, FILM COATED ORAL DAILY
Qty: 90 TABLET | Refills: 3 | Status: SHIPPED | OUTPATIENT
Start: 2024-03-26

## 2024-03-26 RX ORDER — METOPROLOL TARTRATE 100 MG
200 TABLET ORAL 2 TIMES DAILY
Qty: 360 TABLET | Refills: 3 | Status: SHIPPED | OUTPATIENT
Start: 2024-03-26

## 2024-03-26 RX ORDER — KETOCONAZOLE 20 MG/ML
SHAMPOO TOPICAL
Qty: 120 ML | Refills: 11 | Status: SHIPPED | OUTPATIENT
Start: 2024-03-26

## 2024-03-26 RX ORDER — FUROSEMIDE 20 MG
20 TABLET ORAL DAILY
Qty: 90 TABLET | Refills: 3 | Status: SHIPPED | OUTPATIENT
Start: 2024-03-26

## 2024-03-26 RX ORDER — GLIPIZIDE 5 MG/1
TABLET ORAL
Qty: 180 TABLET | Refills: 3 | Status: SHIPPED | OUTPATIENT
Start: 2024-03-26

## 2024-03-26 RX ORDER — LISINOPRIL 40 MG/1
40 TABLET ORAL DAILY
Qty: 90 TABLET | Refills: 3 | Status: SHIPPED | OUTPATIENT
Start: 2024-03-26

## 2024-03-26 NOTE — PROGRESS NOTES
Assessment & Plan     Type 2 diabetes mellitus with stage 2 chronic kidney disease, without long-term current use of insulin (H)  Diabetic polyneuropathy associated with type 2 diabetes mellitus (H)  Well controlled with medications without side effects. Follow-up q 6 months   - Albumin Random Urine Quantitative with Creat Ratio; Future  - BASIC METABOLIC PANEL; Future  - empagliflozin (JARDIANCE) 10 MG TABS tablet; Take 1 tablet (10 mg) by mouth daily  - glipiZIDE (GLUCOTROL) 5 MG tablet; TAKE 1 TABLET BY MOUTH TWICE DAILY BEFORE MEAL(S)  - metFORMIN (GLUCOPHAGE) 1000 MG tablet; TAKE 1 TABLET BY MOUTH 2 TIMES DAILY WITH MEALS    Chronic heart failure with preserved ejection fraction (H)  euvolemic, on beta blocker and ACE/ARB   - furosemide (LASIX) 20 MG tablet; Take 1 tablet (20 mg) by mouth daily  - lisinopril (ZESTRIL) 40 MG tablet; Take 1 tablet (40 mg) by mouth daily  - metoprolol tartrate (LOPRESSOR) 100 MG tablet; Take 2 tablets (200 mg) by mouth 2 times daily    Chronic atrial fibrillation (H)  rate controlled and anticoagulated   - metoprolol tartrate (LOPRESSOR) 100 MG tablet; Take 2 tablets (200 mg) by mouth 2 times daily    Renal hypertension  Well controlled with medications without side effects.   - lisinopril (ZESTRIL) 40 MG tablet; Take 1 tablet (40 mg) by mouth daily  - metoprolol tartrate (LOPRESSOR) 100 MG tablet; Take 2 tablets (200 mg) by mouth 2 times daily    Hyperlipidemia LDL goal <70  Well controlled with medications without side effects.   - atorvastatin (LIPITOR) 20 MG tablet; Take 1 tablet (20 mg) by mouth daily    Pulmonary emphysema, unspecified emphysema type (H)  Well controlled with medications without side effects.     Seborrheic dermatitis  Well controlled with medications without side effects.   - ketoconazole (NIZORAL) 2 % external shampoo; Use every 2 days to wash scalp, ears, face, and under arms. Leave on skin for 3-5 minutes before rinsing.    BMI  Estimated body mass  "index is 31.89 kg/m  as calculated from the following:    Height as of this encounter: 1.72 m (5' 7.72\").    Weight as of this encounter: 94.3 kg (208 lb).   Weight management plan: Discussed healthy diet and exercise guidelines      The longitudinal plan of care for the diagnosis(es)/condition(s) as documented were addressed during this visit. Due to the added complexity in care, I will continue to support Ed in the subsequent management and with ongoing continuity of care.     See Patient Instructions    Subjective   Ed is a 78 year old, presenting for the following health issues:  Diabetes        3/26/2024    10:42 AM   Additional Questions   Roomed by Vibha BERTRAND CMA   Accompanied by Wife - Melissa     History of Present Illness       Diabetes:   He presents for follow up of diabetes.    He is not checking blood glucose.         He has no concerns regarding his diabetes at this time.  He is having numbness in feet and burning in feet.  The patient has had a diabetic eye exam in the last 12 months.          Hyperlipidemia:  He presents for follow up of hyperlipidemia.   He is taking medication to lower cholesterol. He is not having myalgia or other side effects to statin medications.    Hypertension: He presents for follow up of hypertension.  He does not check blood pressure  regularly outside of the clinic.  He follows a low salt diet.     Reason for visit:  Diabetes check up    He eats 0-1 servings of fruits and vegetables daily.He consumes 2 sweetened beverage(s) daily.He exercises with enough effort to increase his heart rate 9 or less minutes per day.  He exercises with enough effort to increase his heart rate 3 or less days per week.   He is taking medications regularly.                     Objective    /68 (BP Location: Right arm, Patient Position: Sitting, Cuff Size: Adult Large)   Pulse 67   Temp 98.1  F (36.7  C) (Oral)   Resp 24   Ht 1.72 m (5' 7.72\")   Wt 94.3 kg (208 lb)   SpO2 96%   BMI " 31.89 kg/m    Body mass index is 31.89 kg/m .  Physical Exam   GENERAL: alert, no distress, obese, and elderly  NECK: no adenopathy, no asymmetry, masses, or scars  RESP: decreased breath sounds throughout  CV: irregularly irregular rhythm, normal S1 S2, no S3 or S4, no murmur, click or rub, and trace bipedal edema   MS: no gross musculoskeletal defects noted   NEURO: Normal strength and tone, mentation intact and speech normal  PSYCH: mentation appears normal, affect normal/bright  Diabetic foot exam: no trophic changes or ulcerative lesions, reduced sensation at all monofilament points, dry cracking heels, and onychomycosis    [unfilled]  [unfilled]        Signed Electronically by: Cayla Gregorio MD

## 2024-04-16 PROCEDURE — 82043 UR ALBUMIN QUANTITATIVE: CPT

## 2024-04-16 PROCEDURE — 82570 ASSAY OF URINE CREATININE: CPT

## 2024-04-17 ENCOUNTER — LAB (OUTPATIENT)
Dept: LAB | Facility: CLINIC | Age: 79
End: 2024-04-17
Payer: MEDICARE

## 2024-04-17 ENCOUNTER — ANTICOAGULATION THERAPY VISIT (OUTPATIENT)
Dept: ANTICOAGULATION | Facility: CLINIC | Age: 79
End: 2024-04-17

## 2024-04-17 DIAGNOSIS — I48.20 CHRONIC ATRIAL FIBRILLATION (H): ICD-10-CM

## 2024-04-17 DIAGNOSIS — Z79.01 LONG TERM CURRENT USE OF ANTICOAGULANT THERAPY: Primary | ICD-10-CM

## 2024-04-17 DIAGNOSIS — Z79.01 LONG TERM CURRENT USE OF ANTICOAGULANT THERAPY: ICD-10-CM

## 2024-04-17 DIAGNOSIS — N18.2 TYPE 2 DIABETES MELLITUS WITH STAGE 2 CHRONIC KIDNEY DISEASE, WITHOUT LONG-TERM CURRENT USE OF INSULIN (H): Primary | ICD-10-CM

## 2024-04-17 DIAGNOSIS — E11.22 TYPE 2 DIABETES MELLITUS WITH STAGE 2 CHRONIC KIDNEY DISEASE, WITHOUT LONG-TERM CURRENT USE OF INSULIN (H): Primary | ICD-10-CM

## 2024-04-17 LAB
HBA1C MFR BLD: 6.4 % (ref 0–5.6)
INR BLD: 2.4 (ref 0.9–1.1)

## 2024-04-17 PROCEDURE — 83036 HEMOGLOBIN GLYCOSYLATED A1C: CPT

## 2024-04-17 PROCEDURE — 36416 COLLJ CAPILLARY BLOOD SPEC: CPT

## 2024-04-17 PROCEDURE — 36415 COLL VENOUS BLD VENIPUNCTURE: CPT

## 2024-04-17 PROCEDURE — 85610 PROTHROMBIN TIME: CPT

## 2024-04-17 NOTE — PROGRESS NOTES
ANTICOAGULATION MANAGEMENT     Keith Desir 78 year old male is on warfarin with therapeutic INR result. (Goal INR 2.0-3.0)    Recent labs: (last 7 days)     04/17/24  1020   INR 2.4*       ASSESSMENT     Warfarin Lab Questionnaire    Warfarin Doses Last 7 Days      4/16/2024    11:02 AM   Dose in Tablet or Mg   TAB or MG? tablet (tab)     Pt Rptd Dose SUNDAY MONDAY TUESDAY WED THURS FRIDAY SATURDAY 4/16/2024  11:02 AM 3 3 3 3 3 3 3         4/16/2024   Warfarin Lab Questionnaire   Missed doses within past 14 days? No   Changes in diet or alcohol within past 14 days? No   Medication changes since last result? No   Injuries or illness since last result? No   New shortness of breath, severe headaches or sudden changes in vision since last result? No   Abnormal bleeding since last result? No   Upcoming surgery, procedure? No   Best number to call with results? [phone]     Previous result: Subtherapeutic  Additional findings: None       PLAN     Recommended plan for no diet, medication or health factor changes affecting INR     Dosing Instructions: Continue your current warfarin dose with next INR in 4 weeks       Summary  As of 4/17/2024      Full warfarin instructions:  7.5 mg every day   Next INR check:  5/22/2024               Telephone call with Melissa who verbalizes understanding and agrees to plan    Patient elected to schedule next visit in 5 weeks    Education provided:   Goal range and lab monitoring: goal range and significance of current result    Plan made per [facility] anticoagulation protocol    Ban Matamoros RN  Anticoagulation Clinic  4/17/2024    _______________________________________________________________________     Anticoagulation Episode Summary       Current INR goal:  2.0-3.0   TTR:  60.3% (1 y)   Target end date:  Indefinite   Send INR reminders to:  AJ PALUMBO    Indications    Long term current use of anticoagulant therapy [Z79.01]  Chronic atrial fibrillation (HCC) [I48.20]              Comments:               Anticoagulation Care Providers       Provider Role Specialty Phone number    Cayla Gregorio MD Referring Family Medicine [phone]

## 2024-04-18 LAB
CREAT UR-MCNC: 44 MG/DL
MICROALBUMIN UR-MCNC: <12 MG/L
MICROALBUMIN/CREAT UR: NORMAL MG/G{CREAT}

## 2024-05-01 ENCOUNTER — TRANSFERRED RECORDS (OUTPATIENT)
Dept: HEALTH INFORMATION MANAGEMENT | Facility: CLINIC | Age: 79
End: 2024-05-01
Payer: MEDICARE

## 2024-05-22 ENCOUNTER — ANTICOAGULATION THERAPY VISIT (OUTPATIENT)
Dept: ANTICOAGULATION | Facility: CLINIC | Age: 79
End: 2024-05-22

## 2024-05-22 ENCOUNTER — LAB (OUTPATIENT)
Dept: LAB | Facility: CLINIC | Age: 79
End: 2024-05-22
Payer: MEDICARE

## 2024-05-22 DIAGNOSIS — I48.20 CHRONIC ATRIAL FIBRILLATION (H): ICD-10-CM

## 2024-05-22 DIAGNOSIS — Z79.01 LONG TERM CURRENT USE OF ANTICOAGULANT THERAPY: Primary | ICD-10-CM

## 2024-05-22 DIAGNOSIS — Z79.01 LONG TERM CURRENT USE OF ANTICOAGULANT THERAPY: ICD-10-CM

## 2024-05-22 LAB — INR BLD: 2.8 (ref 0.9–1.1)

## 2024-05-22 PROCEDURE — 85610 PROTHROMBIN TIME: CPT

## 2024-05-22 PROCEDURE — 36416 COLLJ CAPILLARY BLOOD SPEC: CPT

## 2024-05-22 NOTE — PROGRESS NOTES
ANTICOAGULATION MANAGEMENT     Keith Desir 78 year old male is on warfarin with therapeutic INR result. (Goal INR 2.0-3.0)    Recent labs: (last 7 days)     05/22/24  0930   INR 2.8*       ASSESSMENT     Warfarin Lab Questionnaire    Warfarin Doses Last 7 Days      5/21/2024    10:53 AM   Dose in Tablet or Mg   TAB or MG? milligram (mg)     Pt Rptd Dose SUNDAY MONDAY TUESDAY WED THURS FRIDAY SATURDAY 5/21/2024  10:53 AM 7.5 7.5 7.5 7.5 7.5 7.5 7.5         5/21/2024   Warfarin Lab Questionnaire   Missed doses within past 14 days? No   Changes in diet or alcohol within past 14 days? No   Medication changes since last result? No   Injuries or illness since last result? No   New shortness of breath, severe headaches or sudden changes in vision since last result? No   Abnormal bleeding since last result? No   Upcoming surgery, procedure? No   Best number to call with results? [phone]     Previous result: Therapeutic last 2(+) visits  Additional findings: None       PLAN     Recommended plan for no diet, medication or health factor changes affecting INR     Dosing Instructions: Continue your current warfarin dose with next INR in 6 weeks       Summary  As of 5/22/2024      Full warfarin instructions:  7.5 mg every day   Next INR check:  7/3/2024               Telephone call with Ed who verbalizes understanding and agrees to plan    Lab visit scheduled    Education provided:   Goal range and lab monitoring: goal range and significance of current result    Plan made per [facility] anticoagulation protocol    Ban Matamoros RN  Anticoagulation Clinic  5/22/2024    _______________________________________________________________________     Anticoagulation Episode Summary       Current INR goal:  2.0-3.0   TTR:  67.8% (1 y)   Target end date:  Indefinite   Send INR reminders to:  AJ PALUMBO    Indications    Long term current use of anticoagulant therapy [Z79.01]  Chronic atrial fibrillation (HCC) [I48.20]              Comments:               Anticoagulation Care Providers       Provider Role Specialty Phone number    Cayla Gregorio MD Referring Family Medicine [phone]

## 2024-06-23 NOTE — PROGRESS NOTES
Chief Complaint   Patient presents with     Diabetic Eye Exam        Chief Complaint(s) and History of Present Illness(es)     Diabetic Eye Exam            Diabetes Type: Type 2 and taking oral medications               Lab Results   Component Value Date    A1C 6.4 03/07/2023    A1C 6.9 09/06/2022    A1C 7.4 03/09/2022    A1C 7.0 09/07/2021    A1C 6.7 03/02/2021    A1C 6.1 11/17/2020    A1C 7.5 09/02/2020    A1C 6.9 02/26/2020    A1C 7.4 07/22/2019            Last Eye Exam: March 2022  Dilated Previously: Yes, side effects of dilation explained today    What are you currently using to see?  Bifocals  - he wears glasses when reading the newspaper or menus.  Otherwise he doesn't wear them full time. Glasses are not present today.     Pt uses old glasses from 2019 & he is in a lined bifocal. He could not adjust to progressive.     Distance Vision Acuity: Noticed gradual change in right eye    Near Vision Acuity: Satisfied with vision while reading  with glasses    Eye Comfort: good  Do you use eye drops? : No  Occupation or Hobbies: retired    Jewels Gaona, OA      Medical, surgical and family histories reviewed and updated 3/29/2023.       OBJECTIVE: See Ophthalmology exam    ASSESSMENT:    ICD-10-CM    1. Nuclear sclerotic cataract of both eyes  H25.13 EYE EXAM (SIMPLE-NONBILLABLE)     REFRACTION      2. Type 2 diabetes mellitus with stage 2 chronic kidney disease, without long-term current use of insulin (H)  E11.22 Adult Eye  Referral    N18.2 EYE EXAM (SIMPLE-NONBILLABLE)     REFRACTION    no diabetic retinopathy found at eye exam       3. Regular astigmatism of left eye  H52.222 EYE EXAM (SIMPLE-NONBILLABLE)     REFRACTION      4. Presbyopia  H52.4 EYE EXAM (SIMPLE-NONBILLABLE)     REFRACTION          PLAN:    Keith Desir aware  eye exam results will be sent to Cayla Gregorio.  Patient Instructions   Fill glasses prescription for reading glasses   Allow 2 weeks to adapt to change in  glasses  Mild cataracts in both eyes- need to monitor   Return in 1 year for eye exam    Kristy Rosales O.D.  [facility]  [address] [place] [address] [phone]           Imaging Studies/Medications

## 2024-07-03 ENCOUNTER — LAB (OUTPATIENT)
Dept: LAB | Facility: CLINIC | Age: 79
End: 2024-07-03
Payer: MEDICARE

## 2024-07-03 ENCOUNTER — ANTICOAGULATION THERAPY VISIT (OUTPATIENT)
Dept: ANTICOAGULATION | Facility: CLINIC | Age: 79
End: 2024-07-03

## 2024-07-03 DIAGNOSIS — I48.20 CHRONIC ATRIAL FIBRILLATION (H): ICD-10-CM

## 2024-07-03 DIAGNOSIS — Z79.01 LONG TERM CURRENT USE OF ANTICOAGULANT THERAPY: Primary | ICD-10-CM

## 2024-07-03 DIAGNOSIS — Z79.01 LONG TERM CURRENT USE OF ANTICOAGULANT THERAPY: ICD-10-CM

## 2024-07-03 LAB — INR BLD: 2.3 (ref 0.9–1.1)

## 2024-07-03 PROCEDURE — 85610 PROTHROMBIN TIME: CPT

## 2024-07-03 PROCEDURE — 36416 COLLJ CAPILLARY BLOOD SPEC: CPT

## 2024-07-03 NOTE — PROGRESS NOTES
ANTICOAGULATION MANAGEMENT     Keith Desir 78 year old male is on warfarin with therapeutic INR result. (Goal INR 2.0-3.0)    Recent labs: (last 7 days)     07/03/24  0936   INR 2.3*       ASSESSMENT     Warfarin Lab Questionnaire    Warfarin Doses Last 7 Days      7/2/2024    10:47 AM   Dose in Tablet or Mg   TAB or MG? milligram (mg)     Pt Rptd Dose SUNDAY MONDAY TUESDAY WED THURS FRIDAY SATURDAY 7/2/2024  10:47 AM 7.5 7.5 7.5 7.5 7.5 7.5 7.5         7/2/2024   Warfarin Lab Questionnaire   Missed doses within past 14 days? No   Changes in diet or alcohol within past 14 days? No   Medication changes since last result? No   Injuries or illness since last result? No   New shortness of breath, severe headaches or sudden changes in vision since last result? No   Abnormal bleeding since last result? No   Upcoming surgery, procedure? No        Previous result: Therapeutic last 2(+) visits  Additional findings: None       PLAN     Recommended plan for no diet, medication or health factor changes affecting INR     Dosing Instructions: Continue your current warfarin dose with next INR in 6 weeks       Summary  As of 7/3/2024      Full warfarin instructions:  7.5 mg every day   Next INR check:  8/14/2024               Telephone call with Melissa who verbalizes understanding and agrees to plan    Lab visit scheduled    Education provided: Please call back if any changes to your diet, medications or how you've been taking warfarin    Plan made per [facility] anticoagulation protocol    Jeri Solis RN  Anticoagulation Clinic  7/3/2024    _______________________________________________________________________     Anticoagulation Episode Summary       Current INR goal:  2.0-3.0   TTR:  67.8% (1 y)   Target end date:  Indefinite   Send INR reminders to:  AJ PALUMBO    Indications    Long term current use of anticoagulant therapy [Z79.01]  Chronic atrial fibrillation (HCC) [I48.20]             Comments:                Anticoagulation Care Providers       Provider Role Specialty Phone number    Cayla Gregorio MD Referring Family Medicine [phone]

## 2024-08-10 ENCOUNTER — HEALTH MAINTENANCE LETTER (OUTPATIENT)
Age: 79
End: 2024-08-10

## 2024-08-10 DIAGNOSIS — I48.20 CHRONIC ATRIAL FIBRILLATION (H): ICD-10-CM

## 2024-08-12 ENCOUNTER — DOCUMENTATION ONLY (OUTPATIENT)
Dept: ANTICOAGULATION | Facility: CLINIC | Age: 79
End: 2024-08-12
Payer: MEDICARE

## 2024-08-12 DIAGNOSIS — Z79.01 LONG TERM CURRENT USE OF ANTICOAGULANT THERAPY: ICD-10-CM

## 2024-08-12 DIAGNOSIS — I48.20 CHRONIC ATRIAL FIBRILLATION (H): Primary | ICD-10-CM

## 2024-08-12 RX ORDER — WARFARIN SODIUM 2.5 MG/1
TABLET ORAL
Qty: 270 TABLET | Refills: 1 | Status: SHIPPED | OUTPATIENT
Start: 2024-08-12

## 2024-08-12 NOTE — TELEPHONE ENCOUNTER
ANTICOAGULATION MANAGEMENT:  Medication Refill    Anticoagulation Summary  As of 7/3/2024      Warfarin maintenance plan:  7.5 mg (2.5 mg x 3) every day   Next INR check:  8/14/2024   Target end date:  Indefinite    Indications    Long term current use of anticoagulant therapy [Z79.01]  Chronic atrial fibrillation (HCC) [I48.20]                 Anticoagulation Care Providers       Provider Role Specialty Phone number    Cayla Gregorio MD Referring Family Medicine [phone]            Refill Criteria    Visit with referring provider/group: Meets criteria: visit within referring provider group in the last 15 months on 3/26/24    [facility] referral last signed: 08/25/2023; within last year: Yes- will send updated referral    Lab monitoring not exceeding 2 weeks overdue: Yes    Edward meets all criteria for refill. Rx instructions and quantity supplied updated to match patient's current dosing plan. Warfarin 90 day supply with 1 refill granted per [facility] protocol     Cinthia Renee RN  Anticoagulation Clinic

## 2024-08-12 NOTE — PROGRESS NOTES
ANTICOAGULATION CLINIC REFERRAL RENEWAL REQUEST       An annual renewal order is required for all patients referred to [facility].?  Please review and sign the pended referral order for Keith Desir.       ANTICOAGULATION SUMMARY      Warfarin indication(s)   Atrial Fibrillation    Mechanical heart valve present?  NO       Current goal range   INR: 2.0-3.0     Goal appropriate for indication? Goal INR 2-3, standard for indication(s) above     Time in Therapeutic Range (TTR)  (Goal > 60%) 67.8%       Office visit with referring provider's group within last year yes on 3/26/24       Cinthia Renee RN  [facility]

## 2024-08-14 ENCOUNTER — ANTICOAGULATION THERAPY VISIT (OUTPATIENT)
Dept: ANTICOAGULATION | Facility: CLINIC | Age: 79
End: 2024-08-14

## 2024-08-14 ENCOUNTER — LAB (OUTPATIENT)
Dept: LAB | Facility: CLINIC | Age: 79
End: 2024-08-14
Payer: MEDICARE

## 2024-08-14 DIAGNOSIS — E78.5 HYPERLIPIDEMIA LDL GOAL <70: ICD-10-CM

## 2024-08-14 DIAGNOSIS — Z79.01 LONG TERM CURRENT USE OF ANTICOAGULANT THERAPY: Primary | ICD-10-CM

## 2024-08-14 DIAGNOSIS — I48.20 CHRONIC ATRIAL FIBRILLATION (H): ICD-10-CM

## 2024-08-14 DIAGNOSIS — N18.2 TYPE 2 DIABETES MELLITUS WITH STAGE 2 CHRONIC KIDNEY DISEASE, WITHOUT LONG-TERM CURRENT USE OF INSULIN (H): Primary | ICD-10-CM

## 2024-08-14 DIAGNOSIS — E11.22 TYPE 2 DIABETES MELLITUS WITH STAGE 2 CHRONIC KIDNEY DISEASE, WITHOUT LONG-TERM CURRENT USE OF INSULIN (H): Primary | ICD-10-CM

## 2024-08-14 DIAGNOSIS — Z79.01 LONG TERM CURRENT USE OF ANTICOAGULANT THERAPY: ICD-10-CM

## 2024-08-14 LAB — INR BLD: 2.1 (ref 0.9–1.1)

## 2024-08-14 PROCEDURE — 36416 COLLJ CAPILLARY BLOOD SPEC: CPT

## 2024-08-14 PROCEDURE — 85610 PROTHROMBIN TIME: CPT

## 2024-08-14 NOTE — PROGRESS NOTES
ANTICOAGULATION MANAGEMENT     Keith Desir 78 year old male is on warfarin with therapeutic INR result. (Goal INR 2.0-3.0)    Recent labs: (last 7 days)     08/14/24  0921   INR 2.1*       ASSESSMENT     Warfarin Lab Questionnaire    Warfarin Doses Last 7 Days      8/13/2024    12:04 PM   Dose in Tablet or Mg   TAB or MG? tablet (tab)     Pt Rptd Dose SUNDAY MONDAY TUESDAY WED THURS FRIDAY SATURDAY 8/13/2024  12:04 PM 3 3 3 3 3 3 3         8/13/2024   Warfarin Lab Questionnaire   Missed doses within past 14 days? No   Changes in diet or alcohol within past 14 days? No   Medication changes since last result? No   Injuries or illness since last result? No   New shortness of breath, severe headaches or sudden changes in vision since last result? No   Abnormal bleeding since last result? No   Upcoming surgery, procedure? No      Previous result: Therapeutic last 2(+) visits  Additional findings: None     PLAN     Recommended plan for no diet, medication or health factor changes affecting INR     Dosing Instructions: Continue your current warfarin dose with next INR in 6 weeks       Summary  As of 8/14/2024      Full warfarin instructions:  7.5 mg every day   Next INR check:  9/25/2024               Telephone call with Melissa who verbalizes understanding and agrees to plan    Lab visit scheduled    Education provided: Please call back if any changes to your diet, medications or how you've been taking warfarin    Plan made per [facility] anticoagulation protocol    Jenniffer Loera RN  Anticoagulation Clinic  8/14/2024    _______________________________________________________________________     Anticoagulation Episode Summary       Current INR goal:  2.0-3.0   TTR:  70.9% (1 y)   Target end date:  Indefinite   Send INR reminders to:  AJ PALUMBO    Indications    Long term current use of anticoagulant therapy [Z79.01]  Chronic atrial fibrillation (HCC) [I48.20]             Comments:               Anticoagulation Care  Providers       Provider Role Specialty Phone number    Cayla Gregorio MD Referring Family Medicine [phone]

## 2024-09-04 ENCOUNTER — LAB (OUTPATIENT)
Dept: LAB | Facility: CLINIC | Age: 79
End: 2024-09-04
Payer: MEDICARE

## 2024-09-04 ENCOUNTER — ANTICOAGULATION THERAPY VISIT (OUTPATIENT)
Dept: ANTICOAGULATION | Facility: CLINIC | Age: 79
End: 2024-09-04

## 2024-09-04 DIAGNOSIS — Z79.01 LONG TERM CURRENT USE OF ANTICOAGULANT THERAPY: ICD-10-CM

## 2024-09-04 DIAGNOSIS — N18.2 TYPE 2 DIABETES MELLITUS WITH STAGE 2 CHRONIC KIDNEY DISEASE, WITHOUT LONG-TERM CURRENT USE OF INSULIN (H): Primary | ICD-10-CM

## 2024-09-04 DIAGNOSIS — E78.5 HYPERLIPIDEMIA LDL GOAL <70: ICD-10-CM

## 2024-09-04 DIAGNOSIS — I48.20 CHRONIC ATRIAL FIBRILLATION (H): ICD-10-CM

## 2024-09-04 DIAGNOSIS — Z79.01 LONG TERM CURRENT USE OF ANTICOAGULANT THERAPY: Primary | ICD-10-CM

## 2024-09-04 DIAGNOSIS — E11.22 TYPE 2 DIABETES MELLITUS WITH STAGE 2 CHRONIC KIDNEY DISEASE, WITHOUT LONG-TERM CURRENT USE OF INSULIN (H): Primary | ICD-10-CM

## 2024-09-04 LAB
ALT SERPL W P-5'-P-CCNC: 25 U/L (ref 0–70)
ANION GAP SERPL CALCULATED.3IONS-SCNC: 9 MMOL/L (ref 7–15)
BUN SERPL-MCNC: 18.8 MG/DL (ref 8–23)
CALCIUM SERPL-MCNC: 9.7 MG/DL (ref 8.8–10.4)
CHLORIDE SERPL-SCNC: 103 MMOL/L (ref 98–107)
CHOLEST SERPL-MCNC: 121 MG/DL
CREAT SERPL-MCNC: 1.03 MG/DL (ref 0.67–1.17)
DIGOXIN SERPL-MCNC: <0.4 NG/ML (ref 0.6–2)
EGFRCR SERPLBLD CKD-EPI 2021: 75 ML/MIN/1.73M2
ERYTHROCYTE [DISTWIDTH] IN BLOOD BY AUTOMATED COUNT: 13.7 % (ref 10–15)
FASTING STATUS PATIENT QL REPORTED: YES
FASTING STATUS PATIENT QL REPORTED: YES
GLUCOSE SERPL-MCNC: 139 MG/DL (ref 70–99)
HBA1C MFR BLD: 6.2 % (ref 0–5.6)
HCO3 SERPL-SCNC: 31 MMOL/L (ref 22–29)
HCT VFR BLD AUTO: 53.9 % (ref 40–53)
HDLC SERPL-MCNC: 42 MG/DL
HGB BLD-MCNC: 17.7 G/DL (ref 13.3–17.7)
INR BLD: 2.3 (ref 0.9–1.1)
LDLC SERPL CALC-MCNC: 56 MG/DL
MCH RBC QN AUTO: 33.1 PG (ref 26.5–33)
MCHC RBC AUTO-ENTMCNC: 32.8 G/DL (ref 31.5–36.5)
MCV RBC AUTO: 101 FL (ref 78–100)
NONHDLC SERPL-MCNC: 79 MG/DL
PLATELET # BLD AUTO: 178 10E3/UL (ref 150–450)
POTASSIUM SERPL-SCNC: 5.9 MMOL/L (ref 3.4–5.3)
RBC # BLD AUTO: 5.35 10E6/UL (ref 4.4–5.9)
SODIUM SERPL-SCNC: 143 MMOL/L (ref 135–145)
TRIGL SERPL-MCNC: 116 MG/DL
WBC # BLD AUTO: 8.5 10E3/UL (ref 4–11)

## 2024-09-04 PROCEDURE — 83036 HEMOGLOBIN GLYCOSYLATED A1C: CPT

## 2024-09-04 PROCEDURE — 36415 COLL VENOUS BLD VENIPUNCTURE: CPT

## 2024-09-04 PROCEDURE — 85610 PROTHROMBIN TIME: CPT

## 2024-09-04 PROCEDURE — 80061 LIPID PANEL: CPT

## 2024-09-04 PROCEDURE — 80162 ASSAY OF DIGOXIN TOTAL: CPT

## 2024-09-04 PROCEDURE — 84460 ALANINE AMINO (ALT) (SGPT): CPT

## 2024-09-04 PROCEDURE — 80048 BASIC METABOLIC PNL TOTAL CA: CPT

## 2024-09-04 PROCEDURE — 85027 COMPLETE CBC AUTOMATED: CPT

## 2024-09-04 NOTE — PROGRESS NOTES
ANTICOAGULATION MANAGEMENT     Keith Desir 78 year old male is on warfarin with therapeutic INR result. (Goal INR 2.0-3.0)    Recent labs: (last 7 days)     09/04/24  0906   INR 2.3*       ASSESSMENT     Source(s): Chart Review  Previous INR was Therapeutic last 2(+) visits  Medication, diet, health changes since last INR chart reviewed; none identified         PLAN     Recommended plan for no diet, medication or health factor changes affecting INR     Dosing Instructions: Continue your current warfarin dose with next INR in 6 weeks       Summary  As of 9/4/2024      Full warfarin instructions:  7.5 mg every day   Next INR check:  10/16/2024               Detailed voice message left for Ed with dosing instructions and follow up date.     Contact [phone] to schedule and with any changes, questions or concerns.     Education provided: Please call back if any changes to your diet, medications or how you've been taking warfarin  Goal range and lab monitoring: goal range and significance of current result    Plan made per [facility] anticoagulation protocol    Ban Matamoros RN  Anticoagulation Clinic  9/4/2024    _______________________________________________________________________     Anticoagulation Episode Summary       Current INR goal:  2.0-3.0   TTR:  74.7% (1 y)   Target end date:  Indefinite   Send INR reminders to:  ANTICOSTEPH[facility]IE PALUMBO    Indications    Long term current use of anticoagulant therapy [Z79.01]  Chronic atrial fibrillation (HCC) [I48.20]             Comments:               Anticoagulation Care Providers       Provider Role Specialty Phone number    Cayla Gregorio MD Referring Family Medicine [phone]

## 2024-09-05 ENCOUNTER — LAB (OUTPATIENT)
Dept: LAB | Facility: CLINIC | Age: 79
End: 2024-09-05
Payer: MEDICARE

## 2024-09-05 DIAGNOSIS — E87.5 HYPERKALEMIA: ICD-10-CM

## 2024-09-05 DIAGNOSIS — E87.5 HYPERKALEMIA: Primary | ICD-10-CM

## 2024-09-05 LAB — POTASSIUM SERPL-SCNC: 5 MMOL/L (ref 3.4–5.3)

## 2024-09-05 PROCEDURE — 36415 COLL VENOUS BLD VENIPUNCTURE: CPT

## 2024-09-05 PROCEDURE — 84132 ASSAY OF SERUM POTASSIUM: CPT

## 2024-09-05 NOTE — RESULT ENCOUNTER NOTE
Please call patient:    Your potassium may be high and needs to be rechecked today.     Your blood glucose and cholesterol are controlled. Your blood count, kidney, and liver tests are normal.     Cayla Gregorio MD

## 2024-09-11 ENCOUNTER — OFFICE VISIT (OUTPATIENT)
Dept: FAMILY MEDICINE | Facility: CLINIC | Age: 79
End: 2024-09-11
Payer: MEDICARE

## 2024-09-11 VITALS
OXYGEN SATURATION: 98 % | DIASTOLIC BLOOD PRESSURE: 78 MMHG | SYSTOLIC BLOOD PRESSURE: 118 MMHG | RESPIRATION RATE: 22 BRPM | WEIGHT: 216.8 LBS | HEIGHT: 68 IN | TEMPERATURE: 97 F | HEART RATE: 93 BPM | BODY MASS INDEX: 32.86 KG/M2

## 2024-09-11 DIAGNOSIS — N18.2 TYPE 2 DIABETES MELLITUS WITH STAGE 2 CHRONIC KIDNEY DISEASE, WITHOUT LONG-TERM CURRENT USE OF INSULIN (H): ICD-10-CM

## 2024-09-11 DIAGNOSIS — Z72.0 TOBACCO ABUSE: ICD-10-CM

## 2024-09-11 DIAGNOSIS — I12.9 RENAL HYPERTENSION: ICD-10-CM

## 2024-09-11 DIAGNOSIS — I50.32 CHRONIC HEART FAILURE WITH PRESERVED EJECTION FRACTION (H): ICD-10-CM

## 2024-09-11 DIAGNOSIS — J43.9 PULMONARY EMPHYSEMA, UNSPECIFIED EMPHYSEMA TYPE (H): Chronic | ICD-10-CM

## 2024-09-11 DIAGNOSIS — I48.20 CHRONIC ATRIAL FIBRILLATION (H): ICD-10-CM

## 2024-09-11 DIAGNOSIS — Z00.00 ENCOUNTER FOR MEDICARE ANNUAL WELLNESS EXAM: Primary | ICD-10-CM

## 2024-09-11 DIAGNOSIS — E11.22 TYPE 2 DIABETES MELLITUS WITH STAGE 2 CHRONIC KIDNEY DISEASE, WITHOUT LONG-TERM CURRENT USE OF INSULIN (H): ICD-10-CM

## 2024-09-11 DIAGNOSIS — E78.5 HYPERLIPIDEMIA LDL GOAL <70: ICD-10-CM

## 2024-09-11 DIAGNOSIS — E11.42 DIABETIC POLYNEUROPATHY ASSOCIATED WITH TYPE 2 DIABETES MELLITUS (H): ICD-10-CM

## 2024-09-11 PROCEDURE — G0439 PPPS, SUBSEQ VISIT: HCPCS | Performed by: FAMILY MEDICINE

## 2024-09-11 PROCEDURE — 99214 OFFICE O/P EST MOD 30 MIN: CPT | Mod: 25 | Performed by: FAMILY MEDICINE

## 2024-09-11 NOTE — PROGRESS NOTES
Preventive Care Visit  [facility] [place] Gregorio MD, Family Medicine  Sep 11, 2024      Assessment & Plan     Encounter for Medicare annual wellness exam    Chronic heart failure with preserved ejection fraction (H)  Chronic atrial fibrillation (HCC)  Euvolemic, Rate controlled and anticoagulated; sees cardiology q 2 years   - OFFICE/OUTPT VISIT,ESTJUANL IV    Pulmonary emphysema, unspecified emphysema type (H)  Well controlled with medications without side effects.   - OFFICE/OUTPT VISIT,EST,LEVL IV    Type 2 diabetes mellitus with stage 2 chronic kidney disease, without long-term current use of insulin (H)  Diabetic polyneuropathy associated with type 2 diabetes mellitus (H)  Well controlled with medications without side effects. Follow-up q 6 months   - OFFICE/OUTPT VISIT,ESTLEVL IV    Renal hypertension  Well controlled with medications without side effects.   - OFFICE/OUTPT VISIT,ESTLEVL IV    Hyperlipidemia LDL goal <70  Well controlled with medications without side effects.   - OFFICE/OUTPT VISIT,ESTLEVL IV    Tobacco abuse  Urged cessation and offered my support.             Nicotine/Tobacco Cessation  He reports that he has been smoking cigarettes. He has a 22.5 pack-year smoking history. He has been exposed to tobacco smoke. He has never used smokeless tobacco.  Nicotine/Tobacco Cessation Plan  Self help information given to patient      Counseling  Appropriate preventive services were addressed with this patient via screening, questionnaire, or discussion as appropriate for fall prevention, nutrition, physical activity, Tobacco-use cessation, social engagement, weight loss and cognition.  Checklist reviewing preventive services available has been given to the patient.  Reviewed patient's diet, addressing concerns and/or questions.   The patient was instructed to see the dentist every 6 months.           Subjective   Ed is a 78 year old, presenting for the  following:  Physical        9/11/2024     9:48 AM   Additional Questions   Roomed by Vibha BERTRAND CMA   Accompanied by Wife         9/11/2024     9:48 AM   Patient Reported Additional Medications   Patient reports taking the following new medications None         Health Care Directive  Patient does not have a Health Care Directive or Living Will: Discussed advance care planning with patient; information given to patient to review.    History of Present Illness       Hyperlipidemia:  He presents for follow up of hyperlipidemia.   He is taking medication to lower cholesterol. He is not having myalgia or other side effects to statin medications.     He has HFpEF and atrial fibrillation. COPD well controlled with medications without dyspnea, wheezing or cough. Patient also presents to follow-up diabetes. Diabetic Review of Systems - Medication compliance:  compliant all of the time, Diabetic diet compliance:  compliant most of the time, Home glucose monitoring:  blood glucose record WAS NOT brought in today, Diabetic ROS: no polyuria or polydipsia, no chest pain, dyspnea or TIA's, no numbness, tingling or pain in extremities, no unusual visual symptoms, no hypoglycemia, no medication side effects noted.      Hypertension well controlled on current medications without side effects, chest pain, or dyspnea. Hypercholesterolemia well controlled with current treatment plan without side effects.             9/9/2024   General Health   How would you rate your overall physical health? (!) FAIR   Feel stress (tense, anxious, or unable to sleep) Patient declined            9/9/2024   Nutrition   Diet: Low salt    Diabetic       Multiple values from one day are sorted in reverse-chronological order         9/9/2024   Exercise   Days per week of moderate/strenous exercise 0 days   Average minutes spent exercising at this level 0 min      (!) EXERCISE CONCERN      9/9/2024   Social Factors   Frequency of gathering with friends or  relatives Patient declined   Worry food won't last until get money to buy more No   Food not last or not have enough money for food? No   Do you have housing? (Housing is defined as stable permanent housing and does not include staying ouside in a car, in a tent, in an abandoned building, in an overnight shelter, or couch-surfing.) Yes   Are you worried about losing your housing? No   Lack of transportation? No   Unable to get utilities (heat,electricity)? No            9/11/2024   Fall Risk   Gait Speed Test (Document in seconds) 6.82   Gait Speed Test Interpretation Greater than 5.01 seconds - ABNORMAL             9/9/2024   Activities of Daily Living- Home Safety   Needs help with the following daily activites None of the above   Safety concerns in the home None of the above            9/9/2024   Dental   Dentist two times every year? (!) NO            9/9/2024   Hearing Screening   Hearing concerns? None of the above            9/9/2024   Driving Risk Screening   Patient/family members have concerns about driving No            9/9/2024   General Alertness/Fatigue Screening   Have you been more tired than usual lately? No            9/9/2024   Urinary Incontinence Screening   Bothered by leaking urine in past 6 months No            9/9/2024   TB Screening   Were you born outside of the [place]? No            Today's PHQ-2 Score:       9/10/2024     2:18 PM   PHQ-2 ( 1999 Pf[employer])   Q1: Little interest or pleasure in doing things 0   Q2: Feeling down, depressed or hopeless 0   PHQ-2 Score 0   Q1: Little interest or pleasure in doing things Not at all   Q2: Feeling down, depressed or hopeless Not at all   PHQ-2 Score 0           9/9/2024   Substance Use   Alcohol more than 3/day or more than 7/wk No   Do you have a current opioid prescription? No   How severe/bad is pain from 1 to 10? 0/10 (No Pain)   Do you use any other substances recreationally? No        Social History     Tobacco Use    Smoking status: Every Day      Current packs/day: 0.50     Average packs/day: 0.5 packs/day for 45.0 years (22.5 ttl pk-yrs)     Types: Cigarettes     Passive exposure: Current    Smokeless tobacco: Never    Tobacco comments:     cut down to 0.5 ppd   Vaping Use    Vaping status: Never Used   Substance Use Topics    Alcohol use: Yes     Alcohol/week: 0.0 - 4.2 standard drinks of alcohol    Drug use: No       ASCVD Risk   The ASCVD Risk score (Darryl FIGUEROA, et al., 2019) failed to calculate for the following reasons:    The valid total cholesterol range is 130 to 320 mg/dL            Reviewed and updated as needed this visit by Provider   Tobacco  Allergies  Meds  Problems  Med Hx  Surg Hx  Fam Hx     Sexual Activity          Patient Active Problem List   Diagnosis    Hyperlipidemia LDL goal <70    Tobacco abuse    Chronic atrial fibrillation (HCC)    Renal hypertension    Long term current use of anticoagulant therapy    Non morbid obesity due to excess calories    Pulmonary emphysema, unspecified emphysema type (H)    NAFLD (nonalcoholic fatty liver disease)    Varicose veins of legs    Type 2 diabetes mellitus with stage 2 chronic kidney disease, without long-term current use of insulin (H)    Diabetic neuropathy (H)    Gait disturbance    Allergic rhinitis, unspecified seasonality, unspecified trigger    Chronic heart failure with preserved ejection fraction (H)    Umbilical hernia without obstruction and without gangrene    Open-angle glaucoma of right eye, severe stage    Open-angle glaucoma of left eye, mild stage    Sensorineural hearing loss, bilateral     Past Surgical History:   Procedure Laterality Date    ANGIOGRAM  08/2006    normal    CATARACT EXTRACTION Right     LUMBAR DISC SURGERY  11/11/2021    TONSILLECTOMY & ADENOIDECTOMY         Social History     Tobacco Use    Smoking status: Every Day     Current packs/day: 0.50     Average packs/day: 0.5 packs/day for 45.0 years (22.5 ttl pk-yrs)     Types: Cigarettes      Passive exposure: Current    Smokeless tobacco: Never    Tobacco comments:     cut down to 0.5 ppd   Substance Use Topics    Alcohol use: Yes     Alcohol/week: 0.0 - 4.2 standard drinks of alcohol     Family History   Problem Relation Age of Onset    Diabetes Mother     Cerebrovascular Disease Father     Heart Disease Brother         pacer     Deep Vein Thrombosis Son     Hodgkin's lymphoma Son     Cancer No family hx of     Glaucoma No family hx of     Macular Degeneration No family hx of          Current providers sharing in care for this patient include:  Patient Care Team:  Cayla Gregorio MD as PCP - Cheryl Reina APRN CNP as Nurse Practitioner (Dermatology)  Kristy Rosales OD (Optometry)  Cayla Gregorio MD as Referring Physician (Family Medicine)  Riedel, Patrick J, MD as MD (Ophthalmology)  Heriberto Preciado MD as Assigned Surgical Provider  Cayla Gregorio MD as Assigned PCP    The following health maintenance items are reviewed in Epic and correct as of today:  Health Maintenance   Topic Date Due    INFLUENZA VACCINE (1) 09/01/2024    NICOTINE/TOBACCO CESSATION COUNSELING Q 1 YR  09/11/2024    COVID-19 Vaccine (7 - 2023-24 season) 09/12/2024 (Originally 9/1/2024)    A1C  12/04/2024    BMP  03/04/2025    DIABETIC FOOT EXAM  03/26/2025    MICROALBUMIN  04/16/2025    EYE EXAM  05/01/2025    ANNUAL REVIEW OF HM ORDERS  08/12/2025    ALT  09/04/2025    LIPID  09/04/2025    CBC  09/04/2025    HF ACTION PLAN  09/06/2025    MEDICARE ANNUAL WELLNESS VISIT  09/11/2025    FALL RISK ASSESSMENT  09/11/2025    ADVANCE CARE PLANNING  09/11/2029    DTAP/TDAP/TD IMMUNIZATION (3 - Td or Tdap) 10/25/2031    TSH W/FREE T4 REFLEX  Completed    SPIROMETRY  Completed    HEPATITIS C SCREENING  Completed    COPD ACTION PLAN  Completed    PHQ-2 (once per calendar year)  Completed    Pneumococcal Vaccine: 65+ Years  Completed    URINALYSIS  Completed    ZOSTER IMMUNIZATION   "Completed    RSV VACCINE  Completed    HPV IMMUNIZATION  Aged Out    MENINGITIS IMMUNIZATION  Aged Out    RSV MONOCLONAL ANTIBODY  Aged Out    COLORECTAL CANCER SCREENING  Discontinued    LUNG CANCER SCREENING  Discontinued            Objective    Exam  /78 (BP Location: Right arm, Patient Position: Sitting, Cuff Size: Adult Large)   Pulse 93   Temp 97  F (36.1  C) (Temporal)   Resp 22   Ht 1.72 m (5' 7.72\")   Wt 98.3 kg (216 lb 12.8 oz)   SpO2 98%   BMI 33.24 kg/m     Estimated body mass index is 33.24 kg/m  as calculated from the following:    Height as of this encounter: 1.72 m (5' 7.72\").    Weight as of this encounter: 98.3 kg (216 lb 12.8 oz).    Physical Exam  GENERAL: alert and no distress  EYES: Eyes grossly normal to inspection, PERRL and conjunctivae and sclerae normal  NECK: no adenopathy, no asymmetry, masses, or scars  RESP: lungs clear to auscultation - no rales, rhonchi or wheezes  CV: irregularly irregular rhythm, normal S1 S2, no S3 or S4, no murmur, click or rub, and trace bipedal edema   MS: no gross musculoskeletal defects noted   SKIN: no suspicious lesions or rashes and varicosities - lower legs  NEURO: Normal strength and tone, mentation intact and speech normal  PSYCH: mentation appears normal, affect normal/bright        9/11/2024   Mini Cog   Clock Draw Score 2 Normal   3 Item Recall 3 objects recalled   Mini Cog Total Score 5                 Signed Electronically by: Cayla Gregorio MD    "

## 2024-09-11 NOTE — LETTER
My Nicotine and Tobacco Cessation Action Plan  Name: Keith Desir   Date of Birth 1945  Date: 9/11/2024    My provider: Cayla Gregorio   My clinic: [address]  [place] [address]  [phone]       GREEN    ZONE                               Quit for Good    You have quit smoking!  You can quit for good, even if you ve tried before.   Ways to help you stay tobacco free:  When are you still having cravings?   How can you distract yourself from cravings and triggers?  What healthy ways can you cope? For example, deep breathing, managing stress, focusing on the what s good about being a non-smoker, making a cravings kit (gum, water)        YELLOW  ZONE                            Getting Ready to Quit  You re taking steps to quit tobacco.  You know which quit methods or medicines you plan to use.   Ways to get ready to quit:  Use a smoking log to track your use, cravings, and triggers (like waking up, drinking, being with smokers).  Set a quit date.  Tell family, friends, coworkers that you plan to quit.   Think ahead and plan for challenges.  Remove tobacco products from your spaces.  Talk to your care team or clinic pharmacist about getting help to quit.            RED   ZONE                 Not Ready to Quit (Pre-Contemplation)  You re not ready to set a quit date.  What would your future look like without smoking or tobacco?            Reasons why you might quit:  Why do you use tobacco? Think of the social reasons. Also think about how smoking makes you feel (in your body and mind).  What are the rewards of quitting? Money and time saved, better health (blood pressure, risk for heart attack, stroke, cancer, and lung disease)  It s never too late to quit.          For resources to help you quit, ask for a copy of Quitting for Good with Treatment and Support, or go to www.fvfiles.com/881423.pdf    More resources  Resources listed below with a star (*)  have Swedish translations. Some resources may be translated into more languages. Some are only in English.      General   Quit Partner* offers free online and phone support. This includes coaching, email and texts, fact sheets, and quit medicine delivered by mail (nicotine patches, gum or lozenges). They offer help building a personal quit plan, tools to track progress, and a social network for quitters. Go to   1-800-QUIT-NOW or www.quitpartnermn.JungleCents.  American Lung Association - Freedom from Smoking is an online class for quitting. It includes exercises to help you relax, social networking with fellow quitters, and ways to contact experts. There is a cost for this class. It s not free. Visit freedomfrBedloo.org to learn more.  Centers for Disease Control and Prevention* - Free tools and resources, including free coaching, quit plans, fact sheets, and referrals. Go to www.cdc.gov/tobacco or call support line at 1-800-QUIT-NOW.  National [place] for Tobacco Cessation - EX Program* is a free online program designed to  re-learn life without cigarettes.  It helps you make a personal plan and track your progress. It also gives free access to other smokers trying to quit and [facility] experts. Visit www.becomeanex.org.  Nicotine Anonymous* - A free program for quitting tobacco use, adapted from the 12 steps of Alcoholics Anonymous. At the meetings, 2 or more people share what they have gone through, their struggles and successes. There are online and telephone meetings as well as e-mail and pen pal networking. To find a meeting near you, visit www.nicotine-anonymous.org.  MATTIE Woods erika Banks to Quit Smoking is a mobile iPhone jay. QuitNow! Quit Smoking is a mobile jay for both Android and iPhone smartphones.  Smokefree.gov* - Offers a step-by-step quitting guide and access to National Cancer [place] experts via instant         messaging and telephone at 4-391-88G-QUIT. Experts communicate via free  online  LiveHelp  chat Monday   through Friday, 9 a.m. to 9 p.m. EST. Visit online at www.smokefree.gov.    Pregnancy   March of Dimes Foundation* - Free information about tobacco and alcohol use during and after pregnancy. Get answers to your questions for free by e-mailing experts. Go to https://www.marchofdimes.org/pregnancy/smoking-during-pregnancy.aspx.  American Pregnancy Association* - Information and step-by-step tips for quitting success. Visit:  www.americanpregnancy.org/pregnancy-health/smoking-during-pregnancy.    Adolescent   American Legacy Foundation -  Truth  campaign is the largest smoking prevention program for youth in the [place] Visit the website at www.ThirdSpaceLearning to learn more about tobacco, addiction, and empowering teens to make informed decisions about tobacco.  American Cancer Society* - Information about smokeless tobacco, addiction, and drug and non-drug options for quitting. Go to www.cancer.org and search for  smokeless tobacco.     African American   Pathways to [place] from the Centers for Disease Control and Prevention - A free booklet with information, advice, tips on quitting, and ways to take action in your community. Download this free booklet at www.cdc.gov/tobacco/quit_smoking/how_to_quit/pathways/index.htm.    Lesbian, malone, bisexual, and transgender   National LGBT Tobacco Control Network - Free online information on supporting others, making a quit plan, and resources about how tobacco use varies among different groups of people. Visit www.lgbttobacco.org/.  *Available in Albanian    For informational purposes only. Not to replace the advice of your health care provider. Copyright   2023 [employer]. All rights reserved. Clinically reviewed by Cayla Gregorio MD, Medical Director Primary Care Care [facility]. Vaultive [id number] - Rev 5/23

## 2024-09-11 NOTE — PATIENT INSTRUCTIONS
Patient Education   Preventive Care Advice   This is general advice given by our system to help you stay healthy. However, your care team may have specific advice just for you. Please talk to your care team about your preventive care needs.  Nutrition  Eat 5 or more servings of fruits and vegetables each day.  Try wheat bread, brown rice and whole grain pasta (instead of white bread, rice, and pasta).  Get enough calcium and vitamin D. Check the label on foods and aim for 100% of the RDA (recommended daily allowance).  Lifestyle  Exercise at least 150 minutes each week  (30 minutes a day, 5 days a week).  Do muscle strengthening activities 2 days a week. These help control your weight and prevent disease.  No smoking.  Wear sunscreen to prevent skin cancer.  Have a dental exam and cleaning every 6 months.  Yearly exams  See your health care team every year to talk about:  Any changes in your health.  Any medicines your care team has prescribed.  Preventive care, family planning, and ways to prevent chronic diseases.  Shots (vaccines)   HPV shots (up to age 26), if you've never had them before.  Hepatitis B shots (up to age 59), if you've never had them before.  COVID-19 shot: Get this shot when it's due.  Flu shot: Get a flu shot every year.  Tetanus shot: Get a tetanus shot every 10 years.  Pneumococcal, hepatitis A, and RSV shots: Ask your care team if you need these based on your risk.  Shingles shot (for age 50 and up)  General health tests  Diabetes screening:  Starting at age 35, Get screened for diabetes at least every 3 years.  If you are younger than age 35, ask your care team if you should be screened for diabetes.  Cholesterol test: At age 39, start having a cholesterol test every 5 years, or more often if advised.  Bone density scan (DEXA): At age 50, ask your care team if you should have this scan for osteoporosis (brittle bones).  Hepatitis C: Get tested at least once in your life.  STIs (sexually  transmitted infections)  Before age 24: Ask your care team if you should be screened for STIs.  After age 24: Get screened for STIs if you're at risk. You are at risk for STIs (including HIV) if:  You are sexually active with more than one person.  You don't use condoms every time.  You or a partner was diagnosed with a sexually transmitted infection.  If you are at risk for HIV, ask about PrEP medicine to prevent HIV.  Get tested for HIV at least once in your life, whether you are at risk for HIV or not.  Cancer screening tests  Cervical cancer screening: If you have a cervix, begin getting regular cervical cancer screening tests starting at age 21.  Breast cancer scan (mammogram): If you've ever had breasts, begin having regular mammograms starting at age 40. This is a scan to check for breast cancer.  Colon cancer screening: It is important to start screening for colon cancer at age 45.  Have a colonoscopy test every 10 years (or more often if you're at risk) Or, ask your provider about stool tests like a FIT test every year or Cologuard test every 3 years.  To learn more about your testing options, visit:   .  For help making a decision, visit:   https://bit.ly/cj90191.  Prostate cancer screening test: If you have a prostate, ask your care team if a prostate cancer screening test (PSA) at age 55 is right for you.  Lung cancer screening: If you are a current or former smoker ages 50 to 80, ask your care team if ongoing lung cancer screenings are right for you.  For informational purposes only. Not to replace the advice of your health care provider. Copyright   2023 [facility]. All rights reserved. Clinically reviewed by the [facility] Transitions Program. payworks 304084 - REV 01/24.  Preventing Falls: Care Instructions  Injuries and health problems such as trouble walking or poor eyesight can increase your risk of falling. So can some medicines. But there are things you can do to help  "prevent falls. You can exercise to get stronger. You can also arrange your home to make it safer.    Talk to your doctor about the medicines you take. Ask if any of them increase the risk of falls and whether they can be changed or stopped.   Try to exercise regularly. It can help improve your strength and balance. This can help lower your risk of falling.     Practice fall safety and prevention.    Wear low-heeled shoes that fit well and give your feet good support. Talk to your doctor if you have foot problems that make this hard.  Carry a cellphone or wear a medical alert device that you can use to call for help.  Use stepladders instead of chairs to reach high objects. Don't climb if you're at risk for falls. Ask for help, if needed.  Wear the correct eyeglasses, if you need them.    Make your home safer.    Remove rugs, cords, clutter, and furniture from walkways.  Keep your house well lit. Use night-lights in hallways and bathrooms.  Install and use sturdy handrails on stairways.  Wear nonskid footwear, even inside. Don't walk barefoot or in socks without shoes.    Be safe outside.    Use handrails, curb cuts, and ramps whenever possible.  Keep your hands free by using a shoulder bag or backpack.  Try to walk in well-lit areas. Watch out for uneven ground, changes in pavement, and debris.  Be careful in the winter. Walk on the grass or gravel when sidewalks are slippery. Use de-icer on steps and walkways. Add non-slip devices to shoes.    Put grab bars and nonskid mats in your shower or tub and near the toilet. Try to use a shower chair or bath bench when bathing.   Get into a tub or shower by putting in your weaker leg first. Get out with your strong side first. Have a phone or medical alert device in the bathroom with you.   Where can you learn more?  Go to https://www.SHADOW.net/patiented  Enter G117 in the search box to learn more about \"Preventing Falls: Care Instructions.\"  Current as of: July 17, " 2023               Content Version: 14.0    [id number] [employer].   Care instructions adapted under license by your healthcare professional. If you have questions about a medical condition or this instruction, always ask your healthcare professional. [employer] disclaims any warranty or liability for your use of this information.

## 2024-10-16 ENCOUNTER — LAB (OUTPATIENT)
Dept: LAB | Facility: CLINIC | Age: 79
End: 2024-10-16
Payer: MEDICARE

## 2024-10-16 ENCOUNTER — ANTICOAGULATION THERAPY VISIT (OUTPATIENT)
Dept: ANTICOAGULATION | Facility: CLINIC | Age: 79
End: 2024-10-16

## 2024-10-16 DIAGNOSIS — Z79.01 LONG TERM CURRENT USE OF ANTICOAGULANT THERAPY: Primary | ICD-10-CM

## 2024-10-16 DIAGNOSIS — I48.20 CHRONIC ATRIAL FIBRILLATION (H): ICD-10-CM

## 2024-10-16 DIAGNOSIS — Z79.01 LONG TERM CURRENT USE OF ANTICOAGULANT THERAPY: ICD-10-CM

## 2024-10-16 LAB — INR BLD: 1.9 (ref 0.9–1.1)

## 2024-10-16 PROCEDURE — 85610 PROTHROMBIN TIME: CPT

## 2024-10-16 PROCEDURE — 36416 COLLJ CAPILLARY BLOOD SPEC: CPT

## 2024-10-16 NOTE — PROGRESS NOTES
ANTICOAGULATION MANAGEMENT     Keith Desir 78 year old male is on warfarin with subtherapeutic INR result. (Goal INR 2.0-3.0)    Recent labs: (last 7 days)     10/16/24  0929   INR 1.9*       ASSESSMENT     Warfarin Lab Questionnaire    Warfarin Doses Last 7 Days      10/15/2024    10:40 AM   Dose in Tablet or Mg   TAB or MG? tablet (tab)     Pt Rptd Dose GRISELDA MONDAY TUESDAY WED THURS FRIDAY SATURDAY   10/15/2024  10:40 AM 3 3 3 3 3 3 3         10/15/2024   Warfarin Lab Questionnaire   Missed doses within past 14 days? No   Changes in diet or alcohol within past 14 days? No   Medication changes since last result? No   Injuries or illness since last result? No   New shortness of breath, severe headaches or sudden changes in vision since last result? No   Abnormal bleeding since last result? No   Upcoming surgery, procedure? No   Best number to call with results? [phone]      Previous result: Therapeutic last 2(+) visits  Additional findings: None     PLAN     Recommended plan for no diet, medication or health factor changes affecting INR     Dosing Instructions: Continue your current warfarin dose with next INR in 2 weeks   - pt and wife prefer 6 weeks. Education provided.    Summary  As of 10/16/2024      Full warfarin instructions:  7.5 mg every day   Next INR check:  10/30/2024               Telephone call with Melissa who verbalizes understanding and agrees to plan    Lab visit scheduled    Education provided: Please call back if any changes to your diet, medications or how you've been taking warfarin  Symptom monitoring: monitoring for clotting signs and symptoms, monitoring for stroke signs and symptoms, and when to seek medical attention/emergency care    Plan made per [facility] anticoagulation protocol    Jenniffer Loera RN  10/16/2024  Anticoagulation Clinic  [employer] for routing messages: mikki PALUMBO  [facility] patient phone line:  [phone]        _______________________________________________________________________     Anticoagulation Episode Summary       Current INR goal:  2.0-3.0   TTR:  74.5% (1 y)   Target end date:  Indefinite   Send INR reminders to:  AJ PALUMBO    Indications    Long term current use of anticoagulant therapy [Z79.01]  Chronic atrial fibrillation (HCC) [I48.20]             Comments:               Anticoagulation Care Providers       Provider Role Specialty Phone number    Cayla Gregorio MD Referring Family Medicine [phone]

## 2024-10-21 ENCOUNTER — MYC MEDICAL ADVICE (OUTPATIENT)
Dept: FAMILY MEDICINE | Facility: CLINIC | Age: 79
End: 2024-10-21
Payer: MEDICARE

## 2024-10-21 DIAGNOSIS — M54.50 NON-SPECIFIC LOW BACK PAIN: Primary | ICD-10-CM

## 2024-10-22 RX ORDER — BACLOFEN 10 MG/1
10 TABLET ORAL 3 TIMES DAILY PRN
Qty: 30 TABLET | Refills: 1 | Status: SHIPPED | OUTPATIENT
Start: 2024-10-22

## 2024-10-22 NOTE — TELEPHONE ENCOUNTER
Pt's spouse is calling. She is wondering if PCP would be able to prescribe any Valium or muscle relaxer for pain.     Uses [id number] - [place], [place] - [address]      Pt scheduled ED follow-up visit for 10/23/24 with Jose Lopez PA-C.    Isabel Chaves RN

## 2024-10-22 NOTE — TELEPHONE ENCOUNTER
Signed Prescriptions:                        Disp   Refills    baclofen (LIORESAL) 10 MG tablet           30 tab*1        Sig: Take 1 tablet (10 mg) by mouth 3 times daily as           needed for muscle spasms.  Authorizing Provider: DEVON PAREDES    Follow-up recommended

## 2024-10-23 ENCOUNTER — ANTICOAGULATION THERAPY VISIT (OUTPATIENT)
Dept: ANTICOAGULATION | Facility: CLINIC | Age: 79
End: 2024-10-23

## 2024-10-23 ENCOUNTER — ANCILLARY PROCEDURE (OUTPATIENT)
Dept: ULTRASOUND IMAGING | Facility: CLINIC | Age: 79
End: 2024-10-23
Attending: PHYSICIAN ASSISTANT
Payer: MEDICARE

## 2024-10-23 ENCOUNTER — OFFICE VISIT (OUTPATIENT)
Dept: FAMILY MEDICINE | Facility: CLINIC | Age: 79
End: 2024-10-23
Payer: MEDICARE

## 2024-10-23 VITALS
TEMPERATURE: 97.5 F | RESPIRATION RATE: 20 BRPM | DIASTOLIC BLOOD PRESSURE: 72 MMHG | SYSTOLIC BLOOD PRESSURE: 116 MMHG | OXYGEN SATURATION: 98 % | HEART RATE: 65 BPM

## 2024-10-23 DIAGNOSIS — M79.661 PAIN OF RIGHT LOWER LEG: ICD-10-CM

## 2024-10-23 DIAGNOSIS — R60.0 BILATERAL LOWER EXTREMITY EDEMA: Primary | ICD-10-CM

## 2024-10-23 DIAGNOSIS — E11.59 TYPE 2 DIABETES MELLITUS WITH OTHER CIRCULATORY COMPLICATIONS (H): ICD-10-CM

## 2024-10-23 DIAGNOSIS — Z79.01 LONG TERM CURRENT USE OF ANTICOAGULANT THERAPY: Primary | ICD-10-CM

## 2024-10-23 DIAGNOSIS — I48.20 CHRONIC ATRIAL FIBRILLATION (H): ICD-10-CM

## 2024-10-23 DIAGNOSIS — R60.0 BILATERAL LOWER EXTREMITY EDEMA: ICD-10-CM

## 2024-10-23 LAB
ALBUMIN SERPL BCG-MCNC: 4.2 G/DL (ref 3.5–5.2)
ALP SERPL-CCNC: 172 U/L (ref 40–150)
ALT SERPL W P-5'-P-CCNC: 26 U/L (ref 0–70)
ANION GAP SERPL CALCULATED.3IONS-SCNC: 11 MMOL/L (ref 7–15)
AST SERPL W P-5'-P-CCNC: 31 U/L (ref 0–45)
BILIRUB SERPL-MCNC: 0.5 MG/DL
BUN SERPL-MCNC: 23.6 MG/DL (ref 8–23)
CALCIUM SERPL-MCNC: 9.9 MG/DL (ref 8.8–10.4)
CHLORIDE SERPL-SCNC: 104 MMOL/L (ref 98–107)
CREAT SERPL-MCNC: 1.03 MG/DL (ref 0.67–1.17)
EGFRCR SERPLBLD CKD-EPI 2021: 75 ML/MIN/1.73M2
GLUCOSE SERPL-MCNC: 118 MG/DL (ref 70–99)
HCO3 SERPL-SCNC: 29 MMOL/L (ref 22–29)
INR PPP: 2.38 (ref 0.85–1.15)
POTASSIUM SERPL-SCNC: 6 MMOL/L (ref 3.4–5.3)
PROT SERPL-MCNC: 7.2 G/DL (ref 6.4–8.3)
SODIUM SERPL-SCNC: 144 MMOL/L (ref 135–145)

## 2024-10-23 PROCEDURE — 99215 OFFICE O/P EST HI 40 MIN: CPT | Performed by: PHYSICIAN ASSISTANT

## 2024-10-23 PROCEDURE — 80053 COMPREHEN METABOLIC PANEL: CPT | Performed by: PHYSICIAN ASSISTANT

## 2024-10-23 PROCEDURE — 36415 COLL VENOUS BLD VENIPUNCTURE: CPT | Performed by: PHYSICIAN ASSISTANT

## 2024-10-23 PROCEDURE — 93970 EXTREMITY STUDY: CPT | Mod: TC | Performed by: RADIOLOGY

## 2024-10-23 PROCEDURE — 85610 PROTHROMBIN TIME: CPT | Performed by: PHYSICIAN ASSISTANT

## 2024-10-23 ASSESSMENT — PAIN SCALES - GENERAL: PAINLEVEL_OUTOF10: WORST PAIN (10)

## 2024-10-23 NOTE — PROGRESS NOTES
ANTICOAGULATION MANAGEMENT     Ednoemi Desir 78 year old male is on warfarin with therapeutic INR result. (Goal INR 2.0-3.0)    Recent labs: (last 7 days)     10/23/24  0833   INR 2.38*       ASSESSMENT     Source(s): Chart Review and Patient/Caregiver Call     Warfarin doses taken: Warfarin taken as instructed  Diet: No new diet changes identified  Medication/supplement changes: None noted  New illness, injury, or hospitalization: Yes: in ED on 10/19 for back pain  Signs or symptoms of bleeding or clotting: No  Previous result: Subtherapeutic  Additional findings: None       PLAN     Recommended plan for no diet, medication or health factor changes affecting INR     Dosing Instructions: Continue your current warfarin dose with next INR in 3 weeks       Summary  As of 10/23/2024      Full warfarin instructions:  7.5 mg every day   Next INR check:  11/27/2024               Telephone call with Melissa who verbalizes understanding and agrees to plan    Patient elected to schedule next visit in 5 weeks    Education provided: Goal range and lab monitoring: goal range and significance of current result    Plan made per [facility] anticoagulation protocol    Ban Matamoros RN  10/23/2024  Anticoagulation Clinic  [employer] for routing messages: mikki PALUMBO  [facility] patient phone line: [phone]        _______________________________________________________________________     Anticoagulation Episode Summary       Current INR goal:  2.0-3.0   TTR:  74.1% (1 y)   Target end date:  Indefinite   Send INR reminders to:  AJ PALUMBO    Indications    Long term current use of anticoagulant therapy [Z79.01]  Chronic atrial fibrillation (HCC) [I48.20]             Comments:  --             Anticoagulation Care Providers       Provider Role Specialty Phone number    Cayla Gregorio MD Referring Family Medicine [phone]

## 2024-10-23 NOTE — PROGRESS NOTES
Assessment & Plan   Problem List Items Addressed This Visit    None  Visit Diagnoses       Bilateral lower extremity edema    -  Primary    Relevant Orders    Comprehensive metabolic panel (BMP + Alb, Alk Phos, ALT, AST, Total. Bili, TP)    INR (Completed)    US Lower Extremity Venous Duplex Bilateral    US EUGENIE Doppler No Exercise    Pain of right lower leg        Relevant Orders    US Lower Extremity Venous Duplex Bilateral    US EUGENIE Doppler No Exercise    Type 2 diabetes mellitus with other circulatory complications (H)        Relevant Orders    US EUGENIE Doppler No Exercise           - Leg swelling and pain of unclear etiology, but will get arterial and venous US to rule out possible vascular issue. Could also be neurogenic claudication as he had previous symptoms similar to current that he needed lumbar surgery for. Could not find LE pulses, but he has been told they are hard to find in past and cap refill appears normal. Previous EUGENIE reviewed.    Plan:  - Blood work to check kidney function, liver function, and INR. INR normal today.  - Arterial and venous ultrasound of legs  - Consider follow-up with back surgeon (Dr. Coffey) if ultrasounds normal  - Advised to go to ER if toe becomes pale or blue, or if pain significantly worsens  - Recommended Tylenol, Biofreeze, leg elevation, and rest  - will continue to avoid muscle relaxants/narcotics due to constipation risk  - Follow up with ultrasound results    Complete history and physical exam as below. Afebrile with normal vital signs.    DDx and Dx discussed with and explained to the pt and family to their satisfaction.  All questions were answered at this time. Pt expressed understanding of and agreement with this dx, tx, and plan. I have given the patient a list of pertinent indications for re-evaluation. Will go to the Emergency Department if symptoms worsen or new concerning symptoms arise. Patient left in no apparent distress.   Review of prior external  "note(s) from - [facility]where information from [facility] reviewed  Assessment requiring an independent historian(s) - family - stepdaughter and significant other - wife  Ordering of each unique test  45 minutes spent by me on the date of the encounter doing chart review, history and exam, documentation and further activities per the note   MED REC REQUIRED  Post Medication Reconciliation Status: discharge medications reconciled, continue medications without change  BMI  Estimated body mass index is 33.24 kg/m  as calculated from the following:    Height as of 9/11/24: 1.72 m (5' 7.72\").    Weight as of 9/11/24: 98.3 kg (216 lb 12.8 oz).     See Patient Instructions      Subjective   Ed is a 78 year old, presenting for the following health issues:  ER F/U        10/23/2024     7:42 AM   Additional Questions   Roomed by Real Rodriguez CMA   Accompanied by Wife and step daughter         10/23/2024     7:42 AM   Patient Reported Additional Medications   Patient reports taking the following new medications No new medications     HPI   ED/UC Followup:    Facility:  [facility]  Date of visit: 10/19/2024  Reason for visit: Acute left-sided low back pain without sciatica (Primary Dx);   Constipation, unspecified constipation type   Current Status: Pain has moved to right upper leg  4 days ago, the patient was in the emergency room after waking up with left lower back pain.  He had no injury.  Recently he started using a new piece of exercise equipment.  CT of the abdomen/pelvis without contrast showed no kidney stone or appendicitis, though he did have gallstones and constipation.  They recommended MiraLAX and PT and he was discharged home.  - Right leg pain and swelling  - Left side back pain initially in ER, then shifted to right side  - Pain worsens when standing or walking  - Swelling started yesterday, worse than previous episodes  - Leg discoloration, purple and reddish  - Constipation since Sunday, but did have a " big BM since ER  - Difficulty getting out of bed due to pain  - Dizziness (reported as normal for patient)  - No numbness or tingling in groin  - No loss of bowel or bladder control  - No fevers or chills  - No nausea or vomiting    Past Medical History:  - Diabetic neuropathy  - On Coumadin for stroke prevention due to atrial fibrillation  - Back surgery in 2022  - History of constipation (can go up to 2 weeks without bowel movement)  - Smoker    Review of Systems  Constitutional, HEENT, cardiovascular, pulmonary, gi and gu systems are negative, except as otherwise noted.      Objective    /72   Pulse 65   Temp 97.5  F (36.4  C) (Temporal)   Resp 20   SpO2 98%   There is no height or weight on file to calculate BMI.  Physical Exam  Vitals and nursing note reviewed.   Constitutional:       General: He is not in acute distress.     Appearance: He is not ill-appearing or diaphoretic.   HENT:      Head: Normocephalic and atraumatic.      Mouth/Throat:      Mouth: Mucous membranes are moist.   Eyes:      Conjunctiva/sclera: Conjunctivae normal.   Cardiovascular:      Rate and Rhythm: Normal rate and regular rhythm.      Heart sounds: Normal heart sounds. No murmur heard.     No friction rub. No gallop.      Comments: Could not palpate DP/PT pulses, baseline per his report. Scant BLE edema without tenderness. Normal 2 sec capillary refill in toes. Purple-red discoloration in BLEs  Pulmonary:      Effort: Pulmonary effort is normal. No respiratory distress.      Breath sounds: Normal breath sounds. No stridor. No wheezing, rhonchi or rales.   Abdominal:      General: Bowel sounds are normal. There is no distension.      Palpations: Abdomen is soft. There is no mass.      Tenderness: There is no abdominal tenderness. There is no guarding or rebound.      Hernia: No hernia is present.   Skin:     General: Skin is warm and dry.   Neurological:      General: No focal deficit present.      Mental Status: He is alert.  Mental status is at baseline.   Psychiatric:         Mood and Affect: Mood normal.         Behavior: Behavior normal.          Results for orders placed or performed in visit on 10/23/24   INR     Status: Abnormal   Result Value Ref Range    INR 2.38 (H) 0.85 - 1.15           Signed Electronically by: TOAN Silva

## 2024-10-24 ENCOUNTER — ANCILLARY PROCEDURE (OUTPATIENT)
Dept: ULTRASOUND IMAGING | Facility: CLINIC | Age: 79
End: 2024-10-24
Attending: PHYSICIAN ASSISTANT
Payer: MEDICARE

## 2024-10-24 DIAGNOSIS — E11.59 TYPE 2 DIABETES MELLITUS WITH OTHER CIRCULATORY COMPLICATIONS (H): ICD-10-CM

## 2024-10-24 DIAGNOSIS — E87.5 HYPERKALEMIA: Primary | ICD-10-CM

## 2024-10-24 DIAGNOSIS — R60.0 BILATERAL LOWER EXTREMITY EDEMA: Primary | ICD-10-CM

## 2024-10-24 DIAGNOSIS — R60.0 BILATERAL LOWER EXTREMITY EDEMA: ICD-10-CM

## 2024-10-24 DIAGNOSIS — M79.661 PAIN OF RIGHT LOWER LEG: ICD-10-CM

## 2024-10-24 PROCEDURE — 93922 UPR/L XTREMITY ART 2 LEVELS: CPT | Performed by: RADIOLOGY

## 2024-10-24 NOTE — PROGRESS NOTES
Abnormal TBI on ABIs vascular med referral placed. Please pass on # for scheduling. UC/ER if worse.

## 2024-10-24 NOTE — RESULT ENCOUNTER NOTE
Team - please see other result note on this patient regarding US results. Please call patient with results. Potassium is elevated. I would like him to discontinue his lisinopril for a week and we should recheck this. Repeat potassium ordered. Help him schedule a lab appt in a week. I'd like him to check his bp twice a day while off the lisinopril to see if he needs to restart this or not. Goal <140/90 on avg. Thanks.

## 2024-10-24 NOTE — RESULT ENCOUNTER NOTE
Team - please call patient with results. Ultrasounds look good, though he has poor circulation in his big toes. I would like him to see vascular medicine. Please have them call the number below to schedule:    [facility] [place] [address] [facility] [address]  Phone: [phone]

## 2024-10-25 ENCOUNTER — TELEPHONE (OUTPATIENT)
Dept: VASCULAR SURGERY | Facility: CLINIC | Age: 79
End: 2024-10-25
Payer: MEDICARE

## 2024-10-25 NOTE — TELEPHONE ENCOUNTER
Vascular Referral Intake    Referred by: Jose Lopez PA  for R60.0 (ICD-10-CM) - Bilateral lower extremity edema     Specialty: Vascular Medicine    Specific Provider if Necessary:  MD Juan Mayen or Deanna if wanting to be seen sooner.     Visit Type: New    Time Frame: Next Available    Testing/Imaging Needed Before Consult: None    Appt Note: (to be pasted into appt comments, also add where additional information is located, ie, outside images pushed to PACS, in Epic, [facility], etc)  CONSULT Jose Lopez PA referring.  BLE swelling and leg pain.  EUGENIE's done 10/24.

## 2024-10-25 NOTE — TELEPHONE ENCOUNTER
Routing to scheduling to coordinate the following:    NEW VASCULAR PATIENT consult with Vascular Medicine  Please schedule this at next available    Appt note:  Referred by Jose Lopez PA  for BLE swelling    Jessica ROLLINS, RN    [facility]  Office: [phone]  Fax: [phone]

## 2024-11-01 ENCOUNTER — LAB (OUTPATIENT)
Dept: LAB | Facility: CLINIC | Age: 79
End: 2024-11-01
Payer: MEDICARE

## 2024-11-01 DIAGNOSIS — E87.5 HYPERKALEMIA: ICD-10-CM

## 2024-11-01 PROCEDURE — 84132 ASSAY OF SERUM POTASSIUM: CPT

## 2024-11-01 PROCEDURE — 36415 COLL VENOUS BLD VENIPUNCTURE: CPT

## 2024-11-02 LAB — POTASSIUM SERPL-SCNC: 4.7 MMOL/L (ref 3.4–5.3)

## 2024-11-05 ENCOUNTER — TRANSFERRED RECORDS (OUTPATIENT)
Dept: HEALTH INFORMATION MANAGEMENT | Facility: CLINIC | Age: 79
End: 2024-11-05
Payer: MEDICARE

## 2024-11-10 ENCOUNTER — MYC MEDICAL ADVICE (OUTPATIENT)
Dept: FAMILY MEDICINE | Facility: CLINIC | Age: 79
End: 2024-11-10
Payer: MEDICARE

## 2024-11-14 ENCOUNTER — OFFICE VISIT (OUTPATIENT)
Dept: FAMILY MEDICINE | Facility: CLINIC | Age: 79
End: 2024-11-14
Payer: MEDICARE

## 2024-11-14 ENCOUNTER — ANTICOAGULATION THERAPY VISIT (OUTPATIENT)
Dept: ANTICOAGULATION | Facility: CLINIC | Age: 79
End: 2024-11-14

## 2024-11-14 VITALS
WEIGHT: 221 LBS | TEMPERATURE: 97.5 F | BODY MASS INDEX: 34.69 KG/M2 | HEART RATE: 78 BPM | RESPIRATION RATE: 16 BRPM | HEIGHT: 67 IN | SYSTOLIC BLOOD PRESSURE: 116 MMHG | OXYGEN SATURATION: 97 % | DIASTOLIC BLOOD PRESSURE: 70 MMHG

## 2024-11-14 DIAGNOSIS — I50.32 CHRONIC HEART FAILURE WITH PRESERVED EJECTION FRACTION (H): ICD-10-CM

## 2024-11-14 DIAGNOSIS — N18.4 CHRONIC KIDNEY DISEASE, STAGE 4 (SEVERE) (H): ICD-10-CM

## 2024-11-14 DIAGNOSIS — Z79.01 LONG TERM CURRENT USE OF ANTICOAGULANT THERAPY: ICD-10-CM

## 2024-11-14 DIAGNOSIS — I48.20 CHRONIC ATRIAL FIBRILLATION (H): ICD-10-CM

## 2024-11-14 DIAGNOSIS — J43.9 PULMONARY EMPHYSEMA, UNSPECIFIED EMPHYSEMA TYPE (H): Chronic | ICD-10-CM

## 2024-11-14 DIAGNOSIS — I87.2 VENOUS (PERIPHERAL) INSUFFICIENCY: ICD-10-CM

## 2024-11-14 DIAGNOSIS — Z72.0 TOBACCO ABUSE: ICD-10-CM

## 2024-11-14 DIAGNOSIS — Z79.01 LONG TERM CURRENT USE OF ANTICOAGULANT THERAPY: Primary | ICD-10-CM

## 2024-11-14 DIAGNOSIS — I50.33 ACUTE ON CHRONIC DIASTOLIC CONGESTIVE HEART FAILURE (H): Primary | ICD-10-CM

## 2024-11-14 DIAGNOSIS — E66.01 MORBID (SEVERE) OBESITY DUE TO EXCESS CALORIES (H): ICD-10-CM

## 2024-11-14 LAB
ANION GAP SERPL CALCULATED.3IONS-SCNC: 13 MMOL/L (ref 7–15)
BUN SERPL-MCNC: 20.3 MG/DL (ref 8–23)
CALCIUM SERPL-MCNC: 9.6 MG/DL (ref 8.8–10.4)
CHLORIDE SERPL-SCNC: 99 MMOL/L (ref 98–107)
CREAT SERPL-MCNC: 0.96 MG/DL (ref 0.67–1.17)
EGFRCR SERPLBLD CKD-EPI 2021: 82 ML/MIN/1.73M2
GLUCOSE SERPL-MCNC: 144 MG/DL (ref 70–99)
HCO3 SERPL-SCNC: 29 MMOL/L (ref 22–29)
INR BLD: 2.8 (ref 0.9–1.1)
POTASSIUM SERPL-SCNC: 4.9 MMOL/L (ref 3.4–5.3)
SODIUM SERPL-SCNC: 141 MMOL/L (ref 135–145)

## 2024-11-14 PROCEDURE — 36415 COLL VENOUS BLD VENIPUNCTURE: CPT | Performed by: INTERNAL MEDICINE

## 2024-11-14 PROCEDURE — 80048 BASIC METABOLIC PNL TOTAL CA: CPT | Performed by: INTERNAL MEDICINE

## 2024-11-14 PROCEDURE — 85610 PROTHROMBIN TIME: CPT | Performed by: INTERNAL MEDICINE

## 2024-11-14 RX ORDER — FUROSEMIDE 20 MG/1
40 TABLET ORAL DAILY
Qty: 180 TABLET | Refills: 0 | Status: SHIPPED | OUTPATIENT
Start: 2024-11-14

## 2024-11-14 ASSESSMENT — PAIN SCALES - GENERAL: PAINLEVEL_OUTOF10: NO PAIN (0)

## 2024-11-14 NOTE — PROGRESS NOTES
ANTICOAGULATION MANAGEMENT     Keith Desir 78 year old male is on warfarin with therapeutic INR result. (Goal INR 2.0-3.0)    Recent labs: (last 7 days)     11/14/24  0835   INR 2.8*       ASSESSMENT     Source(s): Chart Review and Patient/Caregiver Call     Warfarin doses taken: Warfarin taken as instructed  Diet: No new diet changes identified  Medication/supplement changes: None noted  New illness, injury, or hospitalization: No  Signs or symptoms of bleeding or clotting: No  Previous result: Therapeutic last visit; previously outside of goal range  Additional findings: None     PLAN     Recommended plan for no diet, medication or health factor changes affecting INR     Dosing Instructions: Continue your current warfarin dose with next INR in 4 weeks   - scheduled for 5 weeks.    Summary  As of 11/14/2024      Full warfarin instructions:  7.5 mg every day   Next INR check:  12/12/2024               Telephone call with Melissa who verbalizes understanding and agrees to plan    Lab visit scheduled    Education provided: Please call back if any changes to your diet, medications or how you've been taking warfarin    Plan made per [facility] anticoagulation protocol    Jenniffer Loera RN  11/14/2024  Anticoagulation Clinic  [employer] for routing messages: mikki PALUMBO  [facility] patient phone line: [phone]        _______________________________________________________________________     Anticoagulation Episode Summary       Current INR goal:  2.0-3.0   TTR:  74.1% (1 y)   Target end date:  Indefinite   Send INR reminders to:  AJ PALUMBO    Indications    Long term current use of anticoagulant therapy [Z79.01]  Chronic atrial fibrillation (HCC) [I48.20]             Comments:  --             Anticoagulation Care Providers       Provider Role Specialty Phone number    Cayla Gregorio MD Referring Family Medicine [phone]

## 2024-11-14 NOTE — PROGRESS NOTES
Assessment & Plan     (I50.33) Acute on chronic diastolic congestive heart failure (H)  (primary encounter diagnosis)  Comment: Patient presents with swelling in his legs.  Improved with increasing his Lasix dose from 20 mg to 40 mg in the morning.  His most recent echocardiogram was 6 years ago.  He had a normal ejection fraction at that time.  Diastolic indices were inconclusive due to his atrial fibrillation.  He does not check his weight at home.  His weight is up to 221 pounds which is about 6 pounds higher than it has been in the recent past.  It is not clear if he has had PND orthopnea.  He is a poor historian.  His wife is present.  She states that he does not use salt.  She cooks primarily.  She does not add salt to the food.  She tries to use sodium free supplements.  They do not eat a lot of prepared foods.  He has a slightly elevated creatinine.  H.  His wife states that his potassium is improved when he goes off of lisinopril.  His potassiums have been elevated.  He is on lisinopril.  His wife states that his potassium is improved when he was we will get an echocardiogram to see once where his heart function is at.  I advised him to check his weights daily.  Off of lisinopril in the past.  Plan: Echocardiogram Complete, Basic metabolic panel         (Ca, Cl, CO2, Creat, Gluc, K, Na, BUN)        I advised him to contact us if his weights are going up by 3 pounds in a day or 6 pounds in 3 days.  We will check his renal function.  Will increase his Lasix to 40 mg every morning.  Will have to consider stopping his lisinopril based on his renal function and potassium    (I87.2) Venous (peripheral) insufficiency  Comment: He has venous insufficiency.  This has been a chronic problem for him.  His wife has been wrapping his legs with Ace bandages.  They are not really excited about compression stockings.  She has been encouraging him to keep his legs elevated.  These efforts have improved the swelling in his  "legs  Plan: Discussed venous insufficiency.  Recommend leg elevation and compression wraps as tolerated    (I48.20) Chronic atrial fibrillation (HCC)  (Z79.01) Long term current use of anticoagulant therapy  Comment: Noted.  Good rate control.  On Coumadin  Plan: Coumadin followed by cardiology    (J43.9) Pulmonary emphysema, unspecified emphysema type (H)  Comment: On albuterol without any complications    Plan: Continue albuterol, recommended smoking cessation    (N18.4) Chronic kidney disease, stage 4 (severe) (H)  Comment: Noted.  Primarily elevated potassium.  Plan: BASIC metabolic panel as above     (E66.01) Morbid (severe) obesity due to excess calories (H)  Comment: Noted  Plan:      (Z72.0) Tobacco abuse  Comment: Noted  Plan: Recommend cessation          BMI  Estimated body mass index is 34.61 kg/m  as calculated from the following:    Height as of this encounter: 1.702 m (5' 7\").    Weight as of this encounter: 100.2 kg (221 lb).   Weight management plan: Discussed healthy diet and exercise guidelines          Subjective   Ed is a 78 year old, presenting for the following health issues:  Leg Swelling        11/14/2024     7:54 AM   Additional Questions   Roomed by Jerrica RUBIN   Accompanied by Wife      History of Present Illness       Reason for visit:  Legs and feet have really swollen up. Was unable to sleep because of leg and foot pain. Have gotten somesleep now that swelling is going down.   He is taking medications regularly.     78-year-old male with a history of chronic diastolic congestive heart failure, hypertension, hyperlipidemia, diabetes, diabetic neuropathy, COPD, varicose veins, chronic atrial fibrillation, and chronic anticoagulation with Coumadin presents with a history of leg swelling.  He has been noting for several weeks that his legs have been swollen.  Both feet from the ankles down.  He was instructed to increase his Lasix dosing.  His swelling has improved.  He was having trouble " sleeping.  He has not had any chest pain.      Review of Systems  Constitutional, neuro, ENT, endocrine, pulmonary, cardiac, gastrointestinal, genitourinary, musculoskeletal, integument and psychiatric systems are negative, except as otherwise noted.      Objective    There were no vitals taken for this visit.  There is no height or weight on file to calculate BMI.  Physical Exam   GENERAL: alert and no distress  EYES: Eyes grossly normal to inspection, PERRL and conjunctivae and sclerae normal  HENT: ear canals and TM's normal, nose and mouth without ulcers or lesions  NECK: no adenopathy, no asymmetry, masses, or scars  RESP: Distant breath sounds with some crackles in the bases, no wheezing  CV: regular rates and rhythm, normal S1 S2, no S3 or S4, no murmur, click or rub, JVD present to the sternocleidomastoid   lower extremity pitting edema to the ankle, and varicosities and a persistent rubor to the lower extremities  ABDOMEN: soft, nontender, no hepatosplenomegaly, no masses and bowel sounds normal  MS: no gross musculoskeletal defects noted, no edema  SKIN: no suspicious lesions or rashes  NEURO: Normal strength and tone, mentation intact and speech normal  PSYCH: mentation appears normal, affect normal/bright          Signed Electronically by: Tavo Huff MD

## 2024-11-20 ENCOUNTER — OFFICE VISIT (OUTPATIENT)
Dept: OTHER | Facility: CLINIC | Age: 79
End: 2024-11-20
Attending: INTERNAL MEDICINE
Payer: MEDICARE

## 2024-11-20 VITALS — SYSTOLIC BLOOD PRESSURE: 129 MMHG | DIASTOLIC BLOOD PRESSURE: 93 MMHG | HEART RATE: 87 BPM | OXYGEN SATURATION: 96 %

## 2024-11-20 DIAGNOSIS — Z72.0 TOBACCO ABUSE: ICD-10-CM

## 2024-11-20 DIAGNOSIS — R60.1 GENERALIZED EDEMA: ICD-10-CM

## 2024-11-20 DIAGNOSIS — I87.2 VENOUS (PERIPHERAL) INSUFFICIENCY: Primary | ICD-10-CM

## 2024-11-20 DIAGNOSIS — I89.0 SECONDARY LYMPHEDEMA: ICD-10-CM

## 2024-11-20 PROCEDURE — G0463 HOSPITAL OUTPT CLINIC VISIT: HCPCS | Performed by: INTERNAL MEDICINE

## 2024-11-20 NOTE — PROGRESS NOTES
INITIAL VASCULAR MEDICAL ASSESSMENT  REFERRAL SOURCE: Jose Lopez PA   REASON FOR CONSULT:  for BLE swelling           A/P:      (I87.2) Venous (peripheral) insufficiency  (primary encounter diagnosis)  Comment: This is his likely diagnosis.  Plan: Obtain venous comp study in the next week to two. Wear compression hosiery to the level of the venous insufficiency if TTE reveals no dramatic decrease in EF in comparison to previously. RTC after the above studies.    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   Plan: See above. If not responding to compression then send to LE therapy.     (Z72.0) Tobacco abuse  Comment: Five minutes tobacco cessation counselling. He declines patches.   Plan: SMOKING CESSATION COUNSELING, 3-10 MIN    The longitudinal care of plan for Ed was addressed during this visit. Due to added complexity of care, we will continue to support Keith Desir  and the subsequent management of these conditions and with ongoing continuity of care for these conditions.       62 minutes total medical care on today's date.             HPI: Keith Desir is a 78 year old male with a h/o htn, HLD, DM2, all of which are well controlled. He also has a h/o well compensated HFpEF, as well as AF for which he is ACd on warfarin. He unfortunately continues to smoke and is resistant to all advisement to stop tobacco intake. He was referred to us to address BLE swelling, edema, and pain which he states is new over the last month and is associated with pain , tautness, and tightness in his BLEs which directly correlates with the degree of swelling. His wife has been ACE wrapping his legs and notes that this improves the swelling and consequentially the pain and tingling. . The patient presents today to address the above. ABIs are WNL other than numerically clinically inconsequentially reduced TBIs. He has no DVT on venous duplex. He has no pelvic floor LA  on recent CT abd. TTE is pending for 11/29/2024. His physical exam below bears the mirlande hallmarks of venous insufficiency with CEAP C4 venous insufficiency findings bilaterally and with secondary LE. 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    Review Of Systems  Skin: negative  Eyes: negative  Ears/Nose/Throat: negative  Respiratory: No shortness of breath, dyspnea on exertion, cough, or hemoptysis  Cardiovascular: negative  Gastrointestinal: negative  Genitourinary: negative  Musculoskeletal: as above  Neurologic: negative  Psychiatric: negative  Hematologic/Lymphatic/Immunologic: negative  Endocrine: negative      PAST MEDICAL HISTORY:                  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/2013    Hepatitis C     HTN (hypertension)     Hyperlipidemia     Morbid obesity (H)     NAFLD (nonalcoholic fatty liver disease)     Polycythemia secondary to smoking     Primary open angle glaucoma of left eye, mild stage 12/06/2023    Dr Riedel    Primary open-angle glaucoma, right eye, severe stage 12/06/2023    [facility] , Dr Patrick Riedel    Sensorineural hearing loss, bilateral 03/26/2024    Squamous cell carcinoma of skin, unspecified     Umbilical hernia without obstruction and without gangrene     Varicose veins of legs 08/08/2018       PAST SURGICAL HISTORY:                  Past Surgical History:   Procedure Laterality Date    ANGIOGRAM  08/2006    normal    CATARACT EXTRACTION Right     LUMBAR DISC SURGERY  11/11/2021     TONSILLECTOMY & ADENOIDECTOMY         CURRENT MEDICATIONS:                  Current Outpatient Medications   Medication Sig Dispense Refill    albuterol (PROAIR HFA/PROVENTIL HFA/VENTOLIN HFA) 108 (90 Base) MCG/ACT inhaler Inhale 1-2 puffs into the lungs every 4 hours as needed for shortness of breath / dyspnea 18 g 2    atorvastatin (LIPITOR) 20 MG tablet Take 1 tablet (20 mg) by mouth daily 90 tablet 3    baclofen (LIORESAL) 10 MG tablet Take 1 tablet (10 mg) by mouth 3 times daily as needed for muscle spasms. 30 tablet 1    dimenhyDRINATE (DRAMAMINE PO) Take by mouth as needed. Takes every morning      empagliflozin (JARDIANCE) 10 MG TABS tablet Take 1 tablet (10 mg) by mouth daily 90 tablet 3    fluticasone (FLONASE) 50 MCG/ACT nasal spray Spray 1 spray into both nostrils daily 16 g 11    fluticasone (FLOVENT DISKUS) 50 MCG/ACT inhaler Inhale 1 puff into the lungs every 12 hours. Using PRN      furosemide (LASIX) 20 MG tablet Take 2 tablets (40 mg) by mouth daily. 180 tablet 0    glipiZIDE (GLUCOTROL) 5 MG tablet TAKE 1 TABLET BY MOUTH TWICE DAILY BEFORE MEAL(S) 180 tablet 3    ketoconazole (NIZORAL) 2 % external shampoo Use every 2 days to wash scalp, ears, face, and under arms. Leave on skin for 3-5 minutes before rinsing. 120 mL 11    latanoprost (XALATAN) 0.005 % ophthalmic solution Place 1 drop into both eyes at bedtime.      lisinopril (ZESTRIL) 40 MG tablet Take 1 tablet (40 mg) by mouth daily 90 tablet 3    metFORMIN (GLUCOPHAGE) 1000 MG tablet TAKE 1 TABLET BY MOUTH 2 TIMES DAILY WITH MEALS 180 tablet 3    metoprolol tartrate (LOPRESSOR) 100 MG tablet Take 2 tablets (200 mg) by mouth 2 times daily 360 tablet 3    warfarin ANTICOAGULANT (COUMADIN) 2.5 MG tablet TAKE 3 TABLETS (7.5MG) every day OR AS DIRECTED BY INR CLINIC. 270 tablet 1       ALLERGIES:                  Allergies   Allergen Reactions    Nkda [No Known Drug Allergy]        SOCIAL HISTORY:                  Social History     Socioeconomic  History    Marital status:      Spouse name: Melissa    Number of children: 4    Years of education: 7    Highest education level: Not on file   Occupational History    Occupation: foundry work     Employer: DISABLED     Employer: RETIRED   Tobacco Use    Smoking status: Every Day     Current packs/day: 0.50     Average packs/day: 0.5 packs/day for 45.0 years (22.5 ttl pk-yrs)     Types: Cigarettes     Passive exposure: Current    Smokeless tobacco: Never    Tobacco comments:     cut down to 0.5 ppd   Vaping Use    Vaping status: Never Used   Substance and Sexual Activity    Alcohol use: Yes     Alcohol/week: 0.0 - 4.2 standard drinks of alcohol    Drug use: No    Sexual activity: Not Currently     Partners: Female   Other Topics Concern    Parent/sibling w/ CABG, MI or angioplasty before 65F 55M? Not Asked   Social History Narrative    Not on file     Social Drivers of Health     Financial Resource Strain: Low Risk  (9/9/2024)    Financial Resource Strain     Within the past 12 months, have you or your family members you live with been unable to get utilities (heat, electricity) when it was really needed?: No   Food Insecurity: Low Risk  (9/9/2024)    Food Insecurity     Within the past 12 months, did you worry that your food would run out before you got money to buy more?: No     Within the past 12 months, did the food you bought just not last and you didn t have money to get more?: No   Transportation Needs: Low Risk  (9/9/2024)    Transportation Needs     Within the past 12 months, has lack of transportation kept you from medical appointments, getting your medicines, non-medical meetings or appointments, work, or from getting things that you need?: No   Physical Activity: Inactive (9/9/2024)    Exercise Vital Sign     Days of Exercise per Week: 0 days     Minutes of Exercise per Session: 0 min   Stress: Patient Declined (9/9/2024)    [employer] [place] [employer] - Occupational Stress  Questionnaire     Feeling of Stress : Patient declined   Social Connections: Unknown (9/9/2024)    Social Connection and Isolation Panel [NHANES]     Frequency of Communication with Friends and Family: Not on file     Frequency of Social Gatherings with Friends and Family: Patient declined     Attends Yazidism Services: Not on file     Active Member of Clubs or Organizations: Not on file     Attends Club or Organization Meetings: Not on file     Marital Status: Not on file   Interpersonal Safety: Low Risk  (1/29/2024)    Interpersonal Safety     Do you feel physically and emotionally safe where you currently live?: Yes     Within the past 12 months, have you been hit, slapped, kicked or otherwise physically hurt by someone?: No     Within the past 12 months, have you been humiliated or emotionally abused in other ways by your partner or ex-partner?: No   Housing Stability: Low Risk  (9/9/2024)    Housing Stability     Do you have housing? : Yes     Are you worried about losing your housing?: No       FAMILY HISTORY:                   Family History   Problem Relation Age of Onset    Diabetes Mother     Cerebrovascular Disease Father     Heart Disease Brother         pacer     Deep Vein Thrombosis Son     Hodgkin's lymphoma Son     Cancer No family hx of     Glaucoma No family hx of     Macular Degeneration No family hx of          Physical exam Reveals:    O/P: WNL  HEENT: WNL  NECK: No JVD, thyromegaly, or lymphadenopathy  HEART: RRR, no murmurs, gallops, or rubs  LUNGS: CTA bilaterally without rales, wheezes, or rhonchi  GI: NABS, nondistended, nontender, soft  EXT:without cyanosis, clubbing; two plus BLE edema with CEAP C4 venous insufficiency findings and BLE positive Stemmer signs.  NEURO: nonfocal  : no flank tenderness           All relevant labs and imaging reviewed by myself on today's date.

## 2024-11-20 NOTE — PROGRESS NOTES
[facility]        Patient is here for a consult to discuss BLE swelling     Pt is currently taking Statin and Warfarin.    BP (!) 129/93 (BP Location: Left arm, Patient Position: Chair, Cuff Size: Adult Large)   Pulse 87   SpO2 96%     The provider has been notified that the patient has no concerns.     Questions patient would like addressed today are: N/A.    Refills are needed: N/A    Has homecare services and agency name:  Allie Christina MA

## 2024-11-26 ENCOUNTER — TELEPHONE (OUTPATIENT)
Dept: OTHER | Facility: CLINIC | Age: 79
End: 2024-11-26
Payer: MEDICARE

## 2024-11-26 DIAGNOSIS — I89.0 SECONDARY LYMPHEDEMA: ICD-10-CM

## 2024-11-26 DIAGNOSIS — I87.2 VENOUS (PERIPHERAL) INSUFFICIENCY: Primary | ICD-10-CM

## 2024-11-26 NOTE — TELEPHONE ENCOUNTER
Routing to scheduling to coordinate the following:    BLE venous competency US  In person follow up 1 week after US  Please schedule this next available     Appt note: Follow up to 11/20/24      Jessica ROLLINS, RN    [facility]  Office: [phone]  Fax: [phone]

## 2024-11-29 ENCOUNTER — HOSPITAL ENCOUNTER (OUTPATIENT)
Dept: CARDIOLOGY | Facility: CLINIC | Age: 79
Discharge: HOME | End: 2024-11-29
Attending: INTERNAL MEDICINE | Admitting: INTERNAL MEDICINE
Payer: MEDICARE

## 2024-11-29 DIAGNOSIS — I50.33 ACUTE ON CHRONIC DIASTOLIC CONGESTIVE HEART FAILURE (H): ICD-10-CM

## 2024-11-29 LAB — LVEF ECHO: NORMAL

## 2024-11-29 PROCEDURE — 255N000002 HC RX 255 OP 636: Performed by: INTERNAL MEDICINE

## 2024-11-29 PROCEDURE — C8929 TTE W OR WO FOL WCON,DOPPLER: HCPCS

## 2024-11-29 PROCEDURE — 999N000208 ECHOCARDIOGRAM COMPLETE

## 2024-11-29 PROCEDURE — 93306 TTE W/DOPPLER COMPLETE: CPT | Mod: 26 | Performed by: INTERNAL MEDICINE

## 2024-11-29 RX ADMIN — HUMAN ALBUMIN MICROSPHERES AND PERFLUTREN 2 ML: 10; .22 INJECTION, SOLUTION INTRAVENOUS at 09:54

## 2024-12-02 ENCOUNTER — HOSPITAL ENCOUNTER (OUTPATIENT)
Dept: ULTRASOUND IMAGING | Facility: CLINIC | Age: 79
Discharge: HOME | End: 2024-12-02
Attending: INTERNAL MEDICINE | Admitting: INTERNAL MEDICINE
Payer: MEDICARE

## 2024-12-02 DIAGNOSIS — I87.2 VENOUS (PERIPHERAL) INSUFFICIENCY: ICD-10-CM

## 2024-12-02 DIAGNOSIS — I89.0 SECONDARY LYMPHEDEMA: ICD-10-CM

## 2024-12-02 DIAGNOSIS — R60.1 GENERALIZED EDEMA: ICD-10-CM

## 2024-12-02 PROCEDURE — 93970 EXTREMITY STUDY: CPT

## 2024-12-03 NOTE — TELEPHONE ENCOUNTER
Patient was scheduled for his Ultrasound on 12/02/24 in [place] and follow up appointment with Dr Chow on 12/11/24.

## 2024-12-11 ENCOUNTER — OFFICE VISIT (OUTPATIENT)
Dept: OTHER | Facility: CLINIC | Age: 79
End: 2024-12-11
Attending: INTERNAL MEDICINE
Payer: COMMERCIAL

## 2024-12-11 VITALS
SYSTOLIC BLOOD PRESSURE: 109 MMHG | WEIGHT: 220 LBS | OXYGEN SATURATION: 97 % | DIASTOLIC BLOOD PRESSURE: 73 MMHG | HEART RATE: 65 BPM | BODY MASS INDEX: 34.46 KG/M2

## 2024-12-11 DIAGNOSIS — Z72.0 TOBACCO ABUSE: ICD-10-CM

## 2024-12-11 DIAGNOSIS — I87.2 VENOUS (PERIPHERAL) INSUFFICIENCY: Primary | ICD-10-CM

## 2024-12-11 DIAGNOSIS — I89.0 SECONDARY LYMPHEDEMA: ICD-10-CM

## 2024-12-11 PROCEDURE — G0463 HOSPITAL OUTPT CLINIC VISIT: HCPCS | Performed by: INTERNAL MEDICINE

## 2024-12-11 NOTE — PROGRESS NOTES
VASCULAR MEDICAL ASSESSMENT  REFERRAL SOURCE: Jose Lopez PA   REASON FOR CONSULT:  for BLE swelling            A/P:        (I87.2) Venous (peripheral) insufficiency  (primary encounter diagnosis)  Comment: TTE reveals no dramatic decrease in EF in comparison to previously. Venous comp study dated 12/02/2024 reveals no DVT or SVT in the BLES. He has no LLE venous insufficiency. He has venous insufficiency of the Rt CFV and the GSV.    Plan: Wear compression hosiery to the thigh high level. 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   Plan: See above. If not responding to compression then send to LE therapy.      (Z72.0) Tobacco abuse  Comment: Five minutes tobacco cessation counselling. He declines patches.   Plan: SMOKING CESSATION COUNSELING, 3-10 MIN     The longitudinal care of plan for Ed was addressed during this visit. Due to added complexity of care, we will continue to support Keith Desir  and the subsequent management of these conditions and with ongoing continuity of care for these conditions.         52 minutes total medical care on today's date.               HPI: Keith Desir is a 78 year old male with a h/o htn, HLD, DM2, all of which are well controlled. He also has a h/o well compensated HFpEF, as well as AF for which he is ACd on warfarin. He unfortunately continues to smoke and is resistant to all advisement to stop tobacco intake. He was referred to us to address BLE swelling, edema, and pain which he states is new over the last month and is associated with pain , tautness, and tightness in his BLEs which directly correlates with the degree of swelling. His wife has been ACE wrapping his legs and notes that this improves the swelling and consequentially the pain and tingling. . The patient presents today to address the above. ABIs are WNL other than numerically clinically inconsequentially reduced TBIs. He has no  DVT on venous duplex. He has no pelvic floor LA on recent CT abd. TTE is pending for 11/29/2024. His physical exam below bears the mirlande hallmarks of venous insufficiency with CEAP C4 venous insufficiency findings bilaterally and with secondary LE. 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      Review Of Systems  Skin: negative  Eyes: negative  Ears/Nose/Throat: negative  Respiratory: No shortness of breath, dyspnea on exertion, cough, or hemoptysis  Cardiovascular: negative  Gastrointestinal: negative  Genitourinary: negative  Musculoskeletal: negative  Neurologic: negative  Psychiatric: negative  Hematologic/Lymphatic/Immunologic: negative  Endocrine: negative        PAST MEDICAL HISTORY:                  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/2013    Hepatitis C     HTN (hypertension)     Hyperlipidemia     Morbid obesity (H)     NAFLD (nonalcoholic fatty liver disease)     Polycythemia secondary to smoking     Primary open angle glaucoma of left eye, mild stage 12/06/2023    Dr Riedel    Primary open-angle glaucoma, right eye, severe stage 12/06/2023    [facility] , Dr Patrick Riedel    Sensorineural hearing loss, bilateral 03/26/2024    Squamous cell carcinoma of skin, unspecified     Umbilical hernia without obstruction and without gangrene     Varicose veins of legs 08/08/2018       PAST SURGICAL HISTORY:                  Past Surgical History:   Procedure Laterality Date    ANGIOGRAM  08/2006    normal    CATARACT EXTRACTION  Right     LUMBAR DISC SURGERY  11/11/2021    TONSILLECTOMY & ADENOIDECTOMY         CURRENT MEDICATIONS:                  Current Outpatient Medications   Medication Sig Dispense Refill    albuterol (PROAIR HFA/PROVENTIL HFA/VENTOLIN HFA) 108 (90 Base) MCG/ACT inhaler Inhale 1-2 puffs into the lungs every 4 hours as needed for shortness of breath / dyspnea 18 g 2    atorvastatin (LIPITOR) 20 MG tablet Take 1 tablet (20 mg) by mouth daily 90 tablet 3    baclofen (LIORESAL) 10 MG tablet Take 1 tablet (10 mg) by mouth 3 times daily as needed for muscle spasms. 30 tablet 1    dimenhyDRINATE (DRAMAMINE PO) Take by mouth as needed. Takes every morning      empagliflozin (JARDIANCE) 10 MG TABS tablet Take 1 tablet (10 mg) by mouth daily 90 tablet 3    fluticasone (FLONASE) 50 MCG/ACT nasal spray Spray 1 spray into both nostrils daily 16 g 11    fluticasone (FLOVENT DISKUS) 50 MCG/ACT inhaler Inhale 1 puff into the lungs every 12 hours. Using PRN      furosemide (LASIX) 20 MG tablet Take 2 tablets (40 mg) by mouth daily. 180 tablet 0    glipiZIDE (GLUCOTROL) 5 MG tablet TAKE 1 TABLET BY MOUTH TWICE DAILY BEFORE MEAL(S) 180 tablet 3    ketoconazole (NIZORAL) 2 % external shampoo Use every 2 days to wash scalp, ears, face, and under arms. Leave on skin for 3-5 minutes before rinsing. 120 mL 11    latanoprost (XALATAN) 0.005 % ophthalmic solution Place 1 drop into both eyes at bedtime.      lisinopril (ZESTRIL) 40 MG tablet Take 1 tablet (40 mg) by mouth daily 90 tablet 3    metFORMIN (GLUCOPHAGE) 1000 MG tablet TAKE 1 TABLET BY MOUTH 2 TIMES DAILY WITH MEALS 180 tablet 3    metoprolol tartrate (LOPRESSOR) 100 MG tablet Take 2 tablets (200 mg) by mouth 2 times daily 360 tablet 3    warfarin ANTICOAGULANT (COUMADIN) 2.5 MG tablet TAKE 3 TABLETS (7.5MG) every day OR AS DIRECTED BY INR CLINIC. 270 tablet 1       ALLERGIES:                  Allergies   Allergen Reactions    Nkda [No Known Drug Allergy]        SOCIAL HISTORY:                   Social History     Socioeconomic History    Marital status:      Spouse name: Melissa    Number of children: 4    Years of education: 7    Highest education level: Not on file   Occupational History    Occupation: foundry work     Employer: DISABLED     Employer: RETIRED   Tobacco Use    Smoking status: Every Day     Current packs/day: 0.50     Average packs/day: 0.5 packs/day for 45.0 years (22.5 ttl pk-yrs)     Types: Cigarettes     Passive exposure: Current    Smokeless tobacco: Never    Tobacco comments:     cut down to 0.5 ppd   Vaping Use    Vaping status: Never Used   Substance and Sexual Activity    Alcohol use: Yes     Alcohol/week: 0.0 - 4.2 standard drinks of alcohol    Drug use: No    Sexual activity: Not Currently     Partners: Female   Other Topics Concern    Parent/sibling w/ CABG, MI or angioplasty before 65F 55M? Not Asked   Social History Narrative    Not on file     Social Drivers of Health     Financial Resource Strain: Low Risk  (9/9/2024)    Financial Resource Strain     Within the past 12 months, have you or your family members you live with been unable to get utilities (heat, electricity) when it was really needed?: No   Food Insecurity: Low Risk  (9/9/2024)    Food Insecurity     Within the past 12 months, did you worry that your food would run out before you got money to buy more?: No     Within the past 12 months, did the food you bought just not last and you didn t have money to get more?: No   Transportation Needs: Low Risk  (9/9/2024)    Transportation Needs     Within the past 12 months, has lack of transportation kept you from medical appointments, getting your medicines, non-medical meetings or appointments, work, or from getting things that you need?: No   Physical Activity: Inactive (9/9/2024)    Exercise Vital Sign     Days of Exercise per Week: 0 days     Minutes of Exercise per Session: 0 min   Stress: Patient Declined (9/9/2024)    [employer] [place] [employer]  Occupational Health - Occupational Stress Questionnaire     Feeling of Stress : Patient declined   Social Connections: Unknown (9/9/2024)    Social Connection and Isolation Panel [NHANES]     Frequency of Communication with Friends and Family: Not on file     Frequency of Social Gatherings with Friends and Family: Patient declined     Attends Rastafari Services: Not on file     Active Member of Clubs or Organizations: Not on file     Attends Club or Organization Meetings: Not on file     Marital Status: Not on file   Interpersonal Safety: Low Risk  (1/29/2024)    Interpersonal Safety     Do you feel physically and emotionally safe where you currently live?: Yes     Within the past 12 months, have you been hit, slapped, kicked or otherwise physically hurt by someone?: No     Within the past 12 months, have you been humiliated or emotionally abused in other ways by your partner or ex-partner?: No   Housing Stability: Low Risk  (9/9/2024)    Housing Stability     Do you have housing? : Yes     Are you worried about losing your housing?: No       FAMILY HISTORY:                   Family History   Problem Relation Age of Onset    Diabetes Mother     Cerebrovascular Disease Father     Heart Disease Brother         pacer     Deep Vein Thrombosis Son     Hodgkin's lymphoma Son     Cancer No family hx of     Glaucoma No family hx of     Macular Degeneration No family hx of            Physical exam Reveals:    O/P: WNL  HEENT: WNL  NECK: No JVD, thyromegaly, or lymphadenopathy  HEART: RRR, no murmurs, gallops, or rubs  LUNGS: CTA bilaterally without rales, wheezes, or rhonchi  GI: NABS, nondistended, nontender, soft  EXT:without cyanosis, clubbing, or edema  NEURO: nonfocal  : no flank tenderness         All relevant labs and imaging reviewed by myself on today's date.

## 2024-12-11 NOTE — PROGRESS NOTES
[facility]        Patient is here for a follow up.    Pt is currently taking Statin and Warfarin.    /73 (BP Location: Right arm, Patient Position: Sitting, Cuff Size: Adult Regular)   Pulse 65   Wt 220 lb (99.8 kg)   SpO2 97%   BMI 34.46 kg/m      The provider has been notified that the patient has no concerns.     Questions patient would like addressed today are: N/A.    Refills are needed: N/A    Has homecare services and agency name:  No     Patient has been identified as a fall risk.    Interventions were completed as noted below:  [x]Exam tables/chairs remained in the lowest position.   []Patient remained in wheelchair.    []Provider requested patient in exam chair.  Patient required assist and use of transfer belt.  [x]Exam room door remained open unless with a provider.  []A bell provided to patient to notify staff if assistance was needed.  [x]Provider notified patient was identified as a fall risk.      Sarina Bobo MA

## 2024-12-18 ENCOUNTER — ANTICOAGULATION THERAPY VISIT (OUTPATIENT)
Dept: ANTICOAGULATION | Facility: CLINIC | Age: 79
End: 2024-12-18

## 2024-12-18 ENCOUNTER — LAB (OUTPATIENT)
Dept: LAB | Facility: CLINIC | Age: 79
End: 2024-12-18
Payer: MEDICARE

## 2024-12-18 DIAGNOSIS — I48.20 CHRONIC ATRIAL FIBRILLATION (H): ICD-10-CM

## 2024-12-18 DIAGNOSIS — Z79.01 LONG TERM CURRENT USE OF ANTICOAGULANT THERAPY: ICD-10-CM

## 2024-12-18 DIAGNOSIS — N18.2 TYPE 2 DIABETES MELLITUS WITH STAGE 2 CHRONIC KIDNEY DISEASE, WITHOUT LONG-TERM CURRENT USE OF INSULIN (H): ICD-10-CM

## 2024-12-18 DIAGNOSIS — E11.22 TYPE 2 DIABETES MELLITUS WITH STAGE 2 CHRONIC KIDNEY DISEASE, WITHOUT LONG-TERM CURRENT USE OF INSULIN (H): ICD-10-CM

## 2024-12-18 DIAGNOSIS — N18.4 CHRONIC KIDNEY DISEASE, STAGE 4 (SEVERE) (H): Primary | ICD-10-CM

## 2024-12-18 DIAGNOSIS — Z79.01 LONG TERM CURRENT USE OF ANTICOAGULANT THERAPY: Primary | ICD-10-CM

## 2024-12-18 LAB
EST. AVERAGE GLUCOSE BLD GHB EST-MCNC: 134 MG/DL
HBA1C MFR BLD: 6.3 % (ref 0–5.6)
HGB BLD-MCNC: 17.9 G/DL (ref 13.3–17.7)
INR BLD: 1.9 (ref 0.9–1.1)
PHOSPHATE SERPL-MCNC: 3.5 MG/DL (ref 2.5–4.5)
PTH-INTACT SERPL-MCNC: 58 PG/ML (ref 15–65)

## 2024-12-18 PROCEDURE — 85018 HEMOGLOBIN: CPT

## 2024-12-18 PROCEDURE — 84100 ASSAY OF PHOSPHORUS: CPT

## 2024-12-18 PROCEDURE — 36415 COLL VENOUS BLD VENIPUNCTURE: CPT

## 2024-12-18 PROCEDURE — 85610 PROTHROMBIN TIME: CPT

## 2024-12-18 PROCEDURE — 83036 HEMOGLOBIN GLYCOSYLATED A1C: CPT

## 2024-12-18 PROCEDURE — 83970 ASSAY OF PARATHORMONE: CPT

## 2024-12-18 NOTE — PROGRESS NOTES
ANTICOAGULATION MANAGEMENT     Keith Desir 79 year old male is on warfarin with subtherapeutic INR result. (Goal INR 2.0-3.0)    Recent labs: (last 7 days)     12/18/24  0903   INR 1.9*       ASSESSMENT     Warfarin Lab Questionnaire    Warfarin Doses Last 7 Days      12/17/2024    12:34 PM   Dose in Tablet or Mg   TAB or MG? tablet (tab)     Pt Rptd Dose SUNDAY MONDAY TUESDAY WED THURS FRIDAY SATURDAY 12/17/2024  12:34 PM 3 3 3 3 3 3 3         12/17/2024   Warfarin Lab Questionnaire   Missed doses within past 14 days? No   Changes in diet or alcohol within past 14 days? No   Medication changes since last result? No   Injuries or illness since last result? No   New shortness of breath, severe headaches or sudden changes in vision since last result? No   Abnormal bleeding since last result? No   Upcoming surgery, procedure? No     Previous result: Therapeutic last 2(+) visits  Additional findings: None     PLAN     Recommended plan for no diet, medication or health factor changes affecting INR     Dosing Instructions: Continue your current warfarin dose with next INR in 2 weeks   - discussed protocol and recheck date, however patient prefers 2 week recheck.    Summary  As of 12/18/2024      Full warfarin instructions:  7.5 mg every day   Next INR check:  1/1/2025               Telephone call with Melissa who verbalizes understanding and agrees to plan    Lab visit scheduled    Education provided: Please call back if any changes to your diet, medications or how you've been taking warfarin  Symptom monitoring: monitoring for clotting signs and symptoms, monitoring for stroke signs and symptoms, and when to seek medical attention/emergency care    Plan made per [facility] anticoagulation protocol    Jenniffer Loera RN  12/18/2024  Anticoagulation Clinic  [facility] for routing messages: mikki PALUMBO  [facility] patient phone line: [phone]        _______________________________________________________________________      Anticoagulation Episode Summary       Current INR goal:  2.0-3.0   TTR:  73.1% (1 y)   Target end date:  Indefinite   Send INR reminders to:  AJ PALUMBO    Indications    Long term current use of anticoagulant therapy [Z79.01]  Chronic atrial fibrillation (HCC) [I48.20]             Comments:  --             Anticoagulation Care Providers       Provider Role Specialty Phone number    Cayla Gregorio MD Referring [facility] [phone]

## 2024-12-18 NOTE — RESULT ENCOUNTER NOTE
Ed,    Your blood glucose is well controlled.     You do not have anemia.     Your final test results are pending.  Please check your chart again within 2 - 3 days.      Cayla Gregorio MD

## 2025-01-22 ENCOUNTER — TRANSFERRED RECORDS (OUTPATIENT)
Dept: HEALTH INFORMATION MANAGEMENT | Facility: CLINIC | Age: 80
End: 2025-01-22
Payer: MEDICARE

## 2025-01-29 ENCOUNTER — ANTICOAGULATION THERAPY VISIT (OUTPATIENT)
Dept: ANTICOAGULATION | Facility: CLINIC | Age: 80
End: 2025-01-29

## 2025-01-29 ENCOUNTER — OFFICE VISIT (OUTPATIENT)
Dept: FAMILY MEDICINE | Facility: CLINIC | Age: 80
End: 2025-01-29
Payer: MEDICARE

## 2025-01-29 VITALS
OXYGEN SATURATION: 96 % | DIASTOLIC BLOOD PRESSURE: 75 MMHG | HEIGHT: 69 IN | RESPIRATION RATE: 16 BRPM | SYSTOLIC BLOOD PRESSURE: 117 MMHG | WEIGHT: 217 LBS | BODY MASS INDEX: 32.14 KG/M2 | HEART RATE: 72 BPM | TEMPERATURE: 96 F

## 2025-01-29 DIAGNOSIS — N18.4 CHRONIC KIDNEY DISEASE, STAGE 4 (SEVERE) (H): ICD-10-CM

## 2025-01-29 DIAGNOSIS — Z01.818 PREOP GENERAL PHYSICAL EXAM: Primary | ICD-10-CM

## 2025-01-29 DIAGNOSIS — Z79.01 LONG TERM CURRENT USE OF ANTICOAGULANT THERAPY: ICD-10-CM

## 2025-01-29 DIAGNOSIS — E11.22 TYPE 2 DIABETES MELLITUS WITH STAGE 2 CHRONIC KIDNEY DISEASE, WITHOUT LONG-TERM CURRENT USE OF INSULIN (H): ICD-10-CM

## 2025-01-29 DIAGNOSIS — N18.2 TYPE 2 DIABETES MELLITUS WITH STAGE 2 CHRONIC KIDNEY DISEASE, WITHOUT LONG-TERM CURRENT USE OF INSULIN (H): ICD-10-CM

## 2025-01-29 DIAGNOSIS — I48.20 CHRONIC ATRIAL FIBRILLATION (H): ICD-10-CM

## 2025-01-29 DIAGNOSIS — H26.9 CATARACT OF LEFT EYE, UNSPECIFIED CATARACT TYPE: ICD-10-CM

## 2025-01-29 DIAGNOSIS — Z79.01 LONG TERM CURRENT USE OF ANTICOAGULANT THERAPY: Primary | ICD-10-CM

## 2025-01-29 LAB
ANION GAP SERPL CALCULATED.3IONS-SCNC: 9 MMOL/L (ref 7–15)
BUN SERPL-MCNC: 27.9 MG/DL (ref 8–23)
CALCIUM SERPL-MCNC: 10.2 MG/DL (ref 8.8–10.4)
CHLORIDE SERPL-SCNC: 100 MMOL/L (ref 98–107)
CREAT SERPL-MCNC: 1 MG/DL (ref 0.67–1.17)
EGFRCR SERPLBLD CKD-EPI 2021: 78 ML/MIN/1.73M2
GLUCOSE SERPL-MCNC: 137 MG/DL (ref 70–99)
HCO3 SERPL-SCNC: 30 MMOL/L (ref 22–29)
INR BLD: 2.1 (ref 0.9–1.1)
POTASSIUM SERPL-SCNC: 4.7 MMOL/L (ref 3.4–5.3)
SODIUM SERPL-SCNC: 139 MMOL/L (ref 135–145)

## 2025-01-29 PROCEDURE — 85610 PROTHROMBIN TIME: CPT | Performed by: PHYSICIAN ASSISTANT

## 2025-01-29 PROCEDURE — 99214 OFFICE O/P EST MOD 30 MIN: CPT | Performed by: PHYSICIAN ASSISTANT

## 2025-01-29 PROCEDURE — 80048 BASIC METABOLIC PNL TOTAL CA: CPT | Performed by: PHYSICIAN ASSISTANT

## 2025-01-29 PROCEDURE — 36415 COLL VENOUS BLD VENIPUNCTURE: CPT | Performed by: PHYSICIAN ASSISTANT

## 2025-01-29 ASSESSMENT — PAIN SCALES - GENERAL: PAINLEVEL_OUTOF10: NO PAIN (0)

## 2025-01-29 NOTE — PROGRESS NOTES
ANTICOAGULATION MANAGEMENT     Keith Desir 79 year old male is on warfarin with therapeutic INR result. (Goal INR 2.0-3.0)    Recent labs: (last 7 days)     01/29/25  0858   INR 2.1*       ASSESSMENT     Warfarin Lab Questionnaire    Warfarin Doses Last 7 Days      1/28/2025    12:06 PM   Dose in Tablet or Mg   TAB or MG? tablet (tab)     Pt Rptd Dose SUNDAY MONDAY TUESDAY WED THURS FRIDAY SATURDAY 1/28/2025  12:06 PM 3 3 3 3 3 3 3         1/28/2025   Warfarin Lab Questionnaire   Missed doses within past 14 days? No   Changes in diet or alcohol within past 14 days? No   Medication changes since last result? No   Injuries or illness since last result? No   New shortness of breath, severe headaches or sudden changes in vision since last result? No   Abnormal bleeding since last result? No   Upcoming surgery, procedure? Yes   Please explain, date scheduled? Cataract on left eye. February 18     Previous result: Subtherapeutic  Additional findings: None     PLAN     Recommended plan for no diet, medication or health factor changes affecting INR     Dosing Instructions: Continue your current warfarin dose with next INR in 3 weeks   - pt will not come back sooner than 6 weeks per Melissa. Scheduled for 6 weeks.    Summary  As of 1/29/2025      Full warfarin instructions:  7.5 mg every day   Next INR check:  2/19/2025               Telephone call with Melissa who verbalizes understanding and agrees to plan    Lab visit scheduled    Education provided: Please call back if any changes to your diet, medications or how you've been taking warfarin    Plan made per [facility] anticoagulation protocol    Jenniffer Loera RN  1/29/2025  Anticoagulation Clinic  [facility] for routing messages: mikki PALUMBO  [facility] patient phone line: [phone]        _______________________________________________________________________     Anticoagulation Episode Summary       Current INR goal:  2.0-3.0   TTR:  73.6% (1 y)   Target end date:   Indefinite   Send INR reminders to:  AJ PALUMBO    Indications    Long term current use of anticoagulant therapy [Z79.01]  Chronic atrial fibrillation (HCC) [I48.20]             Comments:  --             Anticoagulation Care Providers       Provider Role Specialty Phone number    Cayla Gregorio MD [facility] [phone]

## 2025-01-29 NOTE — PATIENT INSTRUCTIONS
Patient Education   Preparing for Your Surgery  For Adults  Getting started  In most cases, a nurse will call to review your health history and instructions. They will give you an arrival time based on your scheduled surgery time. Please be ready to share:  Your doctor's clinic name and phone number  Your medical, surgical, and anesthesia history  A list of allergies and sensitivities  A list of medicines, including herbal treatments and over-the-counter drugs  Whether the patient has a legal guardian (ask how to send us the papers in advance)  Note: You may not receive a call if you were seen at our PAC (Preoperative [facility] Center).  Please tell us if you're pregnant--or if there's any chance you might be pregnant. Some surgeries may injure a fetus (unborn baby), so they require a pregnancy test. Surgeries that are safe for a fetus don't always need a test, and you can choose whether to have one.   Preparing for surgery  Within 10 to 30 days of surgery: Have a pre-op exam (sometimes called an H&P, or History and Physical). This can be done at a clinic or pre-operative center.  If you're having a , you may not need this exam. Talk to your care team.  At your pre-op exam, talk to your care team about all medicines you take. (This includes CBD oil and any drugs, such as THC, marijuana, and other forms of cannabis.) If you need to stop any medicine before surgery, ask when to start taking it again.  This is for your safety. Many medicines and drugs can make you bleed too much during surgery. Some change how well surgery (anesthesia) drugs work.  Call your insurance company to let them know you're having surgery. (If you don't have insurance, call [phone].)  Call your clinic if there's any change in your health. This includes a scrape or scratch near the surgery site, or any signs of a cold (sore throat, runny nose, cough, rash, fever).  Eating and drinking guidelines  For your safety: Unless  your surgeon tells you otherwise, follow the guidelines below.  Eat and drink as normal until 8 hours before you arrive for surgery. After that, no food or milk. You can spit out gum when you arrive.  Drink clear liquids until 2 hours before you arrive. These are liquids you can see through, like water, Gatorade, and Propel Water. They also include plain black coffee and tea (no cream or milk).  No alcohol for 24 hours before you arrive. The night before surgery, stop any drinks that contain THC.  If your care team tells you to take medicine on the morning of surgery, it's okay to take it with a sip of water. No other medicines or drugs are allowed (including CBD oil)--follow your care team's instructions.  If you have questions the day of surgery, call your hospital or surgery center.   Preventing infection  Shower or bathe the night before and the morning of surgery. Follow the instructions your clinic gave you. (If no instructions, use regular soap.)  Don't shave or clip hair near your surgery site. We'll remove the hair if needed.  Don't smoke or vape the morning of surgery. No chewing tobacco for 6 hours before you arrive. A nicotine patch is okay. You may spit out nicotine gum when you arrive.  For some surgeries, the surgeon will tell you to fully quit smoking and nicotine.  We will make every effort to keep you safe from infection. We will:  Clean our hands often with soap and water (or an alcohol-based hand rub).  Clean the skin at your surgery site with a special soap that kills germs.  Give you a special gown to keep you warm. (Cold raises the risk of infection.)  Wear hair covers, masks, gowns, and gloves during surgery.  Give antibiotic medicine, if prescribed. Not all surgeries need this medicine.  What to bring on the day of surgery  Photo ID and insurance card  Copy of your health care directive, if you have one  Glasses and hearing aids (bring cases)  You can't wear contacts during surgery  Inhaler  and eye drops, if you use them (tell us about these when you arrive)  CPAP machine or breathing device, if you use them  A few personal items, if spending the night  If you have . . .  A pacemaker, ICD (cardiac defibrillator), or other implant: Bring the ID card.  An implanted stimulator: Bring the remote control.  A legal guardian: Bring a copy of the certified (court-stamped) guardianship papers.  Please remove any jewelry, including body piercings. Leave jewelry and other valuables at home.  If you're going home the day of surgery  You must have a responsible adult drive you home. They should stay with you overnight as well.  If you don't have someone to stay with you, and you aren't safe to go home alone, we may keep you overnight. Insurance often won't pay for this.  After surgery  If it's hard to control your pain or you need more pain medicine, please call your surgeon's office.  Questions?   If you have any questions for your care team, list them here:   ____________________________________________________________________________________________________________________________________________________________________________________________________________________________________________________________  For informational purposes only. Not to replace the advice of your health care provider. Copyright   2003, 2019 [employer]. All rights reserved. Clinically reviewed by Aurelio Vega MD. [employer] [id number] - REV 08/24.     Patient Education   Preparing for Your Surgery  For Adults  Getting started  In most cases, a nurse will call to review your health history and instructions. They will give you an arrival time based on your scheduled surgery time. Please be ready to share:  Your doctor's clinic name and phone number  Your medical, surgical, and anesthesia history  A list of allergies and sensitivities  A list of medicines, including herbal treatments and over-the-counter drugs  Whether the  patient has a legal guardian (ask how to send us the papers in advance)  Note: You may not receive a call if you were seen at our PAC ([facility]operative [facility] Center).  Please tell us if you're pregnant--or if there's any chance you might be pregnant. Some surgeries may injure a fetus (unborn baby), so they require a pregnancy test. Surgeries that are safe for a fetus don't always need a test, and you can choose whether to have one.   Preparing for surgery  Within 10 to 30 days of surgery: Have a pre-op exam (sometimes called an H&P, or History and Physical). This can be done at a clinic or pre-operative center.  If you're having a , you may not need this exam. Talk to your care team.  At your pre-op exam, talk to your care team about all medicines you take. (This includes CBD oil and any drugs, such as THC, marijuana, and other forms of cannabis.) If you need to stop any medicine before surgery, ask when to start taking it again.  This is for your safety. Many medicines and drugs can make you bleed too much during surgery. Some change how well surgery (anesthesia) drugs work.  Call your insurance company to let them know you're having surgery. (If you don't have insurance, call [phone].)  Call your clinic if there's any change in your health. This includes a scrape or scratch near the surgery site, or any signs of a cold (sore throat, runny nose, cough, rash, fever).  Eating and drinking guidelines  For your safety: Unless your surgeon tells you otherwise, follow the guidelines below.  Eat and drink as normal until 8 hours before you arrive for surgery. After that, no food or milk. You can spit out gum when you arrive.  Drink clear liquids until 2 hours before you arrive. These are liquids you can see through, like water, Gatorade, and Propel Water. They also include plain black coffee and tea (no cream or milk).  No alcohol for 24 hours before you arrive. The night before surgery, stop any drinks  that contain THC.  If your care team tells you to take medicine on the morning of surgery, it's okay to take it with a sip of water. No other medicines or drugs are allowed (including CBD oil)--follow your care team's instructions.  If you have questions the day of surgery, call your hospital or surgery center.   Preventing infection  Shower or bathe the night before and the morning of surgery. Follow the instructions your clinic gave you. (If no instructions, use regular soap.)  Don't shave or clip hair near your surgery site. We'll remove the hair if needed.  Don't smoke or vape the morning of surgery. No chewing tobacco for 6 hours before you arrive. A nicotine patch is okay. You may spit out nicotine gum when you arrive.  For some surgeries, the surgeon will tell you to fully quit smoking and nicotine.  We will make every effort to keep you safe from infection. We will:  Clean our hands often with soap and water (or an alcohol-based hand rub).  Clean the skin at your surgery site with a special soap that kills germs.  Give you a special gown to keep you warm. (Cold raises the risk of infection.)  Wear hair covers, masks, gowns, and gloves during surgery.  Give antibiotic medicine, if prescribed. Not all surgeries need this medicine.  What to bring on the day of surgery  Photo ID and insurance card  Copy of your health care directive, if you have one  Glasses and hearing aids (bring cases)  You can't wear contacts during surgery  Inhaler and eye drops, if you use them (tell us about these when you arrive)  CPAP machine or breathing device, if you use them  A few personal items, if spending the night  If you have . . .  A pacemaker, ICD (cardiac defibrillator), or other implant: Bring the ID card.  An implanted stimulator: Bring the remote control.  A legal guardian: Bring a copy of the certified (court-stamped) guardianship papers.  Please remove any jewelry, including body piercings. Leave jewelry and other  valuables at home.  If you're going home the day of surgery  You must have a responsible adult drive you home. They should stay with you overnight as well.  If you don't have someone to stay with you, and you aren't safe to go home alone, we may keep you overnight. Insurance often won't pay for this.  After surgery  If it's hard to control your pain or you need more pain medicine, please call your surgeon's office.  Questions?   If you have any questions for your care team, list them here:   ____________________________________________________________________________________________________________________________________________________________________________________________________________________________________________________________  For informational purposes only. Not to replace the advice of your health care provider. Copyright   2003, 2019 [employer]. All rights reserved. Clinically reviewed by Aurelio Vega MD. [employer] [id number] - REV 08/24.

## 2025-01-29 NOTE — PROGRESS NOTES
Preoperative Evaluation  [facility]  [address] [facility] [address]  Phone: [phone]  Primary Provider: Cayla Gregorio MD  Pre-op Performing Provider: Gilmer Lynn PA-C  Jan 29, 2025 1/28/2025   Surgical Information   What procedure is being done? Cataract surgery on left eye   Facility or Hospital where procedure/surgery will be performed: [facility], [facility]   Who is doing the procedure / surgery? Dr. Riedel   Date of surgery / procedure: February 18, 2025   Time of surgery / procedure: Not set yet   Where do you plan to recover after surgery? at home with family     Fax number for surgical facility: Note does not need to be faxed, will be available electronically in Epic.    Assessment & Plan     The proposed surgical procedure is considered LOW risk.      ICD-10-CM    1. Preop general physical exam  Z01.818 Basic metabolic panel  (Ca, Cl, CO2, Creat, Gluc, K, Na, BUN)     Basic metabolic panel  (Ca, Cl, CO2, Creat, Gluc, K, Na, BUN)      2. Cataract of left eye, unspecified cataract type  H26.9       3. Type 2 diabetes mellitus with stage 2 chronic kidney disease, without long-term current use of insulin (H)  E11.22     N18.2       4. Chronic atrial fibrillation (HCC)  I48.20 INR point of care (finger stick)      5. Chronic kidney disease, stage 4 (severe) (H)  N18.4       6. Long term current use of anticoagulant therapy  Z79.01 INR point of care (finger stick)            - No identified additional risk factors other than previously addressed         Recommendation  Approval given to proceed with proposed procedure, without further diagnostic evaluation.    Subjective   Ed is a 79 year old, presenting for the following:  Pre-Op Exam          1/29/2025     8:36 AM   Additional Questions   Roomed by nicholas   Accompanied by wife         1/29/2025     8:36 AM   Patient Reported Additional Medications   Patient reports taking the following new  medications no     HPI related to upcoming procedure: left eye cataract        1/28/2025   Pre-Op Questionnaire   Have you ever had a heart attack or stroke? No   Have you ever had surgery on your heart or blood vessels, such as a stent placement, a coronary artery bypass, or surgery on an artery in your head, neck, heart, or legs? No   Do you have chest pain with activity? No   Do you have a history of heart failure? No   Do you currently have a cold, bronchitis or symptoms of other infection? No   Do you have a cough, shortness of breath, or wheezing? No   Do you or anyone in your family have previous history of blood clots? No   Do you or does anyone in your family have a serious bleeding problem such as prolonged bleeding following surgeries or cuts? No   Have you ever had problems with anemia or been told to take iron pills? No   Have you had any abnormal blood loss such as black, tarry or bloody stools? No   Have you ever had a blood transfusion? No   Are you willing to have a blood transfusion if it is medically needed before, during, or after your surgery? Yes   Have you or any of your relatives ever had problems with anesthesia? No   Do you have sleep apnea, excessive snoring or daytime drowsiness? No   Do you have any artifical heart valves or other implanted medical devices like a pacemaker, defibrillator, or continuous glucose monitor? No   Do you have artificial joints? No   Are you allergic to latex? No     Health Care Directive  Patient does not have a Health Care Directive: Discussed advance care planning with patient; information given to patient to review.    Preoperative Review of    reviewed - no record of controlled substances prescribed.      Status of Chronic Conditions:  See problem list for active medical problems.  Problems all longstanding and stable, except as noted/documented.  See ROS for pertinent symptoms related to these conditions.    Patient Active Problem List    Diagnosis  Date Noted    Renal hypertension 11/19/2010     Priority: High    Hyperlipidemia LDL goal <70      Priority: High            Chronic atrial fibrillation (HCC)      Priority: High     [facility]      Venous (peripheral) insufficiency 11/14/2024     Priority: Medium    Chronic kidney disease, stage 4 (severe) (H) 11/14/2024     Priority: Medium    Sensorineural hearing loss, bilateral 03/26/2024     Priority: Medium    Open-angle glaucoma of right eye, severe stage 12/07/2023     Priority: Medium     [facility] Dr Patrick Riedel      Open-angle glaucoma of left eye, mild stage 12/07/2023     Priority: Medium     Dr Riedel      Umbilical hernia without obstruction and without gangrene 01/27/2023     Priority: Medium    Chronic heart failure with preserved ejection fraction (H) 09/06/2022     Priority: Medium    Allergic rhinitis, unspecified seasonality, unspecified trigger 03/09/2022     Priority: Medium    Gait disturbance 01/17/2022     Priority: Medium    Type 2 diabetes mellitus with stage 2 chronic kidney disease, without long-term current use of insulin (H) 02/26/2020     Priority: Medium    Diabetic neuropathy (H)      Priority: Medium    Varicose veins of legs 08/08/2018     Priority: Medium    NAFLD (nonalcoholic fatty liver disease)      Priority: Medium    Pulmonary emphysema, unspecified emphysema type (H) 02/15/2017     Priority: Medium    Non morbid obesity due to excess calories 07/20/2016     Priority: Medium    Long term current use of anticoagulant therapy 03/24/2016     Priority: Medium    Tobacco abuse      Priority: Medium      Past Medical History:   Diagnosis Date    AF (atrial fibrillation) (H)     CHF (congestive heart failure), NYHA class II (H)     Chronic heart failure with preserved ejection fraction (H)     CKD (chronic kidney disease) stage 2, GFR 60-89 ml/min 2018    COPD (chronic obstructive pulmonary disease) (H)     Diabetes  mellitus, type 2 (H) 04/22/2015    Diabetic neuropathy (H)     Elevated alkaline phosphatase level     Fracture, scapula     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/2013    Hepatitis C     HTN (hypertension)     Hyperlipidemia     Morbid obesity (H)     NAFLD (nonalcoholic fatty liver disease)     Polycythemia secondary to smoking     Primary open angle glaucoma of left eye, mild stage 12/06/2023    Dr Riedel    Primary open-angle glaucoma, right eye, severe stage 12/06/2023    [facility] , Dr Patrick Riedel    Sensorineural hearing loss, bilateral 03/26/2024    Squamous cell carcinoma of skin, unspecified     Umbilical hernia without obstruction and without gangrene     Varicose veins of legs 08/08/2018     Past Surgical History:   Procedure Laterality Date    ANGIOGRAM  08/2006    normal    CATARACT EXTRACTION Right     LUMBAR DISC SURGERY  11/11/2021    TONSILLECTOMY & ADENOIDECTOMY       Current Outpatient Medications   Medication Sig Dispense Refill    albuterol (PROAIR HFA/PROVENTIL HFA/VENTOLIN HFA) 108 (90 Base) MCG/ACT inhaler Inhale 1-2 puffs into the lungs every 4 hours as needed for shortness of breath / dyspnea 18 g 2    atorvastatin (LIPITOR) 20 MG tablet Take 1 tablet (20 mg) by mouth daily 90 tablet 3    baclofen (LIORESAL) 10 MG tablet Take 1 tablet (10 mg) by mouth 3 times daily as needed for muscle spasms. 30 tablet 1    dimenhyDRINATE (DRAMAMINE PO) Take by mouth as needed. Takes every morning      empagliflozin (JARDIANCE) 10 MG TABS tablet Take 1 tablet (10 mg) by mouth daily 90 tablet 3    fluticasone (FLONASE) 50 MCG/ACT nasal spray Spray 1 spray into both nostrils daily 16 g 11    fluticasone (FLOVENT DISKUS) 50 MCG/ACT inhaler Inhale 1 puff into the lungs every 12 hours. Using PRN      furosemide (LASIX) 20 MG tablet Take 2 tablets (40 mg) by mouth daily. 180 tablet 0    glipiZIDE (GLUCOTROL) 5 MG tablet TAKE 1 TABLET BY MOUTH TWICE DAILY BEFORE MEAL(S) 180 tablet 3    ketoconazole (NIZORAL) 2 %  "external shampoo Use every 2 days to wash scalp, ears, face, and under arms. Leave on skin for 3-5 minutes before rinsing. 120 mL 11    latanoprost (XALATAN) 0.005 % ophthalmic solution Place 1 drop into both eyes at bedtime.      lisinopril (ZESTRIL) 40 MG tablet Take 1 tablet (40 mg) by mouth daily 90 tablet 3    metFORMIN (GLUCOPHAGE) 1000 MG tablet TAKE 1 TABLET BY MOUTH 2 TIMES DAILY WITH MEALS 180 tablet 3    metoprolol tartrate (LOPRESSOR) 100 MG tablet Take 2 tablets (200 mg) by mouth 2 times daily 360 tablet 3    warfarin ANTICOAGULANT (COUMADIN) 2.5 MG tablet TAKE 3 TABLETS (7.5MG) every day OR AS DIRECTED BY INR CLINIC. 270 tablet 1       Allergies   Allergen Reactions    Nkda [No Known Drug Allergy]         Social History     Tobacco Use    Smoking status: Every Day     Current packs/day: 0.50     Average packs/day: 0.5 packs/day for 45.0 years (22.5 ttl pk-yrs)     Types: Cigarettes     Passive exposure: Current    Smokeless tobacco: Never    Tobacco comments:     cut down to 0.5 ppd   Substance Use Topics    Alcohol use: Yes     Alcohol/week: 0.0 - 4.2 standard drinks of alcohol       History   Drug Use No             Review of Systems  Constitutional, HEENT, cardiovascular, pulmonary, gi and gu systems are negative, except as otherwise noted.    Objective    /75   Pulse 72   Temp (!) 96  F (35.6  C) (Tympanic)   Resp 16   Ht 1.753 m (5' 9\")   Wt 98.4 kg (217 lb)   SpO2 96%   BMI 32.05 kg/m     Estimated body mass index is 32.05 kg/m  as calculated from the following:    Height as of this encounter: 1.753 m (5' 9\").    Weight as of this encounter: 98.4 kg (217 lb).  Physical Exam  GENERAL: alert and no distress  EYES: Eyes grossly normal to inspection, PERRL and conjunctivae and sclerae normal  HENT: ear canals and TM's normal, nose and mouth without ulcers or lesions  NECK: no adenopathy, no asymmetry, masses, or scars  RESP: lungs clear to auscultation - no rales, rhonchi or wheezes  CV: " regular rate and rhythm, normal S1 S2, no S3 or S4, no murmur, click or rub, no peripheral edema  ABDOMEN: soft, nontender, no hepatosplenomegaly, no masses and bowel sounds normal  MS: no gross musculoskeletal defects noted, no edema  SKIN: no suspicious lesions or rashes  NEURO: Normal strength and tone, mentation intact and speech normal  PSYCH: mentation appears normal, affect normal/bright    Recent Labs   Lab Test 12/18/24  0903 11/14/24  0835 11/01/24  1243 10/23/24  0833 09/05/24  1119 09/04/24  0906   HGB 17.9*  --   --   --   --  17.7   PLT  --   --   --   --   --  178   INR 1.9* 2.8*  --  2.38*   < > 2.3*   NA  --  141  --  144  --  143   POTASSIUM  --  4.9 4.7 6.0*   < > 5.9*   CR  --  0.96  --  1.03  --  1.03   A1C 6.3*  --   --   --   --  6.2*    < > = values in this interval not displayed.        Diagnostics  Labs pending at this time.  Results will be reviewed when available.   No EKG required for low risk surgery (cataract, skin procedure, breast biopsy, etc).    Revised Cardiac Risk Index (RCRI)  The patient has the following serious cardiovascular risks for perioperative complications:   - No serious cardiac risks = 0 points     RCRI Interpretation: 0 points: Class I (very low risk - 0.4% complication rate)         Signed Electronically by: Gilmer Lynn PA-C  A copy of this evaluation report is provided to the requesting physician.

## 2025-02-05 DIAGNOSIS — I48.20 CHRONIC ATRIAL FIBRILLATION (H): ICD-10-CM

## 2025-02-05 RX ORDER — WARFARIN SODIUM 2.5 MG/1
TABLET ORAL
Qty: 270 TABLET | Refills: 1 | Status: SHIPPED | OUTPATIENT
Start: 2025-02-05

## 2025-02-05 NOTE — TELEPHONE ENCOUNTER
ANTICOAGULATION MANAGEMENT:  Medication Refill    Anticoagulation Summary  As of 1/29/2025      Warfarin maintenance plan:  7.5 mg (2.5 mg x 3) every day   Next INR check:  2/19/2025   Target end date:  Indefinite    Indications    Long term current use of anticoagulant therapy [Z79.01]  Chronic atrial fibrillation (HCC) [I48.20]                 Anticoagulation Care Providers       Provider Role Specialty Phone number    Cayla Gregorio MD Referring Family Medicine [phone]            Refill Criteria    Visit with referring provider/group: Meets criteria: visit within referring provider group in the last 15 months on 1/29/25    [facility] referral last signed: 08/12/2024; within last year:  Yes    Lab monitoring is up to date (not exceeding 2 weeks overdue): Yes    Edward meets all criteria for refill. Rx instructions and quantity match patient's current dosing plan. Warfarin 90 day supply with 1 refill granted per [facility] protocol     Ban Matamoros RN  Anticoagulation Clinic

## 2025-02-11 DIAGNOSIS — I50.32 CHRONIC HEART FAILURE WITH PRESERVED EJECTION FRACTION (H): ICD-10-CM

## 2025-02-11 RX ORDER — FUROSEMIDE 20 MG/1
40 TABLET ORAL DAILY
Qty: 180 TABLET | Refills: 2 | Status: SHIPPED | OUTPATIENT
Start: 2025-02-11

## 2025-03-11 ENCOUNTER — OFFICE VISIT (OUTPATIENT)
Dept: DERMATOLOGY | Facility: CLINIC | Age: 80
End: 2025-03-11
Payer: MEDICARE

## 2025-03-11 DIAGNOSIS — Z85.828 HISTORY OF SKIN CANCER: ICD-10-CM

## 2025-03-11 DIAGNOSIS — L81.4 LENTIGO: ICD-10-CM

## 2025-03-11 DIAGNOSIS — D23.9 DERMAL NEVUS: Primary | ICD-10-CM

## 2025-03-11 DIAGNOSIS — L82.1 SEBORRHEIC KERATOSES: ICD-10-CM

## 2025-03-11 DIAGNOSIS — D18.01 ANGIOMA OF SKIN: ICD-10-CM

## 2025-03-11 PROCEDURE — 99213 OFFICE O/P EST LOW 20 MIN: CPT | Performed by: DERMATOLOGY

## 2025-03-11 NOTE — PROGRESS NOTES
Keith Desir is an extremely pleasant 79 year old year old male patient here today for hx of skin cancer.  Patient has no other skin complaints today.  Remainder of the HPI, Meds, PMH, Allergies, FH, and SH was reviewed in chart.      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/2013    Hepatitis C     HTN (hypertension)     Hyperlipidemia     Morbid obesity (H)     NAFLD (nonalcoholic fatty liver disease)     Polycythemia secondary to smoking     Primary open angle glaucoma of left eye, mild stage 12/06/2023    Dr Riedel    Primary open-angle glaucoma, right eye, severe stage 12/06/2023    [facility] , Dr Patrick Riedel    Sensorineural hearing loss, bilateral 03/26/2024    Squamous cell carcinoma of skin, unspecified     Umbilical hernia without obstruction and without gangrene     Varicose veins of legs 08/08/2018       Past Surgical History:   Procedure Laterality Date    ANGIOGRAM  08/2006    normal    CATARACT EXTRACTION Right     LUMBAR DISC SURGERY  11/11/2021    TONSILLECTOMY & ADENOIDECTOMY          Family History   Problem Relation Age of Onset    Diabetes Mother     Cerebrovascular Disease Father     Heart Disease Brother         pacer     Deep Vein Thrombosis Son     Hodgkin's lymphoma Son     Cancer No family hx of     Glaucoma No family hx of     Macular Degeneration No family hx of        Social History     Socioeconomic History    Marital status:      Spouse name: Melissa    Number of children: 4    Years of education: 7    Highest education level: Not on file    Occupational History    Occupation: foundry work     Employer: DISABLED     Employer: RETIRED   Tobacco Use    Smoking status: Every Day     Current packs/day: 0.50     Average packs/day: 0.5 packs/day for 45.0 years (22.5 ttl pk-yrs)     Types: Cigarettes     Passive exposure: Current    Smokeless tobacco: Never    Tobacco comments:     cut down to 0.5 ppd   Vaping Use    Vaping status: Never Used   Substance and Sexual Activity    Alcohol use: Yes     Alcohol/week: 0.0 - 4.2 standard drinks of alcohol    Drug use: No    Sexual activity: Not Currently     Partners: Female   Other Topics Concern    Parent/sibling w/ CABG, MI or angioplasty before 65F 55M? Not Asked   Social History Narrative    Not on file     Social Drivers of Health     Financial Resource Strain: Low Risk  (9/9/2024)    Financial Resource Strain     Within the past 12 months, have you or your family members you live with been unable to get utilities (heat, electricity) when it was really needed?: No   Food Insecurity: Low Risk  (9/9/2024)    Food Insecurity     Within the past 12 months, did you worry that your food would run out before you got money to buy more?: No     Within the past 12 months, did the food you bought just not last and you didn t have money to get more?: No   Transportation Needs: Low Risk  (9/9/2024)    Transportation Needs     Within the past 12 months, has lack of transportation kept you from medical appointments, getting your medicines, non-medical meetings or appointments, work, or from getting things that you need?: No   Physical Activity: Inactive (9/9/2024)    Exercise Vital Sign     Days of Exercise per Week: 0 days     Minutes of Exercise per Session: 0 min   Stress: Patient Declined (9/9/2024)    [employer] [place] [employer] - Occupational Stress Questionnaire     Feeling of Stress : Patient declined   Social Connections: Unknown (9/9/2024)    Social Connection and Isolation Panel [NHANES]     Frequency  of Communication with Friends and Family: Not on file     Frequency of Social Gatherings with Friends and Family: Patient declined     Attends Buddhism Services: Not on file     Active Member of Clubs or Organizations: Not on file     Attends Club or Organization Meetings: Not on file     Marital Status: Not on file   Interpersonal Safety: Low Risk  (1/29/2024)    Interpersonal Safety     Do you feel physically and emotionally safe where you currently live?: Yes     Within the past 12 months, have you been hit, slapped, kicked or otherwise physically hurt by someone?: No     Within the past 12 months, have you been humiliated or emotionally abused in other ways by your partner or ex-partner?: No   Housing Stability: Low Risk  (9/9/2024)    Housing Stability     Do you have housing? : Yes     Are you worried about losing your housing?: No       Outpatient Encounter Medications as of 3/11/2025   Medication Sig Dispense Refill    albuterol (PROAIR HFA/PROVENTIL HFA/VENTOLIN HFA) 108 (90 Base) MCG/ACT inhaler Inhale 1-2 puffs into the lungs every 4 hours as needed for shortness of breath / dyspnea 18 g 2    atorvastatin (LIPITOR) 20 MG tablet Take 1 tablet (20 mg) by mouth daily 90 tablet 3    baclofen (LIORESAL) 10 MG tablet Take 1 tablet (10 mg) by mouth 3 times daily as needed for muscle spasms. 30 tablet 1    dimenhyDRINATE (DRAMAMINE PO) Take by mouth as needed. Takes every morning      empagliflozin (JARDIANCE) 10 MG TABS tablet Take 1 tablet (10 mg) by mouth daily 90 tablet 3    fluticasone (FLONASE) 50 MCG/ACT nasal spray Spray 1 spray into both nostrils daily 16 g 11    fluticasone (FLOVENT DISKUS) 50 MCG/ACT inhaler Inhale 1 puff into the lungs every 12 hours. Using PRN      furosemide (LASIX) 20 MG tablet Take 2 tablets (40 mg) by mouth daily. 180 tablet 2    glipiZIDE (GLUCOTROL) 5 MG tablet TAKE 1 TABLET BY MOUTH TWICE DAILY BEFORE MEAL(S) 180 tablet 3    ketoconazole (NIZORAL) 2 % external shampoo Use  every 2 days to wash scalp, ears, face, and under arms. Leave on skin for 3-5 minutes before rinsing. 120 mL 11    latanoprost (XALATAN) 0.005 % ophthalmic solution Place 1 drop into both eyes at bedtime.      lisinopril (ZESTRIL) 40 MG tablet Take 1 tablet (40 mg) by mouth daily 90 tablet 3    metFORMIN (GLUCOPHAGE) 1000 MG tablet TAKE 1 TABLET BY MOUTH 2 TIMES DAILY WITH MEALS 180 tablet 3    metoprolol tartrate (LOPRESSOR) 100 MG tablet Take 2 tablets (200 mg) by mouth 2 times daily 360 tablet 3    warfarin ANTICOAGULANT (COUMADIN) 2.5 MG tablet TAKE 3 TABLETS (7.5MG) by mouth every day OR AS DIRECTED BY INR CLINIC. 270 tablet 1     No facility-administered encounter medications on file as of 3/11/2025.             O:   NAD, WDWN, Alert & Oriented, Mood & Affect wnl, Vitals stable   General appearance normal   Vitals stable   Alert, oriented and in no acute distress        Stuck on papules and brown macules on trunk and ext   Red papules on trunk  Flesh colored papules on trunk     The remainder of the full exam was normal; the following areas were examined:  conjunctiva/lids, , neck, peripheral vascular system, abdomen, lymph nodes, digits/nails, eccrine and apocrine glands, scalp/hair, face, neck, chest, abdomen, buttocks, back, RUE, LUE, RLE, LLE       Eyes: Conjunctivae/lids:Normal     ENT: Lips, mucosa: normal    MSK:Normal    Cardiovascular: peripheral edema none    Pulm: Breathing Normal    Lymph Nodes: No Head and Neck Lymphadenopathy     Neuro/Psych: Orientation:Alert and Orientedx3 ; Mood/Affect:normal       A/P:  1. Seborrheic keratosis, lentigo, angioma, dermal nevus, hx of skin cancer  It was a pleasure speaking to Keith Desir today.  Previous clinic notes and pertinent laboratory tests were reviewed prior to Keith Desir's visit.  Signs and Symptoms of skin cancer discussed with patient.  Patient encouraged to perform monthly skin exams.  UV precautions reviewed with patient.  Risks of  non-melanoma skin cancer discussed with patient   Return to clinic 12 months

## 2025-03-11 NOTE — LETTER
3/11/2025      Keith Desir  [id number] [facility] [id number]      Dear Colleague,    Thank you for referring your patient, Keith Desir, to the [facility]. Please see a copy of my visit note below.    Keith Desir is an extremely pleasant 79 year old year old male patient here today for hx of skin cancer.  Patient has no other skin complaints today.  Remainder of the HPI, Meds, PMH, Allergies, FH, and SH was reviewed in chart.      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     Hepatitis C      HTN (hypertension)      Hyperlipidemia      Morbid obesity (H)      NAFLD (nonalcoholic fatty liver disease)      Polycythemia secondary to smoking      Primary open angle glaucoma of left eye, mild stage 12/06/2023    Dr Riedel     Primary open-angle glaucoma, right eye, severe stage 12/06/2023    [facility] , Dr Patrick Riedel     Sensorineural hearing loss, bilateral 03/26/2024     Squamous cell carcinoma of skin, unspecified      Umbilical hernia without obstruction and without gangrene      Varicose veins of legs 08/08/2018       Past Surgical History:   Procedure Laterality Date     ANGIOGRAM  08/2006    normal     CATARACT EXTRACTION Right      LUMBAR DISC SURGERY  11/11/2021     TONSILLECTOMY & ADENOIDECTOMY          Family History   Problem Relation Age of Onset     Diabetes Mother      Cerebrovascular Disease Father      Heart Disease Brother         pacer      Deep Vein Thrombosis Son      Hodgkin's lymphoma Son      Cancer No family hx  of      Glaucoma No family hx of      Macular Degeneration No family hx of        Social History     Socioeconomic History     Marital status:      Spouse name: Melissa     Number of children: 4     Years of education: 7     Highest education level: Not on file   Occupational History     Occupation: foundry work     Employer: DISABLED     Employer: RETIRED   Tobacco Use     Smoking status: Every Day     Current packs/day: 0.50     Average packs/day: 0.5 packs/day for 45.0 years (22.5 ttl pk-yrs)     Types: Cigarettes     Passive exposure: Current     Smokeless tobacco: Never     Tobacco comments:     cut down to 0.5 ppd   Vaping Use     Vaping status: Never Used   Substance and Sexual Activity     Alcohol use: Yes     Alcohol/week: 0.0 - 4.2 standard drinks of alcohol     Drug use: No     Sexual activity: Not Currently     Partners: Female   Other Topics Concern     Parent/sibling w/ CABG, MI or angioplasty before 65F 55M? Not Asked   Social History Narrative     Not on file     Social Drivers of Health     Financial Resource Strain: Low Risk  (9/9/2024)    Financial Resource Strain      Within the past 12 months, have you or your family members you live with been unable to get utilities (heat, electricity) when it was really needed?: No   Food Insecurity: Low Risk  (9/9/2024)    Food Insecurity      Within the past 12 months, did you worry that your food would run out before you got money to buy more?: No      Within the past 12 months, did the food you bought just not last and you didn t have money to get more?: No   Transportation Needs: Low Risk  (9/9/2024)    Transportation Needs      Within the past 12 months, has lack of transportation kept you from medical appointments, getting your medicines, non-medical meetings or appointments, work, or from getting things that you need?: No   Physical Activity: Inactive (9/9/2024)    Exercise Vital Sign      Days of Exercise per Week: 0 days      Minutes of Exercise  per Session: 0 min   Stress: Patient Declined (9/9/2024)    [employer] [place] [place] [employer] - Occupational Stress Questionnaire      Feeling of Stress : Patient declined   Social Connections: Unknown (9/9/2024)    Social Connection and Isolation Panel [NHANES]      Frequency of Communication with Friends and Family: Not on file      Frequency of Social Gatherings with Friends and Family: Patient declined      Attends Church Services: Not on file      Active Member of Clubs or Organizations: Not on file      Attends Club or Organization Meetings: Not on file      Marital Status: Not on file   Interpersonal Safety: Low Risk  (1/29/2024)    Interpersonal Safety      Do you feel physically and emotionally safe where you currently live?: Yes      Within the past 12 months, have you been hit, slapped, kicked or otherwise physically hurt by someone?: No      Within the past 12 months, have you been humiliated or emotionally abused in other ways by your partner or ex-partner?: No   Housing Stability: Low Risk  (9/9/2024)    Housing Stability      Do you have housing? : Yes      Are you worried about losing your housing?: No       Outpatient Encounter Medications as of 3/11/2025   Medication Sig Dispense Refill     albuterol (PROAIR HFA/PROVENTIL HFA/VENTOLIN HFA) 108 (90 Base) MCG/ACT inhaler Inhale 1-2 puffs into the lungs every 4 hours as needed for shortness of breath / dyspnea 18 g 2     atorvastatin (LIPITOR) 20 MG tablet Take 1 tablet (20 mg) by mouth daily 90 tablet 3     baclofen (LIORESAL) 10 MG tablet Take 1 tablet (10 mg) by mouth 3 times daily as needed for muscle spasms. 30 tablet 1     dimenhyDRINATE (DRAMAMINE PO) Take by mouth as needed. Takes every morning       empagliflozin (JARDIANCE) 10 MG TABS tablet Take 1 tablet (10 mg) by mouth daily 90 tablet 3     fluticasone (FLONASE) 50 MCG/ACT nasal spray Spray 1 spray into both nostrils daily 16 g 11     fluticasone (FLOVENT DISKUS) 50 MCG/ACT  inhaler Inhale 1 puff into the lungs every 12 hours. Using PRN       furosemide (LASIX) 20 MG tablet Take 2 tablets (40 mg) by mouth daily. 180 tablet 2     glipiZIDE (GLUCOTROL) 5 MG tablet TAKE 1 TABLET BY MOUTH TWICE DAILY BEFORE MEAL(S) 180 tablet 3     ketoconazole (NIZORAL) 2 % external shampoo Use every 2 days to wash scalp, ears, face, and under arms. Leave on skin for 3-5 minutes before rinsing. 120 mL 11     latanoprost (XALATAN) 0.005 % ophthalmic solution Place 1 drop into both eyes at bedtime.       lisinopril (ZESTRIL) 40 MG tablet Take 1 tablet (40 mg) by mouth daily 90 tablet 3     metFORMIN (GLUCOPHAGE) 1000 MG tablet TAKE 1 TABLET BY MOUTH 2 TIMES DAILY WITH MEALS 180 tablet 3     metoprolol tartrate (LOPRESSOR) 100 MG tablet Take 2 tablets (200 mg) by mouth 2 times daily 360 tablet 3     warfarin ANTICOAGULANT (COUMADIN) 2.5 MG tablet TAKE 3 TABLETS (7.5MG) by mouth every day OR AS DIRECTED BY INR CLINIC. 270 tablet 1     No facility-administered encounter medications on file as of 3/11/2025.             O:   NAD, WDWN, Alert & Oriented, Mood & Affect wnl, Vitals stable   General appearance normal   Vitals stable   Alert, oriented and in no acute distress        Stuck on papules and brown macules on trunk and ext   Red papules on trunk  Flesh colored papules on trunk     The remainder of the full exam was normal; the following areas were examined:  conjunctiva/lids, , neck, peripheral vascular system, abdomen, lymph nodes, digits/nails, eccrine and apocrine glands, scalp/hair, face, neck, chest, abdomen, buttocks, back, RUE, LUE, RLE, LLE       Eyes: Conjunctivae/lids:Normal     ENT: Lips, mucosa: normal    MSK:Normal    Cardiovascular: peripheral edema none    Pulm: Breathing Normal    Lymph Nodes: No Head and Neck Lymphadenopathy     Neuro/Psych: Orientation:Alert and Orientedx3 ; Mood/Affect:normal       A/P:  1. Seborrheic keratosis, lentigo, angioma, dermal nevus, hx of skin cancer  It was a  pleasure speaking to Keith Desir today.  Previous clinic notes and pertinent laboratory tests were reviewed prior to Keith Desir's visit.  Signs and Symptoms of skin cancer discussed with patient.  Patient encouraged to perform monthly skin exams.  UV precautions reviewed with patient.  Risks of non-melanoma skin cancer discussed with patient   Return to clinic 12 months      Again, thank you for allowing me to participate in the care of your patient.        Sincerely,        Heriberto Preciado MD    Electronically signed

## 2025-03-11 NOTE — PATIENT INSTRUCTIONS
Proper skin care from [facility]:    -Eliminate harsh soaps as they strip the natural oils from the skin, often resulting in dry itchy skin ( i.e. Dial, Zest, Rach Spring)  -Use mild soaps such as Cetaphil or Dove Sensitive Skin in the shower. You do not need to use soap on arms, legs, and trunk every time you shower unless visibly soiled.   -Avoid hot or cold showers.  -After showering, lightly dry off and apply moisturizing within 2-3 minutes. This will help trap moisture in the skin.   -Aggressive use of a moisturizer at least 1-2 times a day to the entire body (including -Vanicream, Cetaphil, Aquaphor or Cerave) and moisturize hands after every washing.  -We recommend using moisturizers that come in a tub that needs to be scooped out, not a pump. This has more of an oil base. It will hold moisture in your skin much better than a water base moisturizer. The above recommended are non-pore clogging.      Wear a sunscreen with at least SPF 30 on your face, ears, neck and V of the chest daily. Wear sunscreen on other areas of the body if those areas are exposed to the sun throughout the day. Sunscreens can contain physical and/or chemical blockers. Physical blockers are less likely to clog pores, these include zinc oxide and titanium dioxide. Reapply every two hour and after swimming.     Sunscreen examples: https://www.ewg.org/sunscreen/    UV radiation  UVA radiation remains constant throughout the day and throughout the year. It is a longer wavelength than UVB and therefore penetrates deeper into the skin leading to immediate and delayed tanning, photoaging, and skin cancer. 70-80% of UVA and UVB radiation occurs between the hours of 10am-2pm.  UVB radiation  UVB radiation causes the most harmful effects and is more significant during the summer months. However, snow and ice can reflect UVB radiation leading to skin damage during the winter months as well. UVB radiation is responsible for  tanning, burning, inflammation, delayed erythema (pinkness), pigmentation (brown spots), and skin cancer.     I recommend self monthly full body exams and yearly full body exams with a dermatology provider. If you develop a new or changing lesion please follow up for examination. Most skin cancers are pink and scaly or pink and pearly. However, we do see blue/brown/black skin cancers.  Consider the ABCDEs of melanoma when giving yourself your monthly full body exam ( don't forget the groin, buttocks, feet, toes, etc). A-asymmetry, B-borders, C-color, D-diameter, E-elevation or evolving. If you see any of these changes please follow up in clinic. If you cannot see your back I recommend purchasing a hand held mirror to use with a larger wall mirror.       Checking for Skin Cancer  You can find cancer early by checking your skin each month. There are 3 kinds of skin cancer. They are melanoma, basal cell carcinoma, and squamous cell carcinoma. Doing monthly skin checks is the best way to find new marks or skin changes. Follow the instructions below for checking your skin.   The ABCDEs of checking moles for melanoma   Check your moles or growths for signs of melanoma using ABCDE:   Asymmetry: the sides of the mole or growth don t match  Border: the edges are ragged, notched, or blurred  Color: the color within the mole or growth varies  Diameter: the mole or growth is larger than 6 mm (size of a pencil eraser)  Evolving: the size, shape, or color of the mole or growth is changing (evolving is not shown in the images below)    Checking for other types of skin cancer  Basal cell carcinoma or squamous cell carcinoma have symptoms such as:     A spot or mole that looks different from all other marks on your skin  Changes in how an area feels, such as itching, tenderness, or pain  Changes in the skin's surface, such as oozing, bleeding, or scaliness  A sore that does not heal  New swelling or redness beyond the border of a  mole    Who s at risk?  Anyone can get skin cancer. But you are at greater risk if you have:   Fair skin, light-colored hair, or light-colored eyes  Many moles or abnormal moles on your skin  A history of sunburns from sunlight or tanning beds  A family history of skin cancer  A history of exposure to radiation or chemicals  A weakened immune system  If you have had skin cancer in the past, you are at risk for recurring skin cancer.   How to check your skin  Do your monthly skin checkups in front of a full-length mirror. Check all parts of your body, including your:   Head (ears, face, neck, and scalp)  Torso (front, back, and sides)  Arms (tops, undersides, upper, and lower armpits)  Hands (palms, backs, and fingers, including under the nails)  Buttocks and genitals  Legs (front, back, and sides)  Feet (tops, soles, toes, including under the nails, and between toes)  If you have a lot of moles, take digital photos of them each month. Make sure to take photos both up close and from a distance. These can help you see if any moles change over time.   Most skin changes are not cancer. But if you see any changes in your skin, call your doctor right away. Only he or she can diagnose a problem. If you have skin cancer, seeing your doctor can be the first step toward getting the treatment that could save your life.   [employer] last reviewed this educational content on 4/1/2019 2000-2020 [employer]. [address] [place] [address], [place] [address]. All rights reserved. This information is not intended as a substitute for professional medical care. Always follow your healthcare professional's instructions.       When should I call my doctor?  If you are worsening or not improving, please, contact us or seek urgent care as noted below.     Who should I call with questions (adults)?    [facility] [phone]  For urgent needs outside of business hours call the [facility] at  [phone] and ask for the dermatology resident on call to be paged  If this is a medical emergency and you are unable to reach an ER, Call [phone]      If you need a prescription refill, please contact your pharmacy. Refills are approved or denied by our Physicians during normal business hours, Monday through Friday.  Per office policy, refills will not be granted if you have not been seen within the past year (or sooner depending on the condition).

## 2025-03-12 ENCOUNTER — LAB (OUTPATIENT)
Dept: LAB | Facility: CLINIC | Age: 80
End: 2025-03-12
Payer: MEDICARE

## 2025-03-12 ENCOUNTER — ANTICOAGULATION THERAPY VISIT (OUTPATIENT)
Dept: ANTICOAGULATION | Facility: CLINIC | Age: 80
End: 2025-03-12

## 2025-03-12 DIAGNOSIS — Z79.01 LONG TERM CURRENT USE OF ANTICOAGULANT THERAPY: Primary | ICD-10-CM

## 2025-03-12 DIAGNOSIS — N18.2 TYPE 2 DIABETES MELLITUS WITH STAGE 2 CHRONIC KIDNEY DISEASE, WITHOUT LONG-TERM CURRENT USE OF INSULIN (H): Primary | ICD-10-CM

## 2025-03-12 DIAGNOSIS — I48.20 CHRONIC ATRIAL FIBRILLATION (H): ICD-10-CM

## 2025-03-12 DIAGNOSIS — Z79.01 LONG TERM CURRENT USE OF ANTICOAGULANT THERAPY: ICD-10-CM

## 2025-03-12 DIAGNOSIS — E11.22 TYPE 2 DIABETES MELLITUS WITH STAGE 2 CHRONIC KIDNEY DISEASE, WITHOUT LONG-TERM CURRENT USE OF INSULIN (H): Primary | ICD-10-CM

## 2025-03-12 LAB
EST. AVERAGE GLUCOSE BLD GHB EST-MCNC: 137 MG/DL
HBA1C MFR BLD: 6.4 % (ref 0–5.6)
INR BLD: 2.7 (ref 0.9–1.1)

## 2025-03-12 PROCEDURE — 36416 COLLJ CAPILLARY BLOOD SPEC: CPT

## 2025-03-12 PROCEDURE — 85610 PROTHROMBIN TIME: CPT

## 2025-03-12 PROCEDURE — 83036 HEMOGLOBIN GLYCOSYLATED A1C: CPT

## 2025-03-12 PROCEDURE — 36415 COLL VENOUS BLD VENIPUNCTURE: CPT

## 2025-03-12 NOTE — PROGRESS NOTES
ANTICOAGULATION MANAGEMENT     Keith Desir 79 year old male is on warfarin with therapeutic INR result. (Goal INR 2.0-3.0)    Recent labs: (last 7 days)     03/12/25  0906   INR 2.7*       ASSESSMENT     Warfarin Lab Questionnaire    Warfarin Doses Last 7 Days      3/11/2025     9:59 AM   Dose in Tablet or Mg   TAB or MG? tablet (tab)     Pt Rptd Dose GRISELDA MONDAY TUESDAY WED THURS FRIDAY SATURDAY   3/11/2025   9:59 AM 3 3 3 3 3 3 3         3/11/2025   Warfarin Lab Questionnaire   Missed doses within past 14 days? No   Changes in diet or alcohol within past 14 days? No   Medication changes since last result? No   Injuries or illness since last result? No   New shortness of breath, severe headaches or sudden changes in vision since last result? No   Abnormal bleeding since last result? No   Upcoming surgery, procedure? Yes   Please explain, date scheduled? Cataract surgery on April 8.     Previous result: Therapeutic last visit; previously outside of goal range  Additional findings: None     PLAN     Recommended plan for no diet, medication or health factor changes affecting INR     Dosing Instructions: Continue your current warfarin dose with next INR in 4 weeks   - scheduled for 5 weeks.    Summary  As of 3/12/2025      Full warfarin instructions:  7.5 mg every day   Next INR check:  4/9/2025               Telephone call with Melissa who verbalizes understanding and agrees to plan    Lab visit scheduled    Education provided: Please call back if any changes to your diet, medications or how you've been taking warfarin    Plan made per [facility] anticoagulation protocol    Jenniffer Loera RN  3/12/2025  Anticoagulation Clinic  [place] for routing messages: mikki [place]  [facility] patient phone line: [phone]        _______________________________________________________________________     Anticoagulation Episode Summary       Current INR goal:  2.0-3.0   TTR:  85.1% (1 y)   Target end date:  Indefinite   Send INR  reminders to:  [facility]    Indications    Long term current use of anticoagulant therapy [Z79.01]  Chronic atrial fibrillation (HCC) [I48.20]             Comments:  --             Anticoagulation Care Providers       Provider Role Specialty Phone number    Cayla Gregorio MD Referring Family Medicine [phone]

## 2025-03-18 ENCOUNTER — OFFICE VISIT (OUTPATIENT)
Dept: FAMILY MEDICINE | Facility: CLINIC | Age: 80
End: 2025-03-18
Payer: MEDICARE

## 2025-03-18 VITALS
WEIGHT: 220 LBS | HEIGHT: 68 IN | SYSTOLIC BLOOD PRESSURE: 127 MMHG | TEMPERATURE: 97.4 F | RESPIRATION RATE: 20 BRPM | OXYGEN SATURATION: 91 % | HEART RATE: 62 BPM | DIASTOLIC BLOOD PRESSURE: 90 MMHG | BODY MASS INDEX: 33.34 KG/M2

## 2025-03-18 DIAGNOSIS — E11.22 TYPE 2 DIABETES MELLITUS WITH STAGE 2 CHRONIC KIDNEY DISEASE, WITHOUT LONG-TERM CURRENT USE OF INSULIN (H): Primary | ICD-10-CM

## 2025-03-18 DIAGNOSIS — I50.32 CHRONIC HEART FAILURE WITH PRESERVED EJECTION FRACTION (H): ICD-10-CM

## 2025-03-18 DIAGNOSIS — I12.9 RENAL HYPERTENSION: ICD-10-CM

## 2025-03-18 DIAGNOSIS — J43.9 PULMONARY EMPHYSEMA, UNSPECIFIED EMPHYSEMA TYPE (H): ICD-10-CM

## 2025-03-18 DIAGNOSIS — E78.5 HYPERLIPIDEMIA LDL GOAL <70: ICD-10-CM

## 2025-03-18 DIAGNOSIS — N18.2 TYPE 2 DIABETES MELLITUS WITH STAGE 2 CHRONIC KIDNEY DISEASE, WITHOUT LONG-TERM CURRENT USE OF INSULIN (H): Primary | ICD-10-CM

## 2025-03-18 DIAGNOSIS — I48.20 CHRONIC ATRIAL FIBRILLATION (H): ICD-10-CM

## 2025-03-18 DIAGNOSIS — E66.01 MORBID (SEVERE) OBESITY DUE TO EXCESS CALORIES (H): ICD-10-CM

## 2025-03-18 PROBLEM — I50.33 ACUTE ON CHRONIC DIASTOLIC CONGESTIVE HEART FAILURE (H): Status: ACTIVE | Noted: 2025-03-18

## 2025-03-18 PROBLEM — N18.4 CHRONIC KIDNEY DISEASE, STAGE 4 (SEVERE) (H): Status: RESOLVED | Noted: 2024-11-14 | Resolved: 2025-03-18

## 2025-03-18 PROBLEM — I50.33 ACUTE ON CHRONIC DIASTOLIC CONGESTIVE HEART FAILURE (H): Status: RESOLVED | Noted: 2025-03-18 | Resolved: 2025-03-18

## 2025-03-18 LAB
ANION GAP SERPL CALCULATED.3IONS-SCNC: 11 MMOL/L (ref 7–15)
BUN SERPL-MCNC: 28.5 MG/DL (ref 8–23)
CALCIUM SERPL-MCNC: 9.8 MG/DL (ref 8.8–10.4)
CHLORIDE SERPL-SCNC: 98 MMOL/L (ref 98–107)
CREAT SERPL-MCNC: 1.03 MG/DL (ref 0.67–1.17)
CREAT UR-MCNC: 40.7 MG/DL
EGFRCR SERPLBLD CKD-EPI 2021: 75 ML/MIN/1.73M2
GLUCOSE SERPL-MCNC: 141 MG/DL (ref 70–99)
HCO3 SERPL-SCNC: 29 MMOL/L (ref 22–29)
HGB BLD-MCNC: 17.1 G/DL (ref 13.3–17.7)
MICROALBUMIN UR-MCNC: <12 MG/L
MICROALBUMIN/CREAT UR: NORMAL MG/G{CREAT}
POTASSIUM SERPL-SCNC: 4.4 MMOL/L (ref 3.4–5.3)
SODIUM SERPL-SCNC: 138 MMOL/L (ref 135–145)

## 2025-03-18 PROCEDURE — G2211 COMPLEX E/M VISIT ADD ON: HCPCS | Performed by: NURSE PRACTITIONER

## 2025-03-18 PROCEDURE — 80048 BASIC METABOLIC PNL TOTAL CA: CPT | Performed by: NURSE PRACTITIONER

## 2025-03-18 PROCEDURE — 85018 HEMOGLOBIN: CPT | Performed by: NURSE PRACTITIONER

## 2025-03-18 PROCEDURE — 3074F SYST BP LT 130 MM HG: CPT | Performed by: NURSE PRACTITIONER

## 2025-03-18 PROCEDURE — 99214 OFFICE O/P EST MOD 30 MIN: CPT | Performed by: NURSE PRACTITIONER

## 2025-03-18 PROCEDURE — 82043 UR ALBUMIN QUANTITATIVE: CPT | Performed by: NURSE PRACTITIONER

## 2025-03-18 PROCEDURE — 1126F AMNT PAIN NOTED NONE PRSNT: CPT | Performed by: NURSE PRACTITIONER

## 2025-03-18 PROCEDURE — 99207 PR FOOT EXAM NO CHARGE: CPT | Performed by: NURSE PRACTITIONER

## 2025-03-18 PROCEDURE — 82570 ASSAY OF URINE CREATININE: CPT | Performed by: NURSE PRACTITIONER

## 2025-03-18 PROCEDURE — 3080F DIAST BP >= 90 MM HG: CPT | Performed by: NURSE PRACTITIONER

## 2025-03-18 PROCEDURE — 36415 COLL VENOUS BLD VENIPUNCTURE: CPT | Performed by: NURSE PRACTITIONER

## 2025-03-18 RX ORDER — PREDNISOLONE ACETATE 10 MG/ML
SUSPENSION/ DROPS OPHTHALMIC
COMMUNITY
Start: 2025-01-24

## 2025-03-18 RX ORDER — MOXIFLOXACIN 5 MG/ML
SOLUTION/ DROPS OPHTHALMIC
COMMUNITY
Start: 2025-01-24

## 2025-03-18 RX ORDER — KETOROLAC TROMETHAMINE 5 MG/ML
SOLUTION OPHTHALMIC
COMMUNITY
Start: 2025-01-24

## 2025-03-18 RX ORDER — GLIPIZIDE 5 MG/1
TABLET ORAL
Qty: 180 TABLET | Refills: 3 | Status: SHIPPED | OUTPATIENT
Start: 2025-03-18

## 2025-03-18 RX ORDER — ATORVASTATIN CALCIUM 20 MG/1
20 TABLET, FILM COATED ORAL DAILY
Qty: 90 TABLET | Refills: 3 | Status: SHIPPED | OUTPATIENT
Start: 2025-03-18

## 2025-03-18 RX ORDER — METOPROLOL TARTRATE 100 MG/1
200 TABLET ORAL 2 TIMES DAILY
Qty: 360 TABLET | Refills: 3 | Status: SHIPPED | OUTPATIENT
Start: 2025-03-18

## 2025-03-18 ASSESSMENT — PAIN SCALES - GENERAL: PAINLEVEL_OUTOF10: NO PAIN (0)

## 2025-03-18 NOTE — PROGRESS NOTES
Assessment & Plan     Type 2 diabetes mellitus with stage 2 chronic kidney disease, without long-term current use of insulin (H)  -stable, continue   - empagliflozin (JARDIANCE) 10 MG TABS tablet; Take 1 tablet (10 mg) by mouth daily.  - metFORMIN (GLUCOPHAGE) 1000 MG tablet; TAKE 1 TABLET BY MOUTH 2 TIMES DAILY WITH MEALS  - glipiZIDE (GLUCOTROL) 5 MG tablet; TAKE 1 TABLET BY MOUTH TWICE DAILY BEFORE MEAL(S)  - FOOT EXAM    Chronic heart failure with preserved ejection fraction (H)  -last EF 55%  - metoprolol tartrate (LOPRESSOR) 100 MG tablet; Take 2 tablets (200 mg) by mouth 2 times daily.  - Hemoglobin  - Basic metabolic panel    Chronic atrial fibrillation (H)  -on warfarin anticoagulation  -follows with [facility]  - metoprolol tartrate (LOPRESSOR) 100 MG tablet; Take 2 tablets (200 mg) by mouth 2 times daily.  - Basic metabolic panel    Renal hypertension    - metoprolol tartrate (LOPRESSOR) 100 MG tablet; Take 2 tablets (200 mg) by mouth 2 times daily.  - Albumin Random Urine Quantitative with Creat Ratio    Hyperlipidemia LDL goal <70  -continue  - atorvastatin (LIPITOR) 20 MG tablet; Take 1 tablet (20 mg) by mouth daily.    Pulmonary emphysema, unspecified emphysema type (H)  -no complaints of shortness of breath or cough. Reminded of importance of quitting smoking to preserve lung function.    Morbid (severe) obesity due to excess calories (H)  -diet and exercise guidelines discussed      The longitudinal plan of care for the diagnosis(es)/condition(s) as documented were addressed during this visit. Due to the added complexity in care, I will continue to support Ed in the subsequent management and with ongoing continuity of care.            Subjective   Ed is a 79 year old, presenting for the following health issues:  Establish Care and Diabetes        3/18/2025     8:33 AM   Additional Questions   Roomed by Alycia RODRIGUEZ   Accompanied by Wife- Sharron     Patient presents to clinic to establish care and review  "diabetes medication. Eating usually one snack and one meal a day. Does not check his blood glucose. Has not had episodes or symptoms of hypoglycemia.     Does not like wearing his compression stocking because his toes turn more purple when wearing them.       History of Present Illness       COPD:  He presents for follow up of COPD.  Overall, COPD symptoms are stable since last visit.  He has same as usual fatigue or shortness of breath with exertion and same as usual shortness of breath at rest.  He sometimes coughs and does not have change in sputum. No recent fever. He can walk less than 10 feet without stopping to rest. He can walk 1 flights of stairs without resting. The patient has had no ED, urgent care, or hospital admissions because of COPD since the last visit.     Diabetes:   He presents for follow up of diabetes.    He is not checking blood glucose.         He has no concerns regarding his diabetes at this time.  He is having numbness in feet.            Hyperlipidemia:  He presents for follow up of hyperlipidemia.   He is taking medication to lower cholesterol. He is not having myalgia or other side effects to statin medications.    Hypertension: He presents for follow up of hypertension.  He does not check blood pressure  regularly outside of the clinic. Outpatient blood pressures have not been over 140/90. He follows a low salt diet.     He eats 0-1 servings of fruits and vegetables daily.He consumes 1 sweetened beverage(s) daily.He exercises with enough effort to increase his heart rate 9 or less minutes per day.  He exercises with enough effort to increase his heart rate 3 or less days per week.   He is taking medications regularly.                      Objective    /90   Pulse 62   Temp 97.4  F (36.3  C) (Tympanic)   Resp 20   Ht 1.715 m (5' 7.5\")   Wt 99.8 kg (220 lb)   SpO2 (!) 91%   BMI 33.95 kg/m    Body mass index is 33.95 kg/m .  Physical Exam  Constitutional:       Appearance: " Normal appearance. He is not ill-appearing.   HENT:      Right Ear: External ear normal.      Left Ear: External ear normal.      Nose: Nose normal.   Cardiovascular:      Rate and Rhythm: Normal rate and regular rhythm.      Pulses:           Dorsalis pedis pulses are 1+ on the right side and 1+ on the left side.        Posterior tibial pulses are 1+ on the right side and 1+ on the left side.      Heart sounds: Normal heart sounds. No murmur heard.     No friction rub. No gallop.   Pulmonary:      Effort: Pulmonary effort is normal.      Breath sounds: Decreased air movement present. Decreased breath sounds present. No wheezing, rhonchi or rales.   Musculoskeletal:      Cervical back: Normal range of motion and neck supple.      Right lower le+ Edema present.      Left lower le+ Edema present.   Feet:      Right foot:      Protective Sensation: 10 sites tested.  3 sites sensed.      Skin integrity: Dry skin present.      Toenail Condition: Right toenails are normal.      Left foot:      Protective Sensation: 10 sites tested.  3 sites sensed.      Skin integrity: Dry skin present.      Toenail Condition: Left toenails are normal.   Skin:     General: Skin is warm and dry.   Neurological:      General: No focal deficit present.      Mental Status: He is alert and oriented to person, place, and time.   Psychiatric:         Mood and Affect: Mood normal.         Behavior: Behavior normal.         Thought Content: Thought content normal.         Judgment: Judgment normal.                    Signed Electronically by: IDANIA Milligan CNP

## 2025-03-25 ENCOUNTER — OFFICE VISIT (OUTPATIENT)
Dept: FAMILY MEDICINE | Facility: CLINIC | Age: 80
End: 2025-03-25
Payer: MEDICARE

## 2025-03-25 VITALS
TEMPERATURE: 97.4 F | SYSTOLIC BLOOD PRESSURE: 118 MMHG | WEIGHT: 219.4 LBS | HEIGHT: 68 IN | RESPIRATION RATE: 14 BRPM | BODY MASS INDEX: 33.25 KG/M2 | DIASTOLIC BLOOD PRESSURE: 85 MMHG | HEART RATE: 67 BPM | OXYGEN SATURATION: 99 %

## 2025-03-25 DIAGNOSIS — E11.22 TYPE 2 DIABETES MELLITUS WITH STAGE 2 CHRONIC KIDNEY DISEASE, WITHOUT LONG-TERM CURRENT USE OF INSULIN (H): ICD-10-CM

## 2025-03-25 DIAGNOSIS — J43.9 PULMONARY EMPHYSEMA, UNSPECIFIED EMPHYSEMA TYPE (H): Chronic | ICD-10-CM

## 2025-03-25 DIAGNOSIS — E78.5 HYPERLIPIDEMIA LDL GOAL <70: ICD-10-CM

## 2025-03-25 DIAGNOSIS — N18.2 TYPE 2 DIABETES MELLITUS WITH STAGE 2 CHRONIC KIDNEY DISEASE, WITHOUT LONG-TERM CURRENT USE OF INSULIN (H): ICD-10-CM

## 2025-03-25 DIAGNOSIS — I48.20 CHRONIC ATRIAL FIBRILLATION (H): ICD-10-CM

## 2025-03-25 DIAGNOSIS — H26.9 CATARACT OF LEFT EYE, UNSPECIFIED CATARACT TYPE: ICD-10-CM

## 2025-03-25 DIAGNOSIS — Z01.818 PREOP GENERAL PHYSICAL EXAM: Primary | ICD-10-CM

## 2025-03-25 DIAGNOSIS — I87.2 VENOUS (PERIPHERAL) INSUFFICIENCY: ICD-10-CM

## 2025-03-25 PROCEDURE — 99214 OFFICE O/P EST MOD 30 MIN: CPT | Performed by: NURSE PRACTITIONER

## 2025-03-25 PROCEDURE — 1126F AMNT PAIN NOTED NONE PRSNT: CPT | Performed by: NURSE PRACTITIONER

## 2025-03-25 PROCEDURE — 3074F SYST BP LT 130 MM HG: CPT | Performed by: NURSE PRACTITIONER

## 2025-03-25 PROCEDURE — 3079F DIAST BP 80-89 MM HG: CPT | Performed by: NURSE PRACTITIONER

## 2025-03-25 ASSESSMENT — PAIN SCALES - GENERAL: PAINLEVEL_OUTOF10: NO PAIN (0)

## 2025-03-25 NOTE — PROGRESS NOTES
Preoperative Evaluation  [facility]  [address] [facility] [address]  Phone: [phone]  Primary Provider: IDANIA Milligan CNP  Pre-op Performing Provider: IDANIA Milligan CNP  Mar 25, 2025             3/23/2025   Surgical Information   What procedure is being done? Cataract surgery on left eye   Facility or Hospital where procedure/surgery will be performed: [facility]   Who is doing the procedure / surgery? Dr. Riedel   Date of surgery / procedure: April 8, 2025   Time of surgery / procedure: Haven t been given the time yet. Will know on April 1, 2025.   Where do you plan to recover after surgery? at home with family     Fax number for surgical facility: [phone]    Assessment & Plan     The proposed surgical procedure is considered LOW risk.    Preop general physical exam      Cataract of left eye, unspecified cataract type  -with plans for surgery    Pulmonary emphysema, unspecified emphysema type (H)  -No shortness of breath or dyspnea on exertion today. Discussed importance of tobacco cessation.     Hyperlipidemia LDL goal <70  -Last LDL 56, on atorvastatin    Type 2 diabetes mellitus with stage 2 chronic kidney disease, without long-term current use of insulin (H)  -well controlled with most recent A1C 6.4%, on metformin, glipizide, empagliflozin. No symptomatic hypoglycemia episodes.     Venous (peripheral) insufficiency  -using compression stockings as needed. No edema on exam today.     Chronic atrial fibrillation (HCC)  -rate controled, on warfarin anticoagulation therapy, most recent INR 2.7           Risks and Recommendations  The patient has the following additional risks and recommendations for perioperative complications:  Social and Substance:    - Active nicotine user, advised smoking cessation    Preoperative Medication Instructions  Antiplatelet or Anticoagulation Medication Instructions   - Bleeding risk is low for this  procedure (e.g. dental, skin, cataract).   - warfarin: Bleeding risk is minimal for this procedure (e.g. cataract, 1 to 2 dental extractions) and warfarin should be continued before procedure.     Additional Medication Instructions  Take all scheduled medications on the day of surgery    Recommendation  Approval given to proceed with proposed procedure, without further diagnostic evaluation.    Subjective   Ed is a 79 year old, presenting for the following:  Pre-Op Exam          3/25/2025     9:03 AM   Additional Questions   Roomed by Alycia RODRIGUEZ   Accompanied by Spouse      HPI: Has cataract in left eye, already had right eye done.          3/23/2025   Pre-Op Questionnaire   Have you ever had a heart attack or stroke? No   Have you ever had surgery on your heart or blood vessels, such as a stent placement, a coronary artery bypass, or surgery on an artery in your head, neck, heart, or legs? No   Do you have chest pain with activity? No   Do you have a history of heart failure? No   Do you currently have a cold, bronchitis or symptoms of other infection? No   Do you have a cough, shortness of breath, or wheezing? No   Do you or anyone in your family have previous history of blood clots? No   Do you or does anyone in your family have a serious bleeding problem such as prolonged bleeding following surgeries or cuts? No   Have you ever had problems with anemia or been told to take iron pills? No   Have you had any abnormal blood loss such as black, tarry or bloody stools? No   Have you ever had a blood transfusion? No   Are you willing to have a blood transfusion if it is medically needed before, during, or after your surgery? Yes   Have you or any of your relatives ever had problems with anesthesia? No   Do you have sleep apnea, excessive snoring or daytime drowsiness? No   Do you have any artifical heart valves or other implanted medical devices like a pacemaker, defibrillator, or continuous glucose monitor? No   Do you  have artificial joints? No   Are you allergic to latex? No     Health Care Directive  Patient does not have a Health Care Directive: Discussed advance care planning with patient; however, patient declined at this time.    Preoperative Review of    reviewed - no record of controlled substances prescribed.          Patient Active Problem List    Diagnosis Date Noted    Renal hypertension 11/19/2010     Priority: High    Hyperlipidemia LDL goal <70      Priority: High            Chronic atrial fibrillation (HCC)      Priority: High     [facility]      Venous (peripheral) insufficiency 11/14/2024     Priority: Medium    Sensorineural hearing loss, bilateral 03/26/2024     Priority: Medium    Open-angle glaucoma of right eye, severe stage 12/07/2023     Priority: Medium     [facility] Dr Patrick Riedel      Open-angle glaucoma of left eye, mild stage 12/07/2023     Priority: Medium     Dr Riedel      Umbilical hernia without obstruction and without gangrene 01/27/2023     Priority: Medium    Chronic heart failure with preserved ejection fraction (H) 09/06/2022     Priority: Medium    Allergic rhinitis, unspecified seasonality, unspecified trigger 03/09/2022     Priority: Medium    Gait disturbance 01/17/2022     Priority: Medium    Type 2 diabetes mellitus with stage 2 chronic kidney disease, without long-term current use of insulin (H) 02/26/2020     Priority: Medium    Diabetic neuropathy (H)      Priority: Medium    Varicose veins of legs 08/08/2018     Priority: Medium    NAFLD (nonalcoholic fatty liver disease)      Priority: Medium    Pulmonary emphysema, unspecified emphysema type (H) 02/15/2017     Priority: Medium    Non morbid obesity due to excess calories 07/20/2016     Priority: Medium    Long term current use of anticoagulant therapy 03/24/2016     Priority: Medium    Tobacco abuse      Priority: Medium      Past Medical History:   Diagnosis Date     AF (atrial fibrillation) (H)     CHF (congestive heart failure), NYHA class II (H)     Chronic heart failure with preserved ejection fraction (H)     CKD (chronic kidney disease) stage 2, GFR 60-89 ml/min 2018    COPD (chronic obstructive pulmonary disease) (H)     Diabetes mellitus, type 2 (H) 04/22/2015    Diabetic neuropathy (H)     Elevated alkaline phosphatase level     Fracture, scapula     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/2013    Hepatitis C     HTN (hypertension)     Hyperlipidemia     Morbid obesity (H)     NAFLD (nonalcoholic fatty liver disease)     Polycythemia secondary to smoking     Primary open angle glaucoma of left eye, mild stage 12/06/2023    Dr Riedel    Primary open-angle glaucoma, right eye, severe stage 12/06/2023    [facility] , Dr Patrick Riedel    Sensorineural hearing loss, bilateral 03/26/2024    Squamous cell carcinoma of skin, unspecified     Umbilical hernia without obstruction and without gangrene     Varicose veins of legs 08/08/2018     Past Surgical History:   Procedure Laterality Date    ANGIOGRAM  08/2006    normal    BACK SURGERY      CATARACT EXTRACTION Right     LUMBAR DISC SURGERY  11/11/2021    TONSILLECTOMY & ADENOIDECTOMY       Current Outpatient Medications   Medication Sig Dispense Refill    albuterol (PROAIR HFA/PROVENTIL HFA/VENTOLIN HFA) 108 (90 Base) MCG/ACT inhaler Inhale 1-2 puffs into the lungs every 4 hours as needed for shortness of breath / dyspnea 18 g 2    atorvastatin (LIPITOR) 20 MG tablet Take 1 tablet (20 mg) by mouth daily. 90 tablet 3    dimenhyDRINATE (DRAMAMINE PO) Take by mouth as needed. Takes every morning      empagliflozin (JARDIANCE) 10 MG TABS tablet Take 1 tablet (10 mg) by mouth daily. 90 tablet 3    fluticasone (FLONASE) 50 MCG/ACT nasal spray Spray 1 spray into both nostrils daily 16 g 11    fluticasone (FLOVENT DISKUS) 50 MCG/ACT inhaler Inhale 1 puff into the lungs every 12 hours. Using PRN      furosemide (LASIX) 20 MG tablet Take 2 tablets  (40 mg) by mouth daily. 180 tablet 2    glipiZIDE (GLUCOTROL) 5 MG tablet TAKE 1 TABLET BY MOUTH TWICE DAILY BEFORE MEAL(S) 180 tablet 3    ketoconazole (NIZORAL) 2 % external shampoo Use every 2 days to wash scalp, ears, face, and under arms. Leave on skin for 3-5 minutes before rinsing. 120 mL 11    ketorolac (ACULAR) 0.5 % ophthalmic solution Instill 1 drop into Left Eye 4 times a day beginning one (1) day prior to surgery. Continue until the bottle is empty, but do not exceed four (4) weeks.* (Patient not taking: Reported on 3/25/2025)      latanoprost (XALATAN) 0.005 % ophthalmic solution Place 1 drop into both eyes at bedtime.      lisinopril (ZESTRIL) 40 MG tablet Take 1 tablet (40 mg) by mouth daily 90 tablet 3    metFORMIN (GLUCOPHAGE) 1000 MG tablet TAKE 1 TABLET BY MOUTH 2 TIMES DAILY WITH MEALS 180 tablet 3    metoprolol tartrate (LOPRESSOR) 100 MG tablet Take 2 tablets (200 mg) by mouth 2 times daily. 360 tablet 3    moxifloxacin (VIGAMOX) 0.5 % ophthalmic solution Instill 1 drop into Left Eye 4 times a day beginning one (1) day prior to surgery. Continue until the bottle is empty, but do not exceed four (4) weeks.* (Patient not taking: Reported on 3/25/2025)      prednisoLONE acetate (PRED FORTE) 1 % ophthalmic suspension Instill 1 drop into Left Eye 4 times a day beginning one (1) day prior to surgery. Continue until the bottle is empty, but do not exceed four (4) weeks.* (Patient not taking: Reported on 3/25/2025)      warfarin ANTICOAGULANT (COUMADIN) 2.5 MG tablet TAKE 3 TABLETS (7.5MG) by mouth every day OR AS DIRECTED BY INR CLINIC. 270 tablet 1       Allergies   Allergen Reactions    Nkda [No Known Drug Allergy]         Social History     Tobacco Use    Smoking status: Every Day     Current packs/day: 0.50     Average packs/day: 0.5 packs/day for 45.0 years (22.5 ttl pk-yrs)     Types: Cigarettes     Passive exposure: Current    Smokeless tobacco: Never    Tobacco comments:     Smoking since age  "of 15   Substance Use Topics    Alcohol use: Yes     Alcohol/week: 0.0 - 4.2 standard drinks of alcohol       History   Drug Use No               Objective    /85   Pulse 67   Temp 97.4  F (36.3  C) (Tympanic)   Resp 14   Ht 1.715 m (5' 7.5\")   Wt 99.5 kg (219 lb 6.4 oz)   SpO2 99%   BMI 33.86 kg/m     Estimated body mass index is 33.86 kg/m  as calculated from the following:    Height as of this encounter: 1.715 m (5' 7.5\").    Weight as of this encounter: 99.5 kg (219 lb 6.4 oz).  Physical Exam  GENERAL: alert and no distress  EYES: Eyes grossly normal to inspection, PERRL and conjunctivae and sclerae normal  HENT: ear canals and TM's normal, nose and mouth without ulcers or lesions  NECK: no adenopathy, no asymmetry, masses, or scars  RESP: lungs clear to auscultation - no rales, rhonchi or wheezes  RESP: no rales , no rhonchi, no wheezes, and decreased breath sounds throughout  CV: regular rate and rhythm, normal S1 S2, no S3 or S4, no murmur, click or rub, no peripheral edema  CV: irregularly irregular rhythm, normal S1 S2, no S3 or S4, no murmur, click or rub, peripheral pulses strong, no peripheral edema, and varicosities lower extremities  ABDOMEN: soft, nontender, no hepatosplenomegaly, no masses and bowel sounds normal  MS: no gross musculoskeletal defects noted, no edema  SKIN: no suspicious lesions or rashes  NEURO: Normal strength and tone, mentation intact and speech normal  PSYCH: mentation appears normal, affect normal/bright         Diagnostics  Recent Results (from the past 720 hours)   INR point of care (finger stick)    Collection Time: 03/12/25  9:06 AM   Result Value Ref Range    INR 2.7 (H) 0.9 - 1.1   HEMOGLOBIN A1C    Collection Time: 03/12/25  9:09 AM   Result Value Ref Range    Estimated Average Glucose 137 (H) <117 mg/dL    Hemoglobin A1C 6.4 (H) 0.0 - 5.6 %   Hemoglobin    Collection Time: 03/18/25  9:17 AM   Result Value Ref Range    Hemoglobin 17.1 13.3 - 17.7 g/dL   Basic " metabolic panel    Collection Time: 03/18/25  9:17 AM   Result Value Ref Range    Sodium 138 135 - 145 mmol/L    Potassium 4.4 3.4 - 5.3 mmol/L    Chloride 98 98 - 107 mmol/L    Carbon Dioxide (CO2) 29 22 - 29 mmol/L    Anion Gap 11 7 - 15 mmol/L    Urea Nitrogen 28.5 (H) 8.0 - 23.0 mg/dL    Creatinine 1.03 0.67 - 1.17 mg/dL    GFR Estimate 75 >60 mL/min/1.73m2    Calcium 9.8 8.8 - 10.4 mg/dL    Glucose 141 (H) 70 - 99 mg/dL   Albumin Random Urine Quantitative with Creat Ratio    Collection Time: 03/18/25  9:31 AM   Result Value Ref Range    Creatinine Urine mg/dL 40.7 mg/dL    Albumin Urine mg/L <12.0 mg/L    Albumin Urine mg/g Cr        No EKG required for low risk surgery (cataract, skin procedure, breast biopsy, etc).    Revised Cardiac Risk Index (RCRI)  The patient has the following serious cardiovascular risks for perioperative complications:   - No serious cardiac risks = 0 points     RCRI Interpretation: 0 points: Class I (very low risk - 0.4% complication rate)         Signed Electronically by: IDANIA Milligan CNP  A copy of this evaluation report is provided to the requesting physician.

## 2025-03-25 NOTE — PROGRESS NOTES
Preop Evaluation faxed to [facility] at [phone], Rightfax confirmed.  Nina SCHAEFER    [facility]

## 2025-03-25 NOTE — PATIENT INSTRUCTIONS
How to Take Your Medication Before Surgery  Preoperative Medication Instructions   Antiplatelet or Anticoagulation Medication Instructions   - Bleeding risk is low for this procedure (e.g. dental, skin, cataract).   - warfarin: Bleeding risk is minimal for this procedure (e.g. cataract, 1 to 2 dental extractions) and warfarin should be continued before procedure.     Additional Medication Instructions  Take all scheduled medications on the day of surgery       Patient Education   Preparing for Your Surgery  For Adults  Getting started  In most cases, a nurse will call to review your health history and instructions. They will give you an arrival time based on your scheduled surgery time. Please be ready to share:  Your doctor's clinic name and phone number  Your medical, surgical, and anesthesia history  A list of allergies and sensitivities  A list of medicines, including herbal treatments and over-the-counter drugs  Whether the patient has a legal guardian (ask how to send us the papers in advance)  Note: You may not receive a call if you were seen at our PAC (Preoperative Assessment Center).  Please tell us if you're pregnant--or if there's any chance you might be pregnant. Some surgeries may injure a fetus (unborn baby), so they require a pregnancy test. Surgeries that are safe for a fetus don't always need a test, and you can choose whether to have one.   Preparing for surgery  Within 10 to 30 days of surgery: Have a pre-op exam (sometimes called an H&P, or History and Physical). This can be done at a clinic or pre-operative center.  If you're having a , you may not need this exam. Talk to your care team.  At your pre-op exam, talk to your care team about all medicines you take. (This includes CBD oil and any drugs, such as THC, marijuana, and other forms of cannabis.) If you need to stop any medicine before surgery, ask when to start taking it again.  This is for your safety. Many medicines and drugs  can make you bleed too much during surgery. Some change how well surgery (anesthesia) drugs work.  Call your insurance company to let them know you're having surgery. (If you don't have insurance, call [phone].)  Call your clinic if there's any change in your health. This includes a scrape or scratch near the surgery site, or any signs of a cold (sore throat, runny nose, cough, rash, fever).  Eating and drinking guidelines  For your safety: Unless your surgeon tells you otherwise, follow the guidelines below.  Eat and drink as normal until 8 hours before you arrive for surgery. After that, no food or milk. You can spit out gum when you arrive.  Drink clear liquids until 2 hours before you arrive. These are liquids you can see through, like water, Gatorade, and Propel Water. They also include plain black coffee and tea (no cream or milk).  No alcohol for 24 hours before you arrive. The night before surgery, stop any drinks that contain THC.  If your care team tells you to take medicine on the morning of surgery, it's okay to take it with a sip of water. No other medicines or drugs are allowed (including CBD oil)--follow your care team's instructions.  If you have questions the day of surgery, call your hospital or surgery center.   Preventing infection  Shower or bathe the night before and the morning of surgery. Follow the instructions your clinic gave you. (If no instructions, use regular soap.)  Don't shave or clip hair near your surgery site. We'll remove the hair if needed.  Don't smoke or vape the morning of surgery. No chewing tobacco for 6 hours before you arrive. A nicotine patch is okay. You may spit out nicotine gum when you arrive.  For some surgeries, the surgeon will tell you to fully quit smoking and nicotine.  We will make every effort to keep you safe from infection. We will:  Clean our hands often with soap and water (or an alcohol-based hand rub).  Clean the skin at your surgery site with a  special soap that kills germs.  Give you a special gown to keep you warm. (Cold raises the risk of infection.)  Wear hair covers, masks, gowns, and gloves during surgery.  Give antibiotic medicine, if prescribed. Not all surgeries need this medicine.  What to bring on the day of surgery  Photo ID and insurance card  Copy of your health care directive, if you have one  Glasses and hearing aids (bring cases)  You can't wear contacts during surgery  Inhaler and eye drops, if you use them (tell us about these when you arrive)  CPAP machine or breathing device, if you use them  A few personal items, if spending the night  If you have . . .  A pacemaker, ICD (cardiac defibrillator), or other implant: Bring the ID card.  An implanted stimulator: Bring the remote control.  A legal guardian: Bring a copy of the certified (court-stamped) guardianship papers.  Please remove any jewelry, including body piercings. Leave jewelry and other valuables at home.  If you're going home the day of surgery  You must have a responsible adult drive you home. They should stay with you overnight as well.  If you don't have someone to stay with you, and you aren't safe to go home alone, we may keep you overnight. Insurance often won't pay for this.  After surgery  If it's hard to control your pain or you need more pain medicine, please call your surgeon's office.  Questions?   If you have any questions for your care team, list them here:   ____________________________________________________________________________________________________________________________________________________________________________________________________________________________________________________________  For informational purposes only. Not to replace the advice of your health care provider. Copyright   2003, 2019 [facility]. All rights reserved. Clinically reviewed by Aurelio Vega MD. [employer] [id number] - REV 08/24.

## 2025-04-08 ENCOUNTER — TRANSFERRED RECORDS (OUTPATIENT)
Dept: HEALTH INFORMATION MANAGEMENT | Facility: CLINIC | Age: 80
End: 2025-04-08
Payer: MEDICARE

## 2025-04-16 ENCOUNTER — ANTICOAGULATION THERAPY VISIT (OUTPATIENT)
Dept: ANTICOAGULATION | Facility: CLINIC | Age: 80
End: 2025-04-16

## 2025-04-16 ENCOUNTER — LAB (OUTPATIENT)
Dept: LAB | Facility: CLINIC | Age: 80
End: 2025-04-16
Payer: MEDICARE

## 2025-04-16 DIAGNOSIS — Z79.01 LONG TERM CURRENT USE OF ANTICOAGULANT THERAPY: Primary | ICD-10-CM

## 2025-04-16 DIAGNOSIS — I48.20 CHRONIC ATRIAL FIBRILLATION (H): ICD-10-CM

## 2025-04-16 DIAGNOSIS — Z79.01 LONG TERM CURRENT USE OF ANTICOAGULANT THERAPY: ICD-10-CM

## 2025-04-16 LAB — INR BLD: 1.9 (ref 0.9–1.1)

## 2025-04-16 PROCEDURE — 85610 PROTHROMBIN TIME: CPT

## 2025-04-16 PROCEDURE — 36416 COLLJ CAPILLARY BLOOD SPEC: CPT

## 2025-04-16 NOTE — PROGRESS NOTES
ANTICOAGULATION MANAGEMENT     Keith Desir 79 year old male is on warfarin with subtherapeutic INR result. (Goal INR 2.0-3.0)    Recent labs: (last 7 days)     04/16/25  1046   INR 1.9*       ASSESSMENT     Warfarin Lab Questionnaire    Warfarin Doses Last 7 Days      4/15/2025    12:18 PM   Dose in Tablet or Mg   TAB or MG? tablet (tab)     Pt Rptd Dose GRISELDA MONDAY TUESDAY WED THURS FRIDAY SATURDAY   4/15/2025  12:18 PM 3 3 3 3 3 3 3         4/15/2025   Warfarin Lab Questionnaire   Missed doses within past 14 days? No   Changes in diet or alcohol within past 14 days? No   Medication changes since last result? No   Injuries or illness since last result? No   New shortness of breath, severe headaches or sudden changes in vision since last result? No   Abnormal bleeding since last result? No   Upcoming surgery, procedure? No     Previous result: Therapeutic last 2(+) visits  Additional findings: None       PLAN     Recommended plan for no diet, medication or health factor changes affecting INR     Dosing Instructions: Continue your current warfarin dose with next INR in 2 weeks       Summary  As of 4/16/2025      Full warfarin instructions:  7.5 mg every day   Next INR check:  5/28/2025               Telephone call with Melissa who verbalizes understanding and agrees to plan    Patient elected to schedule next visit in 6 weeks    Education provided: Goal range and lab monitoring: goal range and significance of current result    Plan made per [facility] anticoagulation protocol    Ban Matamoros, RN  4/16/2025  Anticoagulation Clinic  [employer] [place] for routing messages: mikki PALUMBO  [facility] patient phone line: [phone]        _______________________________________________________________________     Anticoagulation Episode Summary       Current INR goal:  2.0-3.0   TTR:  88.7% (1 y)   Target end date:  Indefinite   Send INR reminders to:  AJ PALUMBO    Indications    Long term current use of anticoagulant therapy  [Z79.01]  Chronic atrial fibrillation (HCC) [I48.20]             Comments:  --             Anticoagulation Care Providers       Provider Role Specialty Phone number    Cayla Gregorio MD [facility] Medicine [phone]

## 2025-05-21 ENCOUNTER — OFFICE VISIT (OUTPATIENT)
Dept: OTHER | Facility: CLINIC | Age: 80
End: 2025-05-21
Attending: INTERNAL MEDICINE
Payer: MEDICARE

## 2025-05-21 VITALS
DIASTOLIC BLOOD PRESSURE: 85 MMHG | WEIGHT: 218 LBS | OXYGEN SATURATION: 93 % | HEART RATE: 85 BPM | SYSTOLIC BLOOD PRESSURE: 126 MMHG | BODY MASS INDEX: 33.64 KG/M2

## 2025-05-21 DIAGNOSIS — Z72.0 TOBACCO ABUSE: ICD-10-CM

## 2025-05-21 DIAGNOSIS — I89.0 SECONDARY LYMPHEDEMA: ICD-10-CM

## 2025-05-21 DIAGNOSIS — I87.2 VENOUS (PERIPHERAL) INSUFFICIENCY: ICD-10-CM

## 2025-05-21 PROCEDURE — 3079F DIAST BP 80-89 MM HG: CPT | Performed by: INTERNAL MEDICINE

## 2025-05-21 PROCEDURE — 99406 BEHAV CHNG SMOKING 3-10 MIN: CPT | Performed by: INTERNAL MEDICINE

## 2025-05-21 PROCEDURE — 3074F SYST BP LT 130 MM HG: CPT | Performed by: INTERNAL MEDICINE

## 2025-05-21 PROCEDURE — G2211 COMPLEX E/M VISIT ADD ON: HCPCS | Performed by: INTERNAL MEDICINE

## 2025-05-21 PROCEDURE — 99214 OFFICE O/P EST MOD 30 MIN: CPT | Performed by: INTERNAL MEDICINE

## 2025-05-21 PROCEDURE — G0463 HOSPITAL OUTPT CLINIC VISIT: HCPCS | Performed by: INTERNAL MEDICINE

## 2025-05-21 NOTE — PROGRESS NOTES
VASCULAR MEDICAL ASSESSMENT  REFERRAL SOURCE: Jose Lopez PA   REASON FOR CONSULT:  for BLE swelling            A/P:        (I87.2) Venous (peripheral) insufficiency  (primary encounter diagnosis)  Comment: TTE reveals no dramatic decrease in EF in comparison to previously. Venous comp study dated 12/02/2024 reveals no DVT or SVT in the BLES. He has no LLE venous insufficiency. He has venous insufficiency of the Rt CFV and the GSV. When previously seen on 12/11/2024, an Rx was given for thigh high compression hosiery. He only wore it for a month as he could not stand getting them on.     Plan: I advised ACE wraps at home with his wife, MLD, or if he fails both, a LE pump. He chooses ace wraps.     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   Plan: See above. If not responding to compression then send to LE therapy.      (Z72.0) Tobacco abuse  Comment: Five minutes tobacco cessation counselling. He declines patches.   Plan: SMOKING CESSATION COUNSELING, 3-10 MIN     The longitudinal care of plan for Ed was addressed during this visit. Due to added complexity of care, we will continue to support Keith Desir  and the subsequent management of these conditions and with ongoing continuity of care for these conditions.         32 minutes total medical care on today's date.               HPI: Keith Desir is a 79 year old male with a h/o htn, HLD, DM2, all of which are well controlled. He also has a h/o well compensated HFpEF, as well as AF for which he is ACd on warfarin. He unfortunately continues to smoke and is resistant to all advisement to stop tobacco intake. He was referred to us to address BLE swelling, edema, pain,  and is associated with  tautness, and tightness in his BLEs which directly correlates with the degree of swelling. His wife had been ACE wrapping his legs and notes that this improves the swelling and consequentially the pain and  tingling.  The patient presents today to address the above. ABIs are WNL other than numerically clinically inconsequentially reduced TBIs. He has no DVT on venous duplex. He has no pelvic floor LA on recent CT abd. TTE of 11/29/2024 reveals  normal LV fcn, elevated RVSP. His physical exam below bears the mirlande hallmarks of venous insufficiency with CEAP C4 venous insufficiency findings bilaterally and with secondary LE. 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      Review Of Systems  Skin: tight skin  Eyes: negative  Ears/Nose/Throat: negative  Respiratory: No shortness of breath, dyspnea on exertion, cough, or hemoptysis  Cardiovascular: negative  Gastrointestinal: negative  Genitourinary: negative  Musculoskeletal: as above  Neurologic: as above  Psychiatric: negative  Hematologic/Lymphatic/Immunologic: negative  Endocrine: negative      PAST MEDICAL HISTORY:                  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/2013    Hepatitis C     HTN (hypertension)     Hyperlipidemia     Morbid obesity (H)     NAFLD (nonalcoholic fatty liver disease)     Polycythemia secondary to smoking     Primary open angle glaucoma of left eye, mild stage 12/06/2023    Dr Riedel    Primary open-angle glaucoma, right eye, severe stage 12/06/2023    [facility] , Dr Patrick Riedel    Sensorineural hearing loss, bilateral 03/26/2024    Squamous cell carcinoma of skin, unspecified     Umbilical hernia without obstruction and without gangrene     Varicose veins  of legs 08/08/2018       PAST SURGICAL HISTORY:                  Past Surgical History:   Procedure Laterality Date    ANGIOGRAM  08/2006    normal    BACK SURGERY      CAPSULOTOMY Right 04/08/2025    CATARACT EXTRACTION Right     CATARACT EXTRACTION Left 04/08/2025    LUMBAR DISC SURGERY  11/11/2021    TONSILLECTOMY & ADENOIDECTOMY         CURRENT MEDICATIONS:                  Current Outpatient Medications   Medication Sig Dispense Refill    albuterol (PROAIR HFA/PROVENTIL HFA/VENTOLIN HFA) 108 (90 Base) MCG/ACT inhaler Inhale 1-2 puffs into the lungs every 4 hours as needed for shortness of breath / dyspnea 18 g 2    atorvastatin (LIPITOR) 20 MG tablet Take 1 tablet (20 mg) by mouth daily. 90 tablet 3    dimenhyDRINATE (DRAMAMINE PO) Take by mouth as needed. Takes every morning      empagliflozin (JARDIANCE) 10 MG TABS tablet Take 1 tablet (10 mg) by mouth daily. 90 tablet 3    fluticasone (FLONASE) 50 MCG/ACT nasal spray Spray 1 spray into both nostrils daily 16 g 11    fluticasone (FLOVENT DISKUS) 50 MCG/ACT inhaler Inhale 1 puff into the lungs every 12 hours. Using PRN      furosemide (LASIX) 20 MG tablet Take 2 tablets (40 mg) by mouth daily. 180 tablet 2    glipiZIDE (GLUCOTROL) 5 MG tablet TAKE 1 TABLET BY MOUTH TWICE DAILY BEFORE MEAL(S) 180 tablet 3    ketoconazole (NIZORAL) 2 % external shampoo Use every 2 days to wash scalp, ears, face, and under arms. Leave on skin for 3-5 minutes before rinsing. 120 mL 11    latanoprost (XALATAN) 0.005 % ophthalmic solution Place 1 drop into both eyes at bedtime.      lisinopril (ZESTRIL) 40 MG tablet Take 1 tablet (40 mg) by mouth daily 90 tablet 3    metFORMIN (GLUCOPHAGE) 1000 MG tablet TAKE 1 TABLET BY MOUTH 2 TIMES DAILY WITH MEALS 180 tablet 3    metoprolol tartrate (LOPRESSOR) 100 MG tablet Take 2 tablets (200 mg) by mouth 2 times daily. 360 tablet 3    warfarin ANTICOAGULANT (COUMADIN) 2.5 MG tablet TAKE 3 TABLETS (7.5MG) by mouth every day OR AS DIRECTED BY  INR CLINIC. 270 tablet 1    ketorolac (ACULAR) 0.5 % ophthalmic solution Instill 1 drop into Left Eye 4 times a day beginning one (1) day prior to surgery. Continue until the bottle is empty, but do not exceed four (4) weeks.* (Patient not taking: Reported on 5/21/2025)      moxifloxacin (VIGAMOX) 0.5 % ophthalmic solution Instill 1 drop into Left Eye 4 times a day beginning one (1) day prior to surgery. Continue until the bottle is empty, but do not exceed four (4) weeks.* (Patient not taking: Reported on 5/21/2025)      prednisoLONE acetate (PRED FORTE) 1 % ophthalmic suspension Instill 1 drop into Left Eye 4 times a day beginning one (1) day prior to surgery. Continue until the bottle is empty, but do not exceed four (4) weeks.* (Patient not taking: Reported on 5/21/2025)         ALLERGIES:                  Allergies   Allergen Reactions    Nkda [No Known Drug Allergy]        SOCIAL HISTORY:                  Social History     Socioeconomic History    Marital status:      Spouse name: Melissa    Number of children: 4    Years of education: 7    Highest education level: Not on file   Occupational History    Occupation: foundry work     Employer: DISABLED     Employer: RETIRED   Tobacco Use    Smoking status: Every Day     Current packs/day: 0.50     Average packs/day: 0.5 packs/day for 45.0 years (22.5 ttl pk-yrs)     Types: Cigarettes     Passive exposure: Current    Smokeless tobacco: Never    Tobacco comments:     Smoking since age of 15   Vaping Use    Vaping status: Never Used   Substance and Sexual Activity    Alcohol use: Yes     Alcohol/week: 0.0 - 4.2 standard drinks of alcohol    Drug use: No    Sexual activity: Not Currently     Partners: Female     Birth control/protection: None   Other Topics Concern    Parent/sibling w/ CABG, MI or angioplasty before 65F 55M? No   Social History Narrative    Not on file     Social Drivers of Health     Financial Resource Strain: Low Risk  (9/9/2024)    Financial  Resource Strain     Within the past 12 months, have you or your family members you live with been unable to get utilities (heat, electricity) when it was really needed?: No   Food Insecurity: Low Risk  (9/9/2024)    Food Insecurity     Within the past 12 months, did you worry that your food would run out before you got money to buy more?: No     Within the past 12 months, did the food you bought just not last and you didn t have money to get more?: No   Transportation Needs: Low Risk  (9/9/2024)    Transportation Needs     Within the past 12 months, has lack of transportation kept you from medical appointments, getting your medicines, non-medical meetings or appointments, work, or from getting things that you need?: No   Physical Activity: Inactive (9/9/2024)    Exercise Vital Sign     Days of Exercise per Week: 0 days     Minutes of Exercise per Session: 0 min   Stress: Patient Declined (9/9/2024)    [employer] - Occupational Stress Questionnaire     Feeling of Stress : Patient declined   Social Connections: Unknown (9/9/2024)    Social Connection and Isolation Panel [NHANES]     Frequency of Communication with Friends and Family: Not on file     Frequency of Social Gatherings with Friends and Family: Patient declined     Attends [employer] Services: Not on file     Active Member of Clubs or Organizations: Not on file     Attends Club or Organization Meetings: Not on file     Marital Status: Not on file   Interpersonal Safety: Low Risk  (1/29/2024)    Interpersonal Safety     Do you feel physically and emotionally safe where you currently live?: Yes     Within the past 12 months, have you been hit, slapped, kicked or otherwise physically hurt by someone?: No     Within the past 12 months, have you been humiliated or emotionally abused in other ways by your partner or ex-partner?: No   Housing Stability: Low Risk  (9/9/2024)    Housing Stability     Do you have housing? : Yes     Are you  worried about losing your housing?: No       FAMILY HISTORY:                   Family History   Problem Relation Age of Onset    Diabetes Mother     Cerebrovascular Disease Father     Heart Disease Brother         pacer     Deep Vein Thrombosis Son     Hodgkin's lymphoma Son     Cancer No family hx of     Glaucoma No family hx of     Macular Degeneration No family hx of              Physical exam Reveals:    O/P: WNL  HEENT: WNL  NECK: No JVD, thyromegaly, or lymphadenopathy  HEART: RRR, no murmurs, gallops, or rubs  LUNGS: CTA bilaterally without rales, wheezes, or rhonchi  GI: NABS, nondistended, nontender, soft  EXT:without cyanosis, clubbing, one plus edema with positive Stemmer sign BLEs  NEURO: nonfocal  : no flank tenderness         All relevant labs and imaging reviewed by myself on today's date.

## 2025-05-28 ENCOUNTER — RESULTS FOLLOW-UP (OUTPATIENT)
Dept: NURSING | Facility: CLINIC | Age: 80
End: 2025-05-28

## 2025-05-28 ENCOUNTER — ANTICOAGULATION THERAPY VISIT (OUTPATIENT)
Dept: ANTICOAGULATION | Facility: CLINIC | Age: 80
End: 2025-05-28

## 2025-05-28 ENCOUNTER — LAB (OUTPATIENT)
Dept: LAB | Facility: CLINIC | Age: 80
End: 2025-05-28
Payer: MEDICARE

## 2025-05-28 DIAGNOSIS — I48.20 CHRONIC ATRIAL FIBRILLATION (H): ICD-10-CM

## 2025-05-28 DIAGNOSIS — Z79.01 LONG TERM CURRENT USE OF ANTICOAGULANT THERAPY: Primary | ICD-10-CM

## 2025-05-28 DIAGNOSIS — Z79.01 LONG TERM CURRENT USE OF ANTICOAGULANT THERAPY: ICD-10-CM

## 2025-05-28 LAB — INR BLD: 3.2 (ref 0.9–1.1)

## 2025-05-28 PROCEDURE — 85610 PROTHROMBIN TIME: CPT

## 2025-05-28 PROCEDURE — 36416 COLLJ CAPILLARY BLOOD SPEC: CPT

## 2025-05-28 NOTE — PROGRESS NOTES
ANTICOAGULATION MANAGEMENT     Keith Desir 79 year old male is on warfarin with supratherapeutic INR result. (Goal INR 2.0-3.0)    Recent labs: (last 7 days)     05/28/25 0905   INR 3.2*       ASSESSMENT     Warfarin Lab Questionnaire    Warfarin Doses Last 7 Days      5/27/2025    10:06 AM   Dose in Tablet or Mg   TAB or MG? tablet (tab)     Pt Rptd Dose SUNDAY MONDAY TUESDAY WED THURS FRIDAY SATURDAY 5/27/2025  10:06 AM 3 3 3 3 3 3 3         5/27/2025   Warfarin Lab Questionnaire   Missed doses within past 14 days? No   Changes in diet or alcohol within past 14 days? No   Medication changes since last result? No   Injuries or illness since last result? No   New shortness of breath, severe headaches or sudden changes in vision since last result? No   Abnormal bleeding since last result? No   Upcoming surgery, procedure? No   Best number to call with results? [phone]     Previous result: Subtherapeutic  Additional findings: None       PLAN     Recommended plan for no diet, medication or health factor changes affecting INR     Dosing Instructions: Continue your current warfarin dose with next INR in 2 weeks       Summary  As of 5/28/2025      Full warfarin instructions:  7.5 mg every day   Next INR check:  7/2/2025               Telephone call with Melissa who verbalizes understanding and agrees to plan    Patient elected to schedule next visit in 5 weeks    Education provided: Goal range and lab monitoring: goal range and significance of current result and Importance of following up at instructed interval    Plan made per [facility] anticoagulation protocol    Ban Matamoros RN  5/28/2025  Anticoagulation Clinic  [facility] [employer] for routing messages: mikki PALUMBO  [facility] patient phone line: [phone]        _______________________________________________________________________     Anticoagulation Episode Summary       Current INR goal:  2.0-3.0   TTR:  86.1% (1 y)   Target end date:  Indefinite   Send INR  reminders to:  [facility]    Indications    Long term current use of anticoagulant therapy [Z79.01]  Chronic atrial fibrillation (HCC) [I48.20]             Comments:  --             Anticoagulation Care Providers       Provider Role Specialty Phone number    Cayla Gregorio MD Referring Family Medicine [phone]

## 2025-06-14 ENCOUNTER — HEALTH MAINTENANCE LETTER (OUTPATIENT)
Age: 80
End: 2025-06-14

## 2025-07-02 ENCOUNTER — ANTICOAGULATION THERAPY VISIT (OUTPATIENT)
Dept: ANTICOAGULATION | Facility: CLINIC | Age: 80
End: 2025-07-02

## 2025-07-02 ENCOUNTER — DOCUMENTATION ONLY (OUTPATIENT)
Dept: ANTICOAGULATION | Facility: CLINIC | Age: 80
End: 2025-07-02

## 2025-07-02 ENCOUNTER — RESULTS FOLLOW-UP (OUTPATIENT)
Dept: ANTICOAGULATION | Facility: CLINIC | Age: 80
End: 2025-07-02

## 2025-07-02 ENCOUNTER — LAB (OUTPATIENT)
Dept: LAB | Facility: CLINIC | Age: 80
End: 2025-07-02
Payer: MEDICARE

## 2025-07-02 DIAGNOSIS — I48.20 CHRONIC ATRIAL FIBRILLATION (H): ICD-10-CM

## 2025-07-02 DIAGNOSIS — Z79.01 LONG TERM CURRENT USE OF ANTICOAGULANT THERAPY: ICD-10-CM

## 2025-07-02 DIAGNOSIS — E11.22 TYPE 2 DIABETES MELLITUS WITH STAGE 2 CHRONIC KIDNEY DISEASE, WITHOUT LONG-TERM CURRENT USE OF INSULIN (H): Primary | ICD-10-CM

## 2025-07-02 DIAGNOSIS — Z79.01 LONG TERM CURRENT USE OF ANTICOAGULANT THERAPY: Primary | ICD-10-CM

## 2025-07-02 DIAGNOSIS — N18.2 TYPE 2 DIABETES MELLITUS WITH STAGE 2 CHRONIC KIDNEY DISEASE, WITHOUT LONG-TERM CURRENT USE OF INSULIN (H): Primary | ICD-10-CM

## 2025-07-02 LAB
EST. AVERAGE GLUCOSE BLD GHB EST-MCNC: 140 MG/DL
HBA1C MFR BLD: 6.5 % (ref 0–5.6)
INR BLD: 3 (ref 0.9–1.1)

## 2025-07-02 PROCEDURE — 83036 HEMOGLOBIN GLYCOSYLATED A1C: CPT

## 2025-07-02 PROCEDURE — 85610 PROTHROMBIN TIME: CPT

## 2025-07-02 PROCEDURE — 36415 COLL VENOUS BLD VENIPUNCTURE: CPT

## 2025-07-02 NOTE — PROGRESS NOTES
ANTICOAGULATION MANAGEMENT     Keith Desir 79 year old male is on warfarin with therapeutic INR result. (Goal INR 2.0-3.0)    Recent labs: (last 7 days)     07/02/25  0916   INR 3.0*       ASSESSMENT     Warfarin Lab Questionnaire    Warfarin Doses Last 7 Days      7/1/2025    10:04 AM   Dose in Tablet or Mg   TAB or MG? tablet (tab)     Pt Rptd Dose SUNDAY MONDAY TUESDAY WED THURS FRIDAY SATURDAY 7/1/2025  10:04 AM 3 3 3 3 3 3 3         7/1/2025   Warfarin Lab Questionnaire   Missed doses within past 14 days? No   Changes in diet or alcohol within past 14 days? No   Medication changes since last result? No   Injuries or illness since last result? No   New shortness of breath, severe headaches or sudden changes in vision since last result? No   Abnormal bleeding since last result? No   Upcoming surgery, procedure? No   Best number to call with results? [phone]     Previous result: Supratherapeutic  Additional findings: None     PLAN     Recommended plan for no diet, medication or health factor changes affecting INR     Dosing Instructions: Continue your current warfarin dose with next INR in 3 weeks   - scheduled for 6 weeks.    Summary  As of 7/2/2025      Full warfarin instructions:  7.5 mg every day   Next INR check:  7/23/2025               Telephone call with Melissa who verbalizes understanding and agrees to plan    Lab visit scheduled    Education provided: Please call back if any changes to your diet, medications or how you've been taking warfarin    Plan made per [facility] anticoagulation protocol    Jenniffer Loera RN  7/2/2025  Anticoagulation Clinic  [facility] for routing messages: mikki PALUMBO  [facility] patient phone line: [phone]        _______________________________________________________________________     Anticoagulation Episode Summary       Current INR goal:  2.0-3.0   TTR:  76.5% (1 y)   Target end date:  Indefinite   Send INR reminders to:  AJ PALUMBO    Indications    Long term  current use of anticoagulant therapy [Z79.01]  Chronic atrial fibrillation (HCC) [I48.20]             Comments:  --             Anticoagulation Care Providers       Provider Role Specialty Phone number    Cayla Gregorio MD [facility] Medicine [phone]

## 2025-07-02 NOTE — CONFIDENTIAL NOTE
ANTICOAGULATION CLINIC REFERRAL RENEWAL REQUEST       An annual renewal order is required for all patients referred to [facility].?  Please review and sign the pended referral order for Keith Desir.       ANTICOAGULATION SUMMARY      Warfarin indication(s)   Atrial Fibrillation    Mechanical heart valve present?  NO       Current goal range   INR: 2.0-3.0     Goal appropriate for indication? Goal INR 2-3, standard for indication(s) above     Time in Therapeutic Range (TTR)  (Goal > 60%) 76.5%       Office visit with referring provider's group within last year yes on 3/25/25   Additional standing orders None       Jenniffer Loera RN  [facility]

## 2025-07-03 ENCOUNTER — RESULTS FOLLOW-UP (OUTPATIENT)
Dept: FAMILY MEDICINE | Facility: CLINIC | Age: 80
End: 2025-07-03

## 2025-08-02 DIAGNOSIS — I48.20 CHRONIC ATRIAL FIBRILLATION (H): ICD-10-CM

## 2025-08-04 RX ORDER — WARFARIN SODIUM 2.5 MG/1
TABLET ORAL
Qty: 270 TABLET | Refills: 1 | Status: SHIPPED | OUTPATIENT
Start: 2025-08-04

## 2025-08-13 ENCOUNTER — ANTICOAGULATION THERAPY VISIT (OUTPATIENT)
Dept: ANTICOAGULATION | Facility: CLINIC | Age: 80
End: 2025-08-13

## 2025-08-13 ENCOUNTER — RESULTS FOLLOW-UP (OUTPATIENT)
Dept: ANTICOAGULATION | Facility: CLINIC | Age: 80
End: 2025-08-13

## 2025-08-13 ENCOUNTER — LAB (OUTPATIENT)
Dept: LAB | Facility: CLINIC | Age: 80
End: 2025-08-13
Payer: MEDICARE

## 2025-08-13 DIAGNOSIS — Z79.01 LONG TERM CURRENT USE OF ANTICOAGULANT THERAPY: ICD-10-CM

## 2025-08-13 DIAGNOSIS — I48.20 CHRONIC ATRIAL FIBRILLATION (H): ICD-10-CM

## 2025-08-13 DIAGNOSIS — Z79.01 LONG TERM CURRENT USE OF ANTICOAGULANT THERAPY: Primary | ICD-10-CM

## 2025-08-13 LAB — INR BLD: 3.3 (ref 0.9–1.1)

## 2025-08-13 PROCEDURE — 36415 COLL VENOUS BLD VENIPUNCTURE: CPT

## 2025-08-13 PROCEDURE — 85610 PROTHROMBIN TIME: CPT

## 2025-08-27 ENCOUNTER — RESULTS FOLLOW-UP (OUTPATIENT)
Dept: NURSING | Facility: CLINIC | Age: 80
End: 2025-08-27

## 2025-08-27 ENCOUNTER — ANTICOAGULATION THERAPY VISIT (OUTPATIENT)
Dept: ANTICOAGULATION | Facility: CLINIC | Age: 80
End: 2025-08-27

## 2025-08-27 ENCOUNTER — LAB (OUTPATIENT)
Dept: LAB | Facility: CLINIC | Age: 80
End: 2025-08-27
Payer: MEDICARE

## 2025-08-27 DIAGNOSIS — Z79.01 LONG TERM CURRENT USE OF ANTICOAGULANT THERAPY: ICD-10-CM

## 2025-08-27 DIAGNOSIS — E11.22 TYPE 2 DIABETES MELLITUS WITH STAGE 2 CHRONIC KIDNEY DISEASE, WITHOUT LONG-TERM CURRENT USE OF INSULIN (H): ICD-10-CM

## 2025-08-27 DIAGNOSIS — N18.2 TYPE 2 DIABETES MELLITUS WITH STAGE 2 CHRONIC KIDNEY DISEASE, WITHOUT LONG-TERM CURRENT USE OF INSULIN (H): ICD-10-CM

## 2025-08-27 DIAGNOSIS — Z13.6 SCREENING FOR CARDIOVASCULAR CONDITION: Primary | ICD-10-CM

## 2025-08-27 DIAGNOSIS — I48.20 CHRONIC ATRIAL FIBRILLATION (H): ICD-10-CM

## 2025-08-27 DIAGNOSIS — Z79.01 LONG TERM CURRENT USE OF ANTICOAGULANT THERAPY: Primary | ICD-10-CM

## 2025-08-27 LAB
ANION GAP SERPL CALCULATED.3IONS-SCNC: 11 MMOL/L (ref 7–15)
BUN SERPL-MCNC: 28.5 MG/DL (ref 8–23)
CALCIUM SERPL-MCNC: 9.6 MG/DL (ref 8.8–10.4)
CHLORIDE SERPL-SCNC: 101 MMOL/L (ref 98–107)
CHOLEST SERPL-MCNC: 111 MG/DL
CREAT SERPL-MCNC: 1.1 MG/DL (ref 0.67–1.17)
EGFRCR SERPLBLD CKD-EPI 2021: 69 ML/MIN/1.73M2
ERYTHROCYTE [DISTWIDTH] IN BLOOD BY AUTOMATED COUNT: 14 % (ref 10–15)
FASTING STATUS PATIENT QL REPORTED: NO
FASTING STATUS PATIENT QL REPORTED: NO
GLUCOSE SERPL-MCNC: 112 MG/DL (ref 70–99)
HCO3 SERPL-SCNC: 28 MMOL/L (ref 22–29)
HCT VFR BLD AUTO: 49.2 % (ref 40–53)
HDLC SERPL-MCNC: 39 MG/DL
HGB BLD-MCNC: 16.5 G/DL (ref 13.3–17.7)
INR BLD: 3.3 (ref 0.9–1.1)
LDLC SERPL CALC-MCNC: 42 MG/DL
MCH RBC QN AUTO: 33.9 PG (ref 26.5–33)
MCHC RBC AUTO-ENTMCNC: 33.5 G/DL (ref 31.5–36.5)
MCV RBC AUTO: 101 FL (ref 78–100)
NONHDLC SERPL-MCNC: 72 MG/DL
PLATELET # BLD AUTO: 186 10E3/UL (ref 150–450)
POTASSIUM SERPL-SCNC: 4.9 MMOL/L (ref 3.4–5.3)
RBC # BLD AUTO: 4.87 10E6/UL (ref 4.4–5.9)
SODIUM SERPL-SCNC: 140 MMOL/L (ref 135–145)
TRIGL SERPL-MCNC: 148 MG/DL
WBC # BLD AUTO: 9.76 10E3/UL (ref 4–11)

## 2025-08-27 PROCEDURE — 85610 PROTHROMBIN TIME: CPT

## 2025-08-27 PROCEDURE — 36415 COLL VENOUS BLD VENIPUNCTURE: CPT

## 2025-08-27 PROCEDURE — 85027 COMPLETE CBC AUTOMATED: CPT

## 2025-08-27 PROCEDURE — 80048 BASIC METABOLIC PNL TOTAL CA: CPT

## 2025-08-27 PROCEDURE — 80061 LIPID PANEL: CPT

## 2025-09-03 DIAGNOSIS — L21.9 SEBORRHEIC DERMATITIS: ICD-10-CM

## 2025-09-03 RX ORDER — KETOCONAZOLE 20 MG/ML
SHAMPOO, SUSPENSION TOPICAL
Qty: 120 ML | Refills: 0 | Status: SHIPPED | OUTPATIENT
Start: 2025-09-03